# Patient Record
Sex: FEMALE | Race: WHITE | NOT HISPANIC OR LATINO | Employment: FULL TIME | URBAN - METROPOLITAN AREA
[De-identification: names, ages, dates, MRNs, and addresses within clinical notes are randomized per-mention and may not be internally consistent; named-entity substitution may affect disease eponyms.]

---

## 2017-01-04 ENCOUNTER — OFFICE VISIT (OUTPATIENT)
Dept: PHYSICAL MEDICINE AND REHAB | Facility: CLINIC | Age: 67
End: 2017-01-04
Payer: COMMERCIAL

## 2017-01-04 VITALS — BODY MASS INDEX: 43.19 KG/M2 | HEIGHT: 60 IN | WEIGHT: 220 LBS

## 2017-01-04 DIAGNOSIS — M17.12 PRIMARY OSTEOARTHRITIS OF LEFT KNEE: Primary | ICD-10-CM

## 2017-01-04 PROCEDURE — 99499 UNLISTED E&M SERVICE: CPT | Mod: S$GLB,,, | Performed by: PHYSICAL MEDICINE & REHABILITATION

## 2017-01-04 PROCEDURE — 20611 DRAIN/INJ JOINT/BURSA W/US: CPT | Mod: S$GLB,,, | Performed by: PHYSICAL MEDICINE & REHABILITATION

## 2017-01-04 PROCEDURE — 99999 PR PBB SHADOW E&M-EST. PATIENT-LVL II: CPT | Mod: PBBFAC,,, | Performed by: PHYSICAL MEDICINE & REHABILITATION

## 2017-01-04 NOTE — PROGRESS NOTES
[facility]    Consulting Provider: Dr. Nicholas Peter    CHIEF COMPLAINT:   Chief Complaint   Patient presents with    Knee Pain     left knee synvisc one     HISTORY OF PRESENT ILLNESS: Kenna Schmitt is a 66 y.o. female who presents to me in follow-up for her left knee pain.  She has been doing physical therapy and reports improvements and reduction in pain.  She is now doing a home exercise program.  Her pain level today is a 1 on a scale of 1-10.  She does note some mild swelling about the left knee.  There is no excess redness or warmth, or recent illnesses.    Previous history of present illness  The pain started about one month ago insidiously.  There was no known trauma or injury event.  She rates her pain as a 3 on a scale of 1-10, however the pain may increase with increased physical exertion.  She reports the knee is felt swollen at times.  She denies any excess redness or warmth about the knee.  She has been applying heat intermittently which seems to help temporarily.  She has been using Aleve and diclofenac with little relief.  She locates the pain most prominently over the medial aspect of the left knee.  The pain is achy in nature.    Review of Systems   Constitutional: Negative for fever.   HENT: Negative for drooling.    Eyes: Negative for discharge.   Respiratory: Negative for choking.    Cardiovascular: Negative for chest pain.   Genitourinary: Negative for flank pain.   Skin: Negative for wound.   Allergic/Immunologic: Negative for immunocompromised state.   Neurological: Negative for tremors and syncope.   Psychiatric/Behavioral: Negative for behavioral problems.     Past Medical History:   Past Medical History   Diagnosis Date    GERD (gastroesophageal reflux disease)     Hyperlipidemia     Hypertension     Thyroid disease 1975     Thyroiditis       Past Surgical History:   Past Surgical History   Procedure Laterality Date    Breast surgery  2014     breast  implants     Breast surgery  1989     breast implants    Cholecystectomy      Liposuction      Colonoscopy N/A 11/23/2016     Procedure: COLONOSCOPY;  Surgeon: Anoop Buck MD;  Location: [facility];  Service: Endoscopy;  Laterality: N/A;       Family History:   Family History   Problem Relation Age of Onset    Dementia Mother     Heart disease Father      heart valve    Dementia Father     Cancer Maternal Aunt      lung cancer       Medications:   Current Outpatient Prescriptions on File Prior to Visit   Medication Sig Dispense Refill    amlodipine (NORVASC) 5 MG tablet Take 1 tablet (5 mg total) by mouth once daily. 90 tablet 3    atorvastatin (LIPITOR) 20 MG tablet TAKE 1 TABLET BY MOUTH EVERY DAY 90 tablet 3    b complex vitamins capsule Take 1 capsule by mouth once daily.      chlorthalidone (HYGROTEN) 25 MG Tab Take 1 tablet (25 mg total) by mouth once daily. 30 tablet 11    cholecalciferol, vitamin D3, (VITAMIN D3) 2,000 unit Cap Take 1 capsule by mouth once daily.      diclofenac (VOLTAREN) 75 MG EC tablet Take 1 tablet (75 mg total) by mouth 3 (three) times daily. 30 tablet 0    levothyroxine (SYNTHROID) 100 MCG tablet TAKE 1 TABLET BY MOUTH ONCE DAILY 90 tablet 2    omeprazole (PRILOSEC) 20 MG capsule TAKE ONE CAPSULE BY MOUTH DAILY 30 capsule 6    valacyclovir (VALTREX) 500 MG tablet Take 500 mg by mouth 2 (two) times daily. PRN fever blisters       No current facility-administered medications on file prior to visit.        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  Has recently started an exercise program.     PHYSICAL EXAMINATION:   General    Vitals:    01/04/17 0906   Weight: 99.8 kg (220 lb)   Height: 5' (1.524 m)     Constitutional: Oriented to person, place, and time. No apparent distress. Appears well-developed and well-nourished. Pleasant.  HENT:   Head: Normocephalic and atraumatic.   Eyes: Right eye exhibits no discharge. Left eye exhibits no discharge. No scleral icterus.   Pulmonary/Chest: Effort normal. No respiratory distress.   Abdominal: There is no guarding.   Neurological: Alert and oriented to person, place, and time.   Psychiatric: Behavior is normal.   Right Knee Exam   Right knee exam is normal.    Tenderness   The patient is experiencing no tenderness.         Range of Motion   Extension: normal   Flexion: normal     Muscle Strength     The patient has normal right knee strength.    Tests   Juan Carlos:  Medial - negative Lateral - negative  Varus: negative  Valgus: negative    Other   Erythema: absent  Scars: absent  Sensation: normal  Pulse: present  Swelling: none  Other tests: no effusion present      Left Knee Exam     Tenderness   The patient is experiencing tenderness in the medial joint line.    Range of Motion   Extension: 0   Flexion: 130     Tests   Juan Carlos:  Medial - negative Lateral - negative  Lachman:  Anterior - negative    Posterior - negative  Drawer:       Anterior - negative     Posterior - negative  Varus: negative  Valgus: negative  Patellar Apprehension: negative    Other   Erythema: absent  Scars: absent (On)  Sensation: normal  Pulse: present  Swelling: none  Effusion: effusion present        INSPECTION: There is no swelling, ecchymoses, erythema or gross deformity about the left knee.  GAIT/DYNAMIC: Her gait is  slightly antalgic.    Imaging  X-ray of left knee from 10/31/2016: There are no fractures or soft tissue abnormalities.  There are mild degenerative changes present.    Data Reviewed: X-ray    Supportive Actions: Independent visualization of images or test specimens    ASSESSMENT:   1. Primary osteoarthritis of left knee      PLAN:     1.  We performed the left knee injection of Synvisc 1 today without issue.  See separate procedure note.  25 cc of serous drainage was taken from the left knee.    2.  Continue her home exercise program to maintain long-term strength about the left knee.    3. RTC when necessary.    The above note was completed, in part, with the aid of Dragon dictation software/hardware. Translation errors may be present.

## 2017-01-04 NOTE — PROCEDURES
Large Joint Aspiration/Injection  Date/Time: 1/4/2017 9:56 AM  Performed by: PONCHO ZARATE  Authorized by: PONCHO ZARATE     Consent Done?:  Yes (Verbal)  Indications:  Pain  Procedure site marked: Yes    Timeout: Prior to procedure the correct patient, procedure, and site was verified      Location:  Knee  Site:  L knee  Prep: Patient was prepped and draped in usual sterile fashion    Ultrasonic Guidance for needle placement: Yes  Images are saved and documented.  Needle size:  18 G  Approach: Needle in plane, superior-lateral.  Medications:  48 mg hylan g-f 20 48 mg/6 mL  Aspirate amount (ml):  25  Aspirate:  Serous  Patient tolerance:  Patient tolerated the procedure well with no immediate complications    Additional Comments: Ultrasound guidance was used for correct needle placement, the images were saved will be uploaded to EMR.

## 2017-01-05 ENCOUNTER — TELEPHONE (OUTPATIENT)
Dept: PHYSICAL MEDICINE AND REHAB | Facility: CLINIC | Age: 67
End: 2017-01-05

## 2017-01-05 NOTE — TELEPHONE ENCOUNTER
----- Message from Tiffany Nash sent at 1/5/2017 12:47 PM CST -----  Contact: Dr. Mercedes - Peer reviewer for [facility]  Dr. Mercedes is requesting a call back concerning the peer review for the above patient contact him at [phone] cell. He stated turn around time ends this afternoon.    Thank you

## 2017-01-05 NOTE — TELEPHONE ENCOUNTER
Dr jamil spoke with Dr Mercedes at [facility] [place] for approval. The synvisc One injection was denied because the patient has not tried and failed cortisone injections. I did go over the fact that the synvisc had not yet been approved at the time the patient arrived for the injection. I advised her there was a chance this injection would not be approved. Rather than reschedule the appt the patient decided to go forward with the injection understanding that she could in fact be responsible for the entire amount of the injection. I called her today to let her know that the injection has in fact been denied and she will be responsible for the amount of the injection. Patient voiced understanding and asked to be billed for the injection.

## 2017-01-22 ENCOUNTER — PATIENT MESSAGE (OUTPATIENT)
Dept: PHYSICAL MEDICINE AND REHAB | Facility: CLINIC | Age: 67
End: 2017-01-22

## 2017-01-23 ENCOUNTER — PATIENT MESSAGE (OUTPATIENT)
Dept: PHYSICAL MEDICINE AND REHAB | Facility: CLINIC | Age: 67
End: 2017-01-23

## 2017-01-23 ENCOUNTER — TELEPHONE (OUTPATIENT)
Dept: PHYSICAL MEDICINE AND REHAB | Facility: CLINIC | Age: 67
End: 2017-01-23

## 2017-01-23 NOTE — TELEPHONE ENCOUNTER
Spoke with patient- she confirms that there is no redness or swelling. She did get some relief initially but she felt that was because you removed fluid off of the knee. I reassured her that I would not count the synvisc out just yet. I explained that in some patients it can take up to 6 weeks to feel the full effects. I instructed her to continue with the aleve as directed on the bottle- she was just taking a pill here and there. I instructed her to use it as directed for the next couple of weeks and to use Ice as well for comfort. Continue her exercise regimen and return to clinic if not better in 3 weeks. Pt voiced understanding. She will take antiinflammatories, exercise and use ice- she will get back in touch with me in 3 weeks for a report of progress.

## 2017-01-26 ENCOUNTER — PATIENT MESSAGE (OUTPATIENT)
Dept: PHYSICAL MEDICINE AND REHAB | Facility: CLINIC | Age: 67
End: 2017-01-26

## 2017-01-26 ENCOUNTER — TELEPHONE (OUTPATIENT)
Dept: PHYSICAL MEDICINE AND REHAB | Facility: CLINIC | Age: 67
End: 2017-01-26

## 2017-01-26 DIAGNOSIS — G89.29 CHRONIC PAIN OF LEFT KNEE: Primary | ICD-10-CM

## 2017-01-26 DIAGNOSIS — M25.562 CHRONIC PAIN OF LEFT KNEE: Primary | ICD-10-CM

## 2017-01-26 RX ORDER — TRAMADOL HYDROCHLORIDE 50 MG/1
50 TABLET ORAL EVERY 6 HOURS PRN
Qty: 45 TABLET | Refills: 0 | Status: SHIPPED | OUTPATIENT
Start: 2017-01-26 | End: 2017-02-09

## 2017-01-26 NOTE — TELEPHONE ENCOUNTER
Patient states she is in great pain which started 2 days ago. She states it is painful when she bears weight. No redness and swelling. She has been taking aleve. But it doesn't seem to help the pain. She works from home and would be able to take some pain medication if you wanted to give her something. She is willing to get an MRI but it will have to be approved by insurance.I put in order for the MRI and will schedule. Please send rx to pharmacy for pain.

## 2017-01-30 ENCOUNTER — OFFICE VISIT (OUTPATIENT)
Dept: ORTHOPEDICS | Facility: CLINIC | Age: 67
End: 2017-01-30
Payer: COMMERCIAL

## 2017-01-30 ENCOUNTER — TELEPHONE (OUTPATIENT)
Dept: PHYSICAL MEDICINE AND REHAB | Facility: CLINIC | Age: 67
End: 2017-01-30

## 2017-01-30 VITALS — BODY MASS INDEX: 43.19 KG/M2 | HEIGHT: 60 IN | WEIGHT: 220 LBS

## 2017-01-30 DIAGNOSIS — M87.9 OSTEONECROSIS OF LEFT KNEE REGION: Primary | ICD-10-CM

## 2017-01-30 PROCEDURE — 99999 PR PBB SHADOW E&M-EST. PATIENT-LVL II: CPT | Mod: PBBFAC,,, | Performed by: ORTHOPAEDIC SURGERY

## 2017-01-30 PROCEDURE — 99244 OFF/OP CNSLTJ NEW/EST MOD 40: CPT | Mod: S$GLB,,, | Performed by: ORTHOPAEDIC SURGERY

## 2017-01-30 NOTE — TELEPHONE ENCOUNTER
----- Message from Nicholas Peter MD sent at 1/30/2017  8:36 AM CST -----  Please give her a call and let her know the MRI showed arthritis and an there is an area of the bone that is inflammed. I recommend she she one of our ortho surgeons to discuss options. If she decides against surgery, we could consider PRP/stem cells.

## 2017-01-30 NOTE — PROGRESS NOTES
DATE: 1/30/2017  PATIENT: Kenna Schmitt  REFERRING MD: Nicholas Peter M.D.  CHIEF COMPLAINT:   Chief Complaint   Patient presents with    Left Knee - Pain       HISTORY:  Kenna Schmitt is a 66 y.o. female  who is referred by Dr. Peter for evaluation of left knee pain.  States that she notes a 3-4 month history of discomfort when she was up on a ladder and developed pain.  States the pain has progressively been getting worse.  She saw Dr. Peter who recommended a series of Synvisc injections.  They provided no relief.  She did undergo an MRI which showed osteonecrosis in the medial femoral condyle with significant edema in the medial femoral condyle as well.  She is now referred for evaluation.  She takes over-the-counter pain medicine.  She did try tramadol did not provide any improvement.  She is employed selling outdoor gentleman bleacher equipment.  She reports her pain at 10/10 today.  PAST MEDICAL/SURGICAL HISTORY:  Past Medical History   Diagnosis Date    GERD (gastroesophageal reflux disease)     Hyperlipidemia     Hypertension     Thyroid disease 1975     Thyroiditis     Past Surgical History   Procedure Laterality Date    Breast surgery  2014     breast implants     Breast surgery  1989     breast implants    Cholecystectomy      Liposuction      Colonoscopy N/A 11/23/2016     Procedure: COLONOSCOPY;  Surgeon: Anoop Buck MD;  Location: [facility];  Service: Endoscopy;  Laterality: N/A;       Current Medications:   Current Outpatient Prescriptions:     amlodipine (NORVASC) 5 MG tablet, Take 1 tablet (5 mg total) by mouth once daily., Disp: 90 tablet, Rfl: 3    atorvastatin (LIPITOR) 20 MG tablet, TAKE 1 TABLET BY MOUTH EVERY DAY, Disp: 90 tablet, Rfl: 3    b complex vitamins capsule, Take 1 capsule by mouth once daily., Disp: , Rfl:     chlorthalidone (HYGROTEN) 25 MG Tab, Take 1 tablet (25 mg total) by mouth once daily., Disp: 30 tablet, Rfl: 11    cholecalciferol, vitamin  D3, (VITAMIN D3) 2,000 unit Cap, Take 1 capsule by mouth once daily., Disp: , Rfl:     diclofenac (VOLTAREN) 75 MG EC tablet, Take 1 tablet (75 mg total) by mouth 3 (three) times daily., Disp: 30 tablet, Rfl: 0    levothyroxine (SYNTHROID) 100 MCG tablet, TAKE 1 TABLET BY MOUTH ONCE DAILY, Disp: 90 tablet, Rfl: 2    omeprazole (PRILOSEC) 20 MG capsule, TAKE ONE CAPSULE BY MOUTH DAILY, Disp: 30 capsule, Rfl: 6    tramadol (ULTRAM) 50 mg tablet, Take 1 tablet (50 mg total) by mouth every 6 (six) hours as needed for Pain., Disp: 45 tablet, Rfl: 0    valacyclovir (VALTREX) 500 MG tablet, Take 500 mg by mouth 2 (two) times daily. 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  Has recently started an exercise program.       ROS:  Constitution: Negative for chills, fever, and sweats. Negative for unexplained weight loss.  HENT: Negative for headaches and blurry vision.   Cardiovascular: Negative for chest pain, irregular heartbeat, leg swelling and palpitations.   Respiratory: Negative for cough and shortness of breath.   Gastrointestinal: Negative for abdominal pain, heartburn, nausea and vomiting.   Genitourinary: Negative for bladder incontinence and dysuria.   Musculoskeletal: Negative for systemic arthritis, muscle weakness and myalgias.   Neurological: Negative for numbness.   Psychiatric/Behavioral: Negative for depression.  Endocrine: Negative for polyuria.   Hematologic/Lymphatic:  Negative for bleeding disorders.   Skin: Negative for poor wound healing.        PHYSICAL EXAM:  Visit Vitals    Ht 5' (1.524 m)    Wt 99.8 kg (220 lb)    BMI 42.97 kg/m2     Kenna Schmitt is a well developed, well nourished female in no acute distress. Physical examination of the left knee evaluated the following:    Gait and Alignment  Inspection for ecchymosis, swelling, atrophy, or deformity  Inspection for intra-articular and/or bursal effusions  Tenderness to palpation over the bony and soft tissue structures around the knee  Range of Motion and presence of extensor lag/contractures  Sensation and motor strength  Varus/valgus or anterior/posterior/rotatory instability  Flexion pinch and Juan Carlos's Tests  Patellar alignment/tracking/pain to palpation  Vascular exam to include skin temperature/color/capillary refill    Remarkable findings included:  Normal alignment.  Elevated BMI.  No effusion noted.  Moderate medial joint line tenderness and tenderness over the medial femoral condyle.  Range of motion 0-120°.  No gross instability on exam.  Positive flexion pinch.    IMAGING:   X-rays and MRI of the left knee are reviewed.  No acute fractures are seen.  X-rays show minimal degenerative changes without joint space narrowing.  MRI shows significant bone edema in the medial femoral condyle with an area of osteonecrosis which appears to be a detached fragment.    ASSESSMENT:   Osteonecrosis medial femoral condyle left knee    PLAN:  The nature of the diagnosis, using models and diagrams when appropriate, was explained to the patient in detail.Treatment option discussed included observation, cortisone injection, arthroscopy, arthroplasty.  As she has not responded to conservative treatment over the last 2-3 months, I do think arthroscopy and debridement is the first step.  I have explained that this may not fully resolve the issue would be diagnostic and potentially therapeutic.  Surgical procedure was explained  in detail.. All questions answered and the patient wishes to think about her options.  She'll contact the office should she wish to proceed with arthroscopy.  I'll wait to hear from the patient regarding further care..      This note was dictated using voice recognition software and may contain grammatical errors

## 2017-01-30 NOTE — LETTER
January 30, 2017      Nicholas Peter MD  [address] [facility] [address] [place] [address]           [facility]  [address] [facility] [address] [place] [address]  Phone: [phone]          Patient: Kenna Schmitt   MR Number: [id number]   YOB: 1950   Date of Visit: 1/30/2017       Dear Dr. Nicholas Peter:    Thank you for referring Kenna Schmitt to me for evaluation. Attached you will find relevant portions of my assessment and plan of care.    If you have questions, please do not hesitate to call me. I look forward to following Kenna Schmitt along with you.    Sincerely,    Kirby Dale MD    Enclosure  CC:  No Recipients    If you would like to receive this communication electronically, please contact [email or web address] or [phone] to request more information on [facility] access.    For providers and/or their staff who would like to refer a patient to [facility], please contact us through our one-stop-shop provider referral line, [facility], at [phone].    If you feel you have received this communication in error or would no longer like to receive these types of communications, please e-mail [email or web address]

## 2017-02-06 ENCOUNTER — OFFICE VISIT (OUTPATIENT)
Dept: FAMILY MEDICINE | Facility: CLINIC | Age: 67
End: 2017-02-06
Payer: COMMERCIAL

## 2017-02-06 ENCOUNTER — OFFICE VISIT (OUTPATIENT)
Dept: ORTHOPEDICS | Facility: CLINIC | Age: 67
End: 2017-02-06
Payer: COMMERCIAL

## 2017-02-06 VITALS
TEMPERATURE: 98 F | HEIGHT: 60 IN | WEIGHT: 217.13 LBS | OXYGEN SATURATION: 96 % | BODY MASS INDEX: 42.63 KG/M2 | SYSTOLIC BLOOD PRESSURE: 130 MMHG | DIASTOLIC BLOOD PRESSURE: 74 MMHG | HEART RATE: 82 BPM

## 2017-02-06 VITALS — BODY MASS INDEX: 43.19 KG/M2 | HEIGHT: 60 IN | WEIGHT: 220 LBS

## 2017-02-06 DIAGNOSIS — M25.569 KNEE PAIN, UNSPECIFIED CHRONICITY, UNSPECIFIED LATERALITY: ICD-10-CM

## 2017-02-06 DIAGNOSIS — M87.9 OSTEONECROSIS OF LEFT KNEE REGION: Primary | ICD-10-CM

## 2017-02-06 DIAGNOSIS — R05.9 COUGH: Primary | ICD-10-CM

## 2017-02-06 DIAGNOSIS — I10 ESSENTIAL HYPERTENSION: ICD-10-CM

## 2017-02-06 DIAGNOSIS — R09.81 NASAL CONGESTION: ICD-10-CM

## 2017-02-06 PROCEDURE — 1157F ADVNC CARE PLAN IN RCRD: CPT | Mod: S$GLB,,, | Performed by: NURSE PRACTITIONER

## 2017-02-06 PROCEDURE — 1159F MED LIST DOCD IN RCRD: CPT | Mod: S$GLB,,, | Performed by: NURSE PRACTITIONER

## 2017-02-06 PROCEDURE — 3078F DIAST BP <80 MM HG: CPT | Mod: S$GLB,,, | Performed by: ORTHOPAEDIC SURGERY

## 2017-02-06 PROCEDURE — 99214 OFFICE O/P EST MOD 30 MIN: CPT | Mod: S$GLB,,, | Performed by: ORTHOPAEDIC SURGERY

## 2017-02-06 PROCEDURE — 1126F AMNT PAIN NOTED NONE PRSNT: CPT | Mod: S$GLB,,, | Performed by: ORTHOPAEDIC SURGERY

## 2017-02-06 PROCEDURE — 99999 PR PBB SHADOW E&M-EST. PATIENT-LVL II: CPT | Mod: PBBFAC,,, | Performed by: ORTHOPAEDIC SURGERY

## 2017-02-06 PROCEDURE — 3078F DIAST BP <80 MM HG: CPT | Mod: S$GLB,,, | Performed by: NURSE PRACTITIONER

## 2017-02-06 PROCEDURE — 99999 PR PBB SHADOW E&M-EST. PATIENT-LVL III: CPT | Mod: PBBFAC,,, | Performed by: NURSE PRACTITIONER

## 2017-02-06 PROCEDURE — 3074F SYST BP LT 130 MM HG: CPT | Mod: S$GLB,,, | Performed by: ORTHOPAEDIC SURGERY

## 2017-02-06 PROCEDURE — 1126F AMNT PAIN NOTED NONE PRSNT: CPT | Mod: S$GLB,,, | Performed by: NURSE PRACTITIONER

## 2017-02-06 PROCEDURE — 99213 OFFICE O/P EST LOW 20 MIN: CPT | Mod: S$GLB,,, | Performed by: NURSE PRACTITIONER

## 2017-02-06 PROCEDURE — 1157F ADVNC CARE PLAN IN RCRD: CPT | Mod: S$GLB,,, | Performed by: ORTHOPAEDIC SURGERY

## 2017-02-06 PROCEDURE — 1160F RVW MEDS BY RX/DR IN RCRD: CPT | Mod: S$GLB,,, | Performed by: NURSE PRACTITIONER

## 2017-02-06 PROCEDURE — 3075F SYST BP GE 130 - 139MM HG: CPT | Mod: S$GLB,,, | Performed by: NURSE PRACTITIONER

## 2017-02-06 PROCEDURE — 1160F RVW MEDS BY RX/DR IN RCRD: CPT | Mod: S$GLB,,, | Performed by: ORTHOPAEDIC SURGERY

## 2017-02-06 PROCEDURE — 1159F MED LIST DOCD IN RCRD: CPT | Mod: S$GLB,,, | Performed by: ORTHOPAEDIC SURGERY

## 2017-02-06 NOTE — PROGRESS NOTES
Subjective:       Patient ID: Kenna Schmitt is a 66 y.o. female.    Chief Complaint: Cough (cough for a week. Non productive cough.Been taking Nyquil.)    Cough   This is a new problem. The current episode started in the past 7 days. The problem has been gradually improving (feeling better ). The cough is productive of sputum. Associated symptoms include postnasal drip and rhinorrhea. Pertinent negatives include no chest pain, chills, ear congestion, ear pain, fever, headaches, heartburn, hemoptysis, myalgias, nasal congestion, rash, sore throat, shortness of breath, sweats, weight loss or wheezing. Nothing aggravates the symptoms. Treatments tried: nyquil  The treatment provided no relief. There is no history of asthma, bronchiectasis, bronchitis, COPD, emphysema, environmental allergies or pneumonia.     Vitals:    02/06/17 1410   BP: 130/74   Pulse: 82   Temp: 98.2 °F (36.8 °C)     Review of Systems   Constitutional: Negative.  Negative for chills, diaphoresis, fatigue, fever and weight loss.   HENT: Positive for congestion, postnasal drip and rhinorrhea. Negative for ear pain and sore throat.    Eyes: Negative.    Respiratory: Positive for cough. Negative for hemoptysis, shortness of breath and wheezing.    Cardiovascular: Negative.  Negative for chest pain.   Gastrointestinal: Negative.  Negative for abdominal pain, diarrhea, heartburn and nausea.   Endocrine: Negative.    Genitourinary: Negative.  Negative for dysuria and hematuria.   Musculoskeletal: Negative.  Negative for myalgias.   Skin: Negative.  Negative for color change and rash.   Allergic/Immunologic: Negative.  Negative for environmental allergies.   Neurological: Negative.  Negative for speech difficulty, numbness and headaches.   Hematological: Negative.    Psychiatric/Behavioral: Negative.        Past Medical History   Diagnosis Date    GERD (gastroesophageal reflux disease)     Hyperlipidemia     Hypertension     Thyroid disease 1975      Thyroiditis     Objective:      Physical Exam   Constitutional: She is oriented to person, place, and time. She appears well-developed and well-nourished.   HENT:   Head: Normocephalic and atraumatic.   Right Ear: Hearing, tympanic membrane and ear canal normal.   Left Ear: Hearing, tympanic membrane and ear canal normal.   Nose: Mucosal edema and rhinorrhea present. Right sinus exhibits no maxillary sinus tenderness and no frontal sinus tenderness. Left sinus exhibits no maxillary sinus tenderness and no frontal sinus tenderness.   Mouth/Throat: Oropharynx is clear and moist.   Eyes: Conjunctivae and EOM are normal. Pupils are equal, round, and reactive to light.   Neck: Neck supple.   Cardiovascular: Normal rate, regular rhythm, normal heart sounds and intact distal pulses.  Exam reveals no friction rub.    No murmur heard.  Pulmonary/Chest: Effort normal and breath sounds normal. No respiratory distress. She has no wheezes. She has no rales.   Abdominal: Soft. Bowel sounds are normal.   Musculoskeletal: Normal range of motion.   Neurological: She is alert and oriented to person, place, and time.   Skin: Skin is warm and dry.   Psychiatric: She has a normal mood and affect. Her behavior is normal. Judgment and thought content normal.   Nursing note and vitals reviewed.      Assessment:       1. Cough    2. Nasal congestion    3. Essential hypertension    4. Knee pain, unspecified chronicity, unspecified laterality        Plan:       Cough    Nasal congestion  Discussed viral illness   As long as no worsening in cough, congestion or fever - will be stable for surgery     Essential hypertension  Stable       Knee pain, unspecified chronicity, unspecified laterality  Having surgery with Dr Dale         Discussed if any increase in cough, congestion- needs to call back by Wednesday   Can keep taking nyquil for help with antihistamine effect and cough

## 2017-02-07 NOTE — PROGRESS NOTES
DATE: 2/7/2017  PATIENT: Kenna Schmitt    Attending Physician: Kirby Dale M.D.    HISTORY:  Kenna Schmitt is a 66 y.o. female who returns for follow up evaluation of  her left knee.  She is seen last week and diagnosed with osteonecrosis of the medial femoral condyle.  I recommended arthroscopic debridement as a first stage procedure.  She had thought about her options and wishes to discuss this further as she continues to remain in moderate discomfort.    PMH/PSH/FamHx/SocHx:  Reviewed and unchanged from prior visit    ROS:  Constitution: Negative for chills, fever, and sweats. Negative for unexplained weight loss.  HENT: Negative for headaches and blurry vision.   Cardiovascular: Negative for chest pain, irregular heartbeat, leg swelling and palpitations.   Respiratory: Negative for cough and shortness of breath.   Gastrointestinal: Negative for abdominal pain, heartburn, nausea and vomiting.   Genitourinary: Negative for bladder incontinence and dysuria.   Musculoskeletal: Negative for systemic arthritis, joint swelling, muscle weakness and myalgias.   Neurological: Negative for numbness.   Psychiatric/Behavioral: Negative for depression.   Endocrine: Negative for polyuria.   Hematologic/Lymphatic: Negative for bleeding disorders.  Skin: Negative for poor wound healing.       EXAM:  Visit Vitals    Ht 5' (1.524 m)    Wt 99.8 kg (220 lb)    BMI 42.97 kg/m2     Consitiutional: Well developed, well nourished female in no acute distress.  HEENT: Normocephalic, atraumatic.   Neck: Supple.  Chest: Breath sounds equal.  Cor: Regular, rate and rhythm.   Abdomen: Soft, nontender, nondistended. Without rebound or guarding.  Neuro: Alert, oriented x3. Nonfocal.   Rectal/GYN: Deferred.  Physical examination of the left knee evaluated the following:     Gait and Alignment  Inspection for ecchymosis, swelling, atrophy, or deformity  Inspection for intra-articular and/or bursal effusions  Tenderness to palpation over  the bony and soft tissue structures around the knee  Range of Motion and presence of extensor lag/contractures  Sensation and motor strength  Varus/valgus or anterior/posterior/rotatory instability  Flexion pinch and Juan Carlos's Tests  Patellar alignment/tracking/pain to palpation  Vascular exam to include skin temperature/color/capillary refill     Remarkable findings included:  Normal alignment. Elevated BMI. No effusion noted. Moderate medial joint line tenderness and tenderness over the medial femoral condyle. Range of motion 0-120°. No gross instability on exam. Positive flexion pinch.       IMAGING:   No new x-rays are performed today.  However previous MRI does show stiff and osteonecrosis in the medial femoral condyle.    ASSESSMENT:  Osteonecrosis medial femoral condyle left knee.    PLAN:  The implications of the patient's evolution of symptoms and findings were explained to the patient in detail.  I recommended arthroscopy with debridement and possible microfracture of medial femoral condyle as a first stage procedure.The surgical procedure including potential risks and benefits was discussed in detail. Specific risks discussed included but were not limited to: infection, the possibility of a negative arthroscopic exam, arthroscopy failing to reveal any surgically treatable abnormalities (such as arthritic changes), failure to relieve symptoms, recurrence, nerve injury resulting in permanent numbness or weakness, blood vessel damage requiring repair and/or hospitalization, permanent stiffness, failure to achieve full joint mobility, permanent weakness, extensive and time consuming therapy, deep venous thrombosis and pulmonary embolism, and anesthesia related risks to be discussed with the anesthesiologist.  . All questions answered and the patient wishes to proceed with arthroscopy.  Informed consent obtained and signed.  We'll obtain medical clearance as she does have evidence of sinusitis and congestion.   Follow-up at the time of surgery..          This note was dictated using voice recognition software and may contain grammatical errors  Answers for HPI/ROS submitted by the patient on 2/5/2017   Leg pain  neck pain: Yes, No  unexpected weight change: No  hearing loss: No  rhinorrhea: No  trouble swallowing: No  eye discharge: No  visual disturbance: No  chest tightness: No  wheezing: No  chest pain: No  palpatations: No  blood in stool: No  constipation: No  vomiting: No  diarrhea: No  polydipsia: No  polyuria: No  difficulty urinating: No  hematuria: No  menstrual problem: No  dysuria: No  joint swelling: No  headaches: No  weakness: No  confusion: No  dysphoric mood: No  appetite change : No  sleep disturbance: No  IMMUNOCOMPROMISED: No  nervous/ anxious: No  rash: No  eye redness: No  eye pain: No  ear pain: No  tinnitus: No  sinus pressure : Yes  nosebleeds: No  enviro allergies: No  food allergies: Yes  cough: Yes  shortness of breath: No  sweating: No  frequency: No  painful intercourse: No  nausea: No  dizziness: No  numbness: No  seizures: No  myalgia: No  back pain: No  Pain Chronicity: chronic  History of trauma: No  Onset: more than 1 month ago  Frequency: daily  Progression since onset: rapidly worsening  injury location: at home  pain- numeric: 8/10  pain location: left knee  pain quality: sharp  Radiating Pain: Yes  If your pain is radiating, to what part of the body?: left flank  Aggravating factors: bearing weight  fever: No  inability to bear weight: Yes  itching: No  joint locking: No  limited range of motion: Yes  stiffness: Yes  tingling: No  Treatments tried: injection treatment  physical therapy: effective  Improvement on treatment: mild

## 2017-02-09 ENCOUNTER — PATIENT MESSAGE (OUTPATIENT)
Dept: ORTHOPEDICS | Facility: CLINIC | Age: 67
End: 2017-02-09

## 2017-02-09 ENCOUNTER — ANESTHESIA EVENT (OUTPATIENT)
Dept: SURGERY | Facility: HOSPITAL | Age: 67
End: 2017-02-09
Payer: COMMERCIAL

## 2017-02-09 NOTE — PLAN OF CARE
Problem: Patient Care Overview  Goal: Plan of Care Review  HTN  Osteonecrosis of left knee region   Left knee pain

## 2017-02-10 ENCOUNTER — SURGERY (OUTPATIENT)
Age: 67
End: 2017-02-10

## 2017-02-10 ENCOUNTER — HOSPITAL ENCOUNTER (OUTPATIENT)
Facility: HOSPITAL | Age: 67
Discharge: HOME | End: 2017-02-10
Attending: ORTHOPAEDIC SURGERY | Admitting: ORTHOPAEDIC SURGERY
Payer: COMMERCIAL

## 2017-02-10 ENCOUNTER — ANESTHESIA (OUTPATIENT)
Dept: SURGERY | Facility: HOSPITAL | Age: 67
End: 2017-02-10
Payer: COMMERCIAL

## 2017-02-10 DIAGNOSIS — M87.9 OSTEONECROSIS OF LEFT KNEE REGION: ICD-10-CM

## 2017-02-10 DIAGNOSIS — S83.232A COMPLEX TEAR OF MEDIAL MENISCUS OF LEFT KNEE AS CURRENT INJURY, INITIAL ENCOUNTER: ICD-10-CM

## 2017-02-10 PROBLEM — M22.42 CHONDROMALACIA PATELLAE OF LEFT KNEE: Status: ACTIVE | Noted: 2017-02-10

## 2017-02-10 PROCEDURE — 71000033 HC RECOVERY, INTIAL HOUR: Mod: PO | Performed by: ORTHOPAEDIC SURGERY

## 2017-02-10 PROCEDURE — 29879 ARTHRS KNE SRG ABRASJ ARTHRP: CPT | Mod: LT,,, | Performed by: ORTHOPAEDIC SURGERY

## 2017-02-10 PROCEDURE — 25000003 PHARM REV CODE 250: Mod: PO | Performed by: ANESTHESIOLOGY

## 2017-02-10 PROCEDURE — 25000003 PHARM REV CODE 250: Mod: PO | Performed by: ORTHOPAEDIC SURGERY

## 2017-02-10 PROCEDURE — 29881 ARTHRS KNE SRG MNISECTMY M/L: CPT | Mod: 51,LT,, | Performed by: ORTHOPAEDIC SURGERY

## 2017-02-10 PROCEDURE — 36000711: Mod: PO | Performed by: ORTHOPAEDIC SURGERY

## 2017-02-10 PROCEDURE — D9220A PRA ANESTHESIA: Mod: ANES,,, | Performed by: ANESTHESIOLOGY

## 2017-02-10 PROCEDURE — 25000003 PHARM REV CODE 250: Mod: PO | Performed by: NURSE ANESTHETIST, CERTIFIED REGISTERED

## 2017-02-10 PROCEDURE — 71000039 HC RECOVERY, EACH ADD'L HOUR: Mod: PO | Performed by: ORTHOPAEDIC SURGERY

## 2017-02-10 PROCEDURE — 27201423 OPTIME MED/SURG SUP & DEVICES STERILE SUPPLY: Mod: PO | Performed by: ORTHOPAEDIC SURGERY

## 2017-02-10 PROCEDURE — 63600175 PHARM REV CODE 636 W HCPCS: Mod: PO | Performed by: ANESTHESIOLOGY

## 2017-02-10 PROCEDURE — 37000009 HC ANESTHESIA EA ADD 15 MINS: Mod: PO | Performed by: ORTHOPAEDIC SURGERY

## 2017-02-10 PROCEDURE — 63600175 PHARM REV CODE 636 W HCPCS: Mod: PO | Performed by: NURSE ANESTHETIST, CERTIFIED REGISTERED

## 2017-02-10 PROCEDURE — 36000710: Mod: PO | Performed by: ORTHOPAEDIC SURGERY

## 2017-02-10 PROCEDURE — 63600175 PHARM REV CODE 636 W HCPCS: Mod: PO | Performed by: ORTHOPAEDIC SURGERY

## 2017-02-10 PROCEDURE — 37000008 HC ANESTHESIA 1ST 15 MINUTES: Mod: PO | Performed by: ORTHOPAEDIC SURGERY

## 2017-02-10 PROCEDURE — 27200651 HC AIRWAY, LMA: Mod: PO | Performed by: NURSE ANESTHETIST, CERTIFIED REGISTERED

## 2017-02-10 PROCEDURE — D9220A PRA ANESTHESIA: Mod: CRNA,,, | Performed by: NURSE ANESTHETIST, CERTIFIED REGISTERED

## 2017-02-10 RX ORDER — PROPOFOL 10 MG/ML
VIAL (ML) INTRAVENOUS
Status: DISCONTINUED | OUTPATIENT
Start: 2017-02-10 | End: 2017-02-10

## 2017-02-10 RX ORDER — CLINDAMYCIN PHOSPHATE 900 MG/50ML
900 INJECTION, SOLUTION INTRAVENOUS
Status: DISCONTINUED | OUTPATIENT
Start: 2017-02-10 | End: 2017-02-10 | Stop reason: HOSPADM

## 2017-02-10 RX ORDER — LIDOCAINE HCL/PF 100 MG/5ML
SYRINGE (ML) INTRAVENOUS
Status: DISCONTINUED | OUTPATIENT
Start: 2017-02-10 | End: 2017-02-10

## 2017-02-10 RX ORDER — SODIUM CHLORIDE 0.9 % (FLUSH) 0.9 %
3 SYRINGE (ML) INJECTION
Status: DISCONTINUED | OUTPATIENT
Start: 2017-02-10 | End: 2017-02-10 | Stop reason: HOSPADM

## 2017-02-10 RX ORDER — MIDAZOLAM HYDROCHLORIDE 1 MG/ML
INJECTION, SOLUTION INTRAMUSCULAR; INTRAVENOUS
Status: DISCONTINUED | OUTPATIENT
Start: 2017-02-10 | End: 2017-02-10

## 2017-02-10 RX ORDER — FENTANYL CITRATE 50 UG/ML
25 INJECTION, SOLUTION INTRAMUSCULAR; INTRAVENOUS EVERY 5 MIN PRN
Status: DISCONTINUED | OUTPATIENT
Start: 2017-02-10 | End: 2017-02-10 | Stop reason: HOSPADM

## 2017-02-10 RX ORDER — SODIUM CHLORIDE, SODIUM LACTATE, POTASSIUM CHLORIDE, CALCIUM CHLORIDE 600; 310; 30; 20 MG/100ML; MG/100ML; MG/100ML; MG/100ML
INJECTION, SOLUTION INTRAVENOUS CONTINUOUS
Status: DISCONTINUED | OUTPATIENT
Start: 2017-02-10 | End: 2017-02-10 | Stop reason: HOSPADM

## 2017-02-10 RX ORDER — OXYCODONE HYDROCHLORIDE 5 MG/1
10 TABLET ORAL EVERY 4 HOURS PRN
Status: DISCONTINUED | OUTPATIENT
Start: 2017-02-10 | End: 2017-02-10 | Stop reason: HOSPADM

## 2017-02-10 RX ORDER — FENTANYL CITRATE 50 UG/ML
INJECTION, SOLUTION INTRAMUSCULAR; INTRAVENOUS
Status: DISCONTINUED | OUTPATIENT
Start: 2017-02-10 | End: 2017-02-10

## 2017-02-10 RX ORDER — ONDANSETRON 8 MG/1
8 TABLET, ORALLY DISINTEGRATING ORAL EVERY 8 HOURS PRN
Status: DISCONTINUED | OUTPATIENT
Start: 2017-02-10 | End: 2017-02-10 | Stop reason: HOSPADM

## 2017-02-10 RX ORDER — EPINEPHRINE 1 MG/ML
INJECTION INTRAMUSCULAR; INTRAVENOUS; SUBCUTANEOUS
Status: DISCONTINUED | OUTPATIENT
Start: 2017-02-10 | End: 2017-02-10 | Stop reason: HOSPADM

## 2017-02-10 RX ORDER — OXYCODONE HYDROCHLORIDE 5 MG/1
5 TABLET ORAL EVERY 4 HOURS PRN
Status: DISCONTINUED | OUTPATIENT
Start: 2017-02-10 | End: 2017-02-10 | Stop reason: HOSPADM

## 2017-02-10 RX ORDER — LIDOCAINE HYDROCHLORIDE 10 MG/ML
1 INJECTION, SOLUTION EPIDURAL; INFILTRATION; INTRACAUDAL; PERINEURAL ONCE AS NEEDED
Status: COMPLETED | OUTPATIENT
Start: 2017-02-10 | End: 2017-02-10

## 2017-02-10 RX ORDER — KETAMINE HYDROCHLORIDE 100 MG/ML
INJECTION, SOLUTION INTRAMUSCULAR; INTRAVENOUS
Status: DISCONTINUED | OUTPATIENT
Start: 2017-02-10 | End: 2017-02-10

## 2017-02-10 RX ORDER — ACETAMINOPHEN 10 MG/ML
INJECTION, SOLUTION INTRAVENOUS
Status: DISCONTINUED | OUTPATIENT
Start: 2017-02-10 | End: 2017-02-10

## 2017-02-10 RX ORDER — OXYCODONE AND ACETAMINOPHEN 10; 325 MG/1; MG/1
1 TABLET ORAL
Qty: 60 TABLET | Refills: 0 | Status: SHIPPED | OUTPATIENT
Start: 2017-02-10 | End: 2017-04-13 | Stop reason: ALTCHOICE

## 2017-02-10 RX ORDER — DEXAMETHASONE SODIUM PHOSPHATE 4 MG/ML
8 INJECTION, SOLUTION INTRA-ARTICULAR; INTRALESIONAL; INTRAMUSCULAR; INTRAVENOUS; SOFT TISSUE
Status: COMPLETED | OUTPATIENT
Start: 2017-02-10 | End: 2017-02-10

## 2017-02-10 RX ORDER — ROPIVACAINE HYDROCHLORIDE 5 MG/ML
INJECTION, SOLUTION EPIDURAL; INFILTRATION; PERINEURAL
Status: DISCONTINUED | OUTPATIENT
Start: 2017-02-10 | End: 2017-02-10 | Stop reason: HOSPADM

## 2017-02-10 RX ADMIN — LIDOCAINE HYDROCHLORIDE: 10 INJECTION, SOLUTION EPIDURAL; INFILTRATION; INTRACAUDAL; PERINEURAL at 07:02

## 2017-02-10 RX ADMIN — FENTANYL CITRATE 50 MCG: 50 INJECTION, SOLUTION INTRAMUSCULAR; INTRAVENOUS at 09:02

## 2017-02-10 RX ADMIN — ACETAMINOPHEN 1000 MG: 10 INJECTION, SOLUTION INTRAVENOUS at 09:02

## 2017-02-10 RX ADMIN — DEXAMETHASONE SODIUM PHOSPHATE 8 MG: 4 INJECTION, SOLUTION INTRAMUSCULAR; INTRAVENOUS at 07:02

## 2017-02-10 RX ADMIN — EPINEPHRINE 3 MG: 1 INJECTION, SOLUTION INTRAMUSCULAR; INTRAVENOUS; SUBCUTANEOUS at 09:02

## 2017-02-10 RX ADMIN — LIDOCAINE HYDROCHLORIDE 100 MG: 20 INJECTION PARENTERAL at 09:02

## 2017-02-10 RX ADMIN — KETAMINE HYDROCHLORIDE 25 MG: 100 INJECTION, SOLUTION, CONCENTRATE INTRAMUSCULAR; INTRAVENOUS at 09:02

## 2017-02-10 RX ADMIN — MIDAZOLAM HYDROCHLORIDE 2 MG: 1 INJECTION, SOLUTION INTRAMUSCULAR; INTRAVENOUS at 09:02

## 2017-02-10 RX ADMIN — ROPIVACAINE HYDROCHLORIDE 30 ML: 5 INJECTION, SOLUTION EPIDURAL; INFILTRATION; PERINEURAL at 09:02

## 2017-02-10 RX ADMIN — PROPOFOL 150 MG: 10 INJECTION, EMULSION INTRAVENOUS at 09:02

## 2017-02-10 RX ADMIN — SODIUM CHLORIDE, SODIUM LACTATE, POTASSIUM CHLORIDE, AND CALCIUM CHLORIDE: .6; .31; .03; .02 INJECTION, SOLUTION INTRAVENOUS at 07:02

## 2017-02-10 RX ADMIN — OXYCODONE HYDROCHLORIDE 5 MG: 5 TABLET ORAL at 11:02

## 2017-02-10 NOTE — TRANSFER OF CARE
Anesthesia Transfer of Care Note    Patient: Kenna Schmitt    Procedure(s) Performed: Procedure(s) (LRB):  ARTHROSCOPY-KNEE  (Left)  ARTHROSCOPY-MENISCECTOMY PARTIAL (Left)  ARTHROSCOPY-KNEE DEBRIDEMENT (Left)    Patient location: PACU    Anesthesia Type: general    Transport from OR: Transported from OR on room air with adequate spontaneous ventilation    Post pain: adequate analgesia    Post vital signs: stable    Level of consciousness: awake    Nausea/Vomiting: no nausea/vomiting    Complications: none          Last vitals:   Visit Vitals    BP (!) 111/59 (BP Location: Left arm, Patient Position: Lying, BP Method: Automatic)    Pulse 76    Temp 36.3 °C (97.3 °F) (Skin)    Resp 17    Ht 5' (1.524 m)    Wt 98.4 kg (217 lb)    SpO2 (!) 94%    Breastfeeding No    BMI 42.38 kg/m2

## 2017-02-10 NOTE — ANESTHESIA POSTPROCEDURE EVALUATION
Anesthesia Post Evaluation    Patient: Kenna Schmitt    Procedure(s) Performed: Procedure(s) (LRB):  ARTHROSCOPY-KNEE  (Left)  ARTHROSCOPY-MENISCECTOMY PARTIAL (Left)  ARTHROSCOPY-KNEE DEBRIDEMENT (Left)    Final Anesthesia Type: general  Patient location during evaluation: PACU  Patient participation: Yes- Able to Participate  Level of consciousness: awake and alert  Post-procedure vital signs: reviewed and stable  Pain management: adequate  Airway patency: patent  PONV status at discharge: No PONV  Anesthetic complications: no      Cardiovascular status: hemodynamically stable  Respiratory status: unassisted and room air  Hydration status: euvolemic  Follow-up not needed.        Visit Vitals    BP (!) 110/54 (BP Location: Left arm, Patient Position: Lying, BP Method: Automatic)    Pulse 70    Temp 36.3 °C (97.3 °F) (Skin)    Resp 16    Ht 5' (1.524 m)    Wt 98.4 kg (217 lb)    SpO2 96%    Breastfeeding No    BMI 42.38 kg/m2       Pain/Rogerio Score: Pain Assessment Performed: Yes (2/10/2017 11:25 AM)  Presence of Pain: complains of pain/discomfort (2/10/2017 11:25 AM)  Pain Rating Prior to Med Admin: 4 (2/10/2017 11:25 AM)  Rogerio Score: 10 (2/10/2017 11:25 AM)

## 2017-02-10 NOTE — DISCHARGE INSTRUCTIONS
[facility] [address] [place] [address]  [phone]                   Dr. Dale's Postoperative Instructions   for Knee Surgery                 Your Surgery Included:                  Arthroscopic Open         [x] Diagnostic   [] Ligament Repair/Reconstruction      [x] Remove osteonecrotic loose body         [] MCL   [] PLC    [] MPFL      [] Lysis of Adhesion/Manipulation [] Proximal Patellar Realignment      [x] Partial Menisectomy      [x] Medial  [] Lateral   []Distal Patellar Realignment/Osteotomy      [] Meniscal Repair      [] Medial   [] Lateral  [] Fracture Fixation    [] Tibial Plateau  [] Patella  [] Distal Femur      [x] Debridement/Chondroplasty          [x] MFC  [] MTP [] LFC [] LTP [] MFP [] LFP  [] Trochlea  [] Tendon Repair           [] Quadriceps   [] Patellar       [] Articular Cartilage Repair [x] Microfracture (MF)  [] OATS          [x] MFC  [] MTP [] LFC [] LTP [] MFP [] LFP  [] Trochlea [] HTO             [] Opening wedge   [] Closing wedge      [] ACL Reconstruction    [] Autograft   [] Allograft [] Hybrid  [] PRP injection                 [] SBHS     [] DBHS    [] BPTB                     [] PCL Reconstruction    [] Autograft   [] Allograft [] Hybrid                             [] Achilles    [] HS      [] BPTB            [] Lateral Release             Call our office [phone] or [phone] immediately if you experience any of the following:         Excessive bleeding or pus like drainage at the incision site       Uncontrollable pain not relieved by pain medication       Excessive swelling or redness at the incision site       Fever above 101.5 degrees not controlled with Tylenol or Motrin       Shortness of Breath       Any foul odor or blistering from the surgery site        1.   Pain Management: A cold therapy cuff, pain medications, local injections, and in some cases, regional anesthesia injections are used to manage your  post-operative pain. The decision to use each of these options is based on their risks and benefits.     Medications: You were given one or more of the following medication prescriptions before leaving the hospital. Have the prescriptions filled at a pharmacy on your way home and follow the instructions on the bottle. If you need a refill, please call your pharmacy.      Narcotic Medication (usually Vicodin ES, Lortab, Percocet or Nucynta): Begin taking the medication before your knee starts to hurt. Some patients do not like to take any medication, but if you wait until your pain is severe before taking, you will be very uncomfortable for several hours waiting for the medication to work. Always take with food.     Nausea / Vomiting: If you develop post-operative nausea and vomiting, please contact the office and an anti-emetic, such as Zofran can be prescribed. Use this medication as directed.     Cold Therapy: You may have been sent home with a cold therapy unit and wrap for your knee. Fill with ice and water to the indicated fill line and use throughout the day for the first two days and then as needed to help relieve pain and control swelling. However, never place the cold pad directly against your skin. Place over the dressing or after the first dressing change over a light pair of sweat pants.     Regional Anesthesia Injections (Blocks): You may have been given a regional nerve block either before or after surgery. This may make your entire lower leg weak and numb for 24-36 hours. You may have also had a catheter placed which will provide regional anesthesia for 48 hours. If after 36 hours (48 hours if you have had a catheter placed), you still do not have feeling in your leg, please contact the office.                   2.   Diet: Start with clear liquids and then eat a bland diet for the first day after surgery. Progress your diet as tolerated. Constipation may occur with Narcotic usage, contact our office  if you are experiencing constipation.    3.   Activity: After you arrive at home, spend most of the first 24 hours resting in bed, on the couch, or in a reclining chair. Many patients feel more comfortable with your leg elevated. After the first 48 hours, slowly increase your activity level based upon your symptoms.    4.   Dressing Change: Remove the dressing on the 2nd post-operative day unless specifically instructed not to do so. It is normal for some blood to be seen on the dressings. It is also normal for you to see apparent bruising on the skin around your knee when you remove the dressing. Please place a bandaid over each portal site (i.e. over each suture). If present, leave the steri-strip tape across the incisions. If they fall off, it is OK, but do not peel them off. Let them fall off on their own. If you are concerned by the drainage or the appearance of your knee, please call the office.    5.   Showering: You may shower after the first dressing change if the wound is clean and dry. Please place a sheet of Saran wrap over the incisions to keep them dry. It is OK if a small amount of water gets on the incisions. However, do not let the wound soak/submerge in water until the sutures are removed.     6.   DVT Prophylaxis (Blood Clot Prevention):  To minimize the risk of blood clots after lower extremity surgery, please follow the checked recommendations below:            [x] Take 1 full strength Aspirin (325mg) daily with food for [x] 3 weeks [] 6 weeks post-op          [] Use compression stocking to the non-operative leg for 4 weeks post-operatively.      7.  Weight Bearing Status: You may have been sent home with crutches/cane /walker to assist with your weight bearing status as described below:                        []  Weight bear as tolerated (D/C assistive devise when comfortable)         []  Partial weight bearing (ambulate but use assistive device)         [x]  Non weight bearing  ( do not place  "any weight on extremity)  8. Brace: If you were placed in a brace post-operatively, the brace should be locked in         extension when ambulating or sleeping        [] Keep brace on at all times (except for dressing changes)         []  You may remove the brace as tolerated.         Leg Exercises: Begin the marked exercises the first day after surgery in order to help you             regain your motion and strength. You may do the following marked exercises for 5                         minutes at least 3-5 times/day:      [x] Quad Sets - Begin activating your quadriceps muscle by driving your          knee downward into full knee extension while seated on a table or bed   with a towel rolled and propped under your heel     [] Straight Leg Raise (SLR) - While raleigh your quadriceps muscle, lift     your fully extended leg to the level of your non-operative knee (as shown)     [x] Heel Slides - With the knee straight, slide your heel slowly toward your   buttocks, hold at the endpoint for 10-15 seconds, then slowly straighten     [x] Ankle pumps - With your knee straight, move your ankle in a "pumping"    fashion to activate your calf and leg muscles          9. CPM Machine: If you were prescribed a CPM machine, please use 4-6 hours/day. Start at 0-30 degrees and increase 5-10 degrees/day until you are comfortably at 90 degrees. You may then call the office to have the machine returned. Please make sure the brace (if you were given one) is unlocked or removed when in the CPM.        10.  Physical Therapy: Physical therapy is an essential component to your recovery from surgery. Your physical therapy plan will be discussed at your first post-operative visit.    11.Work Status: [] Out of work/gym until follow up appointment     [] May return to work light duty.     [] May return to work/gym without restrictions.     FIRST POSTOPERATIVE VISIT:  As scheduled. However, if you don't have a    scheduled appointment " or can't remember when it is, please contact the office.        Best wishes for a successful recovery!      Kirby Dale MD

## 2017-02-10 NOTE — BRIEF OP NOTE
[facility]  Brief Operative Note     SUMMARY     Surgery Date: 2/10/2017     Surgeon(s) and Role:     * Kirby Dale MD - Primary    First Assistant: ANANYA Escoto    Pre-op Diagnosis:  Osteonecrosis of left knee region [M87.9]    Post-op Diagnosis:  Osteonecrosis of left knee region [M87.9]     Medial meniscus tear left knee.  Procedure(s) (LRB):  ARTHROSCOPY-KNEE  (Left)  ARTHROSCOPY-MENISCECTOMY PARTIAL (Left)  ARTHROSCOPY-KNEE DEBRIDEMENT (Left)    Anesthesia: General    Description of the findings of the procedure: See dictated Op Note.    Findings/Key Components: See dictated Op Note.    Estimated Blood Loss: * No values recorded between 2/10/2017  9:56 AM and 2/10/2017 10:32 AM *         Specimens:   Specimen     None          Discharge Note    SUMMARY     Admit Date: 2/10/2017    Discharge Date and Time: No discharge date for patient encounter.    Attending Physician: Kirby Dale MD     Discharge Provider: Kirby Dale    Final Diagnosis: Osteonecrosis of left knee region [M87.9]    Outcome of Hospitalization, Treatment, Procedure, or Surgery:    Patient admitted for outpatient procedure, procedure tolerated well, patient discharged home.    Disposition: Home or Self Care    Follow Up/Patient Instructions:   Follow-up Information     Follow up with Kirby Dale MD. Schedule an appointment as soon as possible for a visit on 2/16/2017.    Specialties:  Sports Medicine, Orthopedic Surgery    Why:  For wound re-check    Contact information:    [address] [place] [address]  [phone]            Medications:  Reconciled Home Medications:   Current Discharge Medication List      START taking these medications    Details   oxycodone-acetaminophen (PERCOCET)  mg per tablet Take 1 tablet by mouth every 4 to 6 hours as needed for Pain.  Qty: 60 tablet, Refills: 0         CONTINUE these medications which have NOT CHANGED    Details   atorvastatin (LIPITOR) 20  MG tablet TAKE 1 TABLET BY MOUTH EVERY DAY  Qty: 90 tablet, Refills: 3    Associated Diagnoses: Hyperlipidemia      b complex vitamins capsule Take 1 capsule by mouth once daily.      chlorthalidone (HYGROTEN) 25 MG Tab Take 1 tablet (25 mg total) by mouth once daily.  Qty: 30 tablet, Refills: 11    Associated Diagnoses: Essential hypertension      cholecalciferol, vitamin D3, (VITAMIN D3) 2,000 unit Cap Take 1 capsule by mouth once daily.      Lactobacillus rhamnosus GG (CULTURELLE) 10 billion cell capsule Take 1 capsule by mouth once daily.      levothyroxine (SYNTHROID) 100 MCG tablet TAKE 1 TABLET BY MOUTH ONCE DAILY  Qty: 90 tablet, Refills: 2    Associated Diagnoses: Hypothyroidism due to acquired atrophy of thyroid      omeprazole (PRILOSEC) 20 MG capsule TAKE ONE CAPSULE BY MOUTH DAILY  Qty: 30 capsule, Refills: 6      valacyclovir (VALTREX) 500 MG tablet Take 500 mg by mouth 2 (two) times daily. PRN fever blisters             Discharge Procedure Orders  CRUTCHES FOR HOME USE   Order Specific Question Answer Comments   Type: Axillary    Height: 5' (1.524 m)    Weight: 98.4 kg (217 lb)    Length of need (1-99 months): 1      Diet general     Shower on day dressing removed (No bath)     Keep surgical extremity elevated     Ice to affected area     Weight bearing restrictions (specify)     No driving, operating heavy equipment or signing legal documents while taking pain medication     Call MD for:  temperature >100.4     Call MD for:  persistent nausea and vomiting     Call MD for:  severe uncontrolled pain     Call MD for:  difficulty breathing, headache or visual disturbances     Call MD for:  redness, tenderness, or signs of infection (pain, swelling, redness, odor or green/yellow discharge around incision site)     Call MD for:  hives     Call MD for:  persistent dizziness or light-headedness     Call MD for:  extreme fatigue     Remove dressing in 48 hours

## 2017-02-10 NOTE — ANESTHESIA PREPROCEDURE EVALUATION
02/10/2017  Kenna Schmitt is a 66 y.o., female.    [facility] Anesthesia Evaluation    I have reviewed the Patient Summary Reports.    I have reviewed the Nursing Notes.      Review of Systems  Anesthesia Hx:  No problems with previous Anesthesia    Social:  Former Smoker    Hematology/Oncology:  Hematology Normal   Oncology Normal     EENT/Dental:EENT/Dental Normal   Cardiovascular:   Hypertension, well controlled    Pulmonary:  Pulmonary Normal    Hepatic/GI:   GERD    Musculoskeletal:  Musculoskeletal Normal    Neurological:  Neurology Normal    Endocrine:   Hypothyroidism    Dermatological:  Skin Normal        Physical Exam  General:  Morbid Obesity    Airway/Jaw/Neck:  Airway Findings: Mouth Opening: Normal Tongue: Normal  General Airway Assessment: Adult  Mallampati: II  Improves to I with phonation.  TM Distance: Normal, at least 6 cm        Eyes/Ears/Nose:  EYES/EARS/NOSE FINDINGS: Normal   Dental:  DENTAL FINDINGS: Normal   Chest/Lungs:  Chest/Lungs Findings: Clear to auscultation     Heart/Vascular:  Heart Findings: Rate: Normal  Rhythm: Regular Rhythm  Sounds: Normal  Heart Murmur  Systolic  Systolic Heart Murmur Description: L Upper Sternal Border  Systolic Heart Murmur Grade: Grade II  Vascular Findings: Normal    Abdomen:  Abdomen Findings: Normal    Musculoskeletal:  Musculoskeletal Findings: Normal   Skin:  Skin Findings: Normal    Mental Status:  Mental Status Findings: Normal        Anesthesia Plan  Type of Anesthesia, risks & benefits discussed:  Anesthesia Type:  general  Patient's Preference:   Intra-op Monitoring Plan:   Intra-op Monitoring Plan Comments:   Post Op Pain Control Plan:   Post Op Pain Control Plan Comments:   Induction:   IV  Beta Blocker:  Patient is not currently on a Beta-Blocker (No further documentation required).       Informed Consent: Patient understands risks and agrees  with Anesthesia plan.  Questions answered. Anesthesia consent signed with patient.  ASA Score: 3     Day of Surgery Review of History & Physical:    H&P update referred to the surgeon.         Ready For Surgery From Anesthesia Perspective.

## 2017-02-10 NOTE — INTERVAL H&P NOTE
The patient has been examined and the H&P has been reviewed:    I concur with the findings and no changes have occurred since H&P was written.    Anesthesia/Surgery risks, benefits and alternative options discussed and understood by patient/family.          Active Hospital Problems    Diagnosis  POA    Osteonecrosis of left knee region [M87.9]  Yes      Resolved Hospital Problems    Diagnosis Date Resolved POA   No resolved problems to display.

## 2017-02-10 NOTE — IP AVS SNAPSHOT
[facility]  [address]  [place] [address]  Phone: [phone]           Patient Discharge Instructions     Our goal is to set you up for success. This packet includes information on your condition, medications, and your home care. It will help you to care for yourself so you don't get sicker and need to go back to the hospital.     Please ask your nurse if you have any questions.        There are many details to remember when preparing to leave the hospital. Here is what you will need to do:    1. Take your medicine. If you are prescribed medications, review your Medication List in the following pages. You may have new medications to  at the pharmacy and others that you'll need to stop taking. Review the instructions for how and when to take your medications. Talk with your doctor or nurses if you are unsure of what to do.     2. Go to your follow-up appointments. Specific follow-up information is listed in the following pages. Your may be contacted by a transition nurse or clinical provider about future appointments. Be sure we have all of the phone numbers to reach you, if needed. Please contact your provider's office if you are unable to make an appointment.     3. Watch for warning signs. Your doctor or nurse will give you detailed warning signs to watch for and when to call for assistance. These instructions may also include educational information about your condition. If you experience any of warning signs to your health, call your doctor.               [facility]  Unless otherwise directed by your provider, please contact [facility], our nurse care line that is available for 24/7 assistance.     [phone] (toll-free)    Registered nurses in the [facility] provide clinical advisement, health education, appointment booking, and other advisory services.                    ** Verify the list of medication(s) below is accurate and up  to date. Carry this with you in case of emergency. If your medications have changed, please notify your healthcare provider.             Medication List      START taking these medications        Additional Info                      oxycodone-acetaminophen  mg per tablet   Commonly known as:  PERCOCET   Quantity:  60 tablet   Refills:  0   Dose:  1 tablet    Instructions:  Take 1 tablet by mouth every 4 to 6 hours as needed for Pain.     Begin Date    AM    Noon    PM    Bedtime         CONTINUE taking these medications        Additional Info                      atorvastatin 20 MG tablet   Commonly known as:  LIPITOR   Quantity:  90 tablet   Refills:  3    Instructions:  TAKE 1 TABLET BY MOUTH EVERY DAY     Begin Date    AM    Noon    PM    Bedtime       b complex vitamins capsule   Refills:  0   Dose:  1 capsule    Instructions:  Take 1 capsule by mouth once daily.     Begin Date    AM    Noon    PM    Bedtime       chlorthalidone 25 MG Tab   Commonly known as:  HYGROTEN   Quantity:  30 tablet   Refills:  11   Dose:  25 mg    Instructions:  Take 1 tablet (25 mg total) by mouth once daily.     Begin Date    AM    Noon    PM    Bedtime       Lactobacillus rhamnosus GG 10 billion cell capsule   Commonly known as:  CULTURELLE   Refills:  0   Dose:  1 capsule    Instructions:  Take 1 capsule by mouth once daily.     Begin Date    AM    Noon    PM    Bedtime       levothyroxine 100 MCG tablet   Commonly known as:  SYNTHROID   Quantity:  90 tablet   Refills:  2    Instructions:  TAKE 1 TABLET BY MOUTH ONCE DAILY     Begin Date    AM    Noon    PM    Bedtime       omeprazole 20 MG capsule   Commonly known as:  PRILOSEC   Quantity:  30 capsule   Refills:  6    Instructions:  TAKE ONE CAPSULE BY MOUTH DAILY     Begin Date    AM    Noon    PM    Bedtime       valacyclovir 500 MG tablet   Commonly known as:  VALTREX   Refills:  0   Dose:  500 mg    Instructions:  Take 500 mg by mouth 2 (two) times daily. PRN fever  blisters     Begin Date    AM    Noon    PM    Bedtime       VITAMIN D3 2,000 unit Cap   Refills:  0   Dose:  1 capsule   Generic drug:  cholecalciferol (vitamin D3)    Instructions:  Take 1 capsule by mouth once daily.     Begin Date    AM    Noon    PM    Bedtime            Where to Get Your Medications      These medications were sent to [facility] [place], [place] - [address]  [address], [place] [address]     Phone:  [phone]     oxycodone-acetaminophen  mg per tablet                  Please bring to all follow up appointments:    1. A copy of your discharge instructions.  2. All medicines you are currently taking in their original bottles.  3. Identification and insurance card.    Please arrive 15 minutes ahead of scheduled appointment time.    Please call 24 hours in advance if you must reschedule your appointment and/or time.        Follow-up Information     Follow up with Kirby Dale MD. Schedule an appointment as soon as possible for a visit on 2/16/2017.    Specialties:  Sports Medicine, Orthopedic Surgery    Why:  For wound re-check    Contact information:    [address] [place] [id number] [phone]          Discharge Instructions     Future Orders    Call MD for:  difficulty breathing, headache or visual disturbances     Call MD for:  extreme fatigue     Call MD for:  hives     Call MD for:  persistent dizziness or light-headedness     Call MD for:  persistent nausea and vomiting     Call MD for:  redness, tenderness, or signs of infection (pain, swelling, redness, odor or green/yellow discharge around incision site)     Call MD for:  severe uncontrolled pain     Call MD for:  temperature >100.4     CRUTCHES FOR HOME USE     Questions:    Type:  Axillary    Height:  5' (1.524 m)    Weight:  98.4 kg (217 lb)    Does patient have medical equipment at home?:      Other:      Length of need (1-99 months):  1    Vendor:  Other (use comments) Comment  - [facility]    Expected Date of Delivery:  2/10/2017    Expected Time of Delivery:      Diet general     Questions:    Total calories:      Fat restriction, if any:      Protein restriction, if any:      Na restriction, if any:      Fluid restriction:      Additional restrictions:      No driving, operating heavy equipment or signing legal documents while taking pain medication     Remove dressing in 48 hours     Shower on day dressing removed (No bath)     Weight bearing restrictions (specify)         Discharge Instructions            [facility] [address] [place] [address]  [phone]                   Dr. Dale's Postoperative Instructions   for Knee Surgery                 Your Surgery Included:                  Arthroscopic Open         [x] Diagnostic   [] Ligament Repair/Reconstruction      [x] Remove osteonecrotic loose body         [] MCL   [] PLC    [] MPFL      [] Lysis of Adhesion/Manipulation [] Proximal Patellar Realignment      [x] Partial Menisectomy      [x] Medial  [] Lateral   []Distal Patellar Realignment/Osteotomy      [] Meniscal Repair      [] Medial   [] Lateral  [] Fracture Fixation    [] Tibial Plateau  [] Patella  [] Distal Femur      [x] Debridement/Chondroplasty          [x] MFC  [] MTP [] LFC [] LTP [] MFP [] LFP  [] Trochlea  [] Tendon Repair           [] Quadriceps   [] Patellar       [] Articular Cartilage Repair [x] Microfracture (MF)  [] OATS          [x] MFC  [] MTP [] LFC [] LTP [] MFP [] LFP  [] Trochlea [] HTO             [] Opening wedge   [] Closing wedge      [] ACL Reconstruction    [] Autograft   [] Allograft [] Hybrid  [] PRP injection                 [] SBHS     [] DBHS    [] BPTB                     [] PCL Reconstruction    [] Autograft   [] Allograft [] Hybrid                             [] Achilles    [] HS      [] BPTB            [] Lateral Release             Call our office [phone] or [phone] immediately if you experience any  of the following:         Excessive bleeding or pus like drainage at the incision site       Uncontrollable pain not relieved by pain medication       Excessive swelling or redness at the incision site       Fever above 101.5 degrees not controlled with Tylenol or Motrin       Shortness of Breath       Any foul odor or blistering from the surgery site        1.   Pain Management: A cold therapy cuff, pain medications, local injections, and in some cases, regional anesthesia injections are used to manage your post-operative pain. The decision to use each of these options is based on their risks and benefits.     Medications: You were given one or more of the following medication prescriptions before leaving the hospital. Have the prescriptions filled at a pharmacy on your way home and follow the instructions on the bottle. If you need a refill, please call your pharmacy.      Narcotic Medication (usually Vicodin ES, Lortab, Percocet or Nucynta): Begin taking the medication before your knee starts to hurt. Some patients do not like to take any medication, but if you wait until your pain is severe before taking, you will be very uncomfortable for several hours waiting for the medication to work. Always take with food.     Nausea / Vomiting: If you develop post-operative nausea and vomiting, please contact the office and an anti-emetic, such as Zofran can be prescribed. Use this medication as directed.     Cold Therapy: You may have been sent home with a cold therapy unit and wrap for your knee. Fill with ice and water to the indicated fill line and use throughout the day for the first two days and then as needed to help relieve pain and control swelling. However, never place the cold pad directly against your skin. Place over the dressing or after the first dressing change over a light pair of sweat pants.     Regional Anesthesia Injections (Blocks): You may have been given a regional nerve block either before or  after surgery. This may make your entire lower leg weak and numb for 24-36 hours. You may have also had a catheter placed which will provide regional anesthesia for 48 hours. If after 36 hours (48 hours if you have had a catheter placed), you still do not have feeling in your leg, please contact the office.                   2.   Diet: Start with clear liquids and then eat a bland diet for the first day after surgery. Progress your diet as tolerated. Constipation may occur with Narcotic usage, contact our office if you are experiencing constipation.    3.   Activity: After you arrive at home, spend most of the first 24 hours resting in bed, on the couch, or in a reclining chair. Many patients feel more comfortable with your leg elevated. After the first 48 hours, slowly increase your activity level based upon your symptoms.    4.   Dressing Change: Remove the dressing on the 2nd post-operative day unless specifically instructed not to do so. It is normal for some blood to be seen on the dressings. It is also normal for you to see apparent bruising on the skin around your knee when you remove the dressing. Please place a bandaid over each portal site (i.e. over each suture). If present, leave the steri-strip tape across the incisions. If they fall off, it is OK, but do not peel them off. Let them fall off on their own. If you are concerned by the drainage or the appearance of your knee, please call the office.    5.   Showering: You may shower after the first dressing change if the wound is clean and dry. Please place a sheet of Saran wrap over the incisions to keep them dry. It is OK if a small amount of water gets on the incisions. However, do not let the wound soak/submerge in water until the sutures are removed.     6.   DVT Prophylaxis (Blood Clot Prevention):  To minimize the risk of blood clots after lower extremity surgery, please follow the checked recommendations below:            [x] Take 1 full strength  "Aspirin (325mg) daily with food for [x] 3 weeks [] 6 weeks post-op          [] Use compression stocking to the non-operative leg for 4 weeks post-operatively.      7.  Weight Bearing Status: You may have been sent home with crutches/cane /walker to assist with your weight bearing status as described below:                        []  Weight bear as tolerated (D/C assistive devise when comfortable)         []  Partial weight bearing (ambulate but use assistive device)         [x]  Non weight bearing  ( do not place any weight on extremity)  8. Brace: If you were placed in a brace post-operatively, the brace should be locked in         extension when ambulating or sleeping        [] Keep brace on at all times (except for dressing changes)         []  You may remove the brace as tolerated.         Leg Exercises: Begin the marked exercises the first day after surgery in order to help you             regain your motion and strength. You may do the following marked exercises for 5                         minutes at least 3-5 times/day:      [x] Quad Sets - Begin activating your quadriceps muscle by driving your          knee downward into full knee extension while seated on a table or bed   with a towel rolled and propped under your heel     [] Straight Leg Raise (SLR) - While raleigh your quadriceps muscle, lift     your fully extended leg to the level of your non-operative knee (as shown)     [x] Heel Slides - With the knee straight, slide your heel slowly toward your   buttocks, hold at the endpoint for 10-15 seconds, then slowly straighten     [x] Ankle pumps - With your knee straight, move your ankle in a "pumping"    fashion to activate your calf and leg muscles          9. CPM Machine: If you were prescribed a CPM machine, please use 4-6 hours/day. Start at 0-30 degrees and increase 5-10 degrees/day until you are comfortably at 90 degrees. You may then call the office to have the machine returned. Please make " sure the brace (if you were given one) is unlocked or removed when in the CPM.        10.  Physical Therapy: Physical therapy is an essential component to your recovery from surgery. Your physical therapy plan will be discussed at your first post-operative visit.    11.Work Status: [] Out of work/gym until follow up appointment     [] May return to work light duty.     [] May return to work/gym without restrictions.     FIRST POSTOPERATIVE VISIT:  As scheduled. However, if you don't have a    scheduled appointment or can't remember when it is, please contact the office.        Best wishes for a successful recovery!      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      Allergies as of 2/10/2017        Reactions    Penicillins Hives    Shellfish Containing Products Edema    Generalized swelling      Advance Directives     An advance directive is a document which, in the event you are no longer able to make decisions for yourself, tells your healthcare team what kind of treatment you do or do not want to receive, or who you would like to make those decisions for you.  If you do not currently have an advance directive, [facility] encourages you to create one.  For more information call:  [phone] ([phone]),  [phone] ([phone]),  or log on to [email or web address].        Smoking Cessation     If you would like to quit smoking:   You may be eligible for free services if you are a [place] resident and started smoking cigarettes before September 1, 1988.  Call the Smoking Cessation Trust (SCT) toll free at (334) 476-9700 or ([phone]) 460-2448.   Call 9-092-QUIT-NOW if you do not meet the above criteria.            Language Assistance Services     ATTENTION: Language assistance services are available, free of charge. Please call [phone].      ATENCIÓN: Si habla español, tiene a loredo disposición servicios gratuitos de asistencia lingüística. Llame al [phone].     CHÚ Ý: N?u b?n nói Ti?ng Vi?t, có các d?ch v? h? tr? ngôn ng? mi?n phí dành cho b?n. G?i s? [phone].         [facility] complies with applicable Federal civil rights laws and does not discriminate on the basis of race, color, national origin, age, disability, or sex.

## 2017-02-10 NOTE — OP NOTE
DATE OF PROCEDURE:  02/10/2017.    PREOPERATIVE DIAGNOSIS:  Osteonecrosis, medial femoral condyle, left knee.    POSTOPERATIVE DIAGNOSES:  1.  Osteonecrosis, medial femoral condyle, left knee.  2.  Medial meniscus tear, left knee.  3.  Chondromalacia patella, left knee.    PROCEDURE:  Arthroscopy, left knee, with debridement, drilling, removal of   osteonecrotic loose body, medial femoral condyle, chondroplasty, patella and   partial medial meniscectomy.    SURGEON:  Kirby Dale M.D.    FIRST ASSISTANT:  ANANYA Nguyễn.    ANESTHESIA:  General.    ESTIMATED BLOOD LOSS:  Minimal.    FLUIDS:  Per Anesthesia record.    TOURNIQUET TIME:  Approximately 30 minutes.    SPECIMENS:  None.    DRAINS:  None.    COMPLICATIONS:  None.    DESCRIPTION OF THE OPERATION:  The patient was brought to the Operating Room,   placed supine on the operating room table.  After successful induction of   general anesthesia, tourniquet was placed high on the left lower extremity.    Left lower extremity then sterilely prepped and draped in the usual fashion.    After appropriate timeout, the limb was exsanguinated with an Esmarch.    Tourniquet inflated to 250 mmHg.  A 70-degree arthroscope inserted via modified   anterocentral portal.  Systematic diagnostic arthroscopy was performed.    Posteromedial and posterolateral compartments were visualized.  No loose bodies,   mild synovitis in both the posterior compartments and no peripheral capsular   meniscal tears were seen.  PCL insertion identified and found to be intact.  The   arthroscope was brought into the anterior compartment, exchanged with 30-degree   arthroscope.  Anteromedial and anterolateral portals were made under direct   visualization.  Lateral compartment inspected.  Minimal grade II changes on the   lateral tibial plateau.  No significant articular damage to lateral femoral   condyle.  Lateral meniscus was visualized and probed on its superior and   inferior  surfaces and showed mild fraying of the central edge, but otherwise   intact.  ACL and PCL identified in the intercondylar notch and were   unremarkable.  Medial compartment then inspected.  Of immediate notice, there   were significant grade III changes on the weightbearing surface of the medial   femoral condyle.  Medial tibial plateau showed minimal grade II changes.  Medial   meniscus showed a complex tear of the mid third.  Palpation of the medial   aspect of the weightbearing surface of the medial femoral condyle revealed an   area of articular surface that appeared loose and when probed was detached.    This revealed a large osteochondral lesion.  This was not attached.  There was   necrotic bone underneath and this was removed piecemeal.  A fairly large lesion,   which appeared at least a 2.5 x 2.5 cm.  Using a curette, osteonecrotic bone   was removed from the base and then using a microfracture awl, microfracture   holes were drilled.  At this point, the medial meniscus was attended to.  Using   a combination of basket forceps and motorized shaver, partial medial   meniscectomy was performed contouring the meniscus to a stable rim.  Range of   motion of the knee was performed.  The osteochondral defect was at the very   medial aspect, and was a shoulder type lesion, but was no longer rubbing on the   meniscus and the site of the meniscus tear.  At this point, 30-degree   arthroscope was placed in the suprapatellar pouch via superolateral portal.    Medial and lateral gutters were visualized.  Mild synovitis, but no loose bodies   were seen.  Patellofemoral joint inspected.  Grade II to III changes on the   medial and lateral facet of the patella.  This was debrided with the motorized   shaver to a stable configuration.  No articular damage to the trochlear groove.    The remainder of suprapatellar pouch showed mild synovitis, but was otherwise   unremarkable.  At this point, 18-gauge spinal needle was  placed in the   suprapatellar pouch via supramedial stick.  Arthroscopic instruments were   removed.  Arthroscopic portals were then closed using Steri-Strips.  A 10 mL of   0.5% ropivacaine injected into the suprapatellar pouch through the spinal   needle, which was then removed.  Sterile dressing was applied.  Tourniquet was   deflated.  Ace wrap applied around the knee.  Cold temp pad applied around the   Ace wrap.  The patient was then successfully awoken from general anesthesia and   taken to Recovery Room in stable condition.  Sponge and needle counts were   reported as correct.  No complications.      MOHAMUD/CARLOZ  dd: 02/10/2017 10:48:51 (CST)  td: 02/10/2017 11:37:45 (CST)  Doc ID   #[id number]  Job ID #[id number]    CC:

## 2017-02-13 VITALS
OXYGEN SATURATION: 96 % | RESPIRATION RATE: 16 BRPM | TEMPERATURE: 97 F | HEART RATE: 70 BPM | SYSTOLIC BLOOD PRESSURE: 110 MMHG | WEIGHT: 217 LBS | DIASTOLIC BLOOD PRESSURE: 54 MMHG | BODY MASS INDEX: 42.6 KG/M2 | HEIGHT: 60 IN

## 2017-02-14 ENCOUNTER — PATIENT MESSAGE (OUTPATIENT)
Dept: ORTHOPEDICS | Facility: CLINIC | Age: 67
End: 2017-02-14

## 2017-02-14 DIAGNOSIS — Z98.890 S/P ARTHROSCOPY OF LEFT KNEE: Primary | ICD-10-CM

## 2017-02-15 ENCOUNTER — HOSPITAL ENCOUNTER (INPATIENT)
Facility: HOSPITAL | Age: 67
LOS: 2 days | Discharge: HOME | End: 2017-02-18
Attending: EMERGENCY MEDICINE | Admitting: HOSPITALIST

## 2017-02-15 DIAGNOSIS — N10 ACUTE PYELONEPHRITIS: Primary | ICD-10-CM

## 2017-02-15 LAB
BILIRUB UR QL STRIP: NEGATIVE
CLARITY UR: ABNORMAL
COLOR UR: YELLOW
GLUCOSE UR STRIP-MCNC: NEGATIVE MG/DL
HGB UR QL STRIP.AUTO: ABNORMAL
KETONES UR QL STRIP: NEGATIVE
LEUKOCYTE ESTERASE UR QL STRIP.AUTO: ABNORMAL
NITRITE UR QL STRIP: NEGATIVE
PH UR STRIP.AUTO: 6.5 [PH] (ref 5–8)
PROT UR QL STRIP: ABNORMAL
SP GR UR STRIP: 1.01 (ref 1–1.03)
UROBILINOGEN UR QL STRIP: ABNORMAL

## 2017-02-15 PROCEDURE — 99284 EMERGENCY DEPT VISIT MOD MDM: CPT

## 2017-02-15 PROCEDURE — 87186 SC STD MICRODIL/AGAR DIL: CPT | Performed by: EMERGENCY MEDICINE

## 2017-02-15 PROCEDURE — 36415 COLL VENOUS BLD VENIPUNCTURE: CPT

## 2017-02-15 PROCEDURE — 87086 URINE CULTURE/COLONY COUNT: CPT | Performed by: EMERGENCY MEDICINE

## 2017-02-15 PROCEDURE — 81001 URINALYSIS AUTO W/SCOPE: CPT | Performed by: EMERGENCY MEDICINE

## 2017-02-15 RX ORDER — SODIUM CHLORIDE 0.9 % (FLUSH) 0.9 %
10 SYRINGE (ML) INJECTION AS NEEDED
Status: DISCONTINUED | OUTPATIENT
Start: 2017-02-15 | End: 2017-02-18 | Stop reason: HOSPADM

## 2017-02-16 ENCOUNTER — HOSPITAL ENCOUNTER (OUTPATIENT)
Dept: RADIOLOGY | Facility: HOSPITAL | Age: 67
Discharge: HOME | End: 2017-02-16
Attending: ORTHOPAEDIC SURGERY
Payer: COMMERCIAL

## 2017-02-16 ENCOUNTER — APPOINTMENT (OUTPATIENT)
Dept: CT IMAGING | Facility: HOSPITAL | Age: 67
End: 2017-02-16

## 2017-02-16 ENCOUNTER — OFFICE VISIT (OUTPATIENT)
Dept: ORTHOPEDICS | Facility: CLINIC | Age: 67
End: 2017-02-16
Payer: COMMERCIAL

## 2017-02-16 VITALS — BODY MASS INDEX: 42.6 KG/M2 | HEIGHT: 60 IN | WEIGHT: 217 LBS

## 2017-02-16 DIAGNOSIS — Z98.890 S/P ARTHROSCOPY OF LEFT KNEE: ICD-10-CM

## 2017-02-16 DIAGNOSIS — Z98.890 S/P ARTHROSCOPY OF LEFT KNEE: Primary | ICD-10-CM

## 2017-02-16 DIAGNOSIS — M87.9 OSTEONECROSIS OF LEFT KNEE REGION: ICD-10-CM

## 2017-02-16 PROBLEM — N10 ACUTE PYELONEPHRITIS: Status: ACTIVE | Noted: 2017-02-16

## 2017-02-16 PROBLEM — N20.0 NEPHROLITHIASIS: Status: ACTIVE | Noted: 2017-02-16

## 2017-02-16 PROBLEM — N30.00 ACUTE CYSTITIS: Status: ACTIVE | Noted: 2017-02-16

## 2017-02-16 LAB
ALBUMIN SERPL-MCNC: 4.3 G/DL (ref 3.5–5.2)
ALBUMIN/GLOB SERPL: 1.2 G/DL
ALP SERPL-CCNC: 154 U/L (ref 39–117)
ALT SERPL W P-5'-P-CCNC: 16 U/L (ref 1–33)
ANION GAP SERPL CALCULATED.3IONS-SCNC: 14 MMOL/L
ANION GAP SERPL CALCULATED.3IONS-SCNC: 14.2 MMOL/L
AST SERPL-CCNC: 16 U/L (ref 1–32)
BACTERIA UR QL AUTO: ABNORMAL /HPF
BASOPHILS # BLD AUTO: 0.05 10*3/MM3 (ref 0–0.2)
BASOPHILS # BLD AUTO: 0.06 10*3/MM3 (ref 0–0.2)
BASOPHILS NFR BLD AUTO: 0.3 % (ref 0–1.5)
BASOPHILS NFR BLD AUTO: 0.4 % (ref 0–1.5)
BILIRUB SERPL-MCNC: 0.4 MG/DL (ref 0.1–1.2)
BUN BLD-MCNC: 10 MG/DL (ref 8–23)
BUN BLD-MCNC: 12 MG/DL (ref 8–23)
BUN/CREAT SERPL: 13.7 (ref 7–25)
BUN/CREAT SERPL: 16.2 (ref 7–25)
CALCIUM SPEC-SCNC: 10.9 MG/DL (ref 8.6–10.5)
CALCIUM SPEC-SCNC: 9.2 MG/DL (ref 8.6–10.5)
CHLORIDE SERPL-SCNC: 101 MMOL/L (ref 98–107)
CHLORIDE SERPL-SCNC: 106 MMOL/L (ref 98–107)
CO2 SERPL-SCNC: 21 MMOL/L (ref 22–29)
CO2 SERPL-SCNC: 24.8 MMOL/L (ref 22–29)
CREAT BLD-MCNC: 0.73 MG/DL (ref 0.57–1)
CREAT BLD-MCNC: 0.74 MG/DL (ref 0.57–1)
D-LACTATE SERPL-SCNC: 1.1 MMOL/L (ref 0.5–2)
DEPRECATED RDW RBC AUTO: 45.6 FL (ref 37–54)
DEPRECATED RDW RBC AUTO: 45.7 FL (ref 37–54)
EOSINOPHIL # BLD AUTO: 0.13 10*3/MM3 (ref 0–0.7)
EOSINOPHIL # BLD AUTO: 0.28 10*3/MM3 (ref 0–0.7)
EOSINOPHIL NFR BLD AUTO: 0.9 % (ref 0.3–6.2)
EOSINOPHIL NFR BLD AUTO: 1.5 % (ref 0.3–6.2)
ERYTHROCYTE [DISTWIDTH] IN BLOOD BY AUTOMATED COUNT: 13.3 % (ref 11.7–13)
ERYTHROCYTE [DISTWIDTH] IN BLOOD BY AUTOMATED COUNT: 13.4 % (ref 11.7–13)
GFR SERPL CREATININE-BSD FRML MDRD: 78 ML/MIN/1.73
GFR SERPL CREATININE-BSD FRML MDRD: 79 ML/MIN/1.73
GLOBULIN UR ELPH-MCNC: 3.5 GM/DL
GLUCOSE BLD-MCNC: 110 MG/DL (ref 65–99)
GLUCOSE BLD-MCNC: 143 MG/DL (ref 65–99)
HCT VFR BLD AUTO: 41.4 % (ref 35.6–45.5)
HCT VFR BLD AUTO: 51.2 % (ref 35.6–45.5)
HGB BLD-MCNC: 13.6 G/DL (ref 11.9–15.5)
HGB BLD-MCNC: 17.2 G/DL (ref 11.9–15.5)
HOLD SPECIMEN: NORMAL
HOLD SPECIMEN: NORMAL
HYALINE CASTS UR QL AUTO: ABNORMAL /LPF
IMM GRANULOCYTES # BLD: 0.03 10*3/MM3 (ref 0–0.03)
IMM GRANULOCYTES # BLD: 0.05 10*3/MM3 (ref 0–0.03)
IMM GRANULOCYTES NFR BLD: 0.2 % (ref 0–0.5)
IMM GRANULOCYTES NFR BLD: 0.3 % (ref 0–0.5)
LIPASE SERPL-CCNC: 23 U/L (ref 13–60)
LYMPHOCYTES # BLD AUTO: 3.02 10*3/MM3 (ref 0.9–4.8)
LYMPHOCYTES # BLD AUTO: 3.07 10*3/MM3 (ref 0.9–4.8)
LYMPHOCYTES NFR BLD AUTO: 15.7 % (ref 19.6–45.3)
LYMPHOCYTES NFR BLD AUTO: 21.3 % (ref 19.6–45.3)
MAGNESIUM SERPL-MCNC: 2 MG/DL (ref 1.6–2.4)
MCH RBC QN AUTO: 31.1 PG (ref 26.9–32)
MCH RBC QN AUTO: 31.6 PG (ref 26.9–32)
MCHC RBC AUTO-ENTMCNC: 32.9 G/DL (ref 32.4–36.3)
MCHC RBC AUTO-ENTMCNC: 33.6 G/DL (ref 32.4–36.3)
MCV RBC AUTO: 94.1 FL (ref 80.5–98.2)
MCV RBC AUTO: 94.7 FL (ref 80.5–98.2)
MONOCYTES # BLD AUTO: 0.91 10*3/MM3 (ref 0.2–1.2)
MONOCYTES # BLD AUTO: 1.32 10*3/MM3 (ref 0.2–1.2)
MONOCYTES NFR BLD AUTO: 6.3 % (ref 5–12)
MONOCYTES NFR BLD AUTO: 6.8 % (ref 5–12)
NEUTROPHILS # BLD AUTO: 10.2 10*3/MM3 (ref 1.9–8.1)
NEUTROPHILS # BLD AUTO: 14.56 10*3/MM3 (ref 1.9–8.1)
NEUTROPHILS NFR BLD AUTO: 70.9 % (ref 42.7–76)
NEUTROPHILS NFR BLD AUTO: 75.4 % (ref 42.7–76)
PHOSPHATE SERPL-MCNC: 2.7 MG/DL (ref 2.5–4.5)
PLATELET # BLD AUTO: 245 10*3/MM3 (ref 140–500)
PLATELET # BLD AUTO: 290 10*3/MM3 (ref 140–500)
PMV BLD AUTO: 12.8 FL (ref 6–12)
PMV BLD AUTO: 12.9 FL (ref 6–12)
POTASSIUM BLD-SCNC: 3.7 MMOL/L (ref 3.5–5.2)
POTASSIUM BLD-SCNC: 4.3 MMOL/L (ref 3.5–5.2)
PROT SERPL-MCNC: 7.8 G/DL (ref 6–8.5)
RBC # BLD AUTO: 4.37 10*6/MM3 (ref 3.9–5.2)
RBC # BLD AUTO: 5.44 10*6/MM3 (ref 3.9–5.2)
RBC # UR: ABNORMAL /HPF
REF LAB TEST METHOD: ABNORMAL
SODIUM BLD-SCNC: 140 MMOL/L (ref 136–145)
SODIUM BLD-SCNC: 141 MMOL/L (ref 136–145)
SQUAMOUS #/AREA URNS HPF: ABNORMAL /HPF
WBC CLUMPS # UR AUTO: ABNORMAL /HPF
WBC NRBC COR # BLD: 14.4 10*3/MM3 (ref 4.5–10.7)
WBC NRBC COR # BLD: 19.28 10*3/MM3 (ref 4.5–10.7)
WBC UR QL AUTO: ABNORMAL /HPF
WHOLE BLOOD HOLD SPECIMEN: NORMAL
WHOLE BLOOD HOLD SPECIMEN: NORMAL

## 2017-02-16 PROCEDURE — 73564 X-RAY EXAM KNEE 4 OR MORE: CPT | Mod: TC,PO,LT

## 2017-02-16 PROCEDURE — 99999 PR PBB SHADOW E&M-EST. PATIENT-LVL II: CPT | Mod: PBBFAC,,, | Performed by: ORTHOPAEDIC SURGERY

## 2017-02-16 PROCEDURE — 85025 COMPLETE CBC W/AUTO DIFF WBC: CPT | Performed by: HOSPITALIST

## 2017-02-16 PROCEDURE — 73562 X-RAY EXAM OF KNEE 3: CPT | Mod: 26,59,RT, | Performed by: RADIOLOGY

## 2017-02-16 PROCEDURE — 25010000002 HYDROMORPHONE PER 4 MG

## 2017-02-16 PROCEDURE — 25010000002 MEROPENEM: Performed by: HOSPITALIST

## 2017-02-16 PROCEDURE — 84100 ASSAY OF PHOSPHORUS: CPT | Performed by: HOSPITALIST

## 2017-02-16 PROCEDURE — 87040 BLOOD CULTURE FOR BACTERIA: CPT | Performed by: EMERGENCY MEDICINE

## 2017-02-16 PROCEDURE — 83605 ASSAY OF LACTIC ACID: CPT | Performed by: EMERGENCY MEDICINE

## 2017-02-16 PROCEDURE — 83735 ASSAY OF MAGNESIUM: CPT | Performed by: HOSPITALIST

## 2017-02-16 PROCEDURE — 25010000002 ONDANSETRON PER 1 MG: Performed by: INTERNAL MEDICINE

## 2017-02-16 PROCEDURE — 73564 X-RAY EXAM KNEE 4 OR MORE: CPT | Mod: 26,LT,, | Performed by: RADIOLOGY

## 2017-02-16 PROCEDURE — 25010000002 ONDANSETRON PER 1 MG: Performed by: HOSPITALIST

## 2017-02-16 PROCEDURE — 83690 ASSAY OF LIPASE: CPT | Performed by: EMERGENCY MEDICINE

## 2017-02-16 PROCEDURE — 80053 COMPREHEN METABOLIC PANEL: CPT | Performed by: EMERGENCY MEDICINE

## 2017-02-16 PROCEDURE — 25010000002 MORPHINE PER 10 MG: Performed by: HOSPITALIST

## 2017-02-16 PROCEDURE — 99024 POSTOP FOLLOW-UP VISIT: CPT | Mod: S$GLB,,, | Performed by: ORTHOPAEDIC SURGERY

## 2017-02-16 PROCEDURE — 25010000002 ONDANSETRON PER 1 MG

## 2017-02-16 PROCEDURE — 25010000002 MEROPENEM: Performed by: EMERGENCY MEDICINE

## 2017-02-16 PROCEDURE — 85025 COMPLETE CBC W/AUTO DIFF WBC: CPT | Performed by: EMERGENCY MEDICINE

## 2017-02-16 PROCEDURE — 74176 CT ABD & PELVIS W/O CONTRAST: CPT

## 2017-02-16 RX ORDER — NALOXONE HCL 0.4 MG/ML
0.4 VIAL (ML) INJECTION
Status: DISCONTINUED | OUTPATIENT
Start: 2017-02-16 | End: 2017-02-18 | Stop reason: HOSPADM

## 2017-02-16 RX ORDER — SODIUM CHLORIDE 0.9 % (FLUSH) 0.9 %
1-10 SYRINGE (ML) INJECTION AS NEEDED
Status: DISCONTINUED | OUTPATIENT
Start: 2017-02-16 | End: 2017-02-18 | Stop reason: HOSPADM

## 2017-02-16 RX ORDER — NITROGLYCERIN 0.4 MG/1
0.4 TABLET SUBLINGUAL
Status: DISCONTINUED | OUTPATIENT
Start: 2017-02-16 | End: 2017-02-18 | Stop reason: HOSPADM

## 2017-02-16 RX ORDER — ONDANSETRON 2 MG/ML
INJECTION INTRAMUSCULAR; INTRAVENOUS
Status: COMPLETED
Start: 2017-02-16 | End: 2017-02-16

## 2017-02-16 RX ORDER — ONDANSETRON 2 MG/ML
4 INJECTION INTRAMUSCULAR; INTRAVENOUS EVERY 6 HOURS PRN
Status: DISCONTINUED | OUTPATIENT
Start: 2017-02-16 | End: 2017-02-18 | Stop reason: HOSPADM

## 2017-02-16 RX ORDER — MORPHINE SULFATE 2 MG/ML
1 INJECTION, SOLUTION INTRAMUSCULAR; INTRAVENOUS EVERY 4 HOURS PRN
Status: DISCONTINUED | OUTPATIENT
Start: 2017-02-16 | End: 2017-02-18 | Stop reason: HOSPADM

## 2017-02-16 RX ORDER — ONDANSETRON 4 MG/1
4 TABLET, ORALLY DISINTEGRATING ORAL EVERY 6 HOURS PRN
Status: DISCONTINUED | OUTPATIENT
Start: 2017-02-16 | End: 2017-02-18 | Stop reason: HOSPADM

## 2017-02-16 RX ORDER — ONDANSETRON 4 MG/1
4 TABLET, FILM COATED ORAL EVERY 6 HOURS PRN
Status: DISCONTINUED | OUTPATIENT
Start: 2017-02-16 | End: 2017-02-18 | Stop reason: HOSPADM

## 2017-02-16 RX ORDER — SODIUM CHLORIDE 9 MG/ML
125 INJECTION, SOLUTION INTRAVENOUS CONTINUOUS
Status: DISCONTINUED | OUTPATIENT
Start: 2017-02-16 | End: 2017-02-17

## 2017-02-16 RX ORDER — ACETAMINOPHEN 325 MG/1
650 TABLET ORAL EVERY 4 HOURS PRN
Status: DISCONTINUED | OUTPATIENT
Start: 2017-02-16 | End: 2017-02-18 | Stop reason: HOSPADM

## 2017-02-16 RX ORDER — ONDANSETRON 2 MG/ML
4 INJECTION INTRAMUSCULAR; INTRAVENOUS ONCE
Status: COMPLETED | OUTPATIENT
Start: 2017-02-16 | End: 2017-02-16

## 2017-02-16 RX ORDER — SENNA AND DOCUSATE SODIUM 50; 8.6 MG/1; MG/1
2 TABLET, FILM COATED ORAL 2 TIMES DAILY
Status: DISCONTINUED | OUTPATIENT
Start: 2017-02-16 | End: 2017-02-17

## 2017-02-16 RX ORDER — HYDROCODONE BITARTRATE AND ACETAMINOPHEN 7.5; 325 MG/1; MG/1
1 TABLET ORAL EVERY 4 HOURS PRN
Status: DISCONTINUED | OUTPATIENT
Start: 2017-02-16 | End: 2017-02-18 | Stop reason: HOSPADM

## 2017-02-16 RX ADMIN — SODIUM CHLORIDE 1000 ML: 9 INJECTION, SOLUTION INTRAVENOUS at 01:28

## 2017-02-16 RX ADMIN — Medication 1 MG: at 02:29

## 2017-02-16 RX ADMIN — SODIUM CHLORIDE 125 ML/HR: 9 INJECTION, SOLUTION INTRAVENOUS at 20:31

## 2017-02-16 RX ADMIN — SODIUM CHLORIDE 125 ML/HR: 9 INJECTION, SOLUTION INTRAVENOUS at 12:29

## 2017-02-16 RX ADMIN — MORPHINE SULFATE 1 MG: 2 INJECTION, SOLUTION INTRAMUSCULAR; INTRAVENOUS at 20:31

## 2017-02-16 RX ADMIN — MEROPENEM 1 G: 1 INJECTION, POWDER, FOR SOLUTION INTRAVENOUS at 01:28

## 2017-02-16 RX ADMIN — HYDROMORPHONE HYDROCHLORIDE 1 MG: 1 INJECTION, SOLUTION INTRAMUSCULAR; INTRAVENOUS; SUBCUTANEOUS at 02:29

## 2017-02-16 RX ADMIN — HYDROCODONE BITARTRATE AND ACETAMINOPHEN 1 TABLET: 7.5; 325 TABLET ORAL at 08:33

## 2017-02-16 RX ADMIN — ONDANSETRON 4 MG: 2 INJECTION INTRAMUSCULAR; INTRAVENOUS at 02:29

## 2017-02-16 RX ADMIN — ONDANSETRON 4 MG: 2 INJECTION INTRAMUSCULAR; INTRAVENOUS at 19:05

## 2017-02-16 RX ADMIN — ONDANSETRON 4 MG: 2 INJECTION INTRAMUSCULAR; INTRAVENOUS at 14:24

## 2017-02-16 RX ADMIN — DOCUSATE SODIUM -SENNOSIDES 2 TABLET: 50; 8.6 TABLET, COATED ORAL at 17:15

## 2017-02-16 RX ADMIN — DOCUSATE SODIUM -SENNOSIDES 2 TABLET: 50; 8.6 TABLET, COATED ORAL at 09:27

## 2017-02-16 RX ADMIN — MEROPENEM 1 G: 1 INJECTION, POWDER, FOR SOLUTION INTRAVENOUS at 09:27

## 2017-02-16 RX ADMIN — MEROPENEM 1 G: 1 INJECTION, POWDER, FOR SOLUTION INTRAVENOUS at 17:15

## 2017-02-16 RX ADMIN — SODIUM CHLORIDE 125 ML/HR: 9 INJECTION, SOLUTION INTRAVENOUS at 04:30

## 2017-02-16 NOTE — PROGRESS NOTES
DATE: 2/16/2017  PATIENT: Kenna Schmitt    Attending Physician: Kirby Dale M.D.    HISTORY:  Kenna Schmitt is a 66 y.o. female who returns for follow up evaluation of  her left knee.  She is status post arthroscopy with debridement drilling and removal of the osteonecrotic loose body of the medial femoral condyle and partial meniscectomy meniscectomy, 2/10/17.  She reports that her pain is significantly improved from preoperatively.  She does have some soreness but denies to sharp pain.  She's been using a walker and limiting her activities.  She is taking aspirin dose topically as prescribed.    PMH/PSH/FamHx/SocHx:  Reviewed and unchanged from prior visit    ROS:  Constitution: Negative for chills, fever, and sweats. Negative for unexplained weight loss.  HENT: Negative for headaches and blurry vision.   Cardiovascular: Negative for chest pain, irregular heartbeat, leg swelling and palpitations.   Respiratory: Negative for cough and shortness of breath.   Gastrointestinal: Negative for abdominal pain, heartburn, nausea and vomiting.   Genitourinary: Negative for bladder incontinence and dysuria.   Musculoskeletal: Negative for systemic arthritis, joint swelling, muscle weakness and myalgias.   Neurological: Negative for numbness.   Psychiatric/Behavioral: Negative for depression.   Endocrine: Negative for polyuria.   Hematologic/Lymphatic: Negative for bleeding disorders.  Skin: Negative for poor wound healing.       EXAM:  Visit Vitals    Ht 5' (1.524 m)    Wt 98.4 kg (217 lb)    BMI 42.38 kg/m2     Healthy-appearing female no acute distress.  Examination left knee reveals the arthroscopic portals are clean and dry.  Mild swelling and trace effusion.  Range of motion 0-100° of flexion.  Mild medial tenderness noted.  Sensation intact to lower extremity.  Calf compartment is soft    IMAGING:   X-rays performed today of the left knee are reviewed.  There is no evidence of the osteonecrosis of the medial  femoral condyle with a loose body subsequently removed.    ASSESSMENT:  Status post arthroscopy left knee with debridement, microfracture and removal osteonecrotic loose body medial femoral condyle and partial medial meniscectomy, 2/10/17.    PLAN:  The implications of the patient's evolution of symptoms and findings were explained to the patient and her  in detail.  Kenna is feeling much better.  I recommended limitation of activity for 3 more weeks.  She may partial weight-bear with a walker.  She'll work on gentle range of motion.  I will see her back in 3 weeks' time for reexam.    This note was dictated using voice recognition software and may contain grammatical errors

## 2017-02-16 NOTE — ED PROVIDER NOTES
" EMERGENCY DEPARTMENT ENCOUNTER    CHIEF COMPLAINT  Chief Complaint: dysuria   History given by: pt  History limited by: none  Room Number:   PMD: No Known Provider      HPI:  Pt is a 66 y.o. female who presents complaining of dysuria onset this evening. She c/o left sided flank pain. Hx of one prior UTI. She has no further complaints at this time.     Duration:  Several hours   Onset: gradual   Timing: constant   Location: n/a  Radiation: none  Quality: \"pain\"  Intensity/Severity: moderate  Progression: worsening   Associated Symptoms: left sided flank pain   Aggravating Factors: none  Alleviating Factors: none  Previous Episodes: Hx of one prior UTI.   Treatment before arrival: none    PAST MEDICAL HISTORY  Active Ambulatory Problems     Diagnosis Date Noted   • No Active Ambulatory Problems     Resolved Ambulatory Problems     Diagnosis Date Noted   • No Resolved Ambulatory Problems     No Additional Past Medical History       PAST SURGICAL HISTORY  Past Surgical History   Procedure Laterality Date   •  section     • Mandible surgery     • Breast biopsy Right        FAMILY HISTORY  History reviewed. No pertinent family history.    SOCIAL HISTORY  Social History     Social History   • Marital status:      Spouse name: N/A   • Number of children: N/A   • Years of education: N/A     Occupational History   • Not on file.     Social History Main Topics   • Smoking status: Former Smoker     Packs/day: 1.00     Types: Cigarettes   • Smokeless tobacco: Not on file   • Alcohol use Yes      Comment: \"on occasion\"   • Drug use: No   • Sexual activity: Not on file     Other Topics Concern   • Not on file     Social History Narrative   • No narrative on file       ALLERGIES  Cephalosporins; Ciprofloxacin; Penicillins; and Sulfa antibiotics    REVIEW OF SYSTEMS  Review of Systems   Constitutional: Negative for fever.   HENT: Negative for sore throat.    Eyes: Negative.    Respiratory: Negative for cough " and shortness of breath.    Cardiovascular: Negative for chest pain.   Gastrointestinal: Negative for abdominal pain, diarrhea and vomiting.   Genitourinary: Positive for dysuria and flank pain.   Musculoskeletal: Negative for neck pain.   Skin: Negative for rash.   Allergic/Immunologic: Negative.    Neurological: Negative for weakness, numbness and headaches.   Hematological: Negative.    Psychiatric/Behavioral: Negative.    All other systems reviewed and are negative.      PHYSICAL EXAM  ED Triage Vitals   Temp Heart Rate Resp BP SpO2   02/15/17 2305 02/15/17 2305 02/15/17 2305 02/15/17 2310 02/15/17 2305   98.8 °F (37.1 °C) 134 16 162/96 99 %      Temp src Heart Rate Source Patient Position BP Location FiO2 (%)   02/15/17 2305 02/15/17 2305 02/15/17 2310 02/15/17 2310 --   Tympanic Monitor Sitting Left arm        Physical Exam   Constitutional: She is oriented to person, place, and time and well-developed, well-nourished, and in no distress. No distress.   Appears uncomfortable and in pain   HENT:   Head: Normocephalic and atraumatic.   Eyes: EOM are normal. Pupils are equal, round, and reactive to light.   Neck: Normal range of motion. Neck supple.   Cardiovascular: Regular rhythm and normal heart sounds.  Tachycardia present.    Pulmonary/Chest: Effort normal and breath sounds normal. No respiratory distress.   Abdominal: Soft. There is no tenderness. There is CVA tenderness (moderate L). There is no rebound and no guarding.   Musculoskeletal: Normal range of motion. She exhibits no edema.   Neurological: She is alert and oriented to person, place, and time. She has normal sensation and normal strength.   Skin: Skin is warm and dry. No rash noted.   Psychiatric: Mood and affect normal.   Nursing note and vitals reviewed.      LAB RESULTS  Lab Results (last 24 hours)     Procedure Component Value Units Date/Time    Urinalysis With / Culture If Indicated [[id number]]  (Abnormal) Collected:  02/15/17 7253     Specimen:  Urine from Urine, Clean Catch Updated:  02/15/17 2350     Color, UA Yellow      Appearance, UA Cloudy (A)      pH, UA 6.5      Specific Gravity, UA 1.010      Glucose, UA Negative      Ketones, UA Negative      Bilirubin, UA Negative      Blood, UA Large (3+) (A)      Protein, UA 30 mg/dL (1+) (A)      Leuk Esterase, UA Large (3+) (A)      Nitrite, UA Negative      Urobilinogen, UA 1.0 E.U./dL     Urinalysis, Microscopic Only [[id number]]  (Abnormal) Collected:  02/15/17 2329    Specimen:  Urine from Urine, Clean Catch Updated:  02/16/17 0019     RBC, UA 6-12 (A) /HPF      WBC, UA Too Numerous to Count (A) /HPF      Bacteria, UA 2+ (A) /HPF      Squamous Epithelial Cells, UA 0-2 /HPF      Hyaline Casts, UA 0-2 /LPF      WBC Clumps, UA Small/1+ /HPF      Methodology Manual Light Microscopy     Urine Culture [[id number]] Collected:  02/15/17 2329    Specimen:  Urine from Urine, Clean Catch Updated:  02/15/17 2346    CBC & Differential [[id number]] Collected:  02/16/17 0043    Specimen:  Blood Updated:  02/16/17 0101    Narrative:       The following orders were created for panel order CBC & Differential.  Procedure                               Abnormality         Status                     ---------                               -----------         ------                     CBC Auto Differential[[id number]]         Abnormal            Final result                 Please view results for these tests on the individual orders.    Comprehensive Metabolic Panel [[id number]]  (Abnormal) Collected:  02/16/17 0043    Specimen:  Blood Updated:  02/16/17 0119     Glucose 110 (H) mg/dL      BUN 12 mg/dL      Creatinine 0.74 mg/dL      Sodium 140 mmol/L      Potassium 4.3 mmol/L      Chloride 101 mmol/L      CO2 24.8 mmol/L      Calcium 10.9 (H) mg/dL      Total Protein 7.8 g/dL      Albumin 4.30 g/dL      ALT (SGPT) 16 U/L      AST (SGOT) 16 U/L      Alkaline Phosphatase 154 (H) U/L      Total Bilirubin 0.4 mg/dL      eGFR  Non  Amer 78 mL/min/1.73      Globulin 3.5 gm/dL      A/G Ratio 1.2 g/dL      BUN/Creatinine Ratio 16.2      Anion Gap 14.2 mmol/L     Lipase [[id number]]  (Normal) Collected:  02/16/17 0043    Specimen:  Blood Updated:  02/16/17 0119     Lipase 23 U/L     CBC Auto Differential [[id number]]  (Abnormal) Collected:  02/16/17 0043    Specimen:  Blood Updated:  02/16/17 0101     WBC 19.28 (H) 10*3/mm3      RBC 5.44 (H) 10*6/mm3      Hemoglobin 17.2 (H) g/dL      Hematocrit 51.2 (H) %      MCV 94.1 fL      MCH 31.6 pg      MCHC 33.6 g/dL      RDW 13.4 (H) %      RDW-SD 45.7 fl      MPV 12.8 (H) fL      Platelets 290 10*3/mm3      Neutrophil % 75.4 %      Lymphocyte % 15.7 (L) %      Monocyte % 6.8 %      Eosinophil % 1.5 %      Basophil % 0.3 %      Immature Grans % 0.3 %      Neutrophils, Absolute 14.56 (H) 10*3/mm3      Lymphocytes, Absolute 3.02 10*3/mm3      Monocytes, Absolute 1.32 (H) 10*3/mm3      Eosinophils, Absolute 0.28 10*3/mm3      Basophils, Absolute 0.05 10*3/mm3      Immature Grans, Absolute 0.05 (H) 10*3/mm3     Blood Culture [[id number]] Collected:  02/16/17 0125    Specimen:  Blood from Arm, Right Updated:  02/16/17 0134    Lactic Acid, Plasma [[id number]]  (Normal) Collected:  02/16/17 0125    Specimen:  Blood from Arm, Left Updated:  02/16/17 0149     Lactate 1.1 mmol/L     Blood Culture [[id number]] Collected:  02/16/17 0126    Specimen:  Blood from Arm, Left Updated:  02/16/17 0134          I ordered the above labs and reviewed the results    RADIOLOGY  CT Abdomen Pelvis Without Contrast   Preliminary Result   1.  Severe bilateral UTI, left pyelonephritis cannot be excluded on this   noncontrast study. Nonobstructing 0.5 cm stone of the right kidney.    2.  Moderately severe cystitis.   3.  Diverticulosis of the colon, appendix is normal.   4.  Small hiatal hernia. Hepatic cysts measuring up to 2.2 cm.                   I ordered the above noted radiological studies. Interpreted by radiologist.  Reviewed by me in PACS.       PROCEDURES  Procedures      PROGRESS AND CONSULTS  ED Course   23:16 Ordered blood work, Lipase and UA for further evaluation.     01:10 Ordered lactic acid, blood cultures and CT Abd/Pel for further evaluation. Ordered IVF bolus and Merrem for UTI.     01:59 Ordered Zofran for nausea. /89.    02:06 Placed call to [facility] for admission.     02:08 Discussed case with Dr. Prado ([facility]) who agrees to admit pt.     02:10 Rechecked pt. She is resting and appears in no acute distress. Discussed lab results which showed elevated WBC and CT which showed pyelonephritis. Informed of plan to admit. Pt agrees with course of care. Addressed all questions.     MEDICAL DECISION MAKING  Results were reviewed/discussed with the patient and they were also made aware of online access. Pt also made aware that some labs, such as cultures, will not be resulted during ER visit and follow up with PMD is necessary.     MDM  Number of Diagnoses or Management Options  Acute pyelonephritis:      Amount and/or Complexity of Data Reviewed  Clinical lab tests: ordered and reviewed (UA - showed UTI  WBC - 19.2  Lactic - 1.1  Bun - 12  Cr- 0.74  )  Tests in the radiology section of CPT®: ordered and reviewed (CT Abd/Pel showed pyelonephritis )  Decide to obtain previous medical records or to obtain history from someone other than the patient: yes  Review and summarize past medical records: yes  Discuss the patient with other providers: yes (D/w Dr. Prado ([facility]) who agrees to admit pt)           DIAGNOSIS  Final diagnoses:   Acute pyelonephritis       DISPOSITION  ADMISSION    Discussed treatment plan and reason for admission with pt/family and admitting physician.  Pt/family voiced understanding of the plan for admission for further testing/treatment as needed.         Latest Documented Vital Signs:  As of 2:11 AM  BP- 137/89 HR- 114 Temp- 98.8 °F (37.1 °C) (Tympanic) O2 sat- 94%    --  Documentation assistance  provided by alesia oBnilla for Dr. Griffith.  Information recorded by the scribe was done at my direction and has been verified and validated by me.           Pily Bonilla  02/16/17 0211       Leeroy Griffith MD  02/17/17 0406

## 2017-02-16 NOTE — PLAN OF CARE
Problem: Patient Care Overview (Adult)  Goal: Plan of Care Review  Outcome: Ongoing (interventions implemented as appropriate)    02/16/17 0508   Coping/Psychosocial Response Interventions   Plan Of Care Reviewed With patient   Patient Care Overview   Progress no change   Outcome Evaluation   Outcome Summary/Follow up Plan Pt admitted to unit during shift. NS at 125 started. Vitals stable. No falls during shift.          02/16/17 0508   Coping/Psychosocial Response Interventions   Plan Of Care Reviewed With patient   Patient Care Overview   Progress no change   Outcome Evaluation   Outcome Summary/Follow up Plan Pt admitted to unit during shift. NS at 125 started. Vitals stable. No falls during shift.        Goal: Adult Individualization and Mutuality  Outcome: Ongoing (interventions implemented as appropriate)  Goal: Discharge Needs Assessment  Outcome: Ongoing (interventions implemented as appropriate)    Problem: Infection, Risk/Actual (Adult)  Goal: Identify Related Risk Factors and Signs and Symptoms  Outcome: Outcome(s) achieved Date Met:  02/16/17  Goal: Infection Prevention/Resolution  Outcome: Ongoing (interventions implemented as appropriate)    Problem: Pain, Acute (Adult)  Goal: Identify Related Risk Factors and Signs and Symptoms  Outcome: Outcome(s) achieved Date Met:  02/16/17  Goal: Acceptable Pain Control/Comfort Level  Outcome: Ongoing (interventions implemented as appropriate)

## 2017-02-16 NOTE — MR AVS SNAPSHOT
[facility]  [address]  [place] [address]  Phone: [phone]                  Kenna Schmitt   2017 3:00 PM   Office Visit    Description:  Female : 1950   Provider:  Kirby Dale MD   Department:  [facility]           Reason for Visit     Left Knee - Pain, Post-op Evaluation                To Do List           Future Appointments        Provider Department Dept Phone    3/13/2017 3:15 PM Kirby Dale MD [facility] [phone]      Goals (5 Years of Data)              7/27/16    10/23/14    LDL CHOLESTEROL < 130   104.8  82.2      [facility]     [facility] [place] [facility] Line -  Assistance  Registered nurses in the [facility] provide clinical advisement, health education, appointment booking, and other advisory services.  Call for this free service at [phone].             Medications           Message regarding Medications     Verify the changes and/or additions to your medication regime listed below are the same as discussed with your clinician today.  If any of these changes or additions are incorrect, please notify your healthcare provider.             Verify that the below list of medications is an accurate representation of the medications you are currently taking.  If none reported, the list may be blank. If incorrect, please contact your healthcare provider. Carry this list with you in case of emergency.           Current Medications     atorvastatin (LIPITOR) 20 MG tablet TAKE 1 TABLET BY MOUTH EVERY DAY    b complex vitamins capsule Take 1 capsule by mouth once daily.    chlorthalidone (HYGROTEN) 25 MG Tab Take 1 tablet (25 mg total) by mouth once daily.    cholecalciferol, vitamin D3, (VITAMIN D3) 2,000 unit Cap Take 1 capsule by mouth once daily.    Lactobacillus rhamnosus GG (CULTURELLE) 10 billion cell capsule Take 1 capsule by mouth once daily.    levothyroxine (SYNTHROID) 100 MCG tablet  TAKE 1 TABLET BY MOUTH ONCE DAILY    omeprazole (PRILOSEC) 20 MG capsule TAKE ONE CAPSULE BY MOUTH DAILY    oxycodone-acetaminophen (PERCOCET)  mg per tablet Take 1 tablet by mouth every 4 to 6 hours as needed for Pain.    valacyclovir (VALTREX) 500 MG tablet Take 500 mg by mouth 2 (two) times daily. PRN fever blisters           Clinical Reference Information           Your Vitals Were     Height Weight BMI          5' (1.524 m) 98.4 kg (217 lb) 42.38 kg/m2        Allergies as of 2/16/2017     Penicillins    Shellfish Containing Products      Immunizations Administered on Date of Encounter - 2/16/2017     None      Language Assistance Services     ATTENTION: Language assistance services are available, free of charge. Please call [phone].      ATENCIÓN: Si analia santos, tiene a loredo disposición servicios gratuitos de asistencia lingüística. Llame al [phone].     ROSE Ý: N?u b?n nói Ti?ng Vi?t, có các d?ch v? h? tr? ngôn ng? mi?n phí dành cho b?n. G?i s? [phone].         [facility] complies with applicable Federal civil rights laws and does not discriminate on the basis of race, color, national origin, age, disability, or sex.

## 2017-02-16 NOTE — ED NOTES
Pt reports having left flank pain that started this morning. Pt reports difficulty urinating, burning upon urination, and itching.      Norma Castaneda RN  02/15/17 8183

## 2017-02-17 LAB
ANION GAP SERPL CALCULATED.3IONS-SCNC: 8.4 MMOL/L
BUN BLD-MCNC: 8 MG/DL (ref 8–23)
BUN/CREAT SERPL: 12.7 (ref 7–25)
CALCIUM SPEC-SCNC: 9.1 MG/DL (ref 8.6–10.5)
CHLORIDE SERPL-SCNC: 109 MMOL/L (ref 98–107)
CO2 SERPL-SCNC: 22.6 MMOL/L (ref 22–29)
CREAT BLD-MCNC: 0.63 MG/DL (ref 0.57–1)
DEPRECATED RDW RBC AUTO: 47.4 FL (ref 37–54)
ERYTHROCYTE [DISTWIDTH] IN BLOOD BY AUTOMATED COUNT: 13.6 % (ref 11.7–13)
GFR SERPL CREATININE-BSD FRML MDRD: 94 ML/MIN/1.73
GLUCOSE BLD-MCNC: 92 MG/DL (ref 65–99)
HCT VFR BLD AUTO: 39.3 % (ref 35.6–45.5)
HGB BLD-MCNC: 12.8 G/DL (ref 11.9–15.5)
MCH RBC QN AUTO: 31.1 PG (ref 26.9–32)
MCHC RBC AUTO-ENTMCNC: 32.6 G/DL (ref 32.4–36.3)
MCV RBC AUTO: 95.4 FL (ref 80.5–98.2)
PLATELET # BLD AUTO: 221 10*3/MM3 (ref 140–500)
PMV BLD AUTO: 12.7 FL (ref 6–12)
POTASSIUM BLD-SCNC: 4.3 MMOL/L (ref 3.5–5.2)
RBC # BLD AUTO: 4.12 10*6/MM3 (ref 3.9–5.2)
SODIUM BLD-SCNC: 140 MMOL/L (ref 136–145)
WBC NRBC COR # BLD: 8.52 10*3/MM3 (ref 4.5–10.7)

## 2017-02-17 PROCEDURE — 80048 BASIC METABOLIC PNL TOTAL CA: CPT | Performed by: INTERNAL MEDICINE

## 2017-02-17 PROCEDURE — 25010000002 MEROPENEM: Performed by: HOSPITALIST

## 2017-02-17 PROCEDURE — 25010000002 ONDANSETRON PER 1 MG: Performed by: HOSPITALIST

## 2017-02-17 PROCEDURE — 85027 COMPLETE CBC AUTOMATED: CPT | Performed by: INTERNAL MEDICINE

## 2017-02-17 RX ADMIN — MEROPENEM 1 G: 1 INJECTION, POWDER, FOR SOLUTION INTRAVENOUS at 18:40

## 2017-02-17 RX ADMIN — MEROPENEM 1 G: 1 INJECTION, POWDER, FOR SOLUTION INTRAVENOUS at 00:32

## 2017-02-17 RX ADMIN — ONDANSETRON 4 MG: 2 INJECTION INTRAMUSCULAR; INTRAVENOUS at 00:32

## 2017-02-17 RX ADMIN — ONDANSETRON 4 MG: 2 INJECTION INTRAMUSCULAR; INTRAVENOUS at 08:59

## 2017-02-17 RX ADMIN — SODIUM CHLORIDE 125 ML/HR: 9 INJECTION, SOLUTION INTRAVENOUS at 11:54

## 2017-02-17 RX ADMIN — SODIUM CHLORIDE 125 ML/HR: 9 INJECTION, SOLUTION INTRAVENOUS at 04:33

## 2017-02-17 RX ADMIN — MEROPENEM 1 G: 1 INJECTION, POWDER, FOR SOLUTION INTRAVENOUS at 08:59

## 2017-02-17 NOTE — PROGRESS NOTES
Continued Stay Note  [facility]     Patient Name: Jazmyn Castaneda  MRN: [id number]  Today's Date: 2/17/2017    Admit Date: 2/15/2017          Discharge Plan       02/17/17 1246    Case Management/Social Work Plan    Plan home denies additional dc needs    Patient/Family In Agreement With Plan yes    Additional Comments Spoke with patient at bedside, introduced self and explained ccp role. Verified facesheet and confirmed [facility] [address]- pt denies any problems with medications. Pt states she lives independently at home, denies any DME, HH or SNF. Pt states plan is home at dc and daughter Laine will transport. Pt does not have PCP- Gave patient [facility] MD appointment liasion info sheet and number. Elina RNCCP              Discharge Codes     None            Neyda Clinton, RN

## 2017-02-17 NOTE — PLAN OF CARE
Problem: Patient Care Overview (Adult)  Goal: Plan of Care Review    02/16/17 1945   Coping/Psychosocial Response Interventions   Plan Of Care Reviewed With patient   Patient Care Overview   Progress improving   Outcome Evaluation   Outcome Summary/Follow up Plan c/o nausea and 1 episode of vomiting this shift. Antx given. Telemetry NSR with BBB. Will continue to monitor.         Problem: Infection, Risk/Actual (Adult)  Goal: Infection Prevention/Resolution  Outcome: Ongoing (interventions implemented as appropriate)

## 2017-02-17 NOTE — PLAN OF CARE
Problem: Patient Care Overview (Adult)  Goal: Plan of Care Review  Outcome: Ongoing (interventions implemented as appropriate)    02/17/17 0507   Coping/Psychosocial Response Interventions   Plan Of Care Reviewed With patient   Patient Care Overview   Progress improving   Outcome Evaluation   Outcome Summary/Follow up Plan Vitals stable. IV fluids continued. Pt c/o left flank pain x1, medicated per MAR. Pt c/o nausea x1, medicated per MAR.        Goal: Adult Individualization and Mutuality  Outcome: Ongoing (interventions implemented as appropriate)  Goal: Discharge Needs Assessment  Outcome: Ongoing (interventions implemented as appropriate)    Problem: Infection, Risk/Actual (Adult)  Goal: Infection Prevention/Resolution  Outcome: Ongoing (interventions implemented as appropriate)    Problem: Pain, Acute (Adult)  Goal: Acceptable Pain Control/Comfort Level  Outcome: Ongoing (interventions implemented as appropriate)

## 2017-02-17 NOTE — PROGRESS NOTES
" LOS: 1 day   Primary Care Physician: No Known Provider     Subjective  Feels better.  Has been up and about.  Tolerating diet.    I reviewed her allergic reactions: Throat swelling with penicillin or cephalosporin.  Physicians were not sure at the time.  Rash with Cipro.  Sulfa caused irritation to her eyes when it was used in eyedrops.  She has been able to take Macrodantin in the past.    Vital Signs  Body mass index is 27.34 kg/(m^2).  Temp:  [97.5 °F (36.4 °C)-98.1 °F (36.7 °C)] 97.5 °F (36.4 °C)  Heart Rate:  [76-82] 79  Resp:  [16-18] 16  BP: (119-150)/(63-82) 150/76      Objective:  General Appearance:  In no acute distress.    Vital signs: (most recent): Blood pressure 150/76, pulse 79, temperature 97.5 °F (36.4 °C), temperature source Oral, resp. rate 16, height 60\" (152.4 cm), weight 140 lb (63.5 kg), SpO2 97 %.    Lungs:  There are decreased breath sounds.  No wheezes, rales or rhonchi.    Heart: Normal rate.  Regular rhythm.  No murmur.   Abdomen: Abdomen is soft and non-distended.  There is no abdominal tenderness.   There is no splenomegaly. There is no hepatomegaly.   Extremities: There is dependent edema.  (Trace edema)  Neurological: Patient is alert.          Results Review:    I reviewed the patient's new clinical results.      Results from last 7 days  Lab Units 02/17/17  0602 02/16/17  0607   WBC 10*3/mm3 8.52 14.40*   HEMOGLOBIN g/dL 12.8 13.6   PLATELETS 10*3/mm3 221 245       Results from last 7 days  Lab Units 02/17/17  0602 02/16/17  0607   SODIUM mmol/L 140 141   POTASSIUM mmol/L 4.3 3.7   CHLORIDE mmol/L 109* 106   TOTAL CO2 mmol/L 22.6 21.0*   BUN mg/dL 8 10   CREATININE mg/dL 0.63 0.73   CALCIUM mg/dL 9.1 9.2   GLUCOSE mg/dL 92 143*         Hemoglobin A1C:No results found for: HGBA1C    Glucose Range:No results found for: POCGLU    Medication Review: Yes    Assessment/Plan     Active Hospital Problems (** Indicates Principal Problem)    Diagnosis Date Noted   • Acute pyelonephritis " [N10] 02/16/2017   • Acute cystitis [N30.00] 02/16/2017   • Nephrolithiasis [N20.0] 02/16/2017      Resolved Hospital Problems    Diagnosis Date Noted Date Resolved   No resolved problems to display.       Assessment & Plan  1.  Severe cystitis and severe bilateral UTI with possible left pyelonephritis.  Nonobstructing stone right kidney.  Greater than 100,000 colony-forming units-identification of organism and sensitivities are still pending.  Continue Merrem.  Antibiotics are limited secondary to the severity of her allergies as noted above.  DC IV fluids. Leukocytosis has resolved.    Disposition: Home in a.m. if able to change to an oral antibiotic pending sensitivities and allergies.  Otherwise we'll plan on 5 days total of IV Merrem    Elis Adkins MD  02/17/17  4:40 PM

## 2017-02-17 NOTE — PLAN OF CARE
Problem: Patient Care Overview (Adult)  Goal: Plan of Care Review  Outcome: Ongoing (interventions implemented as appropriate)    02/17/17 1528   Coping/Psychosocial Response Interventions   Plan Of Care Reviewed With patient   Patient Care Overview   Progress improving   Outcome Evaluation   Outcome Summary/Follow up Plan vss, no falls, no pain, c/o nausea, up in chair, continue to monitor       Goal: Adult Individualization and Mutuality  Outcome: Ongoing (interventions implemented as appropriate)    Problem: Infection, Risk/Actual (Adult)  Goal: Infection Prevention/Resolution  Outcome: Ongoing (interventions implemented as appropriate)    Problem: Pain, Acute (Adult)  Goal: Acceptable Pain Control/Comfort Level  Outcome: Ongoing (interventions implemented as appropriate)

## 2017-02-18 VITALS
DIASTOLIC BLOOD PRESSURE: 84 MMHG | OXYGEN SATURATION: 97 % | RESPIRATION RATE: 16 BRPM | SYSTOLIC BLOOD PRESSURE: 150 MMHG | WEIGHT: 140 LBS | TEMPERATURE: 97.4 F | HEIGHT: 60 IN | HEART RATE: 81 BPM | BODY MASS INDEX: 27.48 KG/M2

## 2017-02-18 PROBLEM — Z87.892 HISTORY OF DRUG-INDUCED ANAPHYLAXIS: Status: ACTIVE | Noted: 2017-02-18

## 2017-02-18 PROBLEM — K57.30 DIVERTICULOSIS OF LARGE INTESTINE WITHOUT HEMORRHAGE: Status: ACTIVE | Noted: 2017-02-18

## 2017-02-18 PROBLEM — J30.9 ALLERGIC RHINITIS: Status: ACTIVE | Noted: 2017-02-18

## 2017-02-18 PROBLEM — K44.9 HIATAL HERNIA: Status: ACTIVE | Noted: 2017-02-18

## 2017-02-18 LAB — BACTERIA SPEC AEROBE CULT: ABNORMAL

## 2017-02-18 PROCEDURE — 25010000002 MEROPENEM: Performed by: HOSPITALIST

## 2017-02-18 PROCEDURE — G0008 ADMIN INFLUENZA VIRUS VAC: HCPCS | Performed by: HOSPITALIST

## 2017-02-18 PROCEDURE — G0009 ADMIN PNEUMOCOCCAL VACCINE: HCPCS | Performed by: HOSPITALIST

## 2017-02-18 PROCEDURE — 90732 PPSV23 VACC 2 YRS+ SUBQ/IM: CPT | Performed by: HOSPITALIST

## 2017-02-18 PROCEDURE — 90674 CCIIV4 VAC NO PRSV 0.5 ML IM: CPT | Performed by: HOSPITALIST

## 2017-02-18 PROCEDURE — 25010000004 PNEUMOCOCCAL VAC POLYVALENT PER 0.5 ML: Performed by: HOSPITALIST

## 2017-02-18 PROCEDURE — 25010000004 INFLUENZA VAC SPLIT QUAD 0.5 ML SUSPENSION PREFILLED SYRINGE: Performed by: HOSPITALIST

## 2017-02-18 RX ORDER — NITROFURANTOIN 25; 75 MG/1; MG/1
100 CAPSULE ORAL 2 TIMES DAILY
Qty: 14 CAPSULE | Refills: 0 | Status: SHIPPED | OUTPATIENT
Start: 2017-02-18 | End: 2017-03-24

## 2017-02-18 RX ORDER — ACETAMINOPHEN 325 MG/1
650 TABLET ORAL EVERY 6 HOURS PRN
Refills: 0
Start: 2017-02-18 | End: 2017-03-24

## 2017-02-18 RX ADMIN — MEROPENEM 1 G: 1 INJECTION, POWDER, FOR SOLUTION INTRAVENOUS at 01:06

## 2017-02-18 RX ADMIN — PNEUMOCOCCAL VACCINE POLYVALENT 0.5 ML
25; 25; 25; 25; 25; 25; 25; 25; 25; 25; 25; 25; 25; 25; 25; 25; 25; 25; 25; 25; 25; 25; 25 INJECTION, SOLUTION INTRAMUSCULAR; SUBCUTANEOUS at 12:03

## 2017-02-18 RX ADMIN — INFLUENZA A VIRUS A/CALIFORNIA/7/2009 X-179A (H1N1) ANTIGEN (FORMALDEHYDE INACTIVATED), INFLUENZA A VIRUS A/HONG KONG/4801/2014 X-263B (H3N2) ANTIGEN (FORMALDEHYDE INACTIVATED), INFLUENZA B VIRUS B/PHUKET/3073/2013 ANTIGEN (FORMALDEHYDE INACTIVATED), AND INFLUENZA B VIRUS B/BRISBANE/60/2008 ANTIGEN (FORMALDEHYDE INACTIVATED) 0.5 ML: 15; 15; 15; 15 INJECTION, SUSPENSION INTRAMUSCULAR at 12:02

## 2017-02-18 NOTE — PLAN OF CARE
Problem: Patient Care Overview (Adult)  Goal: Plan of Care Review  Outcome: Ongoing (interventions implemented as appropriate)    02/18/17 0932   Coping/Psychosocial Response Interventions   Plan Of Care Reviewed With patient   Patient Care Overview   Progress improving   Outcome Evaluation   Outcome Summary/Follow up Plan vss, no falls, no pain, dc home today       Goal: Adult Individualization and Mutuality  Outcome: Ongoing (interventions implemented as appropriate)    Problem: Infection, Risk/Actual (Adult)  Goal: Infection Prevention/Resolution  Outcome: Ongoing (interventions implemented as appropriate)    Problem: Pain, Acute (Adult)  Goal: Acceptable Pain Control/Comfort Level  Outcome: Ongoing (interventions implemented as appropriate)

## 2017-02-18 NOTE — PLAN OF CARE
Problem: Patient Care Overview (Adult)  Goal: Plan of Care Review  Outcome: Ongoing (interventions implemented as appropriate)    02/18/17 0611   Coping/Psychosocial Response Interventions   Plan Of Care Reviewed With patient   Patient Care Overview   Progress improving   Outcome Evaluation   Outcome Summary/Follow up Plan Vitals stable. No falls during shift. No c/o pain or nausea. ABX continued. Possible d/c this AM.        Goal: Adult Individualization and Mutuality  Outcome: Ongoing (interventions implemented as appropriate)  Goal: Discharge Needs Assessment  Outcome: Ongoing (interventions implemented as appropriate)    Problem: Infection, Risk/Actual (Adult)  Goal: Infection Prevention/Resolution  Outcome: Ongoing (interventions implemented as appropriate)    Problem: Pain, Acute (Adult)  Goal: Acceptable Pain Control/Comfort Level  Outcome: Ongoing (interventions implemented as appropriate)

## 2017-02-18 NOTE — DISCHARGE SUMMARY
DATE OF ADMISSION:  02/15/2017   DATE OF DISCHARGE:  02/18/2017    PRIMARY CARE PROVIDER: None.    DISCHARGE DIAGNOSES:    1. Severe cystitis with possible left pyelonephritis and bilateral stranding of the ureters.   2. Sneezing and sinus congestion.   3. Nonobstructing right kidney stone.   4. Diverticulosis.   5. Small hiatal hernia.   6. History of drug-induced anaphylaxis with multiple drug allergies.     CONSULTANTS: None.     DIAGNOSTIC DATA: CT scan of abdomen and pelvis, 02/16/2017, which showed severe bilateral UTI, left pyelonephritis cannot be excluded. Nonobstructing 0.5 cm stone, right kidney. Moderately severe cystitis. Diverticulosis, hiatal hernia, hepatic cysts.     HOSPITAL COURSE: The patient was admitted to our service for fever, chills and dysuria. CT was done with results as noted above. Given the patient's history of anaphylaxis with penicillin or cephalosporins (the responsible agent could not be determined definitively), she was placed on Merrem. She responded very well. Her white blood cell count is now normal. Today, culture and sensitivities returned. She has E. coli, resistant to ampicillin, but sensitive to the rest of the panel. Yesterday, she told me that she has been able to take Macrodantin. Fortunately, the organism is sensitive to that. We will plan a 10-day course of antibiotics total. Also today she is sneezing and has some sinus congestion. No shortness of breath. Some coughing. She says this usually happens when she is in the hospital. It is likely a flare of allergic rhinitis. She does have an ENT physician, but no primary care provider. I gave her the number for outpatient followup. She is ready for discharge home today.     CONDITION: Stable.     PROGNOSIS: Good.     DIAGNOSTIC DATA: Labs from yesterday: White count 8.5, hemoglobin 12.8, platelets 221,000. Sodium 140, potassium 4.3, chloride 103, CO2 of 23, BUN 8, creatinine 0.6. Blood cultures no growth. Urine culture  greater than 100,000 colony-forming units of E. coli resistant to ampicillin sensitive to the rest of the panel including nitrofurantoin with an ANN of less than 16.     DISCHARGE MEDICATIONS:  1. Tylenol 325 mg 2 p.o. q.6 h. p.r.n. pain.  2. Macrobid 100 mg b.i.d. for 7 more days.     ACTIVITY: As tolerated.     DIET: Regular.     FOLLOWUP:  With physician in 2 weeks. She has the number to schedule.     I have discussed above with the patient. Medical record reviewed. Total time including preparation for discharge was 35 minutes.       MANDO Barbour:deonna  D:   02/18/2017 08:44:28  T:   02/18/2017 09:51:42  Job ID:   [id number]  Document ID:   [id number]  cc:

## 2017-02-18 NOTE — PROGRESS NOTES
" LOS: 2 days   Primary Care Physician: No Known Provider     Subjective  Getting a cold or allergies- sneezing and congested.  Otherwise feels okay.  No nausea or vomiting.  No back pain.  Has been up and about without difficulty.    Vital Signs  Body mass index is 27.34 kg/(m^2).  Temp:  [97.5 °F (36.4 °C)-98.8 °F (37.1 °C)] 97.9 °F (36.6 °C)  Heart Rate:  [79-80] 80  Resp:  [16] 16  BP: (150-155)/(76-84) 150/77      Objective:  General Appearance:  In no acute distress.    Vital signs: (most recent): Blood pressure 150/77, pulse 80, temperature 97.9 °F (36.6 °C), temperature source Oral, resp. rate 16, height 60\" (152.4 cm), weight 140 lb (63.5 kg), SpO2 97 %.    Lungs:  There are decreased breath sounds.  No wheezes, rales or rhonchi.    Heart: Normal rate.  Regular rhythm.  No murmur.   Abdomen: Abdomen is non-distended.    Extremities: There is dependent edema.  (Trace)  Neurological: Patient is alert.          Results Review:    I reviewed the patient's new clinical results.     Urine culture with Escherichia coli, resistant to ampicillin but sensitive to the rest of the panel including nitrofurantoin      Results from last 7 days  Lab Units 02/17/17  0602 02/16/17  0607   WBC 10*3/mm3 8.52 14.40*   HEMOGLOBIN g/dL 12.8 13.6   PLATELETS 10*3/mm3 221 245       Results from last 7 days  Lab Units 02/17/17  0602 02/16/17  0607   SODIUM mmol/L 140 141   POTASSIUM mmol/L 4.3 3.7   CHLORIDE mmol/L 109* 106   TOTAL CO2 mmol/L 22.6 21.0*   BUN mg/dL 8 10   CREATININE mg/dL 0.63 0.73   CALCIUM mg/dL 9.1 9.2   GLUCOSE mg/dL 92 143*         Hemoglobin A1C:No results found for: HGBA1C    Glucose Range:No results found for: POCGLU    Medication Review: Yes    Assessment/Plan     Active Hospital Problems (** Indicates Principal Problem)    Diagnosis Date Noted   • **Acute pyelonephritis [N10] 02/16/2017   • Allergic rhinitis [J30.9] 02/18/2017   • Diverticulosis of large intestine without hemorrhage [K57.30] 02/18/2017   • " Hiatal hernia [K44.9] 02/18/2017   • History of drug-induced anaphylaxis [Z87.892] 02/18/2017   • Acute cystitis [N30.00] 02/16/2017   • Nephrolithiasis [N20.0] 02/16/2017      Resolved Hospital Problems    Diagnosis Date Noted Date Resolved   No resolved problems to display.       Assessment & Plan  1.  Severe cystitis with possible left pyelonephritis by CT.  Nonobstructing stone right kidney.  Culture as above .We'll plan 10 day course of antibiotic.  Change Merrem to Macrodantin which she has been able to take in the past.  Outpatient follow-up.  She has information on hospital follow-up.  2.  Sneezing with sinus congestion, allergies versus cold.  She'll treat symptoms at home.  She has an allergist and an ENT  3. Tics and hiatal hernia noted on CT    Disposition: Home today  [id number]    Elis Adkins MD  02/18/17  8:40 AM

## 2017-02-21 LAB
BACTERIA SPEC AEROBE CULT: NORMAL
BACTERIA SPEC AEROBE CULT: NORMAL

## 2017-03-13 ENCOUNTER — OFFICE VISIT (OUTPATIENT)
Dept: ORTHOPEDICS | Facility: CLINIC | Age: 67
End: 2017-03-13
Payer: COMMERCIAL

## 2017-03-13 VITALS — WEIGHT: 217 LBS | HEIGHT: 60 IN | BODY MASS INDEX: 42.6 KG/M2

## 2017-03-13 DIAGNOSIS — Z98.890 S/P ARTHROSCOPY OF LEFT KNEE: Primary | ICD-10-CM

## 2017-03-13 DIAGNOSIS — M87.9 OSTEONECROSIS OF LEFT KNEE REGION: ICD-10-CM

## 2017-03-13 PROCEDURE — 99999 PR PBB SHADOW E&M-EST. PATIENT-LVL II: CPT | Mod: PBBFAC,,, | Performed by: ORTHOPAEDIC SURGERY

## 2017-03-13 PROCEDURE — 99024 POSTOP FOLLOW-UP VISIT: CPT | Mod: S$GLB,,, | Performed by: ORTHOPAEDIC SURGERY

## 2017-03-13 NOTE — MR AVS SNAPSHOT
[facility]  [address] [facility] [address]  [place] [address]  Phone: [phone]                  Kenna Schmitt   3/13/2017 3:15 PM   Office Visit    Description:  Female : 1950   Provider:  Kirby Dale MD   Department:  [facility]           Reason for Visit     Left Knee - Pain           Diagnoses this Visit        Comments    S/P arthroscopy of left knee    -  Primary     Osteonecrosis of left knee region                To Do List           Future Appointments        Provider Department Dept Phone    2017 1:15 PM Kirby Dale MD [facility] [phone]      Goals (5 Years of Data)              7/27/16    10/23/14    LDL CHOLESTEROL < 130   104.8  82.2      Follow-Up and Disposition     Return in about 3 months (around 2017) for xrays.      [facility]     [facility] Care Line - 24/7 Assistance  Registered nurses in the [facility] provide clinical advisement, health education, appointment booking, and other advisory services.  Call for this free service at [phone].             Medications           Message regarding Medications     Verify the changes and/or additions to your medication regime listed below are the same as discussed with your clinician today.  If any of these changes or additions are incorrect, please notify your healthcare provider.             Verify that the below list of medications is an accurate representation of the medications you are currently taking.  If none reported, the list may be blank. If incorrect, please contact your healthcare provider. Carry this list with you in case of emergency.           Current Medications     atorvastatin (LIPITOR) 20 MG tablet TAKE 1 TABLET BY MOUTH EVERY DAY    b complex vitamins capsule Take 1 capsule by mouth once daily.    chlorthalidone (HYGROTEN) 25 MG Tab Take 1 tablet (25 mg total) by mouth once daily.    cholecalciferol, vitamin D3, (VITAMIN D3)  2,000 unit Cap Take 1 capsule by mouth once daily.    Lactobacillus rhamnosus GG (CULTURELLE) 10 billion cell capsule Take 1 capsule by mouth once daily.    levothyroxine (SYNTHROID) 100 MCG tablet TAKE 1 TABLET BY MOUTH ONCE DAILY    omeprazole (PRILOSEC) 20 MG capsule TAKE ONE CAPSULE BY MOUTH DAILY    oxycodone-acetaminophen (PERCOCET)  mg per tablet Take 1 tablet by mouth every 4 to 6 hours as needed for Pain.    valacyclovir (VALTREX) 500 MG tablet Take 500 mg by mouth 2 (two) times daily. PRN fever blisters           Clinical Reference Information           Your Vitals Were     Height Weight BMI          5' (1.524 m) 98.4 kg (217 lb) 42.38 kg/m2        Allergies as of 3/13/2017     Penicillins    Shellfish Containing Products      Immunizations Administered on Date of Encounter - 3/13/2017     None      Language Assistance Services     ATTENTION: Language assistance services are available, free of charge. Please call [phone].      ATENCIÓN: Si habla tom, tiene a loredo disposición servicios gratuitos de asistencia lingüística. Llame al [phone].     ROSE Ý: N?u b?n nói Ti?ng Vi?t, có các d?ch v? h? tr? ngôn ng? mi?n phí dành cho b?n. G?i s? [phone].         [facility] complies with applicable Federal civil rights laws and does not discriminate on the basis of race, color, national origin, age, disability, or sex.

## 2017-03-14 NOTE — PROGRESS NOTES
DATE: 3/13/2017  PATIENT: Kenna Schmitt    Attending Physician: Kirby Dale M.D.    HISTORY:  Kenna Schmitt is a 66 y.o. female who returns for follow up evaluation of  her left knee.  She is status post arthroscopy with partial meniscectomy meniscectomy and debridement and removal and osteochondral loose body of the medial femoral condyle osteonecrosis, 2/10/17.  She still reports that her pain is significantly improved from preoperatively.  She reports her pain at 0/10 today.  She is weightbearing as tolerated and pleased with her results.    PMH/PSH/FamHx/SocHx:  Reviewed and unchanged from prior visit    ROS:  Constitution: Negative for chills, fever, and sweats. Negative for unexplained weight loss.  HENT: Negative for headaches and blurry vision.   Cardiovascular: Negative for chest pain, irregular heartbeat, leg swelling and palpitations.   Respiratory: Negative for cough and shortness of breath.   Gastrointestinal: Negative for abdominal pain, heartburn, nausea and vomiting.   Genitourinary: Negative for bladder incontinence and dysuria.   Musculoskeletal: Negative for systemic arthritis, joint swelling, muscle weakness and myalgias.   Neurological: Negative for numbness.   Psychiatric/Behavioral: Negative for depression.   Endocrine: Negative for polyuria.   Hematologic/Lymphatic: Negative for bleeding disorders.  Skin: Negative for poor wound healing.       EXAM:  Ht 5' (1.524 m)  Wt 98.4 kg (217 lb)  BMI 42.38 kg/m2  Healthy-appearing female no acute distress.  Examination left knee reveals the arthroscopic portals are healed.  Mild swelling but no effusion.  There is no tenderness over the medial joint line.  Range of motion 0-120° of flexion.  Negative flexion pinch.  Negative Juan Carlos's.    IMAGING:   No new x-rays are performed today.    ASSESSMENT:  Status post arthroscopy left knee with debridement, microfracture and removal osteonecrotic loose body medial femoral condyle and partial medial  meniscectomy, 2/10/17.       PLAN:  The implications of the patient's evolution of symptoms and findings were explained to the patient in detail.  Kenna is doing extremely well, especially given the size of the lesion on the medial femoral condyle.  I have explained that this may progress over time and should be watched.  However at the present time activities may be increased as tolerated.  Flexor back in 3 months time for reexam and follow-up x-rays of the left knee to include standing AP lateral and tunnel views.        This note was dictated using voice recognition software and may contain grammatical errors

## 2017-03-24 ENCOUNTER — OFFICE VISIT (OUTPATIENT)
Dept: INTERNAL MEDICINE | Facility: CLINIC | Age: 67
End: 2017-03-24

## 2017-03-24 VITALS
BODY MASS INDEX: 27.38 KG/M2 | DIASTOLIC BLOOD PRESSURE: 75 MMHG | RESPIRATION RATE: 16 BRPM | SYSTOLIC BLOOD PRESSURE: 161 MMHG | TEMPERATURE: 98.4 F | HEIGHT: 61 IN | OXYGEN SATURATION: 97 % | WEIGHT: 145 LBS | HEART RATE: 97 BPM

## 2017-03-24 DIAGNOSIS — J30.1 NON-SEASONAL ALLERGIC RHINITIS DUE TO POLLEN: ICD-10-CM

## 2017-03-24 DIAGNOSIS — K44.9 HIATAL HERNIA: ICD-10-CM

## 2017-03-24 DIAGNOSIS — Z12.11 ENCOUNTER FOR SCREENING COLONOSCOPY: ICD-10-CM

## 2017-03-24 DIAGNOSIS — N10 ACUTE PYELONEPHRITIS: ICD-10-CM

## 2017-03-24 DIAGNOSIS — Z12.31 VISIT FOR SCREENING MAMMOGRAM: ICD-10-CM

## 2017-03-24 DIAGNOSIS — K57.30 DIVERTICULOSIS OF LARGE INTESTINE WITHOUT HEMORRHAGE: ICD-10-CM

## 2017-03-24 DIAGNOSIS — Z00.00 MEDICARE ANNUAL WELLNESS VISIT, INITIAL: Primary | ICD-10-CM

## 2017-03-24 DIAGNOSIS — N63.20 BREAST MASS, LEFT: ICD-10-CM

## 2017-03-24 DIAGNOSIS — E66.09 EXOGENOUS OBESITY: ICD-10-CM

## 2017-03-24 DIAGNOSIS — I10 ESSENTIAL HYPERTENSION: ICD-10-CM

## 2017-03-24 DIAGNOSIS — N20.0 NEPHROLITHIASIS: ICD-10-CM

## 2017-03-24 PROBLEM — Z87.892 HISTORY OF DRUG-INDUCED ANAPHYLAXIS: Status: RESOLVED | Noted: 2017-02-18 | Resolved: 2017-03-24

## 2017-03-24 PROCEDURE — 99204 OFFICE O/P NEW MOD 45 MIN: CPT | Performed by: INTERNAL MEDICINE

## 2017-03-24 PROCEDURE — G0438 PPPS, INITIAL VISIT: HCPCS | Performed by: INTERNAL MEDICINE

## 2017-03-24 RX ORDER — LISINOPRIL 5 MG/1
5 TABLET ORAL DAILY
Qty: 90 TABLET | Refills: 3 | Status: SHIPPED | OUTPATIENT
Start: 2017-03-24 | End: 2017-09-08

## 2017-03-24 NOTE — PROGRESS NOTES
"QUICK REFERENCE INFORMATION:  The ABCs of the Annual Wellness Visit    Initial Medicare Wellness Visit    HEALTH RISK ASSESSMENT    1950    Recent Hospitalizations:  Recently treated at the following:  [facility].        Current Medical Providers:  Patient Care Team:  Jabier Landon MD as PCP - General (Internal Medicine)        Smoking Status:  History   Smoking Status   • Former Smoker   • Packs/day: 1.00   • Types: Cigarettes   Smokeless Tobacco   • Not on file       Alcohol Consumption:  History   Alcohol Use   • Yes     Comment: \"on occasion\"       Depression Screen:   PHQ-9 Depression Screening 3/24/2017   Little interest or pleasure in doing things 0   Feeling down, depressed, or hopeless 0   Trouble falling or staying asleep, or sleeping too much 0   Feeling tired or having little energy 0   Poor appetite or overeating 0   Feeling bad about yourself - or that you are a failure or have let yourself or your family down 0   Trouble concentrating on things, such as reading the newspaper or watching television 0   Moving or speaking so slowly that other people could have noticed. Or the opposite - being so fidgety or restless that you have been moving around a lot more than usual 0   Thoughts that you would be better off dead, or of hurting yourself in some way 0   PHQ-9 Total Score 0   If you checked off any problems, how difficult have these problems made it for you to do your work, take care of things at home, or get along with other people? Not difficult at all       Health Habits and Functional and Cognitive Screening:  Functional & Cognitive Status 3/24/2017   Do you have difficulty preparing food and eating? No   Do you have difficulty bathing yourself? No   Do you have difficulty getting dressed? No   Do you have difficulty using the toilet? No   Do you have difficulty moving around from place to place? No   In the past year have you fallen or experienced a near fall? No   Do you " need help using the phone?  No   Are you deaf or do you have serious difficulty hearing?  No   Do you need help with transportation? No   Do you need help shopping? No   Do you need help preparing meals?  No   Do you need help with housework?  No   Do you need help with laundry? No   Do you need help taking your medications? No   Do you need help managing money? No   Do you have difficulty concentrating, remembering or making decisions? No       Health Habits  Current Diet: Well Balanced Diet  Dental Exam:  (will schedule)  Eye Exam: Up to date  Exercise (times per week): 3 times per week  Current Exercise Activities Include: Running/Jogging          Does the patient have evidence of cognitive impairment? No    Asprin use counseling:yes      Recent Lab Results:    Visual Acuity:  No exam data present    Age-appropriate Screening Schedule:  Refer to the list below for future screening recommendations based on patient's age, sex and/or medical conditions. Orders for these recommended tests are listed in the plan section. The patient has been provided with a written plan.    Health Maintenance   Topic Date Due   • TDAP/TD VACCINES (1 - Tdap) 07/20/1967   • COLONOSCOPY  03/24/2017   • ZOSTER VACCINE  03/24/2017   • PNEUMOCOCCAL VACCINES (65+ LOW/MEDIUM RISK) (2 of 2 - PCV13) 02/18/2018   • INFLUENZA VACCINE  Completed        Subjective   History of Present Illness    Jazmyn Castaneda is a 66 y.o. female who presents for an Annual Wellness Visit.    The following portions of the patient's history were reviewed and updated as appropriate: allergies, current medications, past family history, past medical history, past social history, past surgical history and problem list.    Outpatient Medications Prior to Visit   Medication Sig Dispense Refill   • acetaminophen (TYLENOL) 325 MG tablet Take 2 tablets by mouth Every 6 (Six) Hours As Needed for mild pain (1-3).  0   • miconazole (MICATIN) 2 % cream Apply  topically 2  "(Two) Times a Day. 28 g 0   • nitrofurantoin, macrocrystal-monohydrate, (MACROBID) 100 MG capsule Take 1 capsule by mouth 2 (Two) Times a Day. 14 capsule 0     No facility-administered medications prior to visit.        Patient Active Problem List   Diagnosis   • Acute pyelonephritis   • Acute cystitis   • Nephrolithiasis   • Allergic rhinitis   • Diverticulosis of large intestine without hemorrhage   • Hiatal hernia   • Medicare annual wellness visit, initial   • Exogenous obesity   • Encounter for screening colonoscopy   • Visit for screening mammogram   • Breast mass, left   • Essential hypertension       Advanced Care Planning:  has NO advanced directive - information provided to the patient today    Identification of Risk Factors:  Risk factors include: weight  and inactivity.    Review of Systems    Compared to one year ago, the patient feels her physical health is the same.  Compared to one year ago, the patient feels her mental health is the same.    Objective     Physical Exam    Vitals:    03/24/17 1332   BP: 161/75   BP Location: Right arm   Patient Position: Sitting   Cuff Size: Small Adult   Pulse: 97   Resp: 16   Temp: 98.4 °F (36.9 °C)   TempSrc: Tympanic   SpO2: 97%   Weight: 145 lb (65.8 kg)   Height: 61\" (154.9 cm)       Body mass index is 27.4 kg/(m^2).  Discussed the patient's BMI with her. The BMI is above average; BMI management plan is completed.    Assessment/Plan   Patient Self-Management and Personalized Health Advice  The patient has been provided with information about: diet, exercise and weight management and preventive services including:   · Exercise counseling provided, Nutrition counseling provided.    Visit Diagnoses:    ICD-10-CM ICD-9-CM   1. Medicare annual wellness visit, initial Z00.00 V70.0   2. Acute pyelonephritis N10 590.10   3. Non-seasonal allergic rhinitis due to pollen J30.1 477.0   4. Diverticulosis of large intestine without hemorrhage K57.30 562.10   5. " "Nephrolithiasis N20.0 592.0   6. Exogenous obesity E66.9 278.00   7. Encounter for screening colonoscopy Z12.11 V76.51   8. Visit for screening mammogram Z12.31 V76.12   9. Breast mass, left N63 611.72   10. Hiatal hernia K44.9 553.3   11. Essential hypertension I10 401.9       Orders Placed This Encounter   Procedures   • Mammo Diagnostic Bilateral With CAD     Order Specific Question:   Is an Ultrasound Breast required?  If 'Yes\", Please enter an order for the US Breast as well.     Answer:   Yes     Order Specific Question:   Reason for Exam:     Answer:   left breast mass at 7 oclock   • Lipid Panel With LDL / HDL Ratio   • Comprehensive Metabolic Panel   • T4, Free   • TSH   • Microscopic Examination   • Ambulatory Referral For Screening Colonoscopy     Referral Priority:   Routine     Referral Type:   Diagnostic Medical     Referral Reason:   Specialty Services Required     Referred to Provider:   Maria Fernanda Lewis MD     Number of Visits Requested:   1       Outpatient Encounter Prescriptions as of 3/24/2017   Medication Sig Dispense Refill   • CALCIUM PO Take  by mouth.     • Multiple Vitamin (MULTI VITAMIN DAILY PO) Take  by mouth.     • lisinopril (PRINIVIL,ZESTRIL) 5 MG tablet Take 1 tablet by mouth Daily. 90 tablet 3   • [DISCONTINUED] acetaminophen (TYLENOL) 325 MG tablet Take 2 tablets by mouth Every 6 (Six) Hours As Needed for mild pain (1-3).  0   • [DISCONTINUED] miconazole (MICATIN) 2 % cream Apply  topically 2 (Two) Times a Day. 28 g 0   • [DISCONTINUED] nitrofurantoin, macrocrystal-monohydrate, (MACROBID) 100 MG capsule Take 1 capsule by mouth 2 (Two) Times a Day. 14 capsule 0     No facility-administered encounter medications on file as of 3/24/2017.        Reviewed use of high risk medication in the elderly: yes  Reviewed for potential of harmful drug interactions in the elderly: yes    Follow Up:  Return in about 1 year (around 3/24/2018).     An After Visit Summary and PPPS with all of these " plans were given to the patient.

## 2017-03-24 NOTE — PATIENT INSTRUCTIONS
Medicare Wellness  Personal Prevention Plan of Service     Date of Office Visit:  2017  Encounter Provider:  Jabier Landon MD  Place of Service:  [facility]  Patient Name: Jazmyn Castaneda  :  1950    As part of the Medicare Wellness portion of your visit today, we are providing you with this personalized preventive plan of services (PPPS). This plan is based upon recommendations of the [place] Preventive Services Task Force (USPSTF) and the Advisory Committee on Immunization Practices (ACIP).    This lists the preventive care services that should be considered, and provides dates of when you are due. Items listed as completed are up-to-date and do not require any further intervention.    Health Maintenance   Topic Date Due   • TDAP/TD VACCINES (1 - Tdap) 1967   • COLONOSCOPY  2017   • ZOSTER VACCINE  2017   • PNEUMOCOCCAL VACCINES (65+ LOW/MEDIUM RISK) (2 of 2 - PCV13) 2018   • MEDICARE ANNUAL WELLNESS  2018   • HEPATITIS C SCREENING  Completed   • INFLUENZA VACCINE  Completed

## 2017-03-25 ENCOUNTER — DOCUMENTATION (OUTPATIENT)
Dept: INTERNAL MEDICINE | Facility: CLINIC | Age: 67
End: 2017-03-25

## 2017-03-25 DIAGNOSIS — E87.5 HYPERKALEMIA: Primary | ICD-10-CM

## 2017-03-25 LAB
ALBUMIN SERPL-MCNC: 4.5 G/DL (ref 3.5–5.2)
ALBUMIN/GLOB SERPL: 1.5 G/DL
ALP SERPL-CCNC: 145 U/L (ref 39–117)
ALT SERPL-CCNC: 18 U/L (ref 1–33)
APPEARANCE UR: CLEAR
AST SERPL-CCNC: 20 U/L (ref 1–32)
BACTERIA #/AREA URNS HPF: ABNORMAL /HPF
BASOPHILS # BLD AUTO: 0.07 10*3/MM3 (ref 0–0.2)
BASOPHILS NFR BLD AUTO: 0.7 % (ref 0–1.5)
BILIRUB SERPL-MCNC: 0.3 MG/DL (ref 0.1–1.2)
BILIRUB UR QL STRIP: NEGATIVE
BUN SERPL-MCNC: 10 MG/DL (ref 8–23)
BUN/CREAT SERPL: 14.3 (ref 7–25)
CALCIUM SERPL-MCNC: 11.4 MG/DL (ref 8.6–10.5)
CHLORIDE SERPL-SCNC: 105 MMOL/L (ref 98–107)
CHOLEST SERPL-MCNC: 223 MG/DL (ref 0–200)
CO2 SERPL-SCNC: 26.8 MMOL/L (ref 22–29)
COLOR UR: YELLOW
CREAT SERPL-MCNC: 0.7 MG/DL (ref 0.57–1)
CRYSTALS URNS MICRO: ABNORMAL
EOSINOPHIL # BLD AUTO: 0.44 10*3/MM3 (ref 0–0.7)
EOSINOPHIL NFR BLD AUTO: 4.3 % (ref 0.3–6.2)
EPI CELLS #/AREA URNS HPF: ABNORMAL /HPF
ERYTHROCYTE [DISTWIDTH] IN BLOOD BY AUTOMATED COUNT: 13.9 % (ref 11.7–13)
GLOBULIN SER CALC-MCNC: 3 GM/DL
GLUCOSE SERPL-MCNC: 105 MG/DL (ref 65–99)
GLUCOSE UR QL: NEGATIVE
HCT VFR BLD AUTO: 51.3 % (ref 35.6–45.5)
HDLC SERPL-MCNC: 41 MG/DL (ref 40–60)
HGB BLD-MCNC: 16.8 G/DL (ref 11.9–15.5)
HGB UR QL STRIP: NEGATIVE
IMM GRANULOCYTES # BLD: 0.02 10*3/MM3 (ref 0–0.03)
IMM GRANULOCYTES NFR BLD: 0.2 % (ref 0–0.5)
KETONES UR QL STRIP: NEGATIVE
LDLC SERPL CALC-MCNC: 161 MG/DL (ref 0–100)
LDLC/HDLC SERPL: 3.92 {RATIO}
LEUKOCYTE ESTERASE UR QL STRIP: (no result)
LYMPHOCYTES # BLD AUTO: 3.54 10*3/MM3 (ref 0.9–4.8)
LYMPHOCYTES NFR BLD AUTO: 34.2 % (ref 19.6–45.3)
MCH RBC QN AUTO: 32 PG (ref 26.9–32)
MCHC RBC AUTO-ENTMCNC: 32.7 G/DL (ref 32.4–36.3)
MCV RBC AUTO: 97.7 FL (ref 80.5–98.2)
MONOCYTES # BLD AUTO: 0.4 10*3/MM3 (ref 0.2–1.2)
MONOCYTES NFR BLD AUTO: 3.9 % (ref 5–12)
NEUTROPHILS # BLD AUTO: 5.87 10*3/MM3 (ref 1.9–8.1)
NEUTROPHILS NFR BLD AUTO: 56.7 % (ref 42.7–76)
NITRITE UR QL STRIP: NEGATIVE
PH UR STRIP: 6 [PH] (ref 5–8)
PLATELET # BLD AUTO: 261 10*3/MM3 (ref 140–500)
POTASSIUM SERPL-SCNC: 6.1 MMOL/L (ref 3.5–5.2)
PROT SERPL-MCNC: 7.5 G/DL (ref 6–8.5)
PROT UR QL STRIP: NEGATIVE
RBC # BLD AUTO: 5.25 10*6/MM3 (ref 3.9–5.2)
RBC #/AREA URNS HPF: ABNORMAL /HPF
SODIUM SERPL-SCNC: 145 MMOL/L (ref 136–145)
SP GR UR: 1.02 (ref 1–1.03)
T4 FREE SERPL-MCNC: 1.25 NG/DL (ref 0.93–1.7)
TRIGL SERPL-MCNC: 107 MG/DL (ref 0–150)
TSH SERPL DL<=0.005 MIU/L-ACNC: 2.13 MIU/ML (ref 0.27–4.2)
UROBILINOGEN UR STRIP-MCNC: (no result) MG/DL
VLDLC SERPL CALC-MCNC: 21.4 MG/DL (ref 5–40)
WBC # BLD AUTO: 10.34 10*3/MM3 (ref 4.5–10.7)
WBC #/AREA URNS HPF: ABNORMAL /HPF

## 2017-03-25 NOTE — PROGRESS NOTES
I called the patient Saturday 3/25/2017 and instructed her to hold her lisinopril this weekend given that the lab called me with an elevated potassium.  She stated that she was too busy to recheck it this weekend and would get the BMP redrawn on Monday in the office.

## 2017-03-28 ENCOUNTER — HOSPITAL ENCOUNTER (OUTPATIENT)
Dept: ULTRASOUND IMAGING | Facility: HOSPITAL | Age: 67
Discharge: HOME | End: 2017-03-28

## 2017-03-28 ENCOUNTER — HOSPITAL ENCOUNTER (OUTPATIENT)
Dept: MAMMOGRAPHY | Facility: HOSPITAL | Age: 67
Discharge: HOME | End: 2017-03-28
Attending: INTERNAL MEDICINE | Admitting: INTERNAL MEDICINE

## 2017-03-28 DIAGNOSIS — Z12.31 VISIT FOR SCREENING MAMMOGRAM: ICD-10-CM

## 2017-03-28 DIAGNOSIS — N63.20 BREAST MASS, LEFT: ICD-10-CM

## 2017-03-28 LAB
BUN SERPL-MCNC: 9 MG/DL (ref 8–23)
BUN/CREAT SERPL: 14.1 (ref 7–25)
CALCIUM SERPL-MCNC: 10.4 MG/DL (ref 8.6–10.5)
CHLORIDE SERPL-SCNC: 106 MMOL/L (ref 98–107)
CO2 SERPL-SCNC: 19.2 MMOL/L (ref 22–29)
CREAT SERPL-MCNC: 0.64 MG/DL (ref 0.57–1)
GLUCOSE SERPL-MCNC: 85 MG/DL (ref 65–99)
POTASSIUM SERPL-SCNC: 5.6 MMOL/L (ref 3.5–5.2)
SODIUM SERPL-SCNC: 142 MMOL/L (ref 136–145)

## 2017-03-28 PROCEDURE — 76642 ULTRASOUND BREAST LIMITED: CPT

## 2017-03-28 PROCEDURE — G0204 DX MAMMO INCL CAD BI: HCPCS

## 2017-03-28 NOTE — PROGRESS NOTES
Subjective   Jazmyn Castaneda is a 66 y.o. female.   She is here today for Medicare annual wellness visit initial along with acute pyelonephritis as well as allergic rhinitis diverticulosis of large intestine nephrolithiasis exogenous obesity encounter for screening colonoscopy and visit for screening mammogram as well as left breast mass and hiatal hernia and hypertension  History of Present Illness   She is here today for Medicare annual wellness visit initial along with pyelonephritis which she has been treated as well as allergic rhinitis diverticulosis of large intestine nephrolithiasis exogenous obesity encounter for screening colonoscopy visit for screening mammogram as well as left breast mass and hiatal hernia and hypertension  The following portions of the patient's history were reviewed and updated as appropriate: allergies, current medications, past family history, past medical history, past social history, past surgical history and problem list.    Review of Systems   Constitutional: Positive for fatigue.   All other systems reviewed and are negative.      Objective   Physical Exam   Constitutional: She is oriented to person, place, and time. Vital signs are normal. She appears well-developed and well-nourished. She is active.   HENT:   Head: Normocephalic and atraumatic.   Right Ear: Hearing, tympanic membrane, external ear and ear canal normal.   Left Ear: Hearing, tympanic membrane, external ear and ear canal normal.   Nose: Nose normal.   Mouth/Throat: Uvula is midline, oropharynx is clear and moist and mucous membranes are normal.   Eyes: Conjunctivae, EOM and lids are normal. Pupils are equal, round, and reactive to light. Right eye exhibits no discharge. Left eye exhibits no discharge.   Neck: Trachea normal, normal range of motion, full passive range of motion without pain and phonation normal. Neck supple. Carotid bruit is not present. No edema present. No thyroid mass and no thyromegaly  present.   Cardiovascular: Normal rate, regular rhythm, normal heart sounds, intact distal pulses and normal pulses.  Exam reveals no gallop and no friction rub.    No murmur heard.  Pulmonary/Chest: Effort normal and breath sounds normal. No respiratory distress. She has no wheezes. She has no rales. Right breast exhibits no inverted nipple, no mass, no nipple discharge, no skin change and no tenderness. Left breast exhibits mass. Left breast exhibits no inverted nipple, no nipple discharge, no skin change and no tenderness. Breasts are symmetrical. There is no breast swelling.       Abdominal: Soft. Normal appearance, normal aorta and bowel sounds are normal. She exhibits no distension, no abdominal bruit and no mass. There is no hepatosplenomegaly. There is no tenderness. There is no rebound, no guarding and no CVA tenderness. No hernia. Hernia confirmed negative in the right inguinal area and confirmed negative in the left inguinal area.   Genitourinary: No breast tenderness, discharge or bleeding.   Musculoskeletal: Normal range of motion. She exhibits no edema or tenderness.       Vascular Status -  Her exam exhibits right foot vasculature normal. Her exam exhibits no right foot edema. Her exam exhibits left foot vasculature normal. Her exam exhibits no left foot edema.   Skin Integrity  -  Her right foot skin is intact.     Jazmyn 's left foot skin is intact. .  Lymphadenopathy:     She has no cervical adenopathy.     She has no axillary adenopathy.        Right: No inguinal and no supraclavicular adenopathy present.        Left: No inguinal and no supraclavicular adenopathy present.   Neurological: She is alert and oriented to person, place, and time. She has normal strength. No cranial nerve deficit or sensory deficit. She exhibits normal muscle tone. She displays a negative Romberg sign. Coordination normal.   Skin: Skin is warm, dry and intact. No cyanosis. Nails show no clubbing.   Psychiatric: She has a  normal mood and affect. Her speech is normal and behavior is normal. Judgment and thought content normal. Cognition and memory are normal.   Nursing note and vitals reviewed.      Assessment/Plan   Diagnoses and all orders for this visit:    Medicare annual wellness visit, initial  -     Lipid Panel With LDL / HDL Ratio  -     CBC & Differential  -     Comprehensive Metabolic Panel  -     T4, Free  -     TSH  -     Urinalysis With Microscopic    Acute pyelonephritis  -     Lipid Panel With LDL / HDL Ratio  -     CBC & Differential  -     Comprehensive Metabolic Panel  -     T4, Free  -     TSH  -     Urinalysis With Microscopic    Non-seasonal allergic rhinitis due to pollen  -     Lipid Panel With LDL / HDL Ratio  -     CBC & Differential  -     Comprehensive Metabolic Panel  -     T4, Free  -     TSH  -     Urinalysis With Microscopic    Diverticulosis of large intestine without hemorrhage  -     Lipid Panel With LDL / HDL Ratio  -     CBC & Differential  -     Comprehensive Metabolic Panel  -     T4, Free  -     TSH  -     Urinalysis With Microscopic    Nephrolithiasis  -     Lipid Panel With LDL / HDL Ratio  -     CBC & Differential  -     Comprehensive Metabolic Panel  -     T4, Free  -     TSH  -     Urinalysis With Microscopic    Exogenous obesity  -     Lipid Panel With LDL / HDL Ratio  -     CBC & Differential  -     Comprehensive Metabolic Panel  -     T4, Free  -     TSH  -     Urinalysis With Microscopic    Encounter for screening colonoscopy  -     Ambulatory Referral For Screening Colonoscopy  -     Lipid Panel With LDL / HDL Ratio  -     CBC & Differential  -     Comprehensive Metabolic Panel  -     T4, Free  -     TSH  -     Urinalysis With Microscopic    Visit for screening mammogram  -     Cancel: Mammo Screening Bilateral With CAD; Future  -     Mammo Diagnostic Bilateral With CAD  -     Lipid Panel With LDL / HDL Ratio  -     CBC & Differential  -     Comprehensive Metabolic Panel  -     T4,  Free  -     TSH  -     Urinalysis With Microscopic    Breast mass, left  -     Mammo Diagnostic Bilateral With CAD  -     Lipid Panel With LDL / HDL Ratio  -     CBC & Differential  -     Comprehensive Metabolic Panel  -     T4, Free  -     TSH  -     Urinalysis With Microscopic    Hiatal hernia  -     Lipid Panel With LDL / HDL Ratio  -     CBC & Differential  -     Comprehensive Metabolic Panel  -     T4, Free  -     TSH  -     Urinalysis With Microscopic    Essential hypertension  -     Lipid Panel With LDL / HDL Ratio  -     CBC & Differential  -     Comprehensive Metabolic Panel  -     T4, Free  -     TSH  -     Urinalysis With Microscopic    Other orders  -     lisinopril (PRINIVIL,ZESTRIL) 5 MG tablet; Take 1 tablet by mouth Daily.  -     Microscopic Examination      Medicare annual wellness visit initial follow recommendations  Left breast mass diagnostic mammogram  Hiatal hernia follow with specialist  Hypertension medication adjustment as noted  This for screening mammogram we will get diagnostic mammogram now with breast mass  Encounter for screening colonoscopy schedule colonoscopy  Exogenous obesity weight loss with proper diet exercise medication  Nephrolithiasis follow with urology  Pyelonephritis getting better  Diverticulosis of large intestine stable with high-fiber diet  Allergic rhinitis supportive meds

## 2017-03-30 ENCOUNTER — RESULTS ENCOUNTER (OUTPATIENT)
Dept: INTERNAL MEDICINE | Facility: CLINIC | Age: 67
End: 2017-03-30

## 2017-03-30 DIAGNOSIS — E87.5 HYPERKALEMIA: ICD-10-CM

## 2017-04-06 DIAGNOSIS — N63.20 BREAST MASS, LEFT: ICD-10-CM

## 2017-04-06 DIAGNOSIS — K44.9 HIATAL HERNIA: ICD-10-CM

## 2017-04-06 DIAGNOSIS — Z12.31 VISIT FOR SCREENING MAMMOGRAM: ICD-10-CM

## 2017-04-06 DIAGNOSIS — I10 ESSENTIAL HYPERTENSION: Primary | ICD-10-CM

## 2017-04-06 DIAGNOSIS — K57.30 DIVERTICULOSIS OF LARGE INTESTINE WITHOUT HEMORRHAGE: ICD-10-CM

## 2017-04-06 DIAGNOSIS — Z12.11 ENCOUNTER FOR SCREENING COLONOSCOPY: ICD-10-CM

## 2017-04-06 DIAGNOSIS — J30.1 NON-SEASONAL ALLERGIC RHINITIS DUE TO POLLEN: ICD-10-CM

## 2017-04-06 LAB
BASOPHILS # BLD AUTO: 0.07 10*3/MM3 (ref 0–0.2)
BASOPHILS NFR BLD AUTO: 0.7 % (ref 0–1.5)
EOSINOPHIL # BLD AUTO: 0.46 10*3/MM3 (ref 0–0.7)
EOSINOPHIL NFR BLD AUTO: 4.5 % (ref 0.3–6.2)
ERYTHROCYTE [DISTWIDTH] IN BLOOD BY AUTOMATED COUNT: 13.7 % (ref 11.7–13)
HCT VFR BLD AUTO: 50.6 % (ref 35.6–45.5)
HGB BLD-MCNC: 16.2 G/DL (ref 11.9–15.5)
IMM GRANULOCYTES # BLD: 0.02 10*3/MM3 (ref 0–0.03)
IMM GRANULOCYTES NFR BLD: 0.2 % (ref 0–0.5)
LYMPHOCYTES # BLD AUTO: 3.46 10*3/MM3 (ref 0.9–4.8)
LYMPHOCYTES NFR BLD AUTO: 33.6 % (ref 19.6–45.3)
MCH RBC QN AUTO: 31 PG (ref 26.9–32)
MCHC RBC AUTO-ENTMCNC: 32 G/DL (ref 32.4–36.3)
MCV RBC AUTO: 96.9 FL (ref 80.5–98.2)
MONOCYTES # BLD AUTO: 0.55 10*3/MM3 (ref 0.2–1.2)
MONOCYTES NFR BLD AUTO: 5.3 % (ref 5–12)
NEUTROPHILS # BLD AUTO: 5.73 10*3/MM3 (ref 1.9–8.1)
NEUTROPHILS NFR BLD AUTO: 55.7 % (ref 42.7–76)
PLATELET # BLD AUTO: 267 10*3/MM3 (ref 140–500)
RBC # BLD AUTO: 5.22 10*6/MM3 (ref 3.9–5.2)
WBC # BLD AUTO: 10.29 10*3/MM3 (ref 4.5–10.7)

## 2017-04-11 ENCOUNTER — RESULTS ENCOUNTER (OUTPATIENT)
Dept: INTERNAL MEDICINE | Facility: CLINIC | Age: 67
End: 2017-04-11

## 2017-04-11 DIAGNOSIS — I10 ESSENTIAL HYPERTENSION: ICD-10-CM

## 2017-04-11 DIAGNOSIS — Z12.31 VISIT FOR SCREENING MAMMOGRAM: ICD-10-CM

## 2017-04-11 DIAGNOSIS — Z12.11 ENCOUNTER FOR SCREENING COLONOSCOPY: ICD-10-CM

## 2017-04-11 DIAGNOSIS — K44.9 HIATAL HERNIA: ICD-10-CM

## 2017-04-11 DIAGNOSIS — N63.20 BREAST MASS, LEFT: ICD-10-CM

## 2017-04-11 DIAGNOSIS — K57.30 DIVERTICULOSIS OF LARGE INTESTINE WITHOUT HEMORRHAGE: ICD-10-CM

## 2017-04-11 DIAGNOSIS — J30.1 NON-SEASONAL ALLERGIC RHINITIS DUE TO POLLEN: ICD-10-CM

## 2017-04-13 ENCOUNTER — PATIENT MESSAGE (OUTPATIENT)
Dept: FAMILY MEDICINE | Facility: CLINIC | Age: 67
End: 2017-04-13

## 2017-04-13 DIAGNOSIS — I10 ESSENTIAL HYPERTENSION: ICD-10-CM

## 2017-04-13 DIAGNOSIS — E03.9 HYPOTHYROIDISM, UNSPECIFIED TYPE: Primary | ICD-10-CM

## 2017-04-13 DIAGNOSIS — E78.2 MIXED HYPERLIPIDEMIA: ICD-10-CM

## 2017-04-13 RX ORDER — CHLORTHALIDONE 25 MG/1
TABLET ORAL
Qty: 30 TABLET | Refills: 11 | Status: SHIPPED | OUTPATIENT
Start: 2017-04-13 | End: 2018-04-07 | Stop reason: SDUPTHER

## 2017-04-18 DIAGNOSIS — D75.1 POLYCYTHEMIA: Primary | ICD-10-CM

## 2017-05-05 ENCOUNTER — PATIENT MESSAGE (OUTPATIENT)
Dept: FAMILY MEDICINE | Facility: CLINIC | Age: 67
End: 2017-05-05

## 2017-05-08 ENCOUNTER — CONSULT (OUTPATIENT)
Dept: ONCOLOGY | Facility: CLINIC | Age: 67
End: 2017-05-08

## 2017-05-08 ENCOUNTER — LAB (OUTPATIENT)
Dept: LAB | Facility: HOSPITAL | Age: 67
End: 2017-05-08

## 2017-05-08 VITALS
HEART RATE: 84 BPM | TEMPERATURE: 98 F | OXYGEN SATURATION: 99 % | DIASTOLIC BLOOD PRESSURE: 76 MMHG | WEIGHT: 146.8 LBS | SYSTOLIC BLOOD PRESSURE: 144 MMHG | BODY MASS INDEX: 28.82 KG/M2 | RESPIRATION RATE: 16 BRPM | HEIGHT: 60 IN

## 2017-05-08 DIAGNOSIS — N63.20 BREAST MASS, LEFT: Primary | ICD-10-CM

## 2017-05-08 DIAGNOSIS — D75.1 ERYTHROCYTOSIS: Primary | ICD-10-CM

## 2017-05-08 DIAGNOSIS — R71.8 OTHER ABNORMALITY OF RED BLOOD CELLS: ICD-10-CM

## 2017-05-08 LAB
BASOPHILS # BLD AUTO: 0.12 10*3/MM3 (ref 0–0.1)
BASOPHILS NFR BLD AUTO: 1.1 % (ref 0–1.1)
DEPRECATED RDW RBC AUTO: 44 FL (ref 37–49)
EOSINOPHIL # BLD AUTO: 0.39 10*3/MM3 (ref 0–0.36)
EOSINOPHIL NFR BLD AUTO: 3.6 % (ref 1–5)
ERYTHROCYTE [DISTWIDTH] IN BLOOD BY AUTOMATED COUNT: 12.9 % (ref 11.7–14.5)
FERRITIN SERPL-MCNC: 131.9 NG/ML
HCT VFR BLD AUTO: 49.4 % (ref 34–45)
HGB BLD-MCNC: 16.1 G/DL (ref 11.5–14.9)
HGB RETIC QN: 36.4 PG (ref 29.8–36.1)
HOLD SPECIMEN: NORMAL
IMM GRANULOCYTES # BLD: 0.03 10*3/MM3 (ref 0–0.03)
IMM GRANULOCYTES NFR BLD: 0.3 % (ref 0–0.5)
IMM RETICS NFR: 6.3 % (ref 3–15.8)
IRON 24H UR-MRATE: 95 MCG/DL (ref 37–145)
IRON SATN MFR SERPL: 26 % (ref 14–48)
LYMPHOCYTES # BLD AUTO: 3.58 10*3/MM3 (ref 1–3.5)
LYMPHOCYTES NFR BLD AUTO: 32.9 % (ref 20–49)
MCH RBC QN AUTO: 30.6 PG (ref 27–33)
MCHC RBC AUTO-ENTMCNC: 32.6 G/DL (ref 32–35)
MCV RBC AUTO: 93.9 FL (ref 83–97)
MONOCYTES # BLD AUTO: 0.61 10*3/MM3 (ref 0.25–0.8)
MONOCYTES NFR BLD AUTO: 5.6 % (ref 4–12)
NEUTROPHILS # BLD AUTO: 6.16 10*3/MM3 (ref 1.5–7)
NEUTROPHILS NFR BLD AUTO: 56.5 % (ref 39–75)
NRBC BLD MANUAL-RTO: 0 /100 WBC (ref 0–0)
PLATELET # BLD AUTO: 314 10*3/MM3 (ref 150–375)
PMV BLD AUTO: 11.5 FL (ref 8.9–12.1)
RBC # BLD AUTO: 5.26 10*6/MM3 (ref 3.9–5)
RETICS/RBC NFR AUTO: 1.56 % (ref 0.6–2)
TIBC SERPL-MCNC: 364 MCG/DL (ref 249–505)
TRANSFERRIN SERPL-MCNC: 260 MG/DL (ref 200–360)
WBC NRBC COR # BLD: 10.89 10*3/MM3 (ref 4–10)
WHOLE BLOOD HOLD SPECIMEN: NORMAL

## 2017-05-08 PROCEDURE — 84466 ASSAY OF TRANSFERRIN: CPT | Performed by: INTERNAL MEDICINE

## 2017-05-08 PROCEDURE — 81270 JAK2 GENE: CPT | Performed by: INTERNAL MEDICINE

## 2017-05-08 PROCEDURE — 36415 COLL VENOUS BLD VENIPUNCTURE: CPT | Performed by: INTERNAL MEDICINE

## 2017-05-08 PROCEDURE — 82728 ASSAY OF FERRITIN: CPT | Performed by: INTERNAL MEDICINE

## 2017-05-08 PROCEDURE — 85025 COMPLETE CBC W/AUTO DIFF WBC: CPT | Performed by: INTERNAL MEDICINE

## 2017-05-08 PROCEDURE — 99205 OFFICE O/P NEW HI 60 MIN: CPT | Performed by: INTERNAL MEDICINE

## 2017-05-08 PROCEDURE — 85046 RETICYTE/HGB CONCENTRATE: CPT | Performed by: INTERNAL MEDICINE

## 2017-05-08 PROCEDURE — 83540 ASSAY OF IRON: CPT | Performed by: INTERNAL MEDICINE

## 2017-05-08 RX ORDER — CETIRIZINE HYDROCHLORIDE 10 MG/1
10 TABLET ORAL DAILY
COMMUNITY
End: 2019-04-17

## 2017-05-09 DIAGNOSIS — M25.562 ACUTE PAIN OF LEFT KNEE: Primary | ICD-10-CM

## 2017-05-09 LAB
COHGB MFR BLD: 2.5 % (ref 0–3.6)
ETHNIC BACKGROUND STATED: 4.8 MIU/ML (ref 2.6–18.5)

## 2017-05-11 ENCOUNTER — HOSPITAL ENCOUNTER (OUTPATIENT)
Dept: RADIOLOGY | Facility: HOSPITAL | Age: 67
Discharge: HOME | End: 2017-05-11
Attending: ORTHOPAEDIC SURGERY
Payer: COMMERCIAL

## 2017-05-11 ENCOUNTER — OFFICE VISIT (OUTPATIENT)
Dept: ORTHOPEDICS | Facility: CLINIC | Age: 67
End: 2017-05-11
Payer: COMMERCIAL

## 2017-05-11 VITALS — BODY MASS INDEX: 42.59 KG/M2 | WEIGHT: 216.94 LBS | HEIGHT: 60 IN

## 2017-05-11 DIAGNOSIS — M87.9 OSTEONECROSIS OF LEFT KNEE REGION: ICD-10-CM

## 2017-05-11 DIAGNOSIS — M25.562 ACUTE PAIN OF LEFT KNEE: ICD-10-CM

## 2017-05-11 DIAGNOSIS — Z98.890 S/P ARTHROSCOPY OF LEFT KNEE: Primary | ICD-10-CM

## 2017-05-11 LAB — REF LAB TEST METHOD: NORMAL

## 2017-05-11 PROCEDURE — 1160F RVW MEDS BY RX/DR IN RCRD: CPT | Mod: S$GLB,,, | Performed by: ORTHOPAEDIC SURGERY

## 2017-05-11 PROCEDURE — 73564 X-RAY EXAM KNEE 4 OR MORE: CPT | Mod: TC,PO,LT

## 2017-05-11 PROCEDURE — 99024 POSTOP FOLLOW-UP VISIT: CPT | Mod: S$GLB,,, | Performed by: ORTHOPAEDIC SURGERY

## 2017-05-11 PROCEDURE — 73564 X-RAY EXAM KNEE 4 OR MORE: CPT | Mod: 26,LT,, | Performed by: RADIOLOGY

## 2017-05-11 PROCEDURE — 1157F ADVNC CARE PLAN IN RCRD: CPT | Mod: 8P,S$GLB,, | Performed by: ORTHOPAEDIC SURGERY

## 2017-05-11 PROCEDURE — 99999 PR PBB SHADOW E&M-EST. PATIENT-LVL II: CPT | Mod: PBBFAC,,, | Performed by: ORTHOPAEDIC SURGERY

## 2017-05-11 PROCEDURE — 1159F MED LIST DOCD IN RCRD: CPT | Mod: S$GLB,,, | Performed by: ORTHOPAEDIC SURGERY

## 2017-05-11 PROCEDURE — 1126F AMNT PAIN NOTED NONE PRSNT: CPT | Mod: S$GLB,,, | Performed by: ORTHOPAEDIC SURGERY

## 2017-05-11 PROCEDURE — 73562 X-RAY EXAM OF KNEE 3: CPT | Mod: 26,RT,, | Performed by: RADIOLOGY

## 2017-05-11 RX ORDER — ACETAMINOPHEN AND PHENYLEPHRINE HCL 325; 5 MG/1; MG/1
TABLET ORAL
COMMUNITY
End: 2023-08-30

## 2017-05-11 NOTE — PROGRESS NOTES
DATE: 5/11/2017  PATIENT: Kenna Schmitt    Attending Physician: Kirby Dale M.D.    HISTORY:  Kenna Schmitt is a 66 y.o. female who returns for follow up evaluation of  her left knee.  She is status post arthroscopy with debridement, microfracture and removal osteonecrotic loose body from the medial femoral condyle and partial medial meniscectomy, 2/10/17.  Overall she is feeling much better.  She does note some disc comfort when she rides a motorcycle.  She denies any swelling.  Pain is reported at 0/10 today.    PMH/PSH/FamHx/SocHx:  Reviewed and unchanged from prior visit    ROS:  Constitution: Negative for chills, fever, and sweats. Negative for unexplained weight loss.  HENT: Negative for headaches and blurry vision.   Cardiovascular: Negative for chest pain, irregular heartbeat, leg swelling and palpitations.   Respiratory: Negative for cough and shortness of breath.   Gastrointestinal: Negative for abdominal pain, heartburn, nausea and vomiting.   Genitourinary: Negative for bladder incontinence and dysuria.   Musculoskeletal: Negative for systemic arthritis, joint swelling, muscle weakness and myalgias.   Neurological: Negative for numbness.   Psychiatric/Behavioral: Negative for depression.   Endocrine: Negative for polyuria.   Hematologic/Lymphatic: Negative for bleeding disorders.  Skin: Negative for poor wound healing.       EXAM:  Ht 5' (1.524 m)  Wt 98.4 kg (216 lb 14.9 oz)  BMI 42.37 kg/m2  Healthy-appearing female no acute distress.  Examination left knee reveals the arthroscopic portals are healed.  No effusion noted.  Minimal medial joint line tenderness.  No lateral joint line tenderness.  Range of motion 0-130° of flexion.  No instability on exam.  Negative flexion pinch.    IMAGING:   X-rays none are performed today.    ASSESSMENT:  Status post arthroscopy left knee with debridement, microfracture and removal osteonecrotic loose body medial femoral condyle and partial medial  meniscectomy, 2/10/17.    PLAN:  The implications of the patient's evolution of symptoms and findings were explained to the patient in detail.. overall, Kenna is doing extremely well especially given the significant findings at arthroscopy.  Activities may be performed as tolerated.  She'll monitor her symptoms over the next 2-3 months.  Should she develop further symptoms, I will see her back.  Otherwise, follow-up as needed.    This note was dictated using voice recognition software and may contain grammatical errors  Answers for HPI/ROS submitted by the patient on 5/10/2017   Leg pain  unexpected weight change: No  appetite change : No  sleep disturbance: No  IMMUNOCOMPROMISED: No  nervous/ anxious: No  dysphoric mood: No  rash: No  visual disturbance: No  eye redness: No  eye pain: No  ear pain: No  tinnitus: No  hearing loss: No  sinus pressure : No  nosebleeds: No  enviro allergies: No  food allergies: Yes  cough: No  shortness of breath: No  sweating: No  dysuria: No  frequency: Yes  difficulty urinating: No  hematuria: No  painful intercourse: No  chest pain: No  palpitations: No  nausea: No  vomiting: No  diarrhea: No  blood in stool: No  constipation: No  headaches: No  dizziness: No  numbness: No  seizures: No  joint swelling: No  myalgia: No  weakness: No  back pain: No  Pain Chronicity: recurrent  History of trauma: Yes  Onset: more than 1 month ago  Frequency: intermittently  Progression since onset: gradually improving  Injury mechanism: falling  injury location: at home  pain- numeric: 0/10  pain location: left knee  pain quality: dull  Radiating Pain: No  Aggravating factors: walking  fever: No  inability to bear weight: Yes  itching: No  joint locking: No  limited range of motion: Yes  stiffness: Yes  tingling: No  Treatments tried: nothing  physical therapy: effective  Improvement on treatment: significant

## 2017-05-26 ENCOUNTER — OFFICE VISIT (OUTPATIENT)
Dept: ONCOLOGY | Facility: CLINIC | Age: 67
End: 2017-05-26

## 2017-05-26 ENCOUNTER — LAB (OUTPATIENT)
Dept: LAB | Facility: HOSPITAL | Age: 67
End: 2017-05-26

## 2017-05-26 VITALS
SYSTOLIC BLOOD PRESSURE: 132 MMHG | TEMPERATURE: 98 F | OXYGEN SATURATION: 96 % | RESPIRATION RATE: 18 BRPM | DIASTOLIC BLOOD PRESSURE: 84 MMHG | HEIGHT: 60 IN | WEIGHT: 147.4 LBS | BODY MASS INDEX: 28.94 KG/M2 | HEART RATE: 76 BPM

## 2017-05-26 DIAGNOSIS — N10 ACUTE PYELONEPHRITIS: Primary | ICD-10-CM

## 2017-05-26 DIAGNOSIS — D75.1 ERYTHROCYTOSIS: ICD-10-CM

## 2017-05-26 DIAGNOSIS — D75.1 ERYTHROCYTOSIS: Primary | ICD-10-CM

## 2017-05-26 LAB
BASOPHILS # BLD AUTO: 0.11 10*3/MM3 (ref 0–0.1)
BASOPHILS NFR BLD AUTO: 1 % (ref 0–1.1)
DEPRECATED RDW RBC AUTO: 45.1 FL (ref 37–49)
EOSINOPHIL # BLD AUTO: 0.44 10*3/MM3 (ref 0–0.36)
EOSINOPHIL NFR BLD AUTO: 4.1 % (ref 1–5)
ERYTHROCYTE [DISTWIDTH] IN BLOOD BY AUTOMATED COUNT: 13.2 % (ref 11.7–14.5)
HCT VFR BLD AUTO: 49.1 % (ref 34–45)
HGB BLD-MCNC: 16.1 G/DL (ref 11.5–14.9)
HOLD SPECIMEN: NORMAL
HOLD SPECIMEN: NORMAL
IMM GRANULOCYTES # BLD: 0.04 10*3/MM3 (ref 0–0.03)
IMM GRANULOCYTES NFR BLD: 0.4 % (ref 0–0.5)
LYMPHOCYTES # BLD AUTO: 3.01 10*3/MM3 (ref 1–3.5)
LYMPHOCYTES NFR BLD AUTO: 27.9 % (ref 20–49)
MCH RBC QN AUTO: 30.7 PG (ref 27–33)
MCHC RBC AUTO-ENTMCNC: 32.8 G/DL (ref 32–35)
MCV RBC AUTO: 93.7 FL (ref 83–97)
MONOCYTES # BLD AUTO: 0.59 10*3/MM3 (ref 0.25–0.8)
MONOCYTES NFR BLD AUTO: 5.5 % (ref 4–12)
NEUTROPHILS # BLD AUTO: 6.59 10*3/MM3 (ref 1.5–7)
NEUTROPHILS NFR BLD AUTO: 61.1 % (ref 39–75)
NRBC BLD MANUAL-RTO: 0 /100 WBC (ref 0–0)
PLATELET # BLD AUTO: 289 10*3/MM3 (ref 150–375)
PMV BLD AUTO: 11.7 FL (ref 8.9–12.1)
RBC # BLD AUTO: 5.24 10*6/MM3 (ref 3.9–5)
WBC NRBC COR # BLD: 10.78 10*3/MM3 (ref 4–10)

## 2017-05-26 PROCEDURE — 36415 COLL VENOUS BLD VENIPUNCTURE: CPT

## 2017-05-26 PROCEDURE — 85025 COMPLETE CBC W/AUTO DIFF WBC: CPT

## 2017-05-26 PROCEDURE — 99213 OFFICE O/P EST LOW 20 MIN: CPT | Performed by: INTERNAL MEDICINE

## 2017-06-20 RX ORDER — OMEPRAZOLE 20 MG/1
CAPSULE, DELAYED RELEASE ORAL
Qty: 30 CAPSULE | Refills: 11 | Status: SHIPPED | OUTPATIENT
Start: 2017-06-20 | End: 2018-06-16 | Stop reason: SDUPTHER

## 2017-08-16 ENCOUNTER — CLINICAL SUPPORT (OUTPATIENT)
Dept: AUDIOLOGY | Facility: CLINIC | Age: 67
End: 2017-08-16
Payer: COMMERCIAL

## 2017-08-16 ENCOUNTER — OFFICE VISIT (OUTPATIENT)
Dept: OTOLARYNGOLOGY | Facility: CLINIC | Age: 67
End: 2017-08-16
Payer: COMMERCIAL

## 2017-08-16 VITALS — BODY MASS INDEX: 42.03 KG/M2 | HEIGHT: 60 IN | WEIGHT: 214.06 LBS

## 2017-08-16 DIAGNOSIS — H90.3 BILATERAL SENSORINEURAL HEARING LOSS: ICD-10-CM

## 2017-08-16 DIAGNOSIS — H61.23 BILATERAL IMPACTED CERUMEN: ICD-10-CM

## 2017-08-16 DIAGNOSIS — H93.19 TINNITUS, UNSPECIFIED LATERALITY: ICD-10-CM

## 2017-08-16 DIAGNOSIS — H90.3 BILATERAL SENSORINEURAL HEARING LOSS: Primary | ICD-10-CM

## 2017-08-16 DIAGNOSIS — Q17.9 CONGENITAL SMALL EAR CANAL: Primary | ICD-10-CM

## 2017-08-16 PROCEDURE — 1126F AMNT PAIN NOTED NONE PRSNT: CPT | Mod: S$GLB,,, | Performed by: NURSE PRACTITIONER

## 2017-08-16 PROCEDURE — 92567 TYMPANOMETRY: CPT | Mod: S$GLB,,, | Performed by: AUDIOLOGIST-HEARING AID FITTER

## 2017-08-16 PROCEDURE — 3008F BODY MASS INDEX DOCD: CPT | Mod: S$GLB,,, | Performed by: NURSE PRACTITIONER

## 2017-08-16 PROCEDURE — 69210 REMOVE IMPACTED EAR WAX UNI: CPT | Mod: S$GLB,,, | Performed by: NURSE PRACTITIONER

## 2017-08-16 PROCEDURE — 99203 OFFICE O/P NEW LOW 30 MIN: CPT | Mod: 25,S$GLB,, | Performed by: NURSE PRACTITIONER

## 2017-08-16 PROCEDURE — 1159F MED LIST DOCD IN RCRD: CPT | Mod: S$GLB,,, | Performed by: NURSE PRACTITIONER

## 2017-08-16 PROCEDURE — 99999 PR PBB SHADOW E&M-EST. PATIENT-LVL III: CPT | Mod: PBBFAC,,, | Performed by: NURSE PRACTITIONER

## 2017-08-16 PROCEDURE — 92557 COMPREHENSIVE HEARING TEST: CPT | Mod: S$GLB,,, | Performed by: AUDIOLOGIST-HEARING AID FITTER

## 2017-08-16 NOTE — PROGRESS NOTES
Kenna Schmitt was seen 08/16/2017 for an audiological evaluation. Patient complains of hearing loss. Pt reports tinnitus sometimes.     Results reveal a bilateral normal-to-moderate sensorineural hearing loss.    Speech Reception Thresholds were  30 dBHL for the right ear and 40 dBHL for the left ear.    Word recognition scores were good for the right ear and good for the left ear.   Tympanograms were Type As for the right ear and Type As for the left ear.    Audiogram results were reviewed in detail with patient and all questions were answered. Results will be reviewed by ENT at the completion of this note. Recommend binaural amplification pending medical clearance, annual hearing tests to monitor hearing and hearing protection in loud noise. Pt scheduled a hearing aid consult for 8/21/17.

## 2017-08-16 NOTE — PROGRESS NOTES
Subjective:       Patient ID: Kenna Schmitt is a 67 y.o. female.    Chief Complaint: Ear Fullness and Hearing Loss    HPI   Patient states she is tired of people telling her she cannot hear. She has some tinnitus. No h/o noise exposure. Her mother had HL after age 60. No other ENT symptoms or concerns.     Review of Systems   Constitutional: Negative.    HENT: Positive for hearing loss and tinnitus.    Eyes: Negative.    Respiratory: Negative.    Cardiovascular: Negative.    Gastrointestinal: Negative.    Musculoskeletal: Negative.    Skin: Negative.    Neurological: Negative.    Hematological: Negative.    Psychiatric/Behavioral: Negative.        Objective:      Physical Exam   Constitutional: She is oriented to person, place, and time. Vital signs are normal. She appears well-developed and well-nourished. She is cooperative. She does not appear ill. No distress.   HENT:   Head: Normocephalic and atraumatic.   Right Ear: Hearing, tympanic membrane, external ear and ear canal normal. Tympanic membrane is not erythematous. No middle ear effusion.   Left Ear: Hearing, tympanic membrane, external ear and ear canal normal. Tympanic membrane is not erythematous.  No middle ear effusion.   Nose: Nose normal. No mucosal edema or rhinorrhea. Right sinus exhibits no maxillary sinus tenderness and no frontal sinus tenderness. Left sinus exhibits no maxillary sinus tenderness and no frontal sinus tenderness.   Mouth/Throat: Uvula is midline, oropharynx is clear and moist and mucous membranes are normal. Mucous membranes are not pale, not dry and not cyanotic. No oral lesions. No oropharyngeal exudate, posterior oropharyngeal edema or posterior oropharyngeal erythema.     Tiny ear canals!    SEPARATE PROCEDURE IN OFFICE:   Procedure: Removal of impacted cerumen, bilateral   Pre Procedure Diagnosis: Cerumen Impaction   Post Procedure Diagnosis: Cerumen Impaction   Verbal informed consent in regards to risk of trauma to ear canal,  ear drum or hearing, discomfort during procedure and/or inability to remove cerumen impaction in one session or unforeseen events or complications.   No anesthesia.     Procedure in detail:   Ear canal visualized bilateral with appropriate size ear speculum utilizing Operating Head Binocular Otomicroscope   Utilizing the following: Ring curet AU. The impacted cerumen of the ear canals was removed atraumatically. The TM and EAC were then inspected and found to be clear of wax. See description of TMs/EACs in PE above.   Complications: No   Condition: Improved/Good     Eyes: Conjunctivae, EOM and lids are normal. Pupils are equal, round, and reactive to light. Right eye exhibits no discharge. Left eye exhibits no discharge. No scleral icterus.   Neck: Trachea normal and normal range of motion. Neck supple. No tracheal deviation present. No thyroid mass and no thyromegaly present.   Cardiovascular: Normal rate.    Pulmonary/Chest: Effort normal. No stridor. No respiratory distress. She has no wheezes.   Musculoskeletal: Normal range of motion.   Lymphadenopathy:        Head (right side): No submental, no submandibular, no tonsillar, no preauricular and no posterior auricular adenopathy present.        Head (left side): No submental, no submandibular, no tonsillar, no preauricular and no posterior auricular adenopathy present.     She has no cervical adenopathy.        Right cervical: No superficial cervical and no posterior cervical adenopathy present.       Left cervical: No superficial cervical and no posterior cervical adenopathy present.   Neurological: She is alert and oriented to person, place, and time. She has normal strength. Coordination and gait normal.   Skin: Skin is warm, dry and intact. No lesion and no rash noted. She is not diaphoretic. No cyanosis. No pallor.   Psychiatric: She has a normal mood and affect. Her speech is normal and behavior is normal. Judgment and thought content normal. Cognition and  memory are normal.   Nursing note and vitals reviewed.      Assessment:     Mild/moderate SNHL AU    Tiny ear canals  Cerumen removed AU  Plan:     PATIENT IS MEDICALLY CLEARED FOR HEARING AIDS.   Today's audiogram reveals the patient is a candidate for amplification. Audiogram is reviewed in detail with the patient. The audiologist's recommendation that the patient have amplification/hearing aids is discussed and questions answered. Patient has been given information by the audiologist on how to schedule a hearing aid consultation. Patient is encouraged to wear ear protection in loud noise and return annually for hearing test. Return to clinic as needed for further ENT concerns.

## 2017-08-18 DIAGNOSIS — E03.4 HYPOTHYROIDISM DUE TO ACQUIRED ATROPHY OF THYROID: ICD-10-CM

## 2017-08-18 RX ORDER — LEVOTHYROXINE SODIUM 100 UG/1
TABLET ORAL
Qty: 90 TABLET | Refills: 0 | Status: SHIPPED | OUTPATIENT
Start: 2017-08-18 | End: 2017-11-15 | Stop reason: SDUPTHER

## 2017-08-21 ENCOUNTER — CLINICAL SUPPORT (OUTPATIENT)
Dept: AUDIOLOGY | Facility: CLINIC | Age: 67
End: 2017-08-21
Payer: COMMERCIAL

## 2017-08-21 DIAGNOSIS — Z71.89 ENCOUNTER FOR HEARING AID CONSULTATION: Primary | ICD-10-CM

## 2017-08-21 NOTE — PROGRESS NOTES
"Kenna Dillon" was seen on 08/21/2017 for a hearing aid consult. Patient's audiogram was discussed in detail. 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 We discussed the different sizes and levels of technology and decided based on the patients lifestyle that the best choice would be Phonak Audeo B90-direct in color P1 (sand beige) with size 1xS receivers AU. The price quoted was $6599.25 with tax. Pt will be called when the aids arrive at the clinic to be scheduled for a hearing aid fitting since this is a new product and shipping time is unavailable at this time. Pt was informed that the hearing test would be valid for 6 months for the purchase of hearing aids and that they would need to pay for the hearing aids in full and be reimbursed by their insurance company for any hearing aid benefits they may have. Pt called her insurance company who told her she has no coverage.     "

## 2017-08-30 DIAGNOSIS — E78.5 HYPERLIPIDEMIA: ICD-10-CM

## 2017-08-30 RX ORDER — ATORVASTATIN CALCIUM 20 MG/1
20 TABLET, FILM COATED ORAL DAILY
Qty: 90 TABLET | Refills: 3 | Status: SHIPPED | OUTPATIENT
Start: 2017-08-30 | End: 2017-09-02 | Stop reason: SDUPTHER

## 2017-09-02 DIAGNOSIS — E78.5 HYPERLIPIDEMIA: ICD-10-CM

## 2017-09-02 RX ORDER — ATORVASTATIN CALCIUM 20 MG/1
TABLET, FILM COATED ORAL
Qty: 90 TABLET | Refills: 0 | Status: SHIPPED | OUTPATIENT
Start: 2017-09-02 | End: 2018-08-26 | Stop reason: SDUPTHER

## 2017-09-08 ENCOUNTER — OFFICE VISIT (OUTPATIENT)
Dept: INTERNAL MEDICINE | Facility: CLINIC | Age: 67
End: 2017-09-08

## 2017-09-08 VITALS
HEART RATE: 95 BPM | RESPIRATION RATE: 16 BRPM | DIASTOLIC BLOOD PRESSURE: 66 MMHG | WEIGHT: 147 LBS | BODY MASS INDEX: 29.64 KG/M2 | OXYGEN SATURATION: 97 % | SYSTOLIC BLOOD PRESSURE: 102 MMHG | HEIGHT: 59 IN | TEMPERATURE: 98.7 F

## 2017-09-08 DIAGNOSIS — L30.9 DERMATITIS: Primary | ICD-10-CM

## 2017-09-08 DIAGNOSIS — L98.9 SKIN LESION OF LEFT LEG: ICD-10-CM

## 2017-09-08 PROCEDURE — 99213 OFFICE O/P EST LOW 20 MIN: CPT | Performed by: NURSE PRACTITIONER

## 2017-09-08 RX ORDER — METHYLPREDNISOLONE 4 MG/1
TABLET ORAL
Qty: 1 EACH | Refills: 0 | Status: SHIPPED | OUTPATIENT
Start: 2017-09-08 | End: 2017-10-24

## 2017-09-08 NOTE — PROGRESS NOTES
"Vitals:    09/08/17 1043   BP: 102/66   Pulse: 95   Resp: 16   Temp: 98.7 °F (37.1 °C)   SpO2: 97%     Last 2 weights    09/08/17  1043   Weight: 147 lb (66.7 kg)     Social History   Substance Use Topics   • Smoking status: Current Every Day Smoker     Packs/day: 1.00     Types: Cigarettes   • Smokeless tobacco: Not on file      Comment: pt states trying to quit   • Alcohol use Yes      Comment: \"on occasion\"       Subjective     HPI  Pt presents to office today with new problem of a skin rash that's located on bilateral extensor forearms. This has been present for two days and states itchiness. She has tried zyrtec with no relief and no benadryl due to making her drowsy. She also spotted a change in a freckle that's located on left upper thigh. She states it use to be red for years and has now turned black.     The following portions of the patient's history were reviewed and updated as appropriate: allergies, current medications, past medical history, past social history and problem list.    Review of Systems   Constitutional: Negative.    Respiratory: Negative.    Cardiovascular: Negative.    Skin: Positive for color change (in freckle) and rash (bilateral extensor forearm).       Objective     Physical Exam   Constitutional: She is oriented to person, place, and time. Vital signs are normal. She appears well-developed and well-nourished.   HENT:   Head: Normocephalic and atraumatic.   Neck: Normal range of motion.   Cardiovascular: Normal rate, regular rhythm and normal heart sounds.    Pulmonary/Chest: Effort normal and breath sounds normal.   Musculoskeletal: Normal range of motion.   Neurological: She is oriented to person, place, and time.   Skin: Lesion (left upper antierior thigh freckle color change) and rash noted. Rash is maculopapular and urticarial.   Nursing note and vitals reviewed.      Assessment/Plan   Jazmyn was seen today for follow-up.    Diagnoses and all orders for this " visit:    Dermatitis  -     Ambulatory Referral to Dermatology    Skin lesion of left leg  -     Ambulatory Referral to Dermatology    Other orders  -     MethylPREDNISolone (MEDROL, WILEY,) 4 MG tablet; Take as directed on package instructions.           -medrol dose pack. No shingles. Some sort of dermatitis  -refer to derm for color change in freckle/mole  -cont home meds  -stop lisinopril per dr meredith  -advised pt to monitor bp at home for next week and let office know her result. May be able to remain off bp med. Goal <150/90  -FU prn or if symptoms persist/worsen

## 2017-09-13 ENCOUNTER — CLINICAL SUPPORT (OUTPATIENT)
Dept: AUDIOLOGY | Facility: CLINIC | Age: 67
End: 2017-09-13

## 2017-09-13 DIAGNOSIS — Z46.1 ENCOUNTER FOR HEARING AID FITTING OF BOTH EARS: Primary | ICD-10-CM

## 2017-09-13 PROCEDURE — COVTAX COVINGTON PRESCRIBED 4.25%: Mod: CSM,,, | Performed by: AUDIOLOGIST-HEARING AID FITTER

## 2017-09-13 PROCEDURE — V5140 BEHIND EAR BINAUR HEARING AI: HCPCS | Mod: CSM,,, | Performed by: AUDIOLOGIST-HEARING AID FITTER

## 2017-09-13 NOTE — PROGRESS NOTES
Kenna Schmitt was seen on 09/13/2017  for a hearing aid fitting.     HA model and style: Phonak Audeo B90-direct   Right S/N: [id number]  Left S/N: [id number]     size: 1xS AU  Dome size: small open AD , small closed AS     Repair warranty expires: 11/22/20    Set target gain to 70% with instructions that pt will not be hearing to total capacity for a couple weeks when the target gain is increased, performed feedback test, set occlusion compensation to weak, deactivated push button, demonstrated low battery signal to pt. Paired hearing aids with her phone and performed a test call with instructions on use. Reviewed insertion and removal, daily care and maintenance, and battery and wax filter change procedure with patient. Patient was able to manipulate hearing aids well and reported satisfaction with sound quality to date.  She was counseled on realistic expectations and acclimation strategies.  She was given a copy of the hearing aid purchase agreement and will pay in full today and was reminded that she should file for insurance reimbursement on her own. Patient's one month trail period will begin today. She to follow up in one week for adjustments. Pt was reminded that she should file for insurance reimbursement on her own. Pt will return for her next follow up in 2 weeks when she returns from her trip.

## 2017-09-18 ENCOUNTER — APPOINTMENT (OUTPATIENT)
Dept: ONCOLOGY | Facility: CLINIC | Age: 67
End: 2017-09-18

## 2017-09-18 ENCOUNTER — APPOINTMENT (OUTPATIENT)
Dept: LAB | Facility: HOSPITAL | Age: 67
End: 2017-09-18

## 2017-09-25 ENCOUNTER — CLINICAL SUPPORT (OUTPATIENT)
Dept: AUDIOLOGY | Facility: CLINIC | Age: 67
End: 2017-09-25
Payer: COMMERCIAL

## 2017-09-25 DIAGNOSIS — Z46.1 ENCOUNTER FOR FITTING AND ADJUSTMENT OF HEARING AID OF BOTH EARS: Primary | ICD-10-CM

## 2017-09-25 DIAGNOSIS — Z97.4 WEARS HEARING AID: ICD-10-CM

## 2017-09-25 NOTE — PROGRESS NOTES
Kenna Schmitt came in on 09/25/2017 for a hearing aid follow up. Pt stated she is hearing her  very loudly and the overall sound is very loud but is otherwise doing well with the aids. Set TG to 80% and decreased 500 Hz by 2 to alleviate her husbands voice. Changed AS to med closed since she was hearing feedback on her left ear. Gave her extra domes for each ear. Gave her small closed domes for the left ear in case she wanted to go back to the smaller size for a better fit. She said it felt very full. She downloaded the [employer] remote elvia and we paired her hearing aids with her phone for use as a remote control. She said her battery was only lasting 2 days but then realized that she may have been mistaking the signals and changing the battery prematurely. Reviewed the signal for low battery and she acknowledged the sound. Discussed opening the battery door completely when the aids are not in use. Gave her Energizer batteries to see if a different brand will increase the battery life as well. Reviewed insertion and removal, daily care and maintenance, and battery and wax filter change procedure with patient.

## 2017-10-02 ENCOUNTER — PATIENT MESSAGE (OUTPATIENT)
Dept: OTOLARYNGOLOGY | Facility: CLINIC | Age: 67
End: 2017-10-02

## 2017-10-02 ENCOUNTER — CLINICAL SUPPORT (OUTPATIENT)
Dept: AUDIOLOGY | Facility: CLINIC | Age: 67
End: 2017-10-02
Payer: COMMERCIAL

## 2017-10-02 DIAGNOSIS — Z97.4 WEARS HEARING AID: Primary | ICD-10-CM

## 2017-10-02 NOTE — PROGRESS NOTES
Kenna Schmitt came in on 10/02/2017 for a hearing aid follow up. Pt stated her left  broke off in her ear but she was able to get it out. She feels like she continually has to push the left  in to hear better. Changed  and will call for stock replacement. Activated alerts for VC and changed intensity for alerts, increased TG to 90% and performed FB test AS to get rid of FB. Changed left dome back to small closed. FB has gone away after performing another FB test. Pt was able to manipulate VC and voiced understanding for use of VC. Reviewed insertion and removal, daily care and maintenance, and battery and wax filter change procedure with patient. Pt will return to the clinic on 1 week for further adjustments.

## 2017-10-09 ENCOUNTER — CLINICAL SUPPORT (OUTPATIENT)
Dept: AUDIOLOGY | Facility: CLINIC | Age: 67
End: 2017-10-09
Payer: COMMERCIAL

## 2017-10-09 DIAGNOSIS — Z97.4 WEARS HEARING AID: Primary | ICD-10-CM

## 2017-10-09 NOTE — PROGRESS NOTES
Kenna Schmitt came in on 10/09/2017 for a hearing aid follow up. Pt stated that sounds are too tinny and she would like more bass. Changed hollow and tinny by 3, increased 500 Hz by 3, and set TG to 100%. Pt says is actually sounds clearer. She is not having the battery drain issue she had in the past and hasn't heard the low battery signal. Played the low battery signal as a reminder. Reviewed insertion and removal, daily care and maintenance, and battery and wax filter change procedure with patient. Pt will return to the clinic in 2 weeks for her next follow up. .

## 2017-10-23 ENCOUNTER — CLINICAL SUPPORT (OUTPATIENT)
Dept: AUDIOLOGY | Facility: CLINIC | Age: 67
End: 2017-10-23
Payer: COMMERCIAL

## 2017-10-23 DIAGNOSIS — Z97.4 WEARS HEARING AID: Primary | ICD-10-CM

## 2017-10-23 NOTE — PROGRESS NOTES
Kenna Schmitt came in on 10/23/2017 for a hearing aid follow up. Pt stated she is having trouble taking phone calls int he car. The car does not take over but she has to manually change the settings in her phone to the car BT. Will call [employer] to see if they have any suggestions and call the pt with answers. Cleaned and tested both aids. Reviewed insertion and removal, daily care and maintenance, and battery and wax filter change procedure with patient. Pt was informed that she rod receive a letter when it's time for her annual hearing test and that she should call the clinic directly to schedule all appts in same day.

## 2017-10-24 ENCOUNTER — LAB (OUTPATIENT)
Dept: LAB | Facility: HOSPITAL | Age: 67
End: 2017-10-24

## 2017-10-24 ENCOUNTER — OFFICE VISIT (OUTPATIENT)
Dept: ONCOLOGY | Facility: CLINIC | Age: 67
End: 2017-10-24

## 2017-10-24 VITALS
TEMPERATURE: 97.4 F | SYSTOLIC BLOOD PRESSURE: 132 MMHG | BODY MASS INDEX: 30 KG/M2 | HEIGHT: 59 IN | RESPIRATION RATE: 18 BRPM | WEIGHT: 148.8 LBS | DIASTOLIC BLOOD PRESSURE: 84 MMHG | OXYGEN SATURATION: 94 % | HEART RATE: 71 BPM

## 2017-10-24 DIAGNOSIS — D75.1 ERYTHROCYTOSIS: Primary | ICD-10-CM

## 2017-10-24 DIAGNOSIS — D75.1 ERYTHROCYTOSIS: ICD-10-CM

## 2017-10-24 LAB
BASOPHILS # BLD AUTO: 0.13 10*3/MM3 (ref 0–0.1)
BASOPHILS NFR BLD AUTO: 1 % (ref 0–1.1)
DEPRECATED RDW RBC AUTO: 45 FL (ref 37–49)
EOSINOPHIL # BLD AUTO: 0.32 10*3/MM3 (ref 0–0.36)
EOSINOPHIL NFR BLD AUTO: 2.4 % (ref 1–5)
ERYTHROCYTE [DISTWIDTH] IN BLOOD BY AUTOMATED COUNT: 13.2 % (ref 11.7–14.5)
HCT VFR BLD AUTO: 46.4 % (ref 34–45)
HGB BLD-MCNC: 15.6 G/DL (ref 11.5–14.9)
IMM GRANULOCYTES # BLD: 0.06 10*3/MM3 (ref 0–0.03)
IMM GRANULOCYTES NFR BLD: 0.4 % (ref 0–0.5)
LYMPHOCYTES # BLD AUTO: 3.5 10*3/MM3 (ref 1–3.5)
LYMPHOCYTES NFR BLD AUTO: 26.2 % (ref 20–49)
MCH RBC QN AUTO: 30.7 PG (ref 27–33)
MCHC RBC AUTO-ENTMCNC: 33.6 G/DL (ref 32–35)
MCV RBC AUTO: 91.3 FL (ref 83–97)
MONOCYTES # BLD AUTO: 0.9 10*3/MM3 (ref 0.25–0.8)
MONOCYTES NFR BLD AUTO: 6.7 % (ref 4–12)
NEUTROPHILS # BLD AUTO: 8.46 10*3/MM3 (ref 1.5–7)
NEUTROPHILS NFR BLD AUTO: 63.3 % (ref 39–75)
NRBC BLD MANUAL-RTO: 0 /100 WBC (ref 0–0)
PLATELET # BLD AUTO: 209 10*3/MM3 (ref 150–375)
PMV BLD AUTO: 12.9 FL (ref 8.9–12.1)
RBC # BLD AUTO: 5.08 10*6/MM3 (ref 3.9–5)
WBC NRBC COR # BLD: 13.37 10*3/MM3 (ref 4–10)

## 2017-10-24 PROCEDURE — 85025 COMPLETE CBC W/AUTO DIFF WBC: CPT

## 2017-10-24 PROCEDURE — 36416 COLLJ CAPILLARY BLOOD SPEC: CPT

## 2017-10-24 PROCEDURE — 99213 OFFICE O/P EST LOW 20 MIN: CPT | Performed by: INTERNAL MEDICINE

## 2017-10-24 RX ORDER — INFLUENZA A VIRUS A/MICHIGAN/45/2015 X-275 (H1N1) ANTIGEN (FORMALDEHYDE INACTIVATED), INFLUENZA A VIRUS A/SINGAPORE/INFIMH-16-0019/2016 IVR-186 (H3N2) ANTIGEN (FORMALDEHYDE INACTIVATED), AND INFLUENZA B VIRUS B/MARYLAND/15/2016 BX-69A (A B/COLORADO/6/2017-LIKE VIRUS) ANTIGEN (FORMALDEHYDE INACTIVATED) 60; 60; 60 UG/.5ML; UG/.5ML; UG/.5ML
INJECTION, SUSPENSION INTRAMUSCULAR
Refills: 0 | COMMUNITY
Start: 2017-10-03 | End: 2018-03-29

## 2017-10-24 NOTE — PROGRESS NOTES
[facility] GROUP OUTPATIENT FOLLOW UP CLINIC VISIT    REASON FOR FOLLOW-UP:    Erythrocytosis    HISTORY OF PRESENT ILLNESS:  Jazmyn Castaneda is a 67 y.o. female who returns today for follow up of the above issue.     She in the past was diagnosed with benign positional vertigo and unfortunately is having some issues with this currently.  This does cause some nausea as well which is nearly constant.  The severe vertigo is positional.  She denies any fevers or chills.  No other infectious symptoms.  She has tried to cut back on smoking.  At times she has been more successful with this than others.    HEMATOLOGIC HISTORY:  She recently switched primary care providers. On 3/24/2017 her hemoglobin was 16.8 with hematocrit 51.3%. As of 4/6/2017 the hemoglobin was 16.2 with hematocrit 50.6%. White blood cells and platelets of been normal. She states that she was not aware of any erythrocytosis prior to that.    Seen initially on 5/8/2017 with hemoglobin of 16.1 and hematocrit 49.4%.  Erythropoietin level was normal at 4.8.,  Monoxide level was normal at 2.5%.  JAK2 V617F and exon 12 mutations were negative.    She returned on 5/26/2017 for follow-up.  Her hemoglobin is stable.  She will follow-up in a few months.  She will try to quit smoking.    PAST MEDICAL, SURGICAL, FAMILY, AND SOCIAL HISTORIES were reviewed with the patient and in the electronic medical record, and were updated if indicated.    ALLERGIES:  Allergies   Allergen Reactions   • Cephalosporins    • Ciprofloxacin    • Penicillins    • Sulfa Antibiotics        MEDICATIONS:  The medication list has been reviewed with the patient by the medical assistant, and the list has been updated in the electronic medical record, which I reviewed.  Medication dosages and frequencies were confirmed to be accurate.    REVIEW OF SYSTEMS:  PAIN:  See Vital Signs below.  GENERAL:  No fevers, chills, night sweats, or unintended weight loss.  SKIN:  No rashes or  "non-healing lesions.  HEME/LYMPH:  No abnormal bleeding.  No palpable lymphadenopathy.  EYES:  No vision changes or diplopia.  ENT:  No sore throat or difficulty swallowing.Vertigo.  RESPIRATORY:  No cough, shortness of breath, hemoptysis, or wheezing.  CARDIOVASCULAR:  No chest pain, palpitations, orthopnea, or dyspnea on exertion.  GASTROINTESTINAL:  Nausea related to vertigo  GENITOURINARY:  No dysuria or hematuria.  MUSCULOSKELETAL:  No joint pain, swelling, or erythema.  NEUROLOGIC:  No dizziness, loss of consciousness, or seizures.  PSYCHIATRIC:  No depression, anxiety, or mood changes.    Vitals:    10/24/17 1055   BP: 132/84   Pulse: 71   Resp: 18   Temp: 97.4 °F (36.3 °C)   TempSrc: Oral   SpO2: 94%   Weight: 148 lb 12.8 oz (67.5 kg)   Height: 59\" (149.9 cm)   PainSc: 0-No pain       PHYSICAL EXAMINATION:  GENERAL:  Well-developed well-nourished female; awake, alert and oriented, in no acute distress.  HEAD:  Normocephalic, atraumatic.  EARS:  Hearing intact.  NOSE:  Septum midline.  No excoriations or nasal discharge.  CHEST:  Lungs are clear to auscultation bilaterally.  No wheezes, rales, or rhonchi.  HEART:  Regular rate; normal rhythm.  No murmurs, gallops or rubs.  EXTREMITIES:  No clubbing, cyanosis, or edema.    DIAGNOSTIC DATA:  Lab Results   Component Value Date    WBC 13.37 (H) 10/24/2017    HGB 15.6 (H) 10/24/2017    HCT 46.4 (H) 10/24/2017    MCV 91.3 10/24/2017     10/24/2017   See the hematologic history otherwise    IMAGING:  None reviewed    ASSESSMENT:  This is a 67 y.o. female with:  1.  Erythrocytosis: This is mild and improving.  I suspect is related to smoking.  COURTNEY 2 mutation analysis was negative.  Laboratory evaluation otherwise was unremarkable.  She will continue to work on smoking cessation.  2.  Mild leukocytosis with neutrophilia: Again, my suspicion for a myeloproliferative disorder is low.  We will continue to monitor this.  3.  Vertigo: She is currently in physical " therapy for this.    PLAN:  1.  She will continue to work on smoking cessation  2.  Return in 4 months for follow-up with a CBC

## 2017-11-04 ENCOUNTER — PATIENT MESSAGE (OUTPATIENT)
Dept: OTOLARYNGOLOGY | Facility: CLINIC | Age: 67
End: 2017-11-04

## 2017-11-06 ENCOUNTER — CLINICAL SUPPORT (OUTPATIENT)
Dept: AUDIOLOGY | Facility: CLINIC | Age: 67
End: 2017-11-06
Payer: COMMERCIAL

## 2017-11-06 ENCOUNTER — PATIENT MESSAGE (OUTPATIENT)
Dept: OTOLARYNGOLOGY | Facility: CLINIC | Age: 67
End: 2017-11-06

## 2017-11-06 DIAGNOSIS — Z97.4 WEARS HEARING AID: Primary | ICD-10-CM

## 2017-11-06 NOTE — PROGRESS NOTES
Kenna Schmitt came in on 11/06/2017 for a hearing aid follow up. Pt stated that her right  broke like the left one. Changed the  and called [employer] for a replacement . Pt says her left aid is not connecting to the phone elvia. Aids were paired in BT but not in the elvia. Deleted the aids in the elvia and repaired. They connected fine. Pt said she is having difficulty with her phone connecting to her car BT. It seems that when she gets a call, her phone automatically comes on in speaker mode rather than connecting to her car or her hearing aids. A test call here in the clinic showed that the phone call initially came on in the speaker mode rather than the hearing aids. She says the same thing happens in her car. Suggested she hard reset her phone to see if the phone behavior changes. Gave 4 extra small closed domes. Reviewed insertion and removal, daily care and maintenance, and battery and wax filter change procedure with patient. Pt will call the clinic PRN.

## 2017-11-15 DIAGNOSIS — E03.4 HYPOTHYROIDISM DUE TO ACQUIRED ATROPHY OF THYROID: ICD-10-CM

## 2017-11-15 RX ORDER — LEVOTHYROXINE SODIUM 100 UG/1
TABLET ORAL
Qty: 90 TABLET | Refills: 1 | Status: SHIPPED | OUTPATIENT
Start: 2017-11-15 | End: 2018-05-12 | Stop reason: SDUPTHER

## 2018-02-15 ENCOUNTER — APPOINTMENT (OUTPATIENT)
Dept: ONCOLOGY | Facility: CLINIC | Age: 68
End: 2018-02-15

## 2018-02-15 ENCOUNTER — APPOINTMENT (OUTPATIENT)
Dept: LAB | Facility: HOSPITAL | Age: 68
End: 2018-02-15

## 2018-02-28 ENCOUNTER — OFFICE VISIT (OUTPATIENT)
Dept: ONCOLOGY | Facility: CLINIC | Age: 68
End: 2018-02-28

## 2018-02-28 ENCOUNTER — LAB (OUTPATIENT)
Dept: LAB | Facility: HOSPITAL | Age: 68
End: 2018-02-28

## 2018-02-28 VITALS
SYSTOLIC BLOOD PRESSURE: 142 MMHG | TEMPERATURE: 98.3 F | DIASTOLIC BLOOD PRESSURE: 94 MMHG | WEIGHT: 144.8 LBS | OXYGEN SATURATION: 96 % | HEART RATE: 81 BPM | RESPIRATION RATE: 16 BRPM | HEIGHT: 61 IN | BODY MASS INDEX: 27.34 KG/M2

## 2018-02-28 DIAGNOSIS — D75.1 ERYTHROCYTOSIS: ICD-10-CM

## 2018-02-28 DIAGNOSIS — D75.1 ERYTHROCYTOSIS: Primary | ICD-10-CM

## 2018-02-28 LAB
BASOPHILS # BLD AUTO: 0.12 10*3/MM3 (ref 0–0.1)
BASOPHILS NFR BLD AUTO: 1 % (ref 0–1.1)
DEPRECATED RDW RBC AUTO: 44.2 FL (ref 37–49)
EOSINOPHIL # BLD AUTO: 0.34 10*3/MM3 (ref 0–0.36)
EOSINOPHIL NFR BLD AUTO: 2.9 % (ref 1–5)
ERYTHROCYTE [DISTWIDTH] IN BLOOD BY AUTOMATED COUNT: 13.4 % (ref 11.7–14.5)
HCT VFR BLD AUTO: 47.7 % (ref 34–45)
HGB BLD-MCNC: 16.1 G/DL (ref 11.5–14.9)
IMM GRANULOCYTES # BLD: 0.04 10*3/MM3 (ref 0–0.03)
IMM GRANULOCYTES NFR BLD: 0.3 % (ref 0–0.5)
LYMPHOCYTES # BLD AUTO: 3.51 10*3/MM3 (ref 1–3.5)
LYMPHOCYTES NFR BLD AUTO: 29.9 % (ref 20–49)
MCH RBC QN AUTO: 30.2 PG (ref 27–33)
MCHC RBC AUTO-ENTMCNC: 33.8 G/DL (ref 32–35)
MCV RBC AUTO: 89.5 FL (ref 83–97)
MONOCYTES # BLD AUTO: 0.77 10*3/MM3 (ref 0.25–0.8)
MONOCYTES NFR BLD AUTO: 6.6 % (ref 4–12)
NEUTROPHILS # BLD AUTO: 6.94 10*3/MM3 (ref 1.5–7)
NEUTROPHILS NFR BLD AUTO: 59.3 % (ref 39–75)
NRBC BLD MANUAL-RTO: 0 /100 WBC (ref 0–0)
PLATELET # BLD AUTO: 272 10*3/MM3 (ref 150–375)
PMV BLD AUTO: 12 FL (ref 8.9–12.1)
RBC # BLD AUTO: 5.33 10*6/MM3 (ref 3.9–5)
WBC NRBC COR # BLD: 11.72 10*3/MM3 (ref 4–10)

## 2018-02-28 PROCEDURE — 36415 COLL VENOUS BLD VENIPUNCTURE: CPT | Performed by: INTERNAL MEDICINE

## 2018-02-28 PROCEDURE — 85025 COMPLETE CBC W/AUTO DIFF WBC: CPT

## 2018-02-28 PROCEDURE — 99213 OFFICE O/P EST LOW 20 MIN: CPT | Performed by: INTERNAL MEDICINE

## 2018-02-28 NOTE — PROGRESS NOTES
[facility] GROUP OUTPATIENT FOLLOW UP CLINIC VISIT    REASON FOR FOLLOW-UP:    1.  Erythrocytosis  2.  Mild leukocytosis    HISTORY OF PRESENT ILLNESS:  Jazmyn Castaneda is a 67 y.o. female who returns today for follow up of the above issue.     She continues to have some issues with vertigo.  She is very stressed at this point.  Brother-in-law was recently diagnosed with head and neck cancer and is being seen at the [facility].  In addition, her niece had a roof leak.  Both call her a lot at night disrupting her sleep.  In spite of this, she states that she is smoking less and continues to try to quit.    HEMATOLOGIC HISTORY:  She recently switched primary care providers. On 3/24/2017 her hemoglobin was 16.8 with hematocrit 51.3%. As of 4/6/2017 the hemoglobin was 16.2 with hematocrit 50.6%. White blood cells and platelets of been normal. She states that she was not aware of any erythrocytosis prior to that.    Seen initially on 5/8/2017 with hemoglobin of 16.1 and hematocrit 49.4%.  Erythropoietin level was normal at 4.8.  Carbon monoxide level was normal at 2.5%.  JAK2 V617F and exon 12 mutations were negative.    She returned on 5/26/2017 for follow-up.  Her hemoglobin is stable.  She will follow-up in a few months.  She will try to quit smoking.    PAST MEDICAL, SURGICAL, FAMILY, AND SOCIAL HISTORIES were reviewed with the patient and in the electronic medical record, and were updated if indicated.    ALLERGIES:  Allergies   Allergen Reactions   • Cephalosporins    • Ciprofloxacin    • Penicillins    • Sulfa Antibiotics        MEDICATIONS:  The medication list has been reviewed with the patient by the medical assistant, and the list has been updated in the electronic medical record, which I reviewed.  Medication dosages and frequencies were confirmed to be accurate.    REVIEW OF SYSTEMS:  PAIN:  See Vital Signs below.  GENERAL:  No fevers, chills, night sweats, or unintended weight  "loss.  SKIN:  No rashes or non-healing lesions.  HEME/LYMPH:  No abnormal bleeding.  No palpable lymphadenopathy.  EYES:  No vision changes or diplopia.  ENT:  No sore throat or difficulty swallowing.  Vertigo.  RESPIRATORY:  No cough, shortness of breath, hemoptysis, or wheezing.  CARDIOVASCULAR:  No chest pain, palpitations, orthopnea, or dyspnea on exertion.  GASTROINTESTINAL:  Nausea related to vertigo  GENITOURINARY:  No dysuria or hematuria.  MUSCULOSKELETAL:  No joint pain, swelling, or erythema.  NEUROLOGIC:  No dizziness, loss of consciousness, or seizures.  PSYCHIATRIC:  See history of present illness    Vitals:    02/28/18 1331   BP: 142/94   Pulse: 81   Resp: 16   Temp: 98.3 °F (36.8 °C)   TempSrc: Oral   SpO2: 96%   Weight: 65.7 kg (144 lb 12.8 oz)   Height: 153.8 cm (60.55\")  Comment: new ht w/shoes   PainSc: 0-No pain       PHYSICAL EXAMINATION:  GENERAL:  Well-developed well-nourished female; awake, alert and oriented, in no acute distress.  HEAD:  Normocephalic, atraumatic.  EARS:  Hearing intact.  NOSE:  Septum midline.  No excoriations or nasal discharge.  LYMPH:  No cervical, supraclavicular, or axillary lymphadenopathy  CHEST:  Lungs are clear to auscultation bilaterally.  No wheezes, rales, or rhonchi.  HEART:  Regular rate; normal rhythm.  No murmurs, gallops or rubs.  Abdomen:  obese  EXTREMITIES:  No edema.    DIAGNOSTIC DATA:  WBC   Date Value Ref Range Status   02/28/2018 11.72 (H) 4.00 - 10.00 10*3/mm3 Final     RBC   Date Value Ref Range Status   02/28/2018 5.33 (H) 3.90 - 5.00 10*6/mm3 Final     Hemoglobin   Date Value Ref Range Status   02/28/2018 16.1 (H) 11.5 - 14.9 g/dL Final     Hematocrit   Date Value Ref Range Status   02/28/2018 47.7 (H) 34.0 - 45.0 % Final     MCV   Date Value Ref Range Status   02/28/2018 89.5 83.0 - 97.0 fL Final     MCH   Date Value Ref Range Status   02/28/2018 30.2 27.0 - 33.0 pg Final     MCHC   Date Value Ref Range Status   02/28/2018 33.8 32.0 - 35.0 " g/dL Final     RDW   Date Value Ref Range Status   02/28/2018 13.4 11.7 - 14.5 % Final     RDW-SD   Date Value Ref Range Status   02/28/2018 44.2 37.0 - 49.0 fl Final     MPV   Date Value Ref Range Status   02/28/2018 12.0 8.9 - 12.1 fL Final     Platelets   Date Value Ref Range Status   02/28/2018 272 150 - 375 10*3/mm3 Final     Neutrophil %   Date Value Ref Range Status   02/28/2018 59.3 39.0 - 75.0 % Final     Lymphocyte %   Date Value Ref Range Status   02/28/2018 29.9 20.0 - 49.0 % Final     Monocyte %   Date Value Ref Range Status   02/28/2018 6.6 4.0 - 12.0 % Final     Eosinophil %   Date Value Ref Range Status   02/28/2018 2.9 1.0 - 5.0 % Final     Basophil %   Date Value Ref Range Status   02/28/2018 1.0 0.0 - 1.1 % Final     Immature Grans %   Date Value Ref Range Status   02/28/2018 0.3 0.0 - 0.5 % Final     Neutrophils, Absolute   Date Value Ref Range Status   02/28/2018 6.94 1.50 - 7.00 10*3/mm3 Final     Lymphocytes, Absolute   Date Value Ref Range Status   02/28/2018 3.51 (H) 1.00 - 3.50 10*3/mm3 Final     Monocytes, Absolute   Date Value Ref Range Status   02/28/2018 0.77 0.25 - 0.80 10*3/mm3 Final     Eosinophils, Absolute   Date Value Ref Range Status   02/28/2018 0.34 0.00 - 0.36 10*3/mm3 Final     Basophils, Absolute   Date Value Ref Range Status   02/28/2018 0.12 (H) 0.00 - 0.10 10*3/mm3 Final     Immature Grans, Absolute   Date Value Ref Range Status   02/28/2018 0.04 (H) 0.00 - 0.03 10*3/mm3 Final     nRBC   Date Value Ref Range Status   02/28/2018 0.0 0.0 - 0.0 /100 WBC Final         IMAGING:  None reviewed    ASSESSMENT:  This is a 67 y.o. female with:  1.  Erythrocytosis:  I suspect this is secondary polycythemia related to smoking.  COURTNEY 2 mutation analysis was negative.  Laboratory evaluation otherwise was unremarkable.  She will continue to work on smoking cessation.  2.  Mild leukocytosis with neutrophilia: Also likely secondary to smoking.    PLAN:  1.  She will continue to work on  smoking cessation  2.  Return in 6 months for follow-up with a CBC

## 2018-03-29 ENCOUNTER — OFFICE VISIT (OUTPATIENT)
Dept: INTERNAL MEDICINE | Facility: CLINIC | Age: 68
End: 2018-03-29

## 2018-03-29 VITALS
SYSTOLIC BLOOD PRESSURE: 134 MMHG | OXYGEN SATURATION: 93 % | WEIGHT: 143 LBS | HEIGHT: 61 IN | BODY MASS INDEX: 27 KG/M2 | TEMPERATURE: 98.2 F | RESPIRATION RATE: 16 BRPM | HEART RATE: 85 BPM | DIASTOLIC BLOOD PRESSURE: 75 MMHG

## 2018-03-29 DIAGNOSIS — Z12.11 ENCOUNTER FOR SCREENING COLONOSCOPY: ICD-10-CM

## 2018-03-29 DIAGNOSIS — Z00.00 MEDICARE ANNUAL WELLNESS VISIT, SUBSEQUENT: Primary | ICD-10-CM

## 2018-03-29 DIAGNOSIS — J30.1 CHRONIC SEASONAL ALLERGIC RHINITIS DUE TO POLLEN: ICD-10-CM

## 2018-03-29 DIAGNOSIS — R42 VERTIGO: ICD-10-CM

## 2018-03-29 DIAGNOSIS — I10 ESSENTIAL HYPERTENSION: ICD-10-CM

## 2018-03-29 PROCEDURE — G0009 ADMIN PNEUMOCOCCAL VACCINE: HCPCS | Performed by: INTERNAL MEDICINE

## 2018-03-29 PROCEDURE — G0439 PPPS, SUBSEQ VISIT: HCPCS | Performed by: INTERNAL MEDICINE

## 2018-03-29 PROCEDURE — 99213 OFFICE O/P EST LOW 20 MIN: CPT | Performed by: INTERNAL MEDICINE

## 2018-03-29 PROCEDURE — 90670 PCV13 VACCINE IM: CPT | Performed by: INTERNAL MEDICINE

## 2018-03-30 LAB
ALBUMIN SERPL-MCNC: 4.2 G/DL (ref 3.5–5.2)
ALBUMIN/GLOB SERPL: 1.5 G/DL
ALP SERPL-CCNC: 152 U/L (ref 39–117)
ALT SERPL-CCNC: 15 U/L (ref 1–33)
APPEARANCE UR: CLEAR
AST SERPL-CCNC: 16 U/L (ref 1–32)
BACTERIA #/AREA URNS HPF: NORMAL /HPF
BASOPHILS # BLD AUTO: 0.07 10*3/MM3 (ref 0–0.2)
BASOPHILS NFR BLD AUTO: 0.6 % (ref 0–1.5)
BILIRUB SERPL-MCNC: 0.6 MG/DL (ref 0.1–1.2)
BILIRUB UR QL STRIP: NEGATIVE
BUN SERPL-MCNC: 14 MG/DL (ref 8–23)
BUN/CREAT SERPL: 22.6 (ref 7–25)
CALCIUM SERPL-MCNC: 10.3 MG/DL (ref 8.6–10.5)
CASTS URNS MICRO: NORMAL
CHLORIDE SERPL-SCNC: 103 MMOL/L (ref 98–107)
CHOLEST SERPL-MCNC: 204 MG/DL (ref 0–200)
CO2 SERPL-SCNC: 26.8 MMOL/L (ref 22–29)
COLOR UR: YELLOW
CREAT SERPL-MCNC: 0.62 MG/DL (ref 0.57–1)
EOSINOPHIL # BLD AUTO: 0.23 10*3/MM3 (ref 0–0.7)
EOSINOPHIL NFR BLD AUTO: 2 % (ref 0.3–6.2)
EPI CELLS #/AREA URNS HPF: NORMAL /HPF
ERYTHROCYTE [DISTWIDTH] IN BLOOD BY AUTOMATED COUNT: 14.5 % (ref 11.7–13)
GFR SERPLBLD CREATININE-BSD FMLA CKD-EPI: 116 ML/MIN/1.73
GFR SERPLBLD CREATININE-BSD FMLA CKD-EPI: 95 ML/MIN/1.73
GLOBULIN SER CALC-MCNC: 2.8 GM/DL
GLUCOSE SERPL-MCNC: 87 MG/DL (ref 65–99)
GLUCOSE UR QL: NEGATIVE
HCT VFR BLD AUTO: 51.1 % (ref 35.6–45.5)
HDLC SERPL-MCNC: 34 MG/DL (ref 40–60)
HGB BLD-MCNC: 16.2 G/DL (ref 11.9–15.5)
HGB UR QL STRIP: NEGATIVE
IMM GRANULOCYTES # BLD: 0.02 10*3/MM3 (ref 0–0.03)
IMM GRANULOCYTES NFR BLD: 0.2 % (ref 0–0.5)
KETONES UR QL STRIP: (no result)
LDLC SERPL CALC-MCNC: 148 MG/DL (ref 0–100)
LDLC/HDLC SERPL: 4.35 {RATIO}
LEUKOCYTE ESTERASE UR QL STRIP: NEGATIVE
LYMPHOCYTES # BLD AUTO: 2.62 10*3/MM3 (ref 0.9–4.8)
LYMPHOCYTES NFR BLD AUTO: 22.7 % (ref 19.6–45.3)
MCH RBC QN AUTO: 30.8 PG (ref 26.9–32)
MCHC RBC AUTO-ENTMCNC: 31.7 G/DL (ref 32.4–36.3)
MCV RBC AUTO: 97.1 FL (ref 80.5–98.2)
MONOCYTES # BLD AUTO: 0.55 10*3/MM3 (ref 0.2–1.2)
MONOCYTES NFR BLD AUTO: 4.8 % (ref 5–12)
NEUTROPHILS # BLD AUTO: 8.03 10*3/MM3 (ref 1.9–8.1)
NEUTROPHILS NFR BLD AUTO: 69.7 % (ref 42.7–76)
NITRITE UR QL STRIP: NEGATIVE
PH UR STRIP: 6 [PH] (ref 5–8)
PLATELET # BLD AUTO: 286 10*3/MM3 (ref 140–500)
POTASSIUM SERPL-SCNC: 4.9 MMOL/L (ref 3.5–5.2)
PROT SERPL-MCNC: 7 G/DL (ref 6–8.5)
PROT UR QL STRIP: NEGATIVE
RBC # BLD AUTO: 5.26 10*6/MM3 (ref 3.9–5.2)
RBC #/AREA URNS HPF: NORMAL /HPF
SODIUM SERPL-SCNC: 140 MMOL/L (ref 136–145)
SP GR UR: 1.02 (ref 1–1.03)
T4 FREE SERPL-MCNC: 1.21 NG/DL (ref 0.93–1.7)
TRIGL SERPL-MCNC: 111 MG/DL (ref 0–150)
TSH SERPL DL<=0.005 MIU/L-ACNC: 1.94 MIU/ML (ref 0.27–4.2)
UROBILINOGEN UR STRIP-MCNC: (no result) MG/DL
VLDLC SERPL CALC-MCNC: 22.2 MG/DL (ref 5–40)
WBC # BLD AUTO: 11.52 10*3/MM3 (ref 4.5–10.7)
WBC #/AREA URNS HPF: NORMAL /HPF

## 2018-04-02 ENCOUNTER — TELEPHONE (OUTPATIENT)
Dept: AUDIOLOGY | Facility: CLINIC | Age: 68
End: 2018-04-02

## 2018-04-02 RX ORDER — ATORVASTATIN CALCIUM 40 MG/1
40 TABLET, FILM COATED ORAL DAILY
Qty: 30 TABLET | Refills: 4 | Status: SHIPPED | OUTPATIENT
Start: 2018-04-02 | End: 2018-07-24 | Stop reason: SDUPTHER

## 2018-04-02 NOTE — TELEPHONE ENCOUNTER
Pt had left a voicemail stating that her hearing aid has been cutting in and out again. Called pt back and she stated that the  wire looks fine but that the left aid is still intermittent with amplification of all sounds, not just when streaming. Pt instructed to drop left aid off to be sent to [facility] for an in-warranty repair. Pt declined a loaner aid for the left ear while her left aid is out for repair. She will be notified when her left aid has arrived back at the clinic so it can be picked up and/or an appt can be scheduled according to her preference.

## 2018-04-07 ENCOUNTER — PATIENT MESSAGE (OUTPATIENT)
Dept: FAMILY MEDICINE | Facility: CLINIC | Age: 68
End: 2018-04-07

## 2018-04-07 DIAGNOSIS — E03.9 HYPOTHYROIDISM, UNSPECIFIED TYPE: Primary | ICD-10-CM

## 2018-04-07 DIAGNOSIS — E78.2 MIXED HYPERLIPIDEMIA: ICD-10-CM

## 2018-04-07 DIAGNOSIS — I10 ESSENTIAL HYPERTENSION: ICD-10-CM

## 2018-04-07 RX ORDER — CHLORTHALIDONE 25 MG/1
TABLET ORAL
Qty: 90 TABLET | Refills: 1 | Status: SHIPPED | OUTPATIENT
Start: 2018-04-07 | End: 2018-09-30 | Stop reason: SDUPTHER

## 2018-04-07 NOTE — TELEPHONE ENCOUNTER
Medications refilled.  Labs due.  Orders in.  Please schedule.    [facility] message sent.    ADOLFO

## 2018-04-09 ENCOUNTER — PATIENT MESSAGE (OUTPATIENT)
Dept: FAMILY MEDICINE | Facility: CLINIC | Age: 68
End: 2018-04-09

## 2018-04-10 ENCOUNTER — CLINICAL SUPPORT (OUTPATIENT)
Dept: AUDIOLOGY | Facility: CLINIC | Age: 68
End: 2018-04-10
Payer: COMMERCIAL

## 2018-04-10 DIAGNOSIS — Z97.4 WEARS HEARING AID: Primary | ICD-10-CM

## 2018-04-10 NOTE — PROGRESS NOTES
Kenna Schmitt came in on 04/10/2018 for a hearing aid follow up. Pt stated when she picked up her repaired left hearing aid, it will not connect to her phone. Tried pairing it to her phone without success. Called [facility] for assistance. Had to change the setting of general>accessibility>call audio routing>bluetooth headset. Test call proved the aids to be working well during calls. Told her she can press either aid to answer the calls. Reviewed insertion and removal, daily care and maintenance, and battery and wax filter change procedure with patient. She will call PRN.

## 2018-04-11 NOTE — PROGRESS NOTES
Subjective   Jazmyn Castaneda is a 67 y.o. female.   She is here today for Medicare annual wellness visit subsequent and she will work on those items mentioned and she is also here to follow-up for hypertension chronic allergic rhinitis encounter for screening colonoscopy and vertigo at times  History of Present Illness   She is here today for Medicare annual wellness visit subsequent and she will work on those items mentioned and she is also here to follow-up for hypertension which is controlled without any medication and chronic seasonal allergic rhinitis which is controlled with Zyrtec and nasal sprays encounter for screening colonoscopy which we will order and vertigo which comes and goes which we will have her see ENT.  The following portions of the patient's history were reviewed and updated as appropriate: allergies, current medications, past family history, past medical history, past social history, past surgical history and problem list.    Review of Systems   Neurological: Positive for dizziness.   All other systems reviewed and are negative.      Objective   Physical Exam   Constitutional: She is oriented to person, place, and time. She appears well-developed and well-nourished. She is cooperative.   HENT:   Head: Normocephalic and atraumatic.   Right Ear: Hearing, tympanic membrane, external ear and ear canal normal.   Left Ear: Hearing, tympanic membrane, external ear and ear canal normal.   Nose: Nose normal.   Mouth/Throat: Uvula is midline, oropharynx is clear and moist and mucous membranes are normal.   Eyes: Conjunctivae, EOM and lids are normal. Pupils are equal, round, and reactive to light.   Neck: Phonation normal. Neck supple. Carotid bruit is not present.   Cardiovascular: Normal rate, regular rhythm and normal heart sounds.  Exam reveals no gallop and no friction rub.    No murmur heard.  Pulmonary/Chest: Effort normal and breath sounds normal. No respiratory distress.   Abdominal: Soft.  Bowel sounds are normal. She exhibits no distension and no mass. There is no hepatosplenomegaly. There is no tenderness. There is no rebound and no guarding. No hernia.   Musculoskeletal: She exhibits no edema.   Neurological: She is alert and oriented to person, place, and time. Coordination and gait normal.   Skin: Skin is warm and dry.   Psychiatric: She has a normal mood and affect. Her speech is normal and behavior is normal. Judgment and thought content normal.   Nursing note and vitals reviewed.      Assessment/Plan   Diagnoses and all orders for this visit:    Medicare annual wellness visit, subsequent    Essential hypertension  -     Lipid Panel With LDL / HDL Ratio  -     CBC & Differential  -     Comprehensive Metabolic Panel  -     T4, Free  -     TSH  -     Urinalysis With Microscopic - Urine, Clean Catch    Chronic seasonal allergic rhinitis due to pollen    Encounter for screening colonoscopy  -     Ambulatory Referral For Screening Colonoscopy    Vertigo  -     Ambulatory Referral to ENT (Otolaryngology)    Other orders  -     Pneumococcal Conjugate Vaccine 13-Valent All  -     Microscopic Examination      Medicare annual wellness visit subsequent see recommendations and she will follow those  Hypertension controlled with diet and exercise and no changes needed we will check labs today  Chronic seasonal allergic rhinitis stable on Zyrtec and nasal sprays  Encounter for screening colonoscopy we will schedule  Vertigo we will have her see ENT

## 2018-04-27 ENCOUNTER — PATIENT MESSAGE (OUTPATIENT)
Dept: FAMILY MEDICINE | Facility: CLINIC | Age: 68
End: 2018-04-27

## 2018-04-28 ENCOUNTER — OFFICE VISIT (OUTPATIENT)
Dept: URGENT CARE | Facility: CLINIC | Age: 68
End: 2018-04-28
Payer: COMMERCIAL

## 2018-04-28 VITALS
TEMPERATURE: 97 F | HEART RATE: 69 BPM | WEIGHT: 214 LBS | HEIGHT: 61 IN | SYSTOLIC BLOOD PRESSURE: 165 MMHG | DIASTOLIC BLOOD PRESSURE: 83 MMHG | OXYGEN SATURATION: 97 % | BODY MASS INDEX: 40.4 KG/M2

## 2018-04-28 DIAGNOSIS — L03.032 CELLULITIS OF TOE OF LEFT FOOT: Primary | ICD-10-CM

## 2018-04-28 PROCEDURE — 3079F DIAST BP 80-89 MM HG: CPT | Mod: CPTII,S$GLB,, | Performed by: PHYSICIAN ASSISTANT

## 2018-04-28 PROCEDURE — 3077F SYST BP >= 140 MM HG: CPT | Mod: CPTII,S$GLB,, | Performed by: PHYSICIAN ASSISTANT

## 2018-04-28 PROCEDURE — 99203 OFFICE O/P NEW LOW 30 MIN: CPT | Mod: S$GLB,,, | Performed by: PHYSICIAN ASSISTANT

## 2018-04-28 RX ORDER — SULFAMETHOXAZOLE AND TRIMETHOPRIM 800; 160 MG/1; MG/1
1 TABLET ORAL 2 TIMES DAILY
Qty: 14 TABLET | Refills: 0 | Status: SHIPPED | OUTPATIENT
Start: 2018-04-28 | End: 2018-05-05

## 2018-04-28 NOTE — PATIENT INSTRUCTIONS
Discharge Instructions for Cellulitis  You have been diagnosed with cellulitis. This is an infection in the deepest layer of the skin. In some cases, the infection also affects the muscle. Cellulitis is caused by bacteria. The bacteria can enter the body through broken skin. This can happen with a cut, scratch, animal bite, or an insect bite that has been scratched. You may have been treated in the hospital with antibiotics and fluids. You will likely be given a prescription for antibiotics to take at home. This sheet will help you take care of yourself at home.  Home care  When you are home:  · Take the prescribed antibiotic medicine you are given as directed until it is gone. Take it even if you feel better. It treats the infection and stops it from returning. Not taking all the medicine can make future infections hard to treat.  · Keep the infected area clean.  · When possible, raise the infected area above the level of your heart. This helps keep swelling down.  · Talk with your healthcare provider if you are in pain. Ask what kind of over-the-counter medicine you can take for pain.  · Apply clean bandages as advised.  · Take your temperature once a day for a week.  · Wash your hands often to prevent spreading the infection.  In the future, wash your hands before and after you touch cuts, scratches, or bandages. This will help prevent infection.   When to call your healthcare provider  Call your healthcare provider immediately if you have any of the following:  · Difficulty or pain when moving the joints above or below the infected area  · Discharge or pus draining from the area  · Fever of 100.4°F (38°C) or higher, or as directed by your healthcare provider  · Pain that gets worse in or around the infected   · Redness that gets worse in or around the infected area, particularly if the area of redness expands to a wider area  · Shaking chills  · Swelling of the infected area  · Vomiting   Date Last Reviewed:  8/1/2016  © 5971-7400 [employer]. [address] [place] [address], [place] [address]. All rights reserved. This information is not intended as a substitute for professional medical care. Always follow your healthcare professional's instructions.

## 2018-04-28 NOTE — PROGRESS NOTES
"Subjective:       Patient ID: Kenna Schmitt is a 67 y.o. female.    Vitals:  height is 5' 1" (1.549 m) and weight is 97.1 kg (214 lb). Her oral temperature is 97.3 °F (36.3 °C). Her blood pressure is 165/83 (abnormal) and her pulse is 69. Her oxygen saturation is 97%.     Chief Complaint: Toe Pain (left great toe)    Had a pedicure 2 weeks ago.       Toe Pain    The incident occurred more than 1 week ago. There was no injury mechanism. The pain is present in the left toes and right toes. The quality of the pain is described as aching. The pain is at a severity of 8/10. The pain has been fluctuating since onset. Associated symptoms include an inability to bear weight (pain when walking). She reports no foreign bodies present. The symptoms are aggravated by weight bearing. She has tried nothing for the symptoms.     Review of Systems   Constitution: Negative for chills and fever.   HENT: Negative for sore throat.    Eyes: Negative for blurred vision.   Cardiovascular: Negative for chest pain.   Respiratory: Negative for shortness of breath.    Skin: Positive for color change, nail changes (Right and Left great toe) and poor wound healing. Negative for rash.   Musculoskeletal: Negative for back pain and joint pain.   Gastrointestinal: Negative for abdominal pain, diarrhea, nausea and vomiting.   Neurological: Negative for headaches.   Psychiatric/Behavioral: The patient is not nervous/anxious.        Objective:      Physical Exam   Constitutional: She is oriented to person, place, and time. She appears well-developed and well-nourished.   HENT:   Head: Normocephalic and atraumatic. Head is without abrasion, without contusion and without laceration.   Right Ear: External ear normal.   Left Ear: External ear normal.   Nose: Nose normal.   Mouth/Throat: Oropharynx is clear and moist.   Eyes: Conjunctivae, EOM and lids are normal. Pupils are equal, round, and reactive to light.   Neck: Trachea normal, full passive range of " motion without pain and phonation normal. Neck supple.   Cardiovascular: Normal rate, regular rhythm and normal heart sounds.    Pulmonary/Chest: Effort normal and breath sounds normal. No stridor. No respiratory distress.   Musculoskeletal: Normal range of motion.   Neurological: She is alert and oriented to person, place, and time.   Skin: Skin is warm, dry and intact. Capillary refill takes less than 2 seconds. No abrasion, no bruising, no burn, no ecchymosis, no laceration and no lesion noted.   Erythema and minimal fluctuance at base of left great toe nail. Left small toe mildly erythematous.   Psychiatric: She has a normal mood and affect. Her speech is normal and behavior is normal. Judgment and thought content normal. Cognition and memory are normal.   Nursing note and vitals reviewed.      Assessment:       1. Cellulitis of toe of left foot        Plan:         Cellulitis of toe of left foot    Other orders  -     sulfamethoxazole-trimethoprim 800-160mg (BACTRIM DS) 800-160 mg Tab; Take 1 tablet by mouth 2 (two) times daily.  Dispense: 14 tablet; Refill: 0      Discussed with patient I&D. She would like to try antibiotics before I&D.  I discussed with the patient the importance of follow up if their symptoms have not resolved in a few day's time. Patient verbalized understanding and will RTC or go to the nearest ER if symptoms persist or worsen.

## 2018-04-30 ENCOUNTER — TELEPHONE (OUTPATIENT)
Dept: URGENT CARE | Facility: CLINIC | Age: 68
End: 2018-04-30

## 2018-04-30 ENCOUNTER — PATIENT MESSAGE (OUTPATIENT)
Dept: FAMILY MEDICINE | Facility: CLINIC | Age: 68
End: 2018-04-30

## 2018-05-03 ENCOUNTER — LAB VISIT (OUTPATIENT)
Dept: LAB | Facility: HOSPITAL | Age: 68
End: 2018-05-03
Attending: EMERGENCY MEDICINE
Payer: COMMERCIAL

## 2018-05-03 DIAGNOSIS — E03.9 HYPOTHYROIDISM, UNSPECIFIED TYPE: ICD-10-CM

## 2018-05-03 DIAGNOSIS — E78.2 MIXED HYPERLIPIDEMIA: ICD-10-CM

## 2018-05-03 DIAGNOSIS — I10 ESSENTIAL HYPERTENSION: ICD-10-CM

## 2018-05-03 LAB
ALBUMIN SERPL BCP-MCNC: 4 G/DL
ALP SERPL-CCNC: 99 U/L
ALT SERPL W/O P-5'-P-CCNC: 20 U/L
ANION GAP SERPL CALC-SCNC: 11 MMOL/L
AST SERPL-CCNC: 23 U/L
BILIRUB SERPL-MCNC: 0.6 MG/DL
BUN SERPL-MCNC: 16 MG/DL
CALCIUM SERPL-MCNC: 10.2 MG/DL
CHLORIDE SERPL-SCNC: 101 MMOL/L
CHOLEST SERPL-MCNC: 203 MG/DL
CHOLEST/HDLC SERPL: 3.4 {RATIO}
CO2 SERPL-SCNC: 29 MMOL/L
CREAT SERPL-MCNC: 1 MG/DL
EST. GFR  (AFRICAN AMERICAN): >60 ML/MIN/1.73 M^2
EST. GFR  (NON AFRICAN AMERICAN): 57.6 ML/MIN/1.73 M^2
GLUCOSE SERPL-MCNC: 101 MG/DL
HDLC SERPL-MCNC: 60 MG/DL
HDLC SERPL: 29.6 %
LDLC SERPL CALC-MCNC: 117.8 MG/DL
NONHDLC SERPL-MCNC: 143 MG/DL
POTASSIUM SERPL-SCNC: 4.4 MMOL/L
PROT SERPL-MCNC: 7.4 G/DL
SODIUM SERPL-SCNC: 141 MMOL/L
TRIGL SERPL-MCNC: 126 MG/DL
TSH SERPL DL<=0.005 MIU/L-ACNC: 3.85 UIU/ML

## 2018-05-03 PROCEDURE — 84443 ASSAY THYROID STIM HORMONE: CPT

## 2018-05-03 PROCEDURE — 80053 COMPREHEN METABOLIC PANEL: CPT

## 2018-05-03 PROCEDURE — 36415 COLL VENOUS BLD VENIPUNCTURE: CPT | Mod: PO

## 2018-05-03 PROCEDURE — 80061 LIPID PANEL: CPT

## 2018-05-12 DIAGNOSIS — E03.4 HYPOTHYROIDISM DUE TO ACQUIRED ATROPHY OF THYROID: ICD-10-CM

## 2018-05-13 RX ORDER — LEVOTHYROXINE SODIUM 100 UG/1
TABLET ORAL
Qty: 90 TABLET | Refills: 0 | Status: SHIPPED | OUTPATIENT
Start: 2018-05-13 | End: 2018-08-08 | Stop reason: SDUPTHER

## 2018-05-28 ENCOUNTER — PATIENT MESSAGE (OUTPATIENT)
Dept: OTOLARYNGOLOGY | Facility: CLINIC | Age: 68
End: 2018-05-28

## 2018-06-16 DIAGNOSIS — Z86.010 PERSONAL HISTORY OF COLONIC POLYPS: Chronic | ICD-10-CM

## 2018-06-16 RX ORDER — OMEPRAZOLE 20 MG/1
CAPSULE, DELAYED RELEASE ORAL
Qty: 30 CAPSULE | Refills: 5 | Status: SHIPPED | OUTPATIENT
Start: 2018-06-16 | End: 2018-11-27 | Stop reason: SDUPTHER

## 2018-07-24 RX ORDER — ATORVASTATIN CALCIUM 40 MG/1
40 TABLET, FILM COATED ORAL DAILY
Qty: 30 TABLET | Refills: 1 | Status: SHIPPED | OUTPATIENT
Start: 2018-07-24 | End: 2018-09-16 | Stop reason: SDUPTHER

## 2018-08-08 DIAGNOSIS — E03.4 HYPOTHYROIDISM DUE TO ACQUIRED ATROPHY OF THYROID: ICD-10-CM

## 2018-08-08 RX ORDER — LEVOTHYROXINE SODIUM 100 UG/1
TABLET ORAL
Qty: 90 TABLET | Refills: 2 | Status: SHIPPED | OUTPATIENT
Start: 2018-08-08 | End: 2019-01-18

## 2018-08-15 ENCOUNTER — LAB (OUTPATIENT)
Dept: LAB | Facility: HOSPITAL | Age: 68
End: 2018-08-15

## 2018-08-15 ENCOUNTER — OFFICE VISIT (OUTPATIENT)
Dept: ONCOLOGY | Facility: CLINIC | Age: 68
End: 2018-08-15

## 2018-08-15 VITALS
OXYGEN SATURATION: 98 % | BODY MASS INDEX: 29.03 KG/M2 | WEIGHT: 144 LBS | HEIGHT: 59 IN | HEART RATE: 97 BPM | SYSTOLIC BLOOD PRESSURE: 126 MMHG | DIASTOLIC BLOOD PRESSURE: 78 MMHG | RESPIRATION RATE: 16 BRPM | TEMPERATURE: 97.9 F

## 2018-08-15 DIAGNOSIS — D75.1 ERYTHROCYTOSIS: ICD-10-CM

## 2018-08-15 DIAGNOSIS — D75.1 ERYTHROCYTOSIS: Primary | ICD-10-CM

## 2018-08-15 LAB
BASOPHILS # BLD AUTO: 0.12 10*3/MM3 (ref 0–0.1)
BASOPHILS NFR BLD AUTO: 1.1 % (ref 0–1.1)
DEPRECATED RDW RBC AUTO: 42.9 FL (ref 37–49)
EOSINOPHIL # BLD AUTO: 0.47 10*3/MM3 (ref 0–0.36)
EOSINOPHIL NFR BLD AUTO: 4.2 % (ref 1–5)
ERYTHROCYTE [DISTWIDTH] IN BLOOD BY AUTOMATED COUNT: 12.7 % (ref 11.7–14.5)
HCT VFR BLD AUTO: 48 % (ref 34–45)
HGB BLD-MCNC: 16.2 G/DL (ref 11.5–14.9)
IMM GRANULOCYTES # BLD: 0.03 10*3/MM3 (ref 0–0.03)
IMM GRANULOCYTES NFR BLD: 0.3 % (ref 0–0.5)
LYMPHOCYTES # BLD AUTO: 2.93 10*3/MM3 (ref 1–3.5)
LYMPHOCYTES NFR BLD AUTO: 26.2 % (ref 20–49)
MCH RBC QN AUTO: 31 PG (ref 27–33)
MCHC RBC AUTO-ENTMCNC: 33.8 G/DL (ref 32–35)
MCV RBC AUTO: 91.8 FL (ref 83–97)
MONOCYTES # BLD AUTO: 0.61 10*3/MM3 (ref 0.25–0.8)
MONOCYTES NFR BLD AUTO: 5.5 % (ref 4–12)
NEUTROPHILS # BLD AUTO: 7.01 10*3/MM3 (ref 1.5–7)
NEUTROPHILS NFR BLD AUTO: 62.7 % (ref 39–75)
NRBC BLD MANUAL-RTO: 0 /100 WBC (ref 0–0)
PLATELET # BLD AUTO: 287 10*3/MM3 (ref 150–375)
PMV BLD AUTO: 11.3 FL (ref 8.9–12.1)
RBC # BLD AUTO: 5.23 10*6/MM3 (ref 3.9–5)
WBC NRBC COR # BLD: 11.17 10*3/MM3 (ref 4–10)

## 2018-08-15 PROCEDURE — 36415 COLL VENOUS BLD VENIPUNCTURE: CPT | Performed by: INTERNAL MEDICINE

## 2018-08-15 PROCEDURE — 85025 COMPLETE CBC W/AUTO DIFF WBC: CPT | Performed by: INTERNAL MEDICINE

## 2018-08-15 PROCEDURE — 99213 OFFICE O/P EST LOW 20 MIN: CPT | Performed by: INTERNAL MEDICINE

## 2018-08-15 NOTE — PROGRESS NOTES
[facility] GROUP OUTPATIENT FOLLOW UP CLINIC VISIT    REASON FOR FOLLOW-UP:    1.  Erythrocytosis  2.  Mild leukocytosis    HISTORY OF PRESENT ILLNESS:  Jazmyn Castaneda is a 68 y.o. female who returns today for follow up of the above issue.     She did not complain about vertigo today.  She quit smoking for a while but then restarted whenever some additional life stressors happened.  She denies any other new problems.  No fevers or chills.    HEMATOLOGIC HISTORY:  She recently switched primary care providers. On 3/24/2017 her hemoglobin was 16.8 with hematocrit 51.3%. As of 4/6/2017 the hemoglobin was 16.2 with hematocrit 50.6%. White blood cells and platelets of been normal. She states that she was not aware of any erythrocytosis prior to that.    Seen initially on 5/8/2017 with hemoglobin of 16.1 and hematocrit 49.4%.  Erythropoietin level was normal at 4.8.  Carbon monoxide level was normal at 2.5%.  JAK2 V617F and exon 12 mutations were negative.    She returned on 5/26/2017 for follow-up.  Her hemoglobin is stable.  She will follow-up in a few months.  She will try to quit smoking.    Subsequent labs stable.    PAST MEDICAL, SURGICAL, FAMILY, AND SOCIAL HISTORIES were reviewed with the patient and in the electronic medical record, and were updated if indicated.    ALLERGIES:  Allergies   Allergen Reactions   • Cephalosporins    • Ciprofloxacin    • Penicillins    • Sulfa Antibiotics        MEDICATIONS:  The medication list has been reviewed with the patient by the medical assistant, and the list has been updated in the electronic medical record, which I reviewed.  Medication dosages and frequencies were confirmed to be accurate.    REVIEW OF SYSTEMS:  PAIN:  See Vital Signs below.  GENERAL:  No fevers, chills, night sweats, or unintended weight loss.  SKIN:  No rashes or non-healing lesions.  HEME/LYMPH:  No abnormal bleeding.  No palpable lymphadenopathy.  EYES:  No vision changes or diplopia.  ENT:  " No sore throat or difficulty swallowing.  Vertigo.  RESPIRATORY:  No cough, shortness of breath, hemoptysis, or wheezing.  CARDIOVASCULAR:  No chest pain, palpitations, orthopnea, or dyspnea on exertion.  GASTROINTESTINAL:  Nausea related to vertigo  GENITOURINARY:  No dysuria or hematuria.  MUSCULOSKELETAL:  No joint pain, swelling, or erythema.  NEUROLOGIC:  No dizziness, loss of consciousness, or seizures.  PSYCHIATRIC:  Stress and anxiety    Vitals:    08/15/18 1345   BP: 126/78   Pulse: 97   Resp: 16   Temp: 97.9 °F (36.6 °C)   TempSrc: Oral   SpO2: 98%   Weight: 65.3 kg (144 lb)   Height: 150.6 cm (59.29\")  Comment: New Ht 6 mth. No shoes   PainSc: 0-No pain       PHYSICAL EXAMINATION:  GENERAL:  Well-developed well-nourished female; awake, alert and oriented, in no acute distress.  HEAD:  Normocephalic, atraumatic.  EARS:  Hearing intact.  NOSE:  Septum midline.  No excoriations or nasal discharge.  LYMPH:  No cervical, supraclavicular lymphadenopathy  CHEST:  Lungs are clear to auscultation bilaterally.  No wheezes, rales, or rhonchi.  HEART:  Regular rate; normal rhythm.  No murmurs, gallops or rubs.  EXTREMITIES:  No loving, cyanosis, or edema.    DIAGNOSTIC DATA:  Results for orders placed or performed in visit on 08/15/18   CBC Auto Differential   Result Value Ref Range    WBC 11.17 (H) 4.00 - 10.00 10*3/mm3    RBC 5.23 (H) 3.90 - 5.00 10*6/mm3    Hemoglobin 16.2 (H) 11.5 - 14.9 g/dL    Hematocrit 48.0 (H) 34.0 - 45.0 %    MCV 91.8 83.0 - 97.0 fL    MCH 31.0 27.0 - 33.0 pg    MCHC 33.8 32.0 - 35.0 g/dL    RDW 12.7 11.7 - 14.5 %    RDW-SD 42.9 37.0 - 49.0 fl    MPV 11.3 8.9 - 12.1 fL    Platelets 287 150 - 375 10*3/mm3    Neutrophil % 62.7 39.0 - 75.0 %    Lymphocyte % 26.2 20.0 - 49.0 %    Monocyte % 5.5 4.0 - 12.0 %    Eosinophil % 4.2 1.0 - 5.0 %    Basophil % 1.1 0.0 - 1.1 %    Immature Grans % 0.3 0.0 - 0.5 %    Neutrophils, Absolute 7.01 (H) 1.50 - 7.00 10*3/mm3    Lymphocytes, Absolute 2.93 1.00 - " 3.50 10*3/mm3    Monocytes, Absolute 0.61 0.25 - 0.80 10*3/mm3    Eosinophils, Absolute 0.47 (H) 0.00 - 0.36 10*3/mm3    Basophils, Absolute 0.12 (H) 0.00 - 0.10 10*3/mm3    Immature Grans, Absolute 0.03 0.00 - 0.03 10*3/mm3    nRBC 0.0 0.0 - 0.0 /100 WBC       IMAGING:  None reviewed    ASSESSMENT:  This is a 68 y.o. female with:  1.  Erythrocytosis:  I suspect this is secondary polycythemia related to smoking.  COURTNEY 2 mutation analysis was negative.  Laboratory evaluation otherwise was unremarkable.  She will continue to work on smoking cessation.  2.  Mild leukocytosis with neutrophilia: Also likely secondary to smoking.  Labs are stable.      PLAN:  1.  She will continue to work on smoking cessation  2.  Return in 6 months for follow-up with a CBC

## 2018-08-24 DIAGNOSIS — H91.90 HEARING DIFFICULTY, UNSPECIFIED LATERALITY: Primary | ICD-10-CM

## 2018-08-25 DIAGNOSIS — E78.5 HYPERLIPIDEMIA: ICD-10-CM

## 2018-08-26 RX ORDER — ATORVASTATIN CALCIUM 20 MG/1
TABLET, FILM COATED ORAL
Qty: 90 TABLET | Refills: 0 | Status: SHIPPED | OUTPATIENT
Start: 2018-08-26 | End: 2018-11-23 | Stop reason: SDUPTHER

## 2018-09-05 ENCOUNTER — OFFICE VISIT (OUTPATIENT)
Dept: OTOLARYNGOLOGY | Facility: CLINIC | Age: 68
End: 2018-09-05
Payer: COMMERCIAL

## 2018-09-05 ENCOUNTER — CLINICAL SUPPORT (OUTPATIENT)
Dept: AUDIOLOGY | Facility: CLINIC | Age: 68
End: 2018-09-05
Payer: COMMERCIAL

## 2018-09-05 VITALS
SYSTOLIC BLOOD PRESSURE: 138 MMHG | BODY MASS INDEX: 43.41 KG/M2 | DIASTOLIC BLOOD PRESSURE: 74 MMHG | HEIGHT: 61 IN | WEIGHT: 229.94 LBS | HEART RATE: 83 BPM

## 2018-09-05 DIAGNOSIS — H61.23 BILATERAL IMPACTED CERUMEN: ICD-10-CM

## 2018-09-05 DIAGNOSIS — Q17.9 CONGENITAL SMALL EAR CANAL: ICD-10-CM

## 2018-09-05 DIAGNOSIS — Z97.4 WEARS HEARING AID IN BOTH EARS: Primary | ICD-10-CM

## 2018-09-05 DIAGNOSIS — H90.3 SENSORINEURAL HEARING LOSS (SNHL) OF BOTH EARS: Primary | ICD-10-CM

## 2018-09-05 DIAGNOSIS — H90.3 BILATERAL SENSORINEURAL HEARING LOSS: ICD-10-CM

## 2018-09-05 PROCEDURE — 99999 PR PBB SHADOW E&M-EST. PATIENT-LVL I: CPT | Mod: PBBFAC,,,

## 2018-09-05 PROCEDURE — 92557 COMPREHENSIVE HEARING TEST: CPT | Mod: S$GLB,,, | Performed by: AUDIOLOGIST

## 2018-09-05 PROCEDURE — 92567 TYMPANOMETRY: CPT | Mod: S$GLB,,, | Performed by: AUDIOLOGIST

## 2018-09-05 PROCEDURE — 99214 OFFICE O/P EST MOD 30 MIN: CPT | Mod: 25,S$GLB,, | Performed by: NURSE PRACTITIONER

## 2018-09-05 PROCEDURE — 99999 PR PBB SHADOW E&M-EST. PATIENT-LVL III: CPT | Mod: PBBFAC,,, | Performed by: NURSE PRACTITIONER

## 2018-09-05 PROCEDURE — 1101F PT FALLS ASSESS-DOCD LE1/YR: CPT | Mod: CPTII,S$GLB,, | Performed by: NURSE PRACTITIONER

## 2018-09-05 PROCEDURE — G0268 REMOVAL OF IMPACTED WAX MD: HCPCS | Mod: S$GLB,,, | Performed by: NURSE PRACTITIONER

## 2018-09-05 PROCEDURE — 3078F DIAST BP <80 MM HG: CPT | Mod: CPTII,S$GLB,, | Performed by: NURSE PRACTITIONER

## 2018-09-05 PROCEDURE — 3075F SYST BP GE 130 - 139MM HG: CPT | Mod: CPTII,S$GLB,, | Performed by: NURSE PRACTITIONER

## 2018-09-05 NOTE — PROGRESS NOTES
Subjective:       Patient ID: Kenna Schmitt is a 68 y.o. female.    Chief Complaint: Cerumen Impaction (clean ears prior to audio today) and Hearing Loss    Hearing Loss:    Associated symptoms: tinnitus.       Patient wears hearing aids. She returns today for annual audiogram, hearing aid adjustment, ear cleaning. No h/o noise exposure. Her mother had HL after age 60. No other ENT symptoms or concerns.     Review of Systems   Constitutional: Negative.    HENT: Positive for hearing loss and tinnitus.    Eyes: Negative.    Respiratory: Negative.    Cardiovascular: Negative.    Gastrointestinal: Negative.    Musculoskeletal: Negative.    Skin: Negative.    Neurological: Negative.    Hematological: Negative.    Psychiatric/Behavioral: Negative.        Objective:      Physical Exam   Constitutional: She is oriented to person, place, and time. Vital signs are normal. She appears well-developed and well-nourished. She is cooperative. She does not appear ill. No distress.   HENT:   Head: Normocephalic and atraumatic.   Right Ear: Hearing, tympanic membrane, external ear and ear canal normal. Tympanic membrane is not erythematous. No middle ear effusion.   Left Ear: Hearing, tympanic membrane, external ear and ear canal normal. Tympanic membrane is not erythematous.  No middle ear effusion.   Nose: Nose normal. No mucosal edema or rhinorrhea. Right sinus exhibits no maxillary sinus tenderness and no frontal sinus tenderness. Left sinus exhibits no maxillary sinus tenderness and no frontal sinus tenderness.   Mouth/Throat: Uvula is midline, oropharynx is clear and moist and mucous membranes are normal. Mucous membranes are not pale, not dry and not cyanotic. No oral lesions. No oropharyngeal exudate, posterior oropharyngeal edema or posterior oropharyngeal erythema.     small ear canals (congenital)    SEPARATE PROCEDURE IN OFFICE:   Procedure: Removal of impacted cerumen, bilateral   Pre Procedure Diagnosis: Cerumen  Impaction   Post Procedure Diagnosis: Cerumen Impaction   Verbal informed consent in regards to risk of trauma to ear canal, ear drum or hearing, discomfort during procedure and/or inability to remove cerumen impaction in one session or unforeseen events or complications.   No anesthesia.     Procedure in detail:   Ear canal visualized bilateral with appropriate size ear speculum utilizing Operating Head Binocular Otomicroscope   Utilizing the following: Suction cannula used AU. The impacted cerumen of the ear canals was removed atraumatically. The TM and EAC were then inspected and found to be clear of wax. See description of TMs/EACs in PE above.   Complications: No   Condition: Improved/Good     Eyes: Conjunctivae, EOM and lids are normal. Pupils are equal, round, and reactive to light. Right eye exhibits no discharge. Left eye exhibits no discharge. No scleral icterus.   Neck: Trachea normal and normal range of motion. Neck supple. No tracheal deviation present. No thyroid mass and no thyromegaly present.   Cardiovascular: Normal rate.   Pulmonary/Chest: Effort normal. No stridor. No respiratory distress. She has no wheezes.   Musculoskeletal: Normal range of motion.   Lymphadenopathy:        Head (right side): No submental, no submandibular, no tonsillar, no preauricular and no posterior auricular adenopathy present.        Head (left side): No submental, no submandibular, no tonsillar, no preauricular and no posterior auricular adenopathy present.     She has no cervical adenopathy.        Right cervical: No superficial cervical and no posterior cervical adenopathy present.       Left cervical: No superficial cervical and no posterior cervical adenopathy present.   Neurological: She is alert and oriented to person, place, and time. She has normal strength. Coordination and gait normal.   Skin: Skin is warm, dry and intact. No lesion and no rash noted. She is not diaphoretic. No cyanosis. No pallor.    Psychiatric: She has a normal mood and affect. Her speech is normal and behavior is normal. Judgment and thought content normal. Cognition and memory are normal.   Nursing note and vitals reviewed.      Assessment:     Mild to moderately-severe SNHL AU (kofi-crossing asymmetry)    Congenitally small ear canals  Cerumen impactions removed AU  Plan:     Recommend repeat audiogram in one year.  Recommend hearing aid adjustment    Patient is encouraged to wear ear protection in loud noise.  Return to clinic as needed for further ENT concerns.

## 2018-09-05 NOTE — PROGRESS NOTES
Kenna Schmitt came in on 09/05/2018 for an annual hearing aid follow up. Pt stated she often has to re-pair her hearing aids to her phone. When the aids were connected, it prompted an update that is suppose to improve the BT connectivity but only for her right aid. Performed the update and changed phone ear to the right with instructions on use. Advised her to call the clinic if the connectivity problem continues. She needed no other changes to her sound settings. Reviewed insertion and removal, daily care and maintenance, and battery and wax filter change procedure with patient. Informed pt that a notification will be mailed when it is time for her annual hearing test and that she should call the clinic to schedule the appts to coordinate them.  She should call the clinic PRN before time for the annual hearing test. Also informed her that if domes or wax filters are needed, call the clinic and we will leave them at the 2nd floor check in desk to  at the earliest convenience.

## 2018-09-17 RX ORDER — ATORVASTATIN CALCIUM 40 MG/1
TABLET, FILM COATED ORAL
Qty: 30 TABLET | Refills: 1 | Status: SHIPPED | OUTPATIENT
Start: 2018-09-17 | End: 2018-11-07 | Stop reason: SDUPTHER

## 2018-09-30 DIAGNOSIS — I10 ESSENTIAL HYPERTENSION: ICD-10-CM

## 2018-09-30 RX ORDER — CHLORTHALIDONE 25 MG/1
TABLET ORAL
Qty: 90 TABLET | Refills: 0 | Status: SHIPPED | OUTPATIENT
Start: 2018-09-30 | End: 2018-12-26 | Stop reason: SDUPTHER

## 2018-10-01 NOTE — TELEPHONE ENCOUNTER
Phoned pot in regards to message for appt. LMOR to call [facility] for appt or schedule via My Chart. Pt states she will schedule via My Chart. CLC

## 2018-10-01 NOTE — TELEPHONE ENCOUNTER
She is overdue for a routine visit for hypertension follow up and general health check.    I have refilled her medication.      It would be ideal if she would make an appointment to see me or Milly in the next 90 days.    Anthony Varner MD.

## 2018-10-15 ENCOUNTER — OFFICE VISIT (OUTPATIENT)
Dept: INTERNAL MEDICINE | Facility: CLINIC | Age: 68
End: 2018-10-15

## 2018-10-15 VITALS
HEART RATE: 80 BPM | DIASTOLIC BLOOD PRESSURE: 90 MMHG | HEIGHT: 60 IN | TEMPERATURE: 98.3 F | SYSTOLIC BLOOD PRESSURE: 167 MMHG | WEIGHT: 147 LBS | OXYGEN SATURATION: 96 % | BODY MASS INDEX: 28.86 KG/M2

## 2018-10-15 DIAGNOSIS — J01.10 ACUTE NON-RECURRENT FRONTAL SINUSITIS: Primary | ICD-10-CM

## 2018-10-15 DIAGNOSIS — J40 BRONCHITIS: ICD-10-CM

## 2018-10-15 PROBLEM — J01.00 ACUTE NON-RECURRENT MAXILLARY SINUSITIS: Status: ACTIVE | Noted: 2018-10-15

## 2018-10-15 PROCEDURE — 96372 THER/PROPH/DIAG INJ SC/IM: CPT | Performed by: INTERNAL MEDICINE

## 2018-10-15 PROCEDURE — 99213 OFFICE O/P EST LOW 20 MIN: CPT | Performed by: INTERNAL MEDICINE

## 2018-10-15 RX ORDER — METHYLPREDNISOLONE ACETATE 80 MG/ML
80 INJECTION, SUSPENSION INTRA-ARTICULAR; INTRALESIONAL; INTRAMUSCULAR; SOFT TISSUE ONCE
Status: COMPLETED | OUTPATIENT
Start: 2018-10-15 | End: 2018-10-15

## 2018-10-15 RX ORDER — DOXYCYCLINE HYCLATE 100 MG
100 TABLET ORAL 2 TIMES DAILY
Qty: 20 TABLET | Refills: 0 | Status: SHIPPED | OUTPATIENT
Start: 2018-10-15 | End: 2019-04-17

## 2018-10-15 RX ORDER — TRIAMCINOLONE ACETONIDE 40 MG/ML
40 INJECTION, SUSPENSION INTRA-ARTICULAR; INTRAMUSCULAR ONCE
Status: COMPLETED | OUTPATIENT
Start: 2018-10-15 | End: 2018-10-15

## 2018-10-15 RX ADMIN — TRIAMCINOLONE ACETONIDE 40 MG: 40 INJECTION, SUSPENSION INTRA-ARTICULAR; INTRAMUSCULAR at 14:42

## 2018-10-15 RX ADMIN — METHYLPREDNISOLONE ACETATE 80 MG: 80 INJECTION, SUSPENSION INTRA-ARTICULAR; INTRALESIONAL; INTRAMUSCULAR; SOFT TISSUE at 14:42

## 2018-11-02 NOTE — PROGRESS NOTES
Subjective   Jazmyn Castaneda is a 68 y.o. female.   She is here today for acute frontal sinusitis  History of Present Illness   She is here today for acute frontal sinusitis which is getting worse and is bilateral  The following portions of the patient's history were reviewed and updated as appropriate: allergies, current medications, past family history, past medical history, past social history, past surgical history and problem list.    Review of Systems   HENT: Positive for congestion and sinus pressure.    Respiratory: Negative for cough, shortness of breath and wheezing.    All other systems reviewed and are negative.      Objective   Physical Exam   Constitutional: She is oriented to person, place, and time. She appears well-developed and well-nourished. She is cooperative.   HENT:   Head: Normocephalic and atraumatic.       Right Ear: Hearing, tympanic membrane, external ear and ear canal normal.   Left Ear: Hearing, tympanic membrane, external ear and ear canal normal.   Nose: Nose normal.   Mouth/Throat: Uvula is midline, oropharynx is clear and moist and mucous membranes are normal.   Eyes: Pupils are equal, round, and reactive to light. Conjunctivae, EOM and lids are normal.   Neck: Phonation normal. Neck supple. Carotid bruit is not present.   Cardiovascular: Normal rate, regular rhythm and normal heart sounds.  Exam reveals no gallop and no friction rub.    No murmur heard.  Pulmonary/Chest: Effort normal and breath sounds normal. No respiratory distress.   Abdominal: Soft. Bowel sounds are normal. She exhibits no distension and no mass. There is no hepatosplenomegaly. There is no tenderness. There is no rebound and no guarding. No hernia.   Musculoskeletal: She exhibits no edema.   Neurological: She is alert and oriented to person, place, and time. Coordination and gait normal.   Skin: Skin is warm and dry.   Psychiatric: She has a normal mood and affect. Her speech is normal and behavior is  normal. Judgment and thought content normal.   Nursing note and vitals reviewed.      Assessment/Plan   Diagnoses and all orders for this visit:    Acute non-recurrent frontal sinusitis    Bronchitis  -     methylPREDNISolone acetate (DEPO-medrol) injection 80 mg; Inject 1 mL into the appropriate muscle as directed by prescriber 1 (One) Time.  -     triamcinolone acetonide (KENALOG-40) injection 40 mg; Inject 1 mL into the appropriate muscle as directed by prescriber 1 (One) Time.    Other orders  -     Cancel: Flucelvax Quad=>4Years (PFS)  -     Cancel: Fluzone High Dose =>65Years  -     doxycycline (VIBRAMYICN) 100 MG tablet; Take 1 tablet by mouth 2 (Two) Times a Day.      Acute nonrecurrent frontal sinusitis not stable and we will provide doxycycline and steroid injections

## 2018-11-07 RX ORDER — ATORVASTATIN CALCIUM 40 MG/1
40 TABLET, FILM COATED ORAL DAILY
Qty: 90 TABLET | Refills: 2 | Status: SHIPPED | OUTPATIENT
Start: 2018-11-07 | End: 2019-08-01 | Stop reason: SDUPTHER

## 2018-11-23 DIAGNOSIS — E78.5 HYPERLIPIDEMIA: ICD-10-CM

## 2018-11-25 RX ORDER — ATORVASTATIN CALCIUM 20 MG/1
TABLET, FILM COATED ORAL
Qty: 90 TABLET | Refills: 1 | Status: SHIPPED | OUTPATIENT
Start: 2018-11-25 | End: 2019-04-02 | Stop reason: SDUPTHER

## 2018-11-28 RX ORDER — OMEPRAZOLE 20 MG/1
CAPSULE, DELAYED RELEASE ORAL
Qty: 30 CAPSULE | Refills: 0 | Status: SHIPPED | OUTPATIENT
Start: 2018-11-28 | End: 2018-12-26 | Stop reason: SDUPTHER

## 2018-12-26 DIAGNOSIS — I10 ESSENTIAL HYPERTENSION: ICD-10-CM

## 2018-12-26 RX ORDER — CHLORTHALIDONE 25 MG/1
TABLET ORAL
Qty: 90 TABLET | Refills: 0 | Status: SHIPPED | OUTPATIENT
Start: 2018-12-26 | End: 2019-03-17 | Stop reason: SDUPTHER

## 2018-12-26 RX ORDER — OMEPRAZOLE 20 MG/1
CAPSULE, DELAYED RELEASE ORAL
Qty: 30 CAPSULE | Refills: 0 | Status: SHIPPED | OUTPATIENT
Start: 2018-12-26 | End: 2019-01-28 | Stop reason: SDUPTHER

## 2019-01-14 ENCOUNTER — TELEPHONE (OUTPATIENT)
Dept: AUDIOLOGY | Facility: CLINIC | Age: 69
End: 2019-01-14

## 2019-01-14 ENCOUNTER — OFFICE VISIT (OUTPATIENT)
Dept: FAMILY MEDICINE | Facility: CLINIC | Age: 69
End: 2019-01-14
Payer: COMMERCIAL

## 2019-01-14 VITALS
HEIGHT: 61 IN | DIASTOLIC BLOOD PRESSURE: 69 MMHG | OXYGEN SATURATION: 97 % | SYSTOLIC BLOOD PRESSURE: 148 MMHG | WEIGHT: 231.5 LBS | BODY MASS INDEX: 43.71 KG/M2 | HEART RATE: 73 BPM

## 2019-01-14 DIAGNOSIS — E78.5 HYPERLIPIDEMIA, UNSPECIFIED HYPERLIPIDEMIA TYPE: Chronic | ICD-10-CM

## 2019-01-14 DIAGNOSIS — I10 ESSENTIAL HYPERTENSION: Primary | Chronic | ICD-10-CM

## 2019-01-14 PROCEDURE — 1101F PR PT FALLS ASSESS DOC 0-1 FALLS W/OUT INJ PAST YR: ICD-10-PCS | Mod: CPTII,S$GLB,, | Performed by: EMERGENCY MEDICINE

## 2019-01-14 PROCEDURE — 3078F DIAST BP <80 MM HG: CPT | Mod: CPTII,S$GLB,, | Performed by: EMERGENCY MEDICINE

## 2019-01-14 PROCEDURE — 99999 PR PBB SHADOW E&M-EST. PATIENT-LVL III: CPT | Mod: PBBFAC,,, | Performed by: EMERGENCY MEDICINE

## 2019-01-14 PROCEDURE — 3078F PR MOST RECENT DIASTOLIC BLOOD PRESSURE < 80 MM HG: ICD-10-PCS | Mod: CPTII,S$GLB,, | Performed by: EMERGENCY MEDICINE

## 2019-01-14 PROCEDURE — 3077F PR MOST RECENT SYSTOLIC BLOOD PRESSURE >= 140 MM HG: ICD-10-PCS | Mod: CPTII,S$GLB,, | Performed by: EMERGENCY MEDICINE

## 2019-01-14 PROCEDURE — 1101F PT FALLS ASSESS-DOCD LE1/YR: CPT | Mod: CPTII,S$GLB,, | Performed by: EMERGENCY MEDICINE

## 2019-01-14 PROCEDURE — 3077F SYST BP >= 140 MM HG: CPT | Mod: CPTII,S$GLB,, | Performed by: EMERGENCY MEDICINE

## 2019-01-14 PROCEDURE — 99999 PR PBB SHADOW E&M-EST. PATIENT-LVL III: ICD-10-PCS | Mod: PBBFAC,,, | Performed by: EMERGENCY MEDICINE

## 2019-01-14 PROCEDURE — 99214 OFFICE O/P EST MOD 30 MIN: CPT | Mod: S$GLB,,, | Performed by: EMERGENCY MEDICINE

## 2019-01-14 PROCEDURE — 99214 PR OFFICE/OUTPT VISIT, EST, LEVL IV, 30-39 MIN: ICD-10-PCS | Mod: S$GLB,,, | Performed by: EMERGENCY MEDICINE

## 2019-01-14 NOTE — PROGRESS NOTES
Subjective:   THIS NOTE IS DONE WITH VOICE RECOGNITION        Patient ID: Kenna Schmitt is a 68 y.o. female.    Chief Complaint: Hypothyroidism      HPI      His in quite some time since I last saw Kenna.  She has done pretty well physically.  She has experienced some significant stress secondary to her daughter's medical illnesses and other family issues.  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  She is anticipating trying to retire in the near future.    She does have hypothyroidism that is currently well controlled, at least as well of May 2018.  She is not having any active symptoms.  She is taking her medication regularly.    Lab Results   Component Value Date    TSH 3.855 05/03/2018     Her blood pressure has fluctuated some.  She is not at target today.  Taking current medications.  She is not experiencing any anginal or other issues.  No palpitations or rhythm related problems.    BP Readings from Last 3 Encounters:   01/14/19 (!) 148/69   09/05/18 138/74   04/28/18 (!) 165/83     Left knee pain continues to be problematic.  She has seen Orthopedics.  She did undergo arthroscopy with debridement and removal of ostomy chronic loose bony particles.  This was from the femoral condyle.  A partial meniscectomy was also completed.  This helped for awhile, but she is now having recurring knee pain. Her primary focus is weight loss rather than additional surgical interventions.    Hyperlipidemia monitoring.  She is currently using atorvastatin 20 milligrams a day.    Lab Results   Component Value Date    CHOL 191 01/18/2019    CHOL 203 (H) 05/03/2018    CHOL 167 05/11/2017     Lab Results   Component Value Date    HDL 62 01/18/2019    HDL 60 05/03/2018    HDL 53 05/11/2017     Lab Results   Component Value Date    LDLCALC 103.4 01/18/2019    LDLCALC 117.8 05/03/2018    LDLCALC 89.6 05/11/2017     Lab Results   Component Value Date    TRIG 128 01/18/2019    TRIG 126 05/03/2018    TRIG 122 05/11/2017     Lab  Results   Component Value Date    CHOLHDL 32.5 01/18/2019    CHOLHDL 29.6 05/03/2018    CHOLHDL 31.7 05/11/2017         Immunization History   Administered Date(s) Administered    Zoster 09/19/2014     All vaccines declined.    Current Outpatient Medications   Medication Sig Dispense Refill    atorvastatin (LIPITOR) 20 MG tablet TAKE ONE TABLET BY MOUTH DAILY 90 tablet 1    b complex vitamins capsule Take 1 capsule by mouth once daily.      biotin 10,000 mcg Cap Take by mouth.      chlorthalidone (HYGROTEN) 25 MG Tab TAKE 1 TABLET BY MOUTH EVERY DAY 90 tablet 0    cholecalciferol, vitamin D3, (VITAMIN D3) 2,000 unit Cap Take 1 capsule by mouth once daily.      Lactobacillus rhamnosus GG (CULTURELLE) 10 billion cell capsule Take 1 capsule by mouth once daily.      levothyroxine (SYNTHROID) 100 MCG tablet TAKE ONE TABLET BY MOUTH EVERY DAY 90 tablet 2    omeprazole (PRILOSEC) 20 MG capsule TAKE ONE CAPSULE BY MOUTH EVERY DAY 30 capsule 0    valacyclovir (VALTREX) 500 MG tablet Take 500 mg by mouth 2 (two) times daily. PRN fever blisters       No current facility-administered medications for this visit.          Review of Systems   Constitutional: Negative for activity change, appetite change, chills, diaphoresis, fatigue, fever and unexpected weight change.   HENT: Negative for congestion, ear pain, hearing loss, rhinorrhea, trouble swallowing and voice change.    Eyes: Negative for pain and visual disturbance.   Respiratory: Negative for cough, chest tightness and shortness of breath.    Cardiovascular: Negative for chest pain, palpitations and leg swelling.   Gastrointestinal: Negative for abdominal distention, abdominal pain and blood in stool.   Genitourinary: Negative for difficulty urinating, flank pain, frequency and urgency.   Musculoskeletal: Negative for arthralgias, back pain, joint swelling, myalgias, neck pain and neck stiffness.   Skin: Negative for pallor and rash.   Neurological: Negative for  dizziness, tremors, syncope, weakness and headaches.   Hematological: Negative for adenopathy.   Psychiatric/Behavioral: Negative for dysphoric mood and sleep disturbance. The patient is not nervous/anxious.        Objective:      Physical Exam   Constitutional: She is oriented to person, place, and time. She appears well-developed and well-nourished. No distress.   She is overweight.   HENT:   Head: Normocephalic and atraumatic.   Right Ear: External ear normal.   Left Ear: External ear normal.   Nose: Nose normal.   Mouth/Throat: Oropharynx is clear and moist.   Eyes: Conjunctivae and EOM are normal. Pupils are equal, round, and reactive to light. No scleral icterus.   Neck: Normal range of motion. Neck supple. No JVD present. No thyromegaly present.   Cardiovascular: Normal rate, regular rhythm, normal heart sounds and intact distal pulses.   No murmur heard.  Pulmonary/Chest: Effort normal and breath sounds normal. She has no wheezes. She has no rales.   Abdominal: Soft. Bowel sounds are normal. She exhibits no distension. There is no tenderness.   Musculoskeletal: Normal range of motion. She exhibits no edema.        Left knee: She exhibits no erythema, no LCL laxity and no MCL laxity. Tenderness found. Medial joint line tenderness noted.   Lymphadenopathy:     She has no cervical adenopathy.   Neurological: She is alert and oriented to person, place, and time. She has normal reflexes. No cranial nerve deficit. Coordination normal.   Skin: Skin is warm and dry. No rash noted. No erythema.   Psychiatric: She has a normal mood and affect. Her behavior is normal.   Vitals reviewed.      Assessment:       1. Essential hypertension    2. Hyperlipidemia, unspecified hyperlipidemia type    3. BMI 40.0-44.9, adult        Plan:       1. Essential hypertension  Additional readings of her blood pressure recommended.  She has a wrist cuff at home, and reports normal blood pressure readings.  I have recommended that we  calibrate her cuff against a cuff here.  A nursing visit is recommended for that process.  Blood pressure target reviewed, current blood pressure target is less than on 139/80.  - Lipid panel; Future  - Comprehensive metabolic panel; Future    2. Hyperlipidemia, unspecified hyperlipidemia type  Prior to changing atorvastatin, her preference is to focus on weight control and repeat lipid panel going forward.  She will be diligent about using her atorvastatin on a regular basis.    - TSH; Future  - Lipid panel; Future    3. BMI 40.0-44.9, adult  She is aware of the negative impact of her weight.  She is planning to reengage in a significant restriction of calories  She is not open to other interventions at this time.  The link between her weight, her cholesterol numbers and her blood pressure was discussed.

## 2019-01-14 NOTE — TELEPHONE ENCOUNTER
Patient called to say she would like to  some hearing aid filters. I am leaving filters for her at the second floor check in desk.

## 2019-01-18 ENCOUNTER — LAB VISIT (OUTPATIENT)
Dept: LAB | Facility: HOSPITAL | Age: 69
End: 2019-01-18
Attending: EMERGENCY MEDICINE
Payer: MEDICARE

## 2019-01-18 DIAGNOSIS — I10 ESSENTIAL HYPERTENSION: Chronic | ICD-10-CM

## 2019-01-18 DIAGNOSIS — E03.4 HYPOTHYROIDISM DUE TO ACQUIRED ATROPHY OF THYROID: ICD-10-CM

## 2019-01-18 DIAGNOSIS — E78.5 HYPERLIPIDEMIA, UNSPECIFIED HYPERLIPIDEMIA TYPE: Chronic | ICD-10-CM

## 2019-01-18 PROBLEM — S83.232A COMPLEX TEAR OF MEDIAL MENISCUS OF LEFT KNEE AS CURRENT INJURY: Status: RESOLVED | Noted: 2017-02-10 | Resolved: 2019-01-18

## 2019-01-18 LAB
ALBUMIN SERPL BCP-MCNC: 3.7 G/DL
ALP SERPL-CCNC: 96 U/L
ALT SERPL W/O P-5'-P-CCNC: 19 U/L
ANION GAP SERPL CALC-SCNC: 8 MMOL/L
AST SERPL-CCNC: 19 U/L
BILIRUB SERPL-MCNC: 0.7 MG/DL
BUN SERPL-MCNC: 13 MG/DL
CALCIUM SERPL-MCNC: 10 MG/DL
CHLORIDE SERPL-SCNC: 101 MMOL/L
CHOLEST SERPL-MCNC: 191 MG/DL
CHOLEST/HDLC SERPL: 3.1 {RATIO}
CO2 SERPL-SCNC: 32 MMOL/L
CREAT SERPL-MCNC: 0.7 MG/DL
EST. GFR  (AFRICAN AMERICAN): >60 ML/MIN/1.73 M^2
EST. GFR  (NON AFRICAN AMERICAN): >60 ML/MIN/1.73 M^2
GLUCOSE SERPL-MCNC: 101 MG/DL
HDLC SERPL-MCNC: 62 MG/DL
HDLC SERPL: 32.5 %
LDLC SERPL CALC-MCNC: 103.4 MG/DL
NONHDLC SERPL-MCNC: 129 MG/DL
POTASSIUM SERPL-SCNC: 3.9 MMOL/L
PROT SERPL-MCNC: 7.1 G/DL
SODIUM SERPL-SCNC: 141 MMOL/L
T4 FREE SERPL-MCNC: 0.78 NG/DL
TRIGL SERPL-MCNC: 128 MG/DL
TSH SERPL DL<=0.005 MIU/L-ACNC: 6.56 UIU/ML

## 2019-01-18 PROCEDURE — 84443 ASSAY THYROID STIM HORMONE: CPT

## 2019-01-18 PROCEDURE — 80053 COMPREHEN METABOLIC PANEL: CPT

## 2019-01-18 PROCEDURE — 84439 ASSAY OF FREE THYROXINE: CPT

## 2019-01-18 PROCEDURE — 36415 COLL VENOUS BLD VENIPUNCTURE: CPT | Mod: PO

## 2019-01-18 PROCEDURE — 80061 LIPID PANEL: CPT

## 2019-01-18 RX ORDER — LEVOTHYROXINE SODIUM 112 UG/1
112 TABLET ORAL
Qty: 90 TABLET | Refills: 0 | Status: SHIPPED | OUTPATIENT
Start: 2019-01-18 | End: 2019-04-04 | Stop reason: SDUPTHER

## 2019-01-28 ENCOUNTER — PATIENT MESSAGE (OUTPATIENT)
Dept: FAMILY MEDICINE | Facility: CLINIC | Age: 69
End: 2019-01-28

## 2019-01-28 DIAGNOSIS — K21.9 GASTROESOPHAGEAL REFLUX DISEASE WITHOUT ESOPHAGITIS: Primary | ICD-10-CM

## 2019-01-28 RX ORDER — OMEPRAZOLE 20 MG/1
CAPSULE, DELAYED RELEASE ORAL
Qty: 90 CAPSULE | Refills: 1 | Status: SHIPPED | OUTPATIENT
Start: 2019-01-28 | End: 2019-07-03 | Stop reason: SDUPTHER

## 2019-01-31 ENCOUNTER — PATIENT MESSAGE (OUTPATIENT)
Dept: FAMILY MEDICINE | Facility: CLINIC | Age: 69
End: 2019-01-31

## 2019-02-06 ENCOUNTER — OFFICE VISIT (OUTPATIENT)
Dept: ONCOLOGY | Facility: CLINIC | Age: 69
End: 2019-02-06

## 2019-02-06 ENCOUNTER — LAB (OUTPATIENT)
Dept: LAB | Facility: HOSPITAL | Age: 69
End: 2019-02-06

## 2019-02-06 VITALS
OXYGEN SATURATION: 96 % | BODY MASS INDEX: 32.61 KG/M2 | TEMPERATURE: 98.1 F | SYSTOLIC BLOOD PRESSURE: 146 MMHG | RESPIRATION RATE: 16 BRPM | WEIGHT: 166.1 LBS | DIASTOLIC BLOOD PRESSURE: 83 MMHG | HEIGHT: 60 IN | HEART RATE: 93 BPM

## 2019-02-06 DIAGNOSIS — D75.1 ERYTHROCYTOSIS: Primary | ICD-10-CM

## 2019-02-06 DIAGNOSIS — D75.1 ERYTHROCYTOSIS: ICD-10-CM

## 2019-02-06 DIAGNOSIS — D72.9 NEUTROPHILIC LEUKOCYTOSIS: ICD-10-CM

## 2019-02-06 PROBLEM — D72.828 NEUTROPHILIC LEUKOCYTOSIS: Status: ACTIVE | Noted: 2019-02-06

## 2019-02-06 LAB
BASOPHILS # BLD AUTO: 0.13 10*3/MM3 (ref 0–0.1)
BASOPHILS NFR BLD AUTO: 1.1 % (ref 0–1.1)
DEPRECATED RDW RBC AUTO: 42.8 FL (ref 37–49)
EOSINOPHIL # BLD AUTO: 0.46 10*3/MM3 (ref 0–0.36)
EOSINOPHIL NFR BLD AUTO: 3.8 % (ref 1–5)
ERYTHROCYTE [DISTWIDTH] IN BLOOD BY AUTOMATED COUNT: 12.5 % (ref 11.7–14.5)
HCT VFR BLD AUTO: 45.6 % (ref 34–45)
HGB BLD-MCNC: 15.4 G/DL (ref 11.5–14.9)
IMM GRANULOCYTES # BLD AUTO: 0.06 10*3/MM3 (ref 0–0.03)
IMM GRANULOCYTES NFR BLD AUTO: 0.5 % (ref 0–0.5)
LYMPHOCYTES # BLD AUTO: 2.88 10*3/MM3 (ref 1–3.5)
LYMPHOCYTES NFR BLD AUTO: 23.7 % (ref 20–49)
MCH RBC QN AUTO: 31.5 PG (ref 27–33)
MCHC RBC AUTO-ENTMCNC: 33.8 G/DL (ref 32–35)
MCV RBC AUTO: 93.3 FL (ref 83–97)
MONOCYTES # BLD AUTO: 0.79 10*3/MM3 (ref 0.25–0.8)
MONOCYTES NFR BLD AUTO: 6.5 % (ref 4–12)
NEUTROPHILS # BLD AUTO: 7.83 10*3/MM3 (ref 1.5–7)
NEUTROPHILS NFR BLD AUTO: 64.4 % (ref 39–75)
NRBC BLD AUTO-RTO: 0 /100 WBC (ref 0–0)
PLATELET # BLD AUTO: 278 10*3/MM3 (ref 150–375)
PMV BLD AUTO: 11.7 FL (ref 8.9–12.1)
RBC # BLD AUTO: 4.89 10*6/MM3 (ref 3.9–5)
WBC NRBC COR # BLD: 12.15 10*3/MM3 (ref 4–10)

## 2019-02-06 PROCEDURE — 99213 OFFICE O/P EST LOW 20 MIN: CPT | Performed by: INTERNAL MEDICINE

## 2019-02-06 PROCEDURE — 85025 COMPLETE CBC W/AUTO DIFF WBC: CPT | Performed by: INTERNAL MEDICINE

## 2019-02-06 PROCEDURE — 36416 COLLJ CAPILLARY BLOOD SPEC: CPT | Performed by: INTERNAL MEDICINE

## 2019-02-06 RX ORDER — INFLUENZA A VIRUS A/MICHIGAN/45/2015 X-275 (H1N1) ANTIGEN (FORMALDEHYDE INACTIVATED), INFLUENZA A VIRUS A/SINGAPORE/INFIMH-16-0019/2016 IVR-186 (H3N2) ANTIGEN (FORMALDEHYDE INACTIVATED), AND INFLUENZA B VIRUS B/MARYLAND/15/2016 BX-69A (A B/COLORADO/6/2017-LIKE VIRUS) ANTIGEN (FORMALDEHYDE INACTIVATED) 60; 60; 60 UG/.5ML; UG/.5ML; UG/.5ML
INJECTION, SUSPENSION INTRAMUSCULAR
Refills: 0 | COMMUNITY
Start: 2018-11-13 | End: 2019-04-17

## 2019-02-06 RX ORDER — IBUPROFEN 400 MG/1
400 TABLET ORAL
Status: ON HOLD | COMMUNITY
End: 2019-07-05

## 2019-02-06 RX ORDER — TRAMADOL HYDROCHLORIDE 50 MG/1
50 TABLET ORAL
Status: ON HOLD | COMMUNITY
Start: 2015-09-18 | End: 2019-07-05

## 2019-02-06 NOTE — PROGRESS NOTES
[facility] GROUP OUTPATIENT FOLLOW UP CLINIC VISIT    REASON FOR FOLLOW-UP:    1.  Erythrocytosis  2.  Mild leukocytosis    HISTORY OF PRESENT ILLNESS:  Jazmyn Castaneda is a 68 y.o. female who returns today for follow up of the above issue.     Her family issues have improved.  She did quit smoking back in October.  She has gained a little weight following this and is having worsening back and hip pain as result of this.  No fevers or chills.  She had an upper respiratory infection back in October that required some antibiotics and steroids but she has not had any steroid use since that time.    HEMATOLOGIC HISTORY:  She recently switched primary care providers. On 3/24/2017 her hemoglobin was 16.8 with hematocrit 51.3%. As of 4/6/2017 the hemoglobin was 16.2 with hematocrit 50.6%. White blood cells and platelets of been normal. She states that she was not aware of any erythrocytosis prior to that.    Seen initially on 5/8/2017 with hemoglobin of 16.1 and hematocrit 49.4%.  Erythropoietin level was normal at 4.8.  Carbon monoxide level was normal at 2.5%.  JAK2 V617F and exon 12 mutations were negative.    She returned on 5/26/2017 for follow-up.  Her hemoglobin is stable.  She will follow-up in a few months.  She will try to quit smoking.    Subsequent labs stable.    PAST MEDICAL, SURGICAL, FAMILY, AND SOCIAL HISTORIES were reviewed with the patient and in the electronic medical record, and were updated if indicated.    ALLERGIES:  Allergies   Allergen Reactions   • Cephalosporins    • Ciprofloxacin    • Penicillins    • Sulfa Antibiotics        MEDICATIONS:  The medication list has been reviewed with the patient by the medical assistant, and the list has been updated in the electronic medical record, which I reviewed.  Medication dosages and frequencies were confirmed to be accurate.    REVIEW OF SYSTEMS:  PAIN:  See Vital Signs below.  GENERAL:  No fevers, chills, night sweats, or unintended weight  "loss.  Weight gain  SKIN:  No rashes or non-healing lesions.  HEME/LYMPH:  No abnormal bleeding.  No palpable lymphadenopathy.  EYES:  No vision changes or diplopia.  ENT:  No sore throat or difficulty swallowing.  Vertigo.  RESPIRATORY:  No cough, shortness of breath, hemoptysis, or wheezing.  CARDIOVASCULAR:  No chest pain, palpitations, orthopnea, or dyspnea on exertion.  GASTROINTESTINAL:  Nausea related to vertigo  GENITOURINARY:  No dysuria or hematuria.  MUSCULOSKELETAL:  No joint pain, swelling, or erythema.  Low back pain  NEUROLOGIC:  No dizziness, loss of consciousness, or seizures.  PSYCHIATRIC:  Stress and anxiety    Vitals:    02/06/19 1320   BP: 146/83   Pulse: 93   Resp: 16   Temp: 98.1 °F (36.7 °C)   TempSrc: Oral   SpO2: 96%   Weight: 75.3 kg (166 lb 1.6 oz)   Height: 152.4 cm (60\")   PainSc: 0-No pain  Comment: erythorcytosis       PHYSICAL EXAMINATION:  GENERAL:  Well-developed well-nourished female; awake, alert and oriented, in no acute distress.  HEAD:  Normocephalic, atraumatic.  EARS:  Hearing intact.  NOSE:  Septum midline.  No excoriations or nasal discharge.  LYMPH:  No cervical, supraclavicular lymphadenopathy  CHEST:  Lungs are clear to auscultation bilaterally.  No wheezes, rales, or rhonchi.  HEART:  Regular rate; normal rhythm.  No murmurs, gallops or rubs.  EXTREMITIES:  No loving, cyanosis, or edema.    DIAGNOSTIC DATA:  Results for orders placed or performed in visit on 02/06/19   CBC Auto Differential   Result Value Ref Range    WBC 12.15 (H) 4.00 - 10.00 10*3/mm3    RBC 4.89 3.90 - 5.00 10*6/mm3    Hemoglobin 15.4 (H) 11.5 - 14.9 g/dL    Hematocrit 45.6 (H) 34.0 - 45.0 %    MCV 93.3 83.0 - 97.0 fL    MCH 31.5 27.0 - 33.0 pg    MCHC 33.8 32.0 - 35.0 g/dL    RDW 12.5 11.7 - 14.5 %    RDW-SD 42.8 37.0 - 49.0 fl    MPV 11.7 8.9 - 12.1 fL    Platelets 278 150 - 375 10*3/mm3    Neutrophil % 64.4 39.0 - 75.0 %    Lymphocyte % 23.7 20.0 - 49.0 %    Monocyte % 6.5 4.0 - 12.0 %    " Eosinophil % 3.8 1.0 - 5.0 %    Basophil % 1.1 0.0 - 1.1 %    Immature Grans % 0.5 0.0 - 0.5 %    Neutrophils, Absolute 7.83 (H) 1.50 - 7.00 10*3/mm3    Lymphocytes, Absolute 2.88 1.00 - 3.50 10*3/mm3    Monocytes, Absolute 0.79 0.25 - 0.80 10*3/mm3    Eosinophils, Absolute 0.46 (H) 0.00 - 0.36 10*3/mm3    Basophils, Absolute 0.13 (H) 0.00 - 0.10 10*3/mm3    Immature Grans, Absolute 0.06 (H) 0.00 - 0.03 10*3/mm3    nRBC 0.0 0.0 - 0.0 /100 WBC       IMAGING:  None reviewed    ASSESSMENT:  This is a 68 y.o. female with:  1.  Erythrocytosis:  I suspect this is secondary polycythemia related to smoking.  COURTNEY 2 mutation analysis was negative.  Laboratory evaluation otherwise was unremarkable.  She has quit smoking.  Her hemoglobin and hematocrit have improved today.  2.  Mild leukocytosis with neutrophilia: Also likely secondary to smoking.  Labs are stable.  No further evaluation at this time.     PLAN:  1.  She will work on diet and exercise to try to maintain a more appropriate weight.  2.  Return in 6 months for follow-up with a CBC

## 2019-03-15 ENCOUNTER — LAB VISIT (OUTPATIENT)
Dept: LAB | Facility: HOSPITAL | Age: 69
End: 2019-03-15
Attending: EMERGENCY MEDICINE
Payer: MEDICARE

## 2019-03-15 DIAGNOSIS — E03.4 HYPOTHYROIDISM DUE TO ACQUIRED ATROPHY OF THYROID: ICD-10-CM

## 2019-03-15 LAB — TSH SERPL DL<=0.005 MIU/L-ACNC: 2.46 UIU/ML

## 2019-03-15 PROCEDURE — 84443 ASSAY THYROID STIM HORMONE: CPT

## 2019-03-15 PROCEDURE — 36415 COLL VENOUS BLD VENIPUNCTURE: CPT | Mod: PO

## 2019-03-16 DIAGNOSIS — E03.4 HYPOTHYROIDISM DUE TO ACQUIRED ATROPHY OF THYROID: Primary | Chronic | ICD-10-CM

## 2019-03-17 DIAGNOSIS — I10 ESSENTIAL HYPERTENSION: ICD-10-CM

## 2019-03-17 RX ORDER — CHLORTHALIDONE 25 MG/1
TABLET ORAL
Qty: 90 TABLET | Refills: 0 | Status: SHIPPED | OUTPATIENT
Start: 2019-03-17 | End: 2019-04-04 | Stop reason: SDUPTHER

## 2019-04-02 DIAGNOSIS — E78.5 HYPERLIPIDEMIA: ICD-10-CM

## 2019-04-02 RX ORDER — ATORVASTATIN CALCIUM 20 MG/1
TABLET, FILM COATED ORAL
Qty: 90 TABLET | Refills: 0 | OUTPATIENT
Start: 2019-04-02

## 2019-04-02 RX ORDER — ATORVASTATIN CALCIUM 20 MG/1
TABLET, FILM COATED ORAL
Qty: 90 TABLET | Refills: 3 | Status: SHIPPED | OUTPATIENT
Start: 2019-04-02 | End: 2020-03-30

## 2019-04-04 DIAGNOSIS — E03.4 HYPOTHYROIDISM DUE TO ACQUIRED ATROPHY OF THYROID: ICD-10-CM

## 2019-04-04 DIAGNOSIS — I10 ESSENTIAL HYPERTENSION: ICD-10-CM

## 2019-04-04 RX ORDER — CHLORTHALIDONE 25 MG/1
TABLET ORAL
Qty: 90 TABLET | Refills: 3 | Status: SHIPPED | OUTPATIENT
Start: 2019-04-04 | End: 2020-07-01

## 2019-04-04 RX ORDER — LEVOTHYROXINE SODIUM 112 UG/1
TABLET ORAL
Qty: 90 TABLET | Refills: 3 | Status: SHIPPED | OUTPATIENT
Start: 2019-04-04 | End: 2020-03-30

## 2019-04-04 NOTE — TELEPHONE ENCOUNTER
Medications filled.    She needs a nursing visit to follow up on blood pressure.    Anthony Varner MD

## 2019-04-09 ENCOUNTER — CLINICAL SUPPORT (OUTPATIENT)
Dept: FAMILY MEDICINE | Facility: CLINIC | Age: 69
End: 2019-04-09
Payer: MEDICARE

## 2019-04-09 VITALS — DIASTOLIC BLOOD PRESSURE: 80 MMHG | SYSTOLIC BLOOD PRESSURE: 136 MMHG

## 2019-04-09 DIAGNOSIS — Z01.30 BP CHECK: Primary | ICD-10-CM

## 2019-04-09 PROCEDURE — 99499 UNLISTED E&M SERVICE: CPT | Mod: S$PBB,,, | Performed by: EMERGENCY MEDICINE

## 2019-04-09 PROCEDURE — 99999 PR PBB SHADOW E&M-EST. PATIENT-LVL I: CPT | Mod: PBBFAC,,,

## 2019-04-09 PROCEDURE — 99499 NO LOS: ICD-10-PCS | Mod: S$PBB,,, | Performed by: EMERGENCY MEDICINE

## 2019-04-09 PROCEDURE — 99999 PR PBB SHADOW E&M-EST. PATIENT-LVL I: ICD-10-PCS | Mod: PBBFAC,,,

## 2019-04-09 PROCEDURE — 99211 OFF/OP EST MAY X REQ PHY/QHP: CPT | Mod: PBBFAC,PO

## 2019-04-09 NOTE — PROGRESS NOTES
Kenna Schmitt 68 y.o. female is here today for Blood Pressure check.   History of HTN yes.    Review of patient's allergies indicates:   Allergen Reactions    Penicillins Hives    Shellfish containing products Edema     Generalized swelling     Creatinine   Date Value Ref Range Status   01/18/2019 0.7 0.5 - 1.4 mg/dL Final     Sodium   Date Value Ref Range Status   01/18/2019 141 136 - 145 mmol/L Final     Potassium   Date Value Ref Range Status   01/18/2019 3.9 3.5 - 5.1 mmol/L Final   ]  Patient verifies taking blood pressure medications on a regular basis at the same time of the day.     Current Outpatient Medications:     atorvastatin (LIPITOR) 20 MG tablet, TAKE 1 TABLET BY MOUTH EVERY DAY, Disp: 90 tablet, Rfl: 3    b complex vitamins capsule, Take 1 capsule by mouth once daily., Disp: , Rfl:     biotin 10,000 mcg Cap, Take by mouth., Disp: , Rfl:     chlorthalidone (HYGROTEN) 25 MG Tab, TAKE 1 TABLET BY MOUTH EVERY DAY, Disp: 90 tablet, Rfl: 3    cholecalciferol, vitamin D3, (VITAMIN D3) 2,000 unit Cap, Take 1 capsule by mouth once daily., Disp: , Rfl:     Lactobacillus rhamnosus GG (CULTURELLE) 10 billion cell capsule, Take 1 capsule by mouth once daily., Disp: , Rfl:     levothyroxine (SYNTHROID) 112 MCG tablet, TAKE 1 TABLET BY MOUTH BEFORE BREAKFAST, Disp: 90 tablet, Rfl: 3    omeprazole (PRILOSEC) 20 MG capsule, TAKE ONE CAPSULE BY MOUTH EVERY DAY, Disp: 90 capsule, Rfl: 1    valacyclovir (VALTREX) 500 MG tablet, Take 500 mg by mouth 2 (two) times daily. PRN fever blisters, Disp: , Rfl:   Does patient have record of home blood pressure readings yes. Readings have been averaging 127/133-64/78.   Last dose of blood pressure medication was taken at am.  Patient is asymptomatic.   Complains of na.    BP: 136/80 ,   .    Blood pressure reading after 15 minutes was 136/80, Pulse .  Dr. Varner notified.

## 2019-04-09 NOTE — PROGRESS NOTES
Pt came in for bp check 140/76 left arm rechecked after 15 minutes 136/80 right arm. Please advise

## 2019-04-10 ENCOUNTER — PATIENT MESSAGE (OUTPATIENT)
Dept: FAMILY MEDICINE | Facility: CLINIC | Age: 69
End: 2019-04-10

## 2019-04-17 ENCOUNTER — OFFICE VISIT (OUTPATIENT)
Dept: INTERNAL MEDICINE | Facility: CLINIC | Age: 69
End: 2019-04-17

## 2019-04-17 VITALS
OXYGEN SATURATION: 98 % | TEMPERATURE: 97.6 F | RESPIRATION RATE: 16 BRPM | HEART RATE: 105 BPM | WEIGHT: 154 LBS | SYSTOLIC BLOOD PRESSURE: 123 MMHG | BODY MASS INDEX: 30.23 KG/M2 | DIASTOLIC BLOOD PRESSURE: 86 MMHG | HEIGHT: 60 IN

## 2019-04-17 DIAGNOSIS — E66.09 EXOGENOUS OBESITY: ICD-10-CM

## 2019-04-17 DIAGNOSIS — J30.1 NON-SEASONAL ALLERGIC RHINITIS DUE TO POLLEN: Primary | ICD-10-CM

## 2019-04-17 DIAGNOSIS — Z12.31 VISIT FOR SCREENING MAMMOGRAM: ICD-10-CM

## 2019-04-17 DIAGNOSIS — E78.2 MIXED HYPERLIPIDEMIA: ICD-10-CM

## 2019-04-17 DIAGNOSIS — Z12.11 ENCOUNTER FOR SCREENING COLONOSCOPY: ICD-10-CM

## 2019-04-17 DIAGNOSIS — I10 ESSENTIAL HYPERTENSION: ICD-10-CM

## 2019-04-17 PROCEDURE — 99214 OFFICE O/P EST MOD 30 MIN: CPT | Performed by: INTERNAL MEDICINE

## 2019-04-17 RX ORDER — MONTELUKAST SODIUM 10 MG/1
10 TABLET ORAL NIGHTLY
Qty: 90 TABLET | Refills: 1 | Status: SHIPPED | OUTPATIENT
Start: 2019-04-17 | End: 2019-12-11 | Stop reason: SDDI

## 2019-04-18 LAB
ALBUMIN SERPL-MCNC: 4.5 G/DL (ref 3.5–5.2)
ALBUMIN/GLOB SERPL: 1.6 G/DL
ALP SERPL-CCNC: 178 U/L (ref 39–117)
ALT SERPL-CCNC: 18 U/L (ref 1–33)
APPEARANCE UR: (no result)
AST SERPL-CCNC: 18 U/L (ref 1–32)
BACTERIA #/AREA URNS HPF: ABNORMAL /HPF
BASOPHILS # BLD AUTO: 0.1 10*3/MM3 (ref 0–0.2)
BASOPHILS # BLD MANUAL: 0 10*3/MM3 (ref 0–0.2)
BASOPHILS NFR BLD AUTO: 0.6 % (ref 0–1.5)
BASOPHILS NFR BLD MANUAL: 0 % (ref 0–1.5)
BILIRUB SERPL-MCNC: 0.2 MG/DL (ref 0.2–1.2)
BILIRUB UR QL STRIP: NEGATIVE
BUN SERPL-MCNC: 12 MG/DL (ref 8–23)
BUN/CREAT SERPL: 17.6 (ref 7–25)
CALCIUM SERPL-MCNC: 11.1 MG/DL (ref 8.6–10.5)
CHLORIDE SERPL-SCNC: 104 MMOL/L (ref 98–107)
CHOLEST SERPL-MCNC: 176 MG/DL (ref 0–200)
CO2 SERPL-SCNC: 22.7 MMOL/L (ref 22–29)
COLOR UR: (no result)
CREAT SERPL-MCNC: 0.68 MG/DL (ref 0.57–1)
CRYSTALS URNS MICRO: ABNORMAL
DIFFERENTIAL COMMENT: ABNORMAL
EOSINOPHIL # BLD AUTO: 0.45 10*3/MM3 (ref 0–0.4)
EOSINOPHIL # BLD MANUAL: 0 10*3/MM3 (ref 0–0.4)
EOSINOPHIL NFR BLD AUTO: 2.8 % (ref 0.3–6.2)
EOSINOPHIL NFR BLD MANUAL: 0 % (ref 0.3–6.2)
EPI CELLS #/AREA URNS HPF: ABNORMAL /HPF
ERYTHROCYTE [DISTWIDTH] IN BLOOD BY AUTOMATED COUNT: 13.4 % (ref 12.3–15.4)
GLOBULIN SER CALC-MCNC: 2.8 GM/DL
GLUCOSE SERPL-MCNC: 116 MG/DL (ref 65–99)
GLUCOSE UR QL: NEGATIVE
HCT VFR BLD AUTO: 52.8 % (ref 34–46.6)
HDLC SERPL-MCNC: 35 MG/DL (ref 40–60)
HGB BLD-MCNC: 16.7 G/DL (ref 12–15.9)
HGB UR QL STRIP: NEGATIVE
IMM GRANULOCYTES # BLD AUTO: 0.07 10*3/MM3 (ref 0–0.05)
IMM GRANULOCYTES NFR BLD AUTO: 0.4 % (ref 0–0.5)
KETONES UR QL STRIP: (no result)
LDLC SERPL CALC-MCNC: 114 MG/DL (ref 0–100)
LDLC/HDLC SERPL: 3.27 {RATIO}
LEUKOCYTE ESTERASE UR QL STRIP: (no result)
LYMPHOCYTES # BLD AUTO: 4.34 10*3/MM3 (ref 0.7–3.1)
LYMPHOCYTES # BLD MANUAL: 4.73 10*3/MM3 (ref 0.7–3.1)
LYMPHOCYTES NFR BLD AUTO: 26.6 % (ref 19.6–45.3)
LYMPHOCYTES NFR BLD MANUAL: 29 % (ref 19.6–45.3)
MCH RBC QN AUTO: 31 PG (ref 26.6–33)
MCHC RBC AUTO-ENTMCNC: 31.6 G/DL (ref 31.5–35.7)
MCV RBC AUTO: 98.1 FL (ref 79–97)
MONOCYTES # BLD AUTO: 0.87 10*3/MM3 (ref 0.1–0.9)
MONOCYTES # BLD MANUAL: 1.31 10*3/MM3 (ref 0.1–0.9)
MONOCYTES NFR BLD AUTO: 5.3 % (ref 5–12)
MONOCYTES NFR BLD MANUAL: 8 % (ref 5–12)
NEUTROPHILS # BLD AUTO: 10.49 10*3/MM3 (ref 1.4–7)
NEUTROPHILS # BLD MANUAL: 8.65 10*3/MM3 (ref 1.4–7)
NEUTROPHILS NFR BLD AUTO: 64.3 % (ref 42.7–76)
NEUTROPHILS NFR BLD MANUAL: 53 % (ref 42.7–76)
NITRITE UR QL STRIP: NEGATIVE
PH UR STRIP: 5.5 [PH] (ref 5–8)
PLATELET # BLD AUTO: 315 10*3/MM3 (ref 140–450)
PLATELET BLD QL SMEAR: ABNORMAL
POTASSIUM SERPL-SCNC: 4.8 MMOL/L (ref 3.5–5.2)
PROT SERPL-MCNC: 7.3 G/DL (ref 6–8.5)
PROT UR QL STRIP: NEGATIVE
RBC # BLD AUTO: 5.38 10*6/MM3 (ref 3.77–5.28)
RBC #/AREA URNS HPF: ABNORMAL /HPF
RBC MORPH BLD: ABNORMAL
SODIUM SERPL-SCNC: 139 MMOL/L (ref 136–145)
SP GR UR: 1.02 (ref 1–1.03)
T4 FREE SERPL-MCNC: 1.35 NG/DL (ref 0.93–1.7)
TRIGL SERPL-MCNC: 133 MG/DL (ref 0–150)
TSH SERPL DL<=0.005 MIU/L-ACNC: 4.44 MIU/ML (ref 0.27–4.2)
UROBILINOGEN UR STRIP-MCNC: (no result) MG/DL
VLDLC SERPL CALC-MCNC: 26.6 MG/DL
WBC # BLD AUTO: 16.32 10*3/MM3 (ref 3.4–10.8)
WBC #/AREA URNS HPF: ABNORMAL /HPF

## 2019-04-28 ENCOUNTER — PATIENT MESSAGE (OUTPATIENT)
Dept: FAMILY MEDICINE | Facility: CLINIC | Age: 69
End: 2019-04-28

## 2019-05-09 ENCOUNTER — HOSPITAL ENCOUNTER (OUTPATIENT)
Dept: MAMMOGRAPHY | Facility: HOSPITAL | Age: 69
Discharge: HOME | End: 2019-05-09
Admitting: INTERNAL MEDICINE

## 2019-05-09 ENCOUNTER — APPOINTMENT (OUTPATIENT)
Dept: MAMMOGRAPHY | Facility: HOSPITAL | Age: 69
End: 2019-05-09

## 2019-05-09 DIAGNOSIS — Z12.31 VISIT FOR SCREENING MAMMOGRAM: ICD-10-CM

## 2019-05-09 PROCEDURE — 77063 BREAST TOMOSYNTHESIS BI: CPT

## 2019-05-09 PROCEDURE — 77067 SCR MAMMO BI INCL CAD: CPT

## 2019-05-19 NOTE — PROGRESS NOTES
Subjective   Jazmyn Castaneda is a 68 y.o. female.   She is here for allergic rhinitis hypertension obesity hyper mixed hyperlipidemia and this was screening mammogram and encounter for screening colonoscopy  History of Present Illness   She is here today for allergic rhinitis which stable hypertension which is stable on current medication mixed hyperlipidemia stable on current medication visit for screening mammogram which is stable and encounter for screening colonoscopy  The following portions of the patient's history were reviewed and updated as appropriate: allergies, current medications, past family history, past medical history, past social history, past surgical history and problem list.    Review of Systems   All other systems reviewed and are negative.      Objective   Physical Exam   Constitutional: She is oriented to person, place, and time. She appears well-developed and well-nourished. She is cooperative.   HENT:   Head: Normocephalic and atraumatic.   Right Ear: Hearing, tympanic membrane, external ear and ear canal normal.   Left Ear: Hearing, tympanic membrane, external ear and ear canal normal.   Nose: Nose normal.   Mouth/Throat: Uvula is midline, oropharynx is clear and moist and mucous membranes are normal.   Eyes: Conjunctivae, EOM and lids are normal. Pupils are equal, round, and reactive to light.   Neck: Phonation normal. Neck supple. Carotid bruit is not present.   Cardiovascular: Normal rate, regular rhythm and normal heart sounds. Exam reveals no gallop and no friction rub.   No murmur heard.  Pulmonary/Chest: Effort normal and breath sounds normal. No respiratory distress.   Abdominal: Soft. Bowel sounds are normal. She exhibits no distension and no mass. There is no hepatosplenomegaly. There is no tenderness. There is no rebound and no guarding. No hernia.   Musculoskeletal: She exhibits no edema.   Neurological: She is alert and oriented to person, place, and time. Coordination and  gait normal.   Skin: Skin is warm and dry.   Psychiatric: She has a normal mood and affect. Her speech is normal and behavior is normal. Judgment and thought content normal.   Nursing note and vitals reviewed.      Assessment/Plan   Diagnoses and all orders for this visit:    Non-seasonal allergic rhinitis due to pollen    Essential hypertension    Exogenous obesity    Mixed hyperlipidemia  -     Lipid Panel With LDL / HDL Ratio  -     CBC & Differential  -     Comprehensive Metabolic Panel  -     T4, Free  -     TSH  -     Urinalysis With Microscopic - Urine, Clean Catch    Visit for screening mammogram  -     Mammo Screening Digital Tomosynthesis Bilateral With CAD; Future    Encounter for screening colonoscopy  -     Ambulatory Referral For Screening Colonoscopy    Other orders  -     montelukast (SINGULAIR) 10 MG tablet; Take 1 tablet by mouth Every Night.  -     Manual Differential  -     Microscopic Examination -    Allergic rhinitis not stable and we will provide singular  Hypertension well-controlled current medication  Mixed hyperlipidemia stable we will check lipid panel  Visit for screening mammogram we will schedule  Encounter for screening colonoscopy we will schedule

## 2019-06-03 ENCOUNTER — PATIENT MESSAGE (OUTPATIENT)
Dept: FAMILY MEDICINE | Facility: CLINIC | Age: 69
End: 2019-06-03

## 2019-06-03 DIAGNOSIS — G89.29 CHRONIC PAIN OF BOTH KNEES: Primary | ICD-10-CM

## 2019-06-03 DIAGNOSIS — M25.561 CHRONIC PAIN OF BOTH KNEES: Primary | ICD-10-CM

## 2019-06-03 DIAGNOSIS — M25.562 CHRONIC PAIN OF BOTH KNEES: Primary | ICD-10-CM

## 2019-06-07 ENCOUNTER — PATIENT MESSAGE (OUTPATIENT)
Dept: FAMILY MEDICINE | Facility: CLINIC | Age: 69
End: 2019-06-07

## 2019-06-26 ENCOUNTER — LAB (OUTPATIENT)
Dept: LAB | Facility: HOSPITAL | Age: 69
End: 2019-06-26

## 2019-06-26 ENCOUNTER — OFFICE VISIT (OUTPATIENT)
Dept: ONCOLOGY | Facility: CLINIC | Age: 69
End: 2019-06-26

## 2019-06-26 VITALS
BODY MASS INDEX: 29.94 KG/M2 | DIASTOLIC BLOOD PRESSURE: 72 MMHG | RESPIRATION RATE: 16 BRPM | OXYGEN SATURATION: 91 % | SYSTOLIC BLOOD PRESSURE: 107 MMHG | HEART RATE: 102 BPM | WEIGHT: 152.5 LBS | HEIGHT: 60 IN | TEMPERATURE: 97.8 F

## 2019-06-26 DIAGNOSIS — D75.1 ERYTHROCYTOSIS: Primary | ICD-10-CM

## 2019-06-26 DIAGNOSIS — D72.9 NEUTROPHILIC LEUKOCYTOSIS: ICD-10-CM

## 2019-06-26 LAB
BASOPHILS # BLD AUTO: 0.1 10*3/MM3 (ref 0–0.2)
BASOPHILS NFR BLD AUTO: 0.8 % (ref 0–1.5)
DEPRECATED RDW RBC AUTO: 47 FL (ref 37–54)
EOSINOPHIL # BLD AUTO: 0.59 10*3/MM3 (ref 0–0.4)
EOSINOPHIL NFR BLD AUTO: 4.9 % (ref 0.3–6.2)
ERYTHROCYTE [DISTWIDTH] IN BLOOD BY AUTOMATED COUNT: 13.7 % (ref 12.3–15.4)
HCT VFR BLD AUTO: 50.1 % (ref 34–46.6)
HGB BLD-MCNC: 16.7 G/DL (ref 12–15.9)
IMM GRANULOCYTES # BLD AUTO: 0.05 10*3/MM3 (ref 0–0.05)
IMM GRANULOCYTES NFR BLD AUTO: 0.4 % (ref 0–0.5)
LYMPHOCYTES # BLD AUTO: 3.93 10*3/MM3 (ref 0.7–3.1)
LYMPHOCYTES NFR BLD AUTO: 32.4 % (ref 19.6–45.3)
MCH RBC QN AUTO: 30.8 PG (ref 26.6–33)
MCHC RBC AUTO-ENTMCNC: 33.3 G/DL (ref 31.5–35.7)
MCV RBC AUTO: 92.4 FL (ref 79–97)
MONOCYTES # BLD AUTO: 0.74 10*3/MM3 (ref 0.1–0.9)
MONOCYTES NFR BLD AUTO: 6.1 % (ref 5–12)
NEUTROPHILS # BLD AUTO: 6.73 10*3/MM3 (ref 1.7–7)
NEUTROPHILS NFR BLD AUTO: 55.4 % (ref 42.7–76)
NRBC BLD AUTO-RTO: 0 /100 WBC (ref 0–0.2)
PLATELET # BLD AUTO: 261 10*3/MM3 (ref 140–450)
PMV BLD AUTO: 12.5 FL (ref 6–12)
RBC # BLD AUTO: 5.42 10*6/MM3 (ref 3.77–5.28)
WBC NRBC COR # BLD: 12.14 10*3/MM3 (ref 3.4–10.8)

## 2019-06-26 PROCEDURE — 85025 COMPLETE CBC W/AUTO DIFF WBC: CPT | Performed by: INTERNAL MEDICINE

## 2019-06-26 PROCEDURE — 99213 OFFICE O/P EST LOW 20 MIN: CPT | Performed by: INTERNAL MEDICINE

## 2019-06-26 PROCEDURE — 36415 COLL VENOUS BLD VENIPUNCTURE: CPT | Performed by: INTERNAL MEDICINE

## 2019-06-26 NOTE — PROGRESS NOTES
[facility] GROUP OUTPATIENT FOLLOW UP CLINIC VISIT    REASON FOR FOLLOW-UP:    1.  Erythrocytosis  2.  Mild leukocytosis    HISTORY OF PRESENT ILLNESS:  Jazmyn Castaneda is a 68 y.o. female who returns today for follow up of the above issue.     She did unfortunately start smoking again.  She does again plan to quit though.  She denies any other new problems today.    HEMATOLOGIC HISTORY:  She recently switched primary care providers. On 3/24/2017 her hemoglobin was 16.8 with hematocrit 51.3%. As of 4/6/2017 the hemoglobin was 16.2 with hematocrit 50.6%. White blood cells and platelets of been normal. She states that she was not aware of any erythrocytosis prior to that.    Seen initially on 5/8/2017 with hemoglobin of 16.1 and hematocrit 49.4%.  Erythropoietin level was normal at 4.8.  Carbon monoxide level was normal at 2.5%.  JAK2 V617F and exon 12 mutations were negative.    She returned on 5/26/2017 for follow-up.  Her hemoglobin is stable.  She will follow-up in a few months.  She will try to quit smoking.    Subsequent labs stable.    PAST MEDICAL, SURGICAL, FAMILY, AND SOCIAL HISTORIES were reviewed with the patient and in the electronic medical record, and were updated if indicated.    ALLERGIES:  Allergies   Allergen Reactions   • Cephalosporins    • Ciprofloxacin    • Penicillins    • Sulfa Antibiotics        MEDICATIONS:  The medication list has been reviewed with the patient by the medical assistant, and the list has been updated in the electronic medical record, which I reviewed.  Medication dosages and frequencies were confirmed to be accurate.    REVIEW OF SYSTEMS:  PAIN:  See Vital Signs below.  GENERAL:  No fevers, chills, night sweats, or unintended weight loss.    SKIN:  No rashes or non-healing lesions.  HEME/LYMPH:  No abnormal bleeding.  No palpable lymphadenopathy.  EYES:  No vision changes or diplopia.  ENT:  No sore throat or difficulty swallowing.  Vertigo.  RESPIRATORY:  No  "cough, shortness of breath, hemoptysis, or wheezing.  CARDIOVASCULAR:  No chest pain, palpitations, orthopnea, or dyspnea on exertion.  GASTROINTESTINAL:  Nausea related to vertigo  GENITOURINARY:  No dysuria or hematuria.  MUSCULOSKELETAL:  No joint pain, swelling, or erythema.  Low back pain  NEUROLOGIC:  No dizziness, loss of consciousness, or seizures.  PSYCHIATRIC:  Stress and anxiety    Vitals:    06/26/19 1159   BP: 107/72   Pulse: 102   Resp: 16   Temp: 97.8 °F (36.6 °C)   SpO2: 91%   Weight: 69.2 kg (152 lb 8 oz)   Height: 152.4 cm (60\")   PainSc: 0-No pain       PHYSICAL EXAMINATION:  GENERAL:  Well-developed well-nourished female; awake, alert and oriented, in no acute distress.  HEAD:  Normocephalic, atraumatic.  EARS:  Hearing intact.  NOSE:  Septum midline.  No excoriations or nasal discharge.  LYMPH:  No cervical, supraclavicular lymphadenopathy  CHEST:  Lungs are clear to auscultation bilaterally.  No wheezes, rales, or rhonchi.  HEART:  Regular rate; normal rhythm.  No murmurs, gallops or rubs.  EXTREMITIES:  No loving, cyanosis, or edema.    DIAGNOSTIC DATA:  Results for orders placed or performed in visit on 06/26/19   CBC Auto Differential   Result Value Ref Range    WBC 12.14 (H) 3.40 - 10.80 10*3/mm3    RBC 5.42 (H) 3.77 - 5.28 10*6/mm3    Hemoglobin 16.7 (H) 12.0 - 15.9 g/dL    Hematocrit 50.1 (H) 34.0 - 46.6 %    MCV 92.4 79.0 - 97.0 fL    MCH 30.8 26.6 - 33.0 pg    MCHC 33.3 31.5 - 35.7 g/dL    RDW 13.7 12.3 - 15.4 %    RDW-SD 47.0 37.0 - 54.0 fl    MPV 12.5 (H) 6.0 - 12.0 fL    Platelets 261 140 - 450 10*3/mm3    Neutrophil % 55.4 42.7 - 76.0 %    Lymphocyte % 32.4 19.6 - 45.3 %    Monocyte % 6.1 5.0 - 12.0 %    Eosinophil % 4.9 0.3 - 6.2 %    Basophil % 0.8 0.0 - 1.5 %    Immature Grans % 0.4 0.0 - 0.5 %    Neutrophils, Absolute 6.73 1.70 - 7.00 10*3/mm3    Lymphocytes, Absolute 3.93 (H) 0.70 - 3.10 10*3/mm3    Monocytes, Absolute 0.74 0.10 - 0.90 10*3/mm3    Eosinophils, Absolute 0.59 (H) " 0.00 - 0.40 10*3/mm3    Basophils, Absolute 0.10 0.00 - 0.20 10*3/mm3    Immature Grans, Absolute 0.05 0.00 - 0.05 10*3/mm3    nRBC 0.0 0.0 - 0.2 /100 WBC       IMAGING:  None reviewed    ASSESSMENT:  This is a 68 y.o. female with:  1.  Erythrocytosis:  I suspect this is secondary polycythemia related to smoking.  COURTNEY 2 mutation analysis was negative.  Laboratory evaluation otherwise was unremarkable.  She is now smoking again after quitting for some time.  Her hemoglobin is stable from April but her hematocrit has improved slightly.  Overall, reasonably stable.  2.  Mild leukocytosis with neutrophilia: Also likely secondary to smoking.  Her white blood cell count has improved from 16,000-12,000 today.    PLAN:  1.  She will again try to quit smoking  2.  Return in 6 months for follow-up with a CBC

## 2019-07-02 DIAGNOSIS — M25.562 CHRONIC PAIN OF BOTH KNEES: Primary | ICD-10-CM

## 2019-07-02 DIAGNOSIS — M25.561 CHRONIC PAIN OF BOTH KNEES: Primary | ICD-10-CM

## 2019-07-02 DIAGNOSIS — G89.29 CHRONIC PAIN OF BOTH KNEES: Primary | ICD-10-CM

## 2019-07-03 DIAGNOSIS — K21.9 GASTROESOPHAGEAL REFLUX DISEASE WITHOUT ESOPHAGITIS: ICD-10-CM

## 2019-07-03 RX ORDER — OMEPRAZOLE 20 MG/1
CAPSULE, DELAYED RELEASE ORAL
Qty: 90 CAPSULE | Refills: 0 | Status: SHIPPED | OUTPATIENT
Start: 2019-07-03 | End: 2019-10-01 | Stop reason: SDUPTHER

## 2019-07-04 ENCOUNTER — HOSPITAL ENCOUNTER (INPATIENT)
Facility: HOSPITAL | Age: 69
LOS: 3 days | Discharge: HOME | End: 2019-07-07
Attending: EMERGENCY MEDICINE | Admitting: UROLOGY

## 2019-07-04 ENCOUNTER — APPOINTMENT (OUTPATIENT)
Dept: CT IMAGING | Facility: HOSPITAL | Age: 69
End: 2019-07-04

## 2019-07-04 ENCOUNTER — APPOINTMENT (OUTPATIENT)
Dept: GENERAL RADIOLOGY | Facility: HOSPITAL | Age: 69
End: 2019-07-04

## 2019-07-04 ENCOUNTER — ANESTHESIA (OUTPATIENT)
Dept: PERIOP | Facility: HOSPITAL | Age: 69
End: 2019-07-04

## 2019-07-04 ENCOUNTER — ANESTHESIA EVENT (OUTPATIENT)
Dept: PERIOP | Facility: HOSPITAL | Age: 69
End: 2019-07-04

## 2019-07-04 DIAGNOSIS — N13.2 HYDRONEPHROSIS WITH URINARY OBSTRUCTION DUE TO URETERAL CALCULUS: Primary | ICD-10-CM

## 2019-07-04 DIAGNOSIS — D72.829 LEUKOCYTOSIS, UNSPECIFIED TYPE: ICD-10-CM

## 2019-07-04 PROBLEM — A41.9 SEPSIS DUE TO URINARY TRACT INFECTION: Status: ACTIVE | Noted: 2019-07-04

## 2019-07-04 PROBLEM — N39.0 SEPSIS DUE TO URINARY TRACT INFECTION (HCC): Status: ACTIVE | Noted: 2019-07-04

## 2019-07-04 LAB
ALBUMIN SERPL-MCNC: 4.3 G/DL (ref 3.5–5.2)
ALBUMIN/GLOB SERPL: 1.2 G/DL
ALP SERPL-CCNC: 203 U/L (ref 39–117)
ALT SERPL W P-5'-P-CCNC: 17 U/L (ref 1–33)
ANION GAP SERPL CALCULATED.3IONS-SCNC: 14.3 MMOL/L (ref 5–15)
AST SERPL-CCNC: 18 U/L (ref 1–32)
BACTERIA UR QL AUTO: NORMAL /HPF
BASOPHILS # BLD AUTO: 0.08 10*3/MM3 (ref 0–0.2)
BASOPHILS NFR BLD AUTO: 0.4 % (ref 0–1.5)
BILIRUB SERPL-MCNC: 0.6 MG/DL (ref 0.2–1.2)
BILIRUB UR QL STRIP: NEGATIVE
BUN BLD-MCNC: 9 MG/DL (ref 8–23)
BUN/CREAT SERPL: 8.3 (ref 7–25)
CALCIUM SPEC-SCNC: 10.8 MG/DL (ref 8.6–10.5)
CHLORIDE SERPL-SCNC: 96 MMOL/L (ref 98–107)
CLARITY UR: CLEAR
CO2 SERPL-SCNC: 23.7 MMOL/L (ref 22–29)
COLOR UR: YELLOW
CREAT BLD-MCNC: 1.08 MG/DL (ref 0.57–1)
DEPRECATED RDW RBC AUTO: 45.3 FL (ref 37–54)
EOSINOPHIL # BLD AUTO: 0 10*3/MM3 (ref 0–0.4)
EOSINOPHIL NFR BLD AUTO: 0 % (ref 0.3–6.2)
ERYTHROCYTE [DISTWIDTH] IN BLOOD BY AUTOMATED COUNT: 13.4 % (ref 12.3–15.4)
GFR SERPL CREATININE-BSD FRML MDRD: 50 ML/MIN/1.73
GLOBULIN UR ELPH-MCNC: 3.7 GM/DL
GLUCOSE BLD-MCNC: 157 MG/DL (ref 65–99)
GLUCOSE UR STRIP-MCNC: NEGATIVE MG/DL
HCT VFR BLD AUTO: 51.8 % (ref 34–46.6)
HGB BLD-MCNC: 17.2 G/DL (ref 12–15.9)
HGB UR QL STRIP.AUTO: ABNORMAL
HYALINE CASTS UR QL AUTO: NORMAL /LPF
IMM GRANULOCYTES # BLD AUTO: 0.09 10*3/MM3 (ref 0–0.05)
IMM GRANULOCYTES NFR BLD AUTO: 0.4 % (ref 0–0.5)
KETONES UR QL STRIP: ABNORMAL
LEUKOCYTE ESTERASE UR QL STRIP.AUTO: ABNORMAL
LIPASE SERPL-CCNC: 19 U/L (ref 13–60)
LYMPHOCYTES # BLD AUTO: 1.5 10*3/MM3 (ref 0.7–3.1)
LYMPHOCYTES NFR BLD AUTO: 6.8 % (ref 19.6–45.3)
MCH RBC QN AUTO: 30.6 PG (ref 26.6–33)
MCHC RBC AUTO-ENTMCNC: 33.2 G/DL (ref 31.5–35.7)
MCV RBC AUTO: 92.2 FL (ref 79–97)
MONOCYTES # BLD AUTO: 0.67 10*3/MM3 (ref 0.1–0.9)
MONOCYTES NFR BLD AUTO: 3 % (ref 5–12)
NEUTROPHILS # BLD AUTO: 19.66 10*3/MM3 (ref 1.7–7)
NEUTROPHILS NFR BLD AUTO: 89.4 % (ref 42.7–76)
NITRITE UR QL STRIP: NEGATIVE
NRBC BLD AUTO-RTO: 0 /100 WBC (ref 0–0.2)
PH UR STRIP.AUTO: 8 [PH] (ref 5–8)
PLATELET # BLD AUTO: 338 10*3/MM3 (ref 140–450)
PMV BLD AUTO: 12.7 FL (ref 6–12)
POTASSIUM BLD-SCNC: 3.9 MMOL/L (ref 3.5–5.2)
PROT SERPL-MCNC: 8 G/DL (ref 6–8.5)
PROT UR QL STRIP: NEGATIVE
RBC # BLD AUTO: 5.62 10*6/MM3 (ref 3.77–5.28)
RBC # UR: NORMAL /HPF
REF LAB TEST METHOD: NORMAL
SODIUM BLD-SCNC: 134 MMOL/L (ref 136–145)
SP GR UR STRIP: 1.01 (ref 1–1.03)
SQUAMOUS #/AREA URNS HPF: NORMAL /HPF
UROBILINOGEN UR QL STRIP: ABNORMAL
WBC NRBC COR # BLD: 22 10*3/MM3 (ref 3.4–10.8)
WBC UR QL AUTO: NORMAL /HPF

## 2019-07-04 PROCEDURE — 25010000002 VANCOMYCIN 10 G RECONSTITUTED SOLUTION: Performed by: EMERGENCY MEDICINE

## 2019-07-04 PROCEDURE — 25010000002 MIDAZOLAM PER 1 MG

## 2019-07-04 PROCEDURE — 25010000002 ONDANSETRON PER 1 MG: Performed by: ANESTHESIOLOGY

## 2019-07-04 PROCEDURE — 74018 RADEX ABDOMEN 1 VIEW: CPT

## 2019-07-04 PROCEDURE — 25010000002 IOPAMIDOL 61 % SOLUTION: Performed by: EMERGENCY MEDICINE

## 2019-07-04 PROCEDURE — 81001 URINALYSIS AUTO W/SCOPE: CPT | Performed by: EMERGENCY MEDICINE

## 2019-07-04 PROCEDURE — 0 IOTHALAMATE 60 % SOLUTION: Performed by: UROLOGY

## 2019-07-04 PROCEDURE — C1758 CATHETER, URETERAL: HCPCS | Performed by: UROLOGY

## 2019-07-04 PROCEDURE — 0T7B8DZ DILATION OF BLADDER WITH INTRALUMINAL DEVICE, VIA NATURAL OR ARTIFICIAL OPENING ENDOSCOPIC: ICD-10-PCS | Performed by: UROLOGY

## 2019-07-04 PROCEDURE — 25010000002 KETOROLAC TROMETHAMINE PER 15 MG: Performed by: EMERGENCY MEDICINE

## 2019-07-04 PROCEDURE — 25010000002 PROPOFOL 10 MG/ML EMULSION: Performed by: ANESTHESIOLOGY

## 2019-07-04 PROCEDURE — 25010000002 ONDANSETRON PER 1 MG: Performed by: EMERGENCY MEDICINE

## 2019-07-04 PROCEDURE — 99284 EMERGENCY DEPT VISIT MOD MDM: CPT

## 2019-07-04 PROCEDURE — 74177 CT ABD & PELVIS W/CONTRAST: CPT

## 2019-07-04 PROCEDURE — 85025 COMPLETE CBC W/AUTO DIFF WBC: CPT | Performed by: EMERGENCY MEDICINE

## 2019-07-04 PROCEDURE — 25010000002 SUCCINYLCHOLINE PER 20 MG: Performed by: ANESTHESIOLOGY

## 2019-07-04 PROCEDURE — 83690 ASSAY OF LIPASE: CPT | Performed by: EMERGENCY MEDICINE

## 2019-07-04 PROCEDURE — 80053 COMPREHEN METABOLIC PANEL: CPT | Performed by: EMERGENCY MEDICINE

## 2019-07-04 PROCEDURE — C2617 STENT, NON-COR, TEM W/O DEL: HCPCS | Performed by: UROLOGY

## 2019-07-04 DEVICE — URETERAL STENT
Type: IMPLANTABLE DEVICE | Site: URETER | Status: NON-FUNCTIONAL
Brand: CONTOUR™

## 2019-07-04 RX ORDER — MIDAZOLAM HYDROCHLORIDE 1 MG/ML
INJECTION INTRAMUSCULAR; INTRAVENOUS
Status: COMPLETED
Start: 2019-07-04 | End: 2019-07-04

## 2019-07-04 RX ORDER — MIDAZOLAM HYDROCHLORIDE 1 MG/ML
2 INJECTION INTRAMUSCULAR; INTRAVENOUS
Status: DISCONTINUED | OUTPATIENT
Start: 2019-07-04 | End: 2019-07-05 | Stop reason: HOSPADM

## 2019-07-04 RX ORDER — KETOROLAC TROMETHAMINE 15 MG/ML
7.5 INJECTION, SOLUTION INTRAMUSCULAR; INTRAVENOUS ONCE
Status: COMPLETED | OUTPATIENT
Start: 2019-07-04 | End: 2019-07-04

## 2019-07-04 RX ORDER — SODIUM CHLORIDE 0.9 % (FLUSH) 0.9 %
3 SYRINGE (ML) INJECTION EVERY 12 HOURS SCHEDULED
Status: DISCONTINUED | OUTPATIENT
Start: 2019-07-04 | End: 2019-07-07 | Stop reason: HOSPADM

## 2019-07-04 RX ORDER — SODIUM CHLORIDE AND POTASSIUM CHLORIDE 150; 900 MG/100ML; MG/100ML
125 INJECTION, SOLUTION INTRAVENOUS CONTINUOUS
Status: DISCONTINUED | OUTPATIENT
Start: 2019-07-04 | End: 2019-07-07 | Stop reason: HOSPADM

## 2019-07-04 RX ORDER — NALOXONE HCL 0.4 MG/ML
0.4 VIAL (ML) INJECTION
Status: DISCONTINUED | OUTPATIENT
Start: 2019-07-04 | End: 2019-07-07 | Stop reason: HOSPADM

## 2019-07-04 RX ORDER — ACETAMINOPHEN 325 MG/1
650 TABLET ORAL EVERY 4 HOURS PRN
Status: DISCONTINUED | OUTPATIENT
Start: 2019-07-04 | End: 2019-07-07 | Stop reason: HOSPADM

## 2019-07-04 RX ORDER — ONDANSETRON 2 MG/ML
4 INJECTION INTRAMUSCULAR; INTRAVENOUS ONCE
Status: COMPLETED | OUTPATIENT
Start: 2019-07-04 | End: 2019-07-04

## 2019-07-04 RX ORDER — CHOLECALCIFEROL (VITAMIN D3) 125 MCG
5 CAPSULE ORAL NIGHTLY PRN
Status: DISCONTINUED | OUTPATIENT
Start: 2019-07-04 | End: 2019-07-07 | Stop reason: HOSPADM

## 2019-07-04 RX ORDER — FAMOTIDINE 10 MG/ML
INJECTION, SOLUTION INTRAVENOUS
Status: COMPLETED
Start: 2019-07-04 | End: 2019-07-04

## 2019-07-04 RX ORDER — HYDROMORPHONE HYDROCHLORIDE 1 MG/ML
0.5 INJECTION, SOLUTION INTRAMUSCULAR; INTRAVENOUS; SUBCUTANEOUS
Status: DISCONTINUED | OUTPATIENT
Start: 2019-07-04 | End: 2019-07-07 | Stop reason: HOSPADM

## 2019-07-04 RX ORDER — FENTANYL CITRATE 50 UG/ML
50 INJECTION, SOLUTION INTRAMUSCULAR; INTRAVENOUS
Status: DISCONTINUED | OUTPATIENT
Start: 2019-07-04 | End: 2019-07-05 | Stop reason: HOSPADM

## 2019-07-04 RX ORDER — ONDANSETRON 4 MG/1
4 TABLET, FILM COATED ORAL EVERY 6 HOURS PRN
Status: DISCONTINUED | OUTPATIENT
Start: 2019-07-04 | End: 2019-07-07 | Stop reason: HOSPADM

## 2019-07-04 RX ORDER — SODIUM CHLORIDE 0.9 % (FLUSH) 0.9 %
10 SYRINGE (ML) INJECTION AS NEEDED
Status: DISCONTINUED | OUTPATIENT
Start: 2019-07-04 | End: 2019-07-07 | Stop reason: HOSPADM

## 2019-07-04 RX ORDER — ONDANSETRON 2 MG/ML
4 INJECTION INTRAMUSCULAR; INTRAVENOUS EVERY 6 HOURS PRN
Status: DISCONTINUED | OUTPATIENT
Start: 2019-07-04 | End: 2019-07-07 | Stop reason: HOSPADM

## 2019-07-04 RX ORDER — MIDAZOLAM HYDROCHLORIDE 1 MG/ML
1 INJECTION INTRAMUSCULAR; INTRAVENOUS
Status: DISCONTINUED | OUTPATIENT
Start: 2019-07-04 | End: 2019-07-05 | Stop reason: HOSPADM

## 2019-07-04 RX ORDER — NITROGLYCERIN 0.4 MG/1
0.4 TABLET SUBLINGUAL
Status: DISCONTINUED | OUTPATIENT
Start: 2019-07-04 | End: 2019-07-07 | Stop reason: HOSPADM

## 2019-07-04 RX ORDER — SODIUM CHLORIDE, SODIUM LACTATE, POTASSIUM CHLORIDE, CALCIUM CHLORIDE 600; 310; 30; 20 MG/100ML; MG/100ML; MG/100ML; MG/100ML
9 INJECTION, SOLUTION INTRAVENOUS CONTINUOUS
Status: DISCONTINUED | OUTPATIENT
Start: 2019-07-04 | End: 2019-07-07 | Stop reason: HOSPADM

## 2019-07-04 RX ORDER — DOCUSATE SODIUM 100 MG/1
100 CAPSULE, LIQUID FILLED ORAL 2 TIMES DAILY
Status: DISCONTINUED | OUTPATIENT
Start: 2019-07-04 | End: 2019-07-07 | Stop reason: HOSPADM

## 2019-07-04 RX ORDER — LIDOCAINE HYDROCHLORIDE 10 MG/ML
0.5 INJECTION, SOLUTION EPIDURAL; INFILTRATION; INTRACAUDAL; PERINEURAL ONCE AS NEEDED
Status: DISCONTINUED | OUTPATIENT
Start: 2019-07-04 | End: 2019-07-05 | Stop reason: HOSPADM

## 2019-07-04 RX ORDER — HYDROCODONE BITARTRATE AND ACETAMINOPHEN 5; 325 MG/1; MG/1
1 TABLET ORAL EVERY 4 HOURS PRN
Status: DISCONTINUED | OUTPATIENT
Start: 2019-07-04 | End: 2019-07-07 | Stop reason: HOSPADM

## 2019-07-04 RX ORDER — CALCIUM CARBONATE 200(500)MG
2 TABLET,CHEWABLE ORAL 2 TIMES DAILY PRN
Status: DISCONTINUED | OUTPATIENT
Start: 2019-07-04 | End: 2019-07-07 | Stop reason: HOSPADM

## 2019-07-04 RX ORDER — SODIUM CHLORIDE 0.9 % (FLUSH) 0.9 %
3-10 SYRINGE (ML) INJECTION AS NEEDED
Status: DISCONTINUED | OUTPATIENT
Start: 2019-07-04 | End: 2019-07-07 | Stop reason: HOSPADM

## 2019-07-04 RX ORDER — SODIUM CHLORIDE 0.9 % (FLUSH) 0.9 %
1-10 SYRINGE (ML) INJECTION AS NEEDED
Status: DISCONTINUED | OUTPATIENT
Start: 2019-07-04 | End: 2019-07-05 | Stop reason: HOSPADM

## 2019-07-04 RX ORDER — FAMOTIDINE 10 MG/ML
20 INJECTION, SOLUTION INTRAVENOUS ONCE
Status: COMPLETED | OUTPATIENT
Start: 2019-07-04 | End: 2019-07-04

## 2019-07-04 RX ADMIN — KETOROLAC TROMETHAMINE 7.5 MG: 15 INJECTION, SOLUTION INTRAMUSCULAR; INTRAVENOUS at 20:14

## 2019-07-04 RX ADMIN — MIDAZOLAM HYDROCHLORIDE 2 MG: 1 INJECTION INTRAMUSCULAR; INTRAVENOUS at 23:25

## 2019-07-04 RX ADMIN — FAMOTIDINE 20 MG: 10 INJECTION INTRAVENOUS at 23:20

## 2019-07-04 RX ADMIN — SODIUM CHLORIDE, POTASSIUM CHLORIDE, SODIUM LACTATE AND CALCIUM CHLORIDE 1000 ML: 600; 310; 30; 20 INJECTION, SOLUTION INTRAVENOUS at 20:18

## 2019-07-04 RX ADMIN — SUCCINYLCHOLINE CHLORIDE 120 MG: 20 INJECTION, SOLUTION INTRAMUSCULAR; INTRAVENOUS; PARENTERAL at 23:42

## 2019-07-04 RX ADMIN — SODIUM CHLORIDE, POTASSIUM CHLORIDE, SODIUM LACTATE AND CALCIUM CHLORIDE 9 ML/HR: 600; 310; 30; 20 INJECTION, SOLUTION INTRAVENOUS at 23:00

## 2019-07-04 RX ADMIN — LIDOCAINE HYDROCHLORIDE 80 MG: 20 INJECTION, SOLUTION INFILTRATION; PERINEURAL at 23:42

## 2019-07-04 RX ADMIN — GLYCOPYRROLATE 0.2 MG: 0.2 INJECTION INTRAMUSCULAR; INTRAVENOUS at 23:39

## 2019-07-04 RX ADMIN — AZTREONAM 1000 MG: 1 INJECTION, POWDER, LYOPHILIZED, FOR SOLUTION INTRAMUSCULAR; INTRAVENOUS at 22:22

## 2019-07-04 RX ADMIN — VANCOMYCIN HYDROCHLORIDE 1500 MG: 10 INJECTION, POWDER, LYOPHILIZED, FOR SOLUTION INTRAVENOUS at 23:05

## 2019-07-04 RX ADMIN — ONDANSETRON 8 MG: 2 INJECTION INTRAMUSCULAR; INTRAVENOUS at 23:39

## 2019-07-04 RX ADMIN — PROPOFOL 150 MG: 10 INJECTION, EMULSION INTRAVENOUS at 23:42

## 2019-07-04 RX ADMIN — ONDANSETRON HYDROCHLORIDE 4 MG: 2 SOLUTION INTRAMUSCULAR; INTRAVENOUS at 20:13

## 2019-07-04 RX ADMIN — IOPAMIDOL 85 ML: 612 INJECTION, SOLUTION INTRAVENOUS at 21:06

## 2019-07-04 RX ADMIN — FAMOTIDINE 20 MG: 10 INJECTION, SOLUTION INTRAVENOUS at 23:20

## 2019-07-04 RX ADMIN — KETOROLAC TROMETHAMINE 7.5 MG: 15 INJECTION, SOLUTION INTRAMUSCULAR; INTRAVENOUS at 22:22

## 2019-07-04 RX ADMIN — MIDAZOLAM 2 MG: 1 INJECTION INTRAMUSCULAR; INTRAVENOUS at 23:25

## 2019-07-04 NOTE — ED PROVIDER NOTES
" EMERGENCY DEPARTMENT ENCOUNTER    CHIEF COMPLAINT  Chief Complaint: Back pain  History given by: Pt  History limited by: none  Room Number: [facility] OR/MAIN OR  PMD: Provider, No Known      HPI:  Pt is a 68 y.o. female who presents complaining of right lower back pain that began this morning. Pt describes the pain as \"giving birth\". Pt also reports lower abdominal pain, urinary urgency, decreased output, and nausea with two episodes of emesis. Pt states her last BM was yesterday. Pt took milk of magnesia today with no results. Pt reports Hx of UTI. Pt denies Hx of kidney stones.      Duration:  Since this am  Onset: gradual  Timing: constant  Location: right lower back  Radiation: none  Quality: giving birth  Intensity/Severity: moderate to severe  Progression: unchanged  Associated Symptoms: lower abd pain, urinary urgency, nausea, vomiting  Aggravating Factors: none  Alleviating Factors: none    PAST MEDICAL HISTORY  Active Ambulatory Problems     Diagnosis Date Noted   • Acute pyelonephritis 02/16/2017   • Acute cystitis 02/16/2017   • Nephrolithiasis 02/16/2017   • Allergic rhinitis 02/18/2017   • Diverticulosis of large intestine without hemorrhage 02/18/2017   • Hiatal hernia 02/18/2017   • Medicare annual wellness visit, subsequent 03/24/2017   • Exogenous obesity 03/24/2017   • Encounter for screening colonoscopy 03/24/2017   • Visit for screening mammogram 03/24/2017   • Breast mass, left 03/24/2017   • Essential hypertension 03/24/2017   • Erythrocytosis 05/08/2017   • Vertigo 03/29/2018   • Acute non-recurrent frontal sinusitis 10/15/2018   • Bronchitis 10/15/2018   • Neutrophilic leukocytosis 02/06/2019   • Mixed hyperlipidemia 04/17/2019     Resolved Ambulatory Problems     Diagnosis Date Noted   • History of drug-induced anaphylaxis 02/18/2017     Past Medical History:   Diagnosis Date   • Collapse of lung 1980s   • Diverticulosis    • Environmental allergies    • Essential hypertension 3/24/2017   • " "Exogenous obesity 3/24/2017   • H/O Pneumonia 1980s   • Infectious mononucleosis    • Kidney infection    • Kidney stone    • Mixed hyperlipidemia 2019   • Neuromuscular disorder (CMS/HCC)    • Urinary tract infection    • Vertigo        PAST SURGICAL HISTORY  Past Surgical History:   Procedure Laterality Date   • BREAST EXCISIONAL BIOPSY Right     30 something when it was done; says it was precancerous   • CATARACT EXTRACTION, BILATERAL     •  SECTION     • MASTOID SURGERY     • NASAL SEPTAL RECONSTRUCTION     • SINUS SURGERY      scraped and prior deviated septum       FAMILY HISTORY  Family History   Problem Relation Age of Onset   • Hypertension Mother    • Aneurysm Mother    • Heart failure Mother    • Heart disease Mother    • Heart disease Father    • Hypertension Father    • Kidney disease Father    • Cancer Father 82        Bladder   • Hypertension Sister    • Cancer Maternal Aunt    • Stroke Maternal Uncle    • Glaucoma Maternal Grandmother    • Cancer Maternal Grandmother    • Stroke Maternal Grandfather    • Aneurysm Maternal Grandfather    • Cancer Maternal Aunt    • Cancer Maternal Aunt    • Liver cancer Other    • Pancreatic cancer Other        SOCIAL HISTORY  Social History     Socioeconomic History   • Marital status:      Spouse name: Not on file   • Number of children: 2   • Years of education: College   • Highest education level: Not on file   Occupational History   • Occupation: Medical technologist     Employer: RETIRED   Tobacco Use   • Smoking status: Current Some Day Smoker     Packs/day: 1.00     Types: Cigarettes   • Smokeless tobacco: Never Used   • Tobacco comment: pt states trying to quit   Substance and Sexual Activity   • Alcohol use: Yes     Comment: \"on occasion\"   • Drug use: No       ALLERGIES  Cephalosporins; Ciprofloxacin; Penicillins; and Sulfa antibiotics    REVIEW OF SYSTEMS  Review of Systems   Constitutional: Negative for fever.   HENT: Negative for " sore throat.    Eyes: Negative.    Respiratory: Negative for cough and shortness of breath.    Cardiovascular: Negative for chest pain.   Gastrointestinal: Positive for abdominal pain (lower), nausea and vomiting (two episodes). Negative for diarrhea.   Genitourinary: Positive for decreased urine volume and urgency. Negative for dysuria.   Musculoskeletal: Positive for back pain (right lower). Negative for neck pain.   Skin: Negative for rash.   Allergic/Immunologic: Negative.    Neurological: Negative for weakness, numbness and headaches.   Hematological: Negative.    Psychiatric/Behavioral: Negative.    All other systems reviewed and are negative.      PHYSICAL EXAM  ED Triage Vitals   Temp Heart Rate Resp BP SpO2   07/04/19 1916 07/04/19 1916 07/04/19 1916 07/04/19 1925 07/04/19 1916   98.1 °F (36.7 °C) (!) 124 24 (!) 181/96 97 %      Temp src Heart Rate Source Patient Position BP Location FiO2 (%)   07/04/19 1916 07/04/19 1916 07/04/19 1925 07/04/19 1925 --   Tympanic Monitor Lying Right arm        Physical Exam   Constitutional: She is oriented to person, place, and time. She appears distressed.   HENT:   Head: Normocephalic and atraumatic.   Eyes: EOM are normal. Pupils are equal, round, and reactive to light.   Neck: Normal range of motion. Neck supple.   Cardiovascular: Normal rate, regular rhythm and normal heart sounds.   No murmur heard.  Pulmonary/Chest: Effort normal and breath sounds normal. No respiratory distress.   Abdominal: Soft. There is tenderness (suprapubic). There is no rebound, no guarding and no CVA tenderness.   Musculoskeletal: Normal range of motion. She exhibits no edema.   Tenderness right lumbar paraspinal muscles. No lumbar spinous tenderness.   Neurological: She is alert and oriented to person, place, and time. She has normal sensation and normal strength.   No saddle anesthesia   Skin: Skin is warm and dry. No rash noted.   Psychiatric: Mood and affect normal.   Nursing note and  vitals reviewed.      LAB RESULTS  Lab Results (last 24 hours)     Procedure Component Value Units Date/Time    Urinalysis With Microscopic If Indicated (No Culture) - Urine, Clean Catch [[id number]]  (Abnormal) Collected:  07/04/19 2006    Specimen:  Urine, Clean Catch Updated:  07/04/19 2024     Color, UA Yellow     Appearance, UA Clear     pH, UA 8.0     Specific Gravity, UA 1.010     Glucose, UA Negative     Ketones, UA Trace     Bilirubin, UA Negative     Blood, UA Small (1+)     Protein, UA Negative     Leuk Esterase, UA Trace     Nitrite, UA Negative     Urobilinogen, UA 0.2 E.U./dL    Urinalysis, Microscopic Only - Urine, Clean Catch [[id number]] Collected:  07/04/19 2006    Specimen:  Urine, Clean Catch Updated:  07/04/19 2043     RBC, UA 0-2 /HPF      WBC, UA 0-2 /HPF      Bacteria, UA None Seen /HPF      Squamous Epithelial Cells, UA 0-2 /HPF      Hyaline Casts, UA None Seen /LPF      Methodology Manual Light Microscopy    CBC & Differential [[id number]] Collected:  07/04/19 2015    Specimen:  Blood Updated:  07/04/19 2023    Narrative:       The following orders were created for panel order CBC & Differential.  Procedure                               Abnormality         Status                     ---------                               -----------         ------                     CBC Auto Differential[[id number]]        Abnormal            Final result                 Please view results for these tests on the individual orders.    Comprehensive Metabolic Panel [[id number]]  (Abnormal) Collected:  07/04/19 2015    Specimen:  Blood Updated:  07/04/19 2043     Glucose 157 mg/dL      BUN 9 mg/dL      Creatinine 1.08 mg/dL      Sodium 134 mmol/L      Potassium 3.9 mmol/L      Chloride 96 mmol/L      CO2 23.7 mmol/L      Calcium 10.8 mg/dL      Total Protein 8.0 g/dL      Albumin 4.30 g/dL      ALT (SGPT) 17 U/L      AST (SGOT) 18 U/L      Alkaline Phosphatase 203 U/L      Total Bilirubin 0.6 mg/dL      eGFR Non   Amer 50 mL/min/1.73      Globulin 3.7 gm/dL      A/G Ratio 1.2 g/dL      BUN/Creatinine Ratio 8.3     Anion Gap 14.3 mmol/L     Narrative:       GFR Normal >60  Chronic Kidney Disease <60  Kidney Failure <15    Lipase [[id number]]  (Normal) Collected:  07/04/19 2015    Specimen:  Blood Updated:  07/04/19 2043     Lipase 19 U/L     CBC Auto Differential [[id number]]  (Abnormal) Collected:  07/04/19 2015    Specimen:  Blood Updated:  07/04/19 2023     WBC 22.00 10*3/mm3      RBC 5.62 10*6/mm3      Hemoglobin 17.2 g/dL      Hematocrit 51.8 %      MCV 92.2 fL      MCH 30.6 pg      MCHC 33.2 g/dL      RDW 13.4 %      RDW-SD 45.3 fl      MPV 12.7 fL      Platelets 338 10*3/mm3      Neutrophil % 89.4 %      Lymphocyte % 6.8 %      Monocyte % 3.0 %      Eosinophil % 0.0 %      Basophil % 0.4 %      Immature Grans % 0.4 %      Neutrophils, Absolute 19.66 10*3/mm3      Lymphocytes, Absolute 1.50 10*3/mm3      Monocytes, Absolute 0.67 10*3/mm3      Eosinophils, Absolute 0.00 10*3/mm3      Basophils, Absolute 0.08 10*3/mm3      Immature Grans, Absolute 0.09 10*3/mm3      nRBC 0.0 /100 WBC           I ordered the above labs and reviewed the results    RADIOLOGY  CT Abdomen Pelvis With Contrast   Final Result   Mild right-sided hydroureteronephrosis. This is secondary to a 6 mm   stone located within the distal right ureter. Patient is noted to have   extensive periureteral and perinephric soft tissue stranding, as well as   urothelial enhancement. Superimposed infection is not excluded.       Radiation dose reduction techniques were utilized, including automated   exposure control and exposure modulation based on body size.       This report was finalized on 7/4/2019 9:47 PM by Dr. Hillary Menendez M.D.               I ordered the above noted radiological studies. Interpreted by radiologist. Discussed with radiologist (). Reviewed by me in PACS.       PROCEDURES  Procedures      PROGRESS AND CONSULTS  ED Course as of  Jul 04 2248   u Jul 04, 2019 2158 12.1 eight days ago WBC: (!) 22.00 []      ED Course User Index  [] Gigi Macias MD       2150  Rechecked pt. Pt is resting comfortably. Notified pt of test results, including a CT abd/pelvis that shows a 6 mm distal R uretal kidney stone with mild right hydroureteronephrosis and CBC that shows elevated WBC. Discussed the plan to consult Urology and plan to admit. Pt understands and agrees with the plan, all questions answered.    2158  Ordered IV Vanc and Aztreonam for hydronephrosis with leukocytosis. Ordered IV Toradol for pain.     2214  Discussed the pt's case with Dr. Lal, Urology who wants to admit to medicine. Plans of placing a stent later tonight.    2232  Discussed the pt's case with Dr. Stone ([facility]) who agrees to admit the pt.       MEDICAL DECISION MAKING  Results were reviewed/discussed with the patient and they were also made aware of online access. Pt also made aware that some labs, such as cultures, will not be resulted during ER visit and follow up with PMD is necessary.     MDM  Number of Diagnoses or Management Options  Hydronephrosis with urinary obstruction due to ureteral calculus:   Leukocytosis, unspecified type:   Diagnosis management comments: CT showed an obstructing 6 mm stone in the distal right ureter.  There was also extensive inflammatory stranding around the right kidney and right ureter.  Patient's white blood cell count was elevated.  She was afebrile.  Urinalysis was unremarkable but I still suspect the patient has underlying infection.  Due to her multiple drug allergies, the patient was given IV Azactam and vancomycin.  She was also given IV fluids and IV Toradol.  Case was discussed with Dr. Lal and he is planning on placing a stent later tonight.  Case was also discussed with Dr. Stone and he agreed to admit the patient.  She was tachycardic while in the ER but was not hypotensive.       Amount and/or Complexity of  Data Reviewed  Clinical lab tests: reviewed and ordered (CBC: WBC = 22k; CMP: BUN = 9, creatinine = 1.08)  Tests in the radiology section of CPT®: reviewed and ordered (CT abd/pelvis shows mild right-sided hydroureteronephrosis with a 6 mm stone located within the distal right ureter.)  Decide to obtain previous medical records or to obtain history from someone other than the patient: yes  Review and summarize past medical records: yes (Last admitted here February 2017 for severe cystitis and possible pyelonephritis. )  Discuss the patient with other providers: yes ( Dr. Lal, Urology; Dr. Stone, [facility])           DIAGNOSIS  Final diagnoses:   Hydronephrosis with urinary obstruction due to ureteral calculus   Leukocytosis, unspecified type       DISPOSITION  ADMISSION    Discussed treatment plan and reason for admission with pt/family and admitting physician.  Pt/family voiced understanding of the plan for admission for further testing/treatment as needed.         Latest Documented Vital Signs:  As of 10:48 PM  BP- 154/92 HR- 107 Temp- 98.1 °F (36.7 °C) (Tympanic) O2 sat- 98%    --  Documentation assistance provided by alesia Hernandez for Dr. Macias.  Information recorded by the alesia was done at my direction and has been verified and validated by me.          Austin Hernandez  07/04/19 7400       Gigi Macias MD  07/04/19 8546

## 2019-07-05 ENCOUNTER — APPOINTMENT (OUTPATIENT)
Dept: GENERAL RADIOLOGY | Facility: HOSPITAL | Age: 69
End: 2019-07-05

## 2019-07-05 PROBLEM — Z88.1 ALLERGY TO MULTIPLE ANTIBIOTICS: Status: ACTIVE | Noted: 2019-07-05

## 2019-07-05 LAB
ANION GAP SERPL CALCULATED.3IONS-SCNC: 6.7 MMOL/L (ref 5–15)
BACTERIA UR QL AUTO: ABNORMAL /HPF
BASOPHILS # BLD AUTO: 0.07 10*3/MM3 (ref 0–0.2)
BASOPHILS NFR BLD AUTO: 0.5 % (ref 0–1.5)
BILIRUB UR QL STRIP: NEGATIVE
BUN BLD-MCNC: 8 MG/DL (ref 8–23)
BUN/CREAT SERPL: 11.3 (ref 7–25)
CALCIUM SPEC-SCNC: 9.4 MG/DL (ref 8.6–10.5)
CHLORIDE SERPL-SCNC: 110 MMOL/L (ref 98–107)
CLARITY UR: CLEAR
CO2 SERPL-SCNC: 22.3 MMOL/L (ref 22–29)
COLOR UR: YELLOW
CREAT BLD-MCNC: 0.71 MG/DL (ref 0.57–1)
DEPRECATED RDW RBC AUTO: 45.9 FL (ref 37–54)
EOSINOPHIL # BLD AUTO: 0.15 10*3/MM3 (ref 0–0.4)
EOSINOPHIL NFR BLD AUTO: 1.1 % (ref 0.3–6.2)
ERYTHROCYTE [DISTWIDTH] IN BLOOD BY AUTOMATED COUNT: 13.5 % (ref 12.3–15.4)
GFR SERPL CREATININE-BSD FRML MDRD: 81 ML/MIN/1.73
GLUCOSE BLD-MCNC: 110 MG/DL (ref 65–99)
GLUCOSE UR STRIP-MCNC: NEGATIVE MG/DL
HCT VFR BLD AUTO: 44.2 % (ref 34–46.6)
HGB BLD-MCNC: 14.5 G/DL (ref 12–15.9)
HGB UR QL STRIP.AUTO: ABNORMAL
HYALINE CASTS UR QL AUTO: ABNORMAL /LPF
IMM GRANULOCYTES # BLD AUTO: 0.04 10*3/MM3 (ref 0–0.05)
IMM GRANULOCYTES NFR BLD AUTO: 0.3 % (ref 0–0.5)
KETONES UR QL STRIP: NEGATIVE
LEUKOCYTE ESTERASE UR QL STRIP.AUTO: ABNORMAL
LYMPHOCYTES # BLD AUTO: 3.24 10*3/MM3 (ref 0.7–3.1)
LYMPHOCYTES NFR BLD AUTO: 24.7 % (ref 19.6–45.3)
MCH RBC QN AUTO: 30.5 PG (ref 26.6–33)
MCHC RBC AUTO-ENTMCNC: 32.8 G/DL (ref 31.5–35.7)
MCV RBC AUTO: 92.9 FL (ref 79–97)
MONOCYTES # BLD AUTO: 1.08 10*3/MM3 (ref 0.1–0.9)
MONOCYTES NFR BLD AUTO: 8.2 % (ref 5–12)
NEUTROPHILS # BLD AUTO: 8.56 10*3/MM3 (ref 1.7–7)
NEUTROPHILS NFR BLD AUTO: 65.2 % (ref 42.7–76)
NITRITE UR QL STRIP: NEGATIVE
NRBC BLD AUTO-RTO: 0 /100 WBC (ref 0–0.2)
PH UR STRIP.AUTO: 5.5 [PH] (ref 5–8)
PLATELET # BLD AUTO: 278 10*3/MM3 (ref 140–450)
PMV BLD AUTO: 12.9 FL (ref 6–12)
POTASSIUM BLD-SCNC: 4.1 MMOL/L (ref 3.5–5.2)
PROT UR QL STRIP: ABNORMAL
RBC # BLD AUTO: 4.76 10*6/MM3 (ref 3.77–5.28)
RBC # UR: ABNORMAL /HPF
REF LAB TEST METHOD: ABNORMAL
SODIUM BLD-SCNC: 139 MMOL/L (ref 136–145)
SP GR UR STRIP: 1.02 (ref 1–1.03)
SQUAMOUS #/AREA URNS HPF: ABNORMAL /HPF
UROBILINOGEN UR QL STRIP: ABNORMAL
WBC NRBC COR # BLD: 13.14 10*3/MM3 (ref 3.4–10.8)
WBC UR QL AUTO: ABNORMAL /HPF

## 2019-07-05 PROCEDURE — 81001 URINALYSIS AUTO W/SCOPE: CPT | Performed by: INTERNAL MEDICINE

## 2019-07-05 PROCEDURE — 25810000003 SODIUM CHLORIDE 0.9 % WITH KCL 20 MEQ 20-0.9 MEQ/L-% SOLUTION: Performed by: INTERNAL MEDICINE

## 2019-07-05 PROCEDURE — 36415 COLL VENOUS BLD VENIPUNCTURE: CPT | Performed by: INTERNAL MEDICINE

## 2019-07-05 PROCEDURE — 87040 BLOOD CULTURE FOR BACTERIA: CPT | Performed by: INTERNAL MEDICINE

## 2019-07-05 PROCEDURE — 85025 COMPLETE CBC W/AUTO DIFF WBC: CPT | Performed by: INTERNAL MEDICINE

## 2019-07-05 PROCEDURE — 76000 FLUOROSCOPY <1 HR PHYS/QHP: CPT

## 2019-07-05 PROCEDURE — 25010000002 VANCOMYCIN 750 MG RECONSTITUTED SOLUTION: Performed by: INTERNAL MEDICINE

## 2019-07-05 PROCEDURE — 80048 BASIC METABOLIC PNL TOTAL CA: CPT | Performed by: INTERNAL MEDICINE

## 2019-07-05 PROCEDURE — 87086 URINE CULTURE/COLONY COUNT: CPT | Performed by: INTERNAL MEDICINE

## 2019-07-05 RX ORDER — ONDANSETRON 2 MG/ML
INJECTION INTRAMUSCULAR; INTRAVENOUS AS NEEDED
Status: DISCONTINUED | OUTPATIENT
Start: 2019-07-04 | End: 2019-07-05 | Stop reason: SURG

## 2019-07-05 RX ORDER — HYDROCODONE BITARTRATE AND ACETAMINOPHEN 7.5; 325 MG/1; MG/1
1 TABLET ORAL ONCE AS NEEDED
Status: DISCONTINUED | OUTPATIENT
Start: 2019-07-05 | End: 2019-07-05 | Stop reason: HOSPADM

## 2019-07-05 RX ORDER — ENALAPRILAT 2.5 MG/2ML
0.62 INJECTION INTRAVENOUS ONCE AS NEEDED
Status: DISCONTINUED | OUTPATIENT
Start: 2019-07-05 | End: 2019-07-05 | Stop reason: HOSPADM

## 2019-07-05 RX ORDER — ONDANSETRON 2 MG/ML
4 INJECTION INTRAMUSCULAR; INTRAVENOUS ONCE AS NEEDED
Status: DISCONTINUED | OUTPATIENT
Start: 2019-07-05 | End: 2019-07-05 | Stop reason: HOSPADM

## 2019-07-05 RX ORDER — PROMETHAZINE HYDROCHLORIDE 25 MG/ML
6.25 INJECTION, SOLUTION INTRAMUSCULAR; INTRAVENOUS ONCE AS NEEDED
Status: DISCONTINUED | OUTPATIENT
Start: 2019-07-05 | End: 2019-07-05 | Stop reason: HOSPADM

## 2019-07-05 RX ORDER — MAGNESIUM HYDROXIDE 1200 MG/15ML
LIQUID ORAL AS NEEDED
Status: DISCONTINUED | OUTPATIENT
Start: 2019-07-04 | End: 2019-07-05 | Stop reason: HOSPADM

## 2019-07-05 RX ORDER — HYDROMORPHONE HYDROCHLORIDE 1 MG/ML
0.25 INJECTION, SOLUTION INTRAMUSCULAR; INTRAVENOUS; SUBCUTANEOUS
Status: DISCONTINUED | OUTPATIENT
Start: 2019-07-05 | End: 2019-07-05 | Stop reason: HOSPADM

## 2019-07-05 RX ORDER — ATORVASTATIN CALCIUM 20 MG/1
40 TABLET, FILM COATED ORAL DAILY
Status: DISCONTINUED | OUTPATIENT
Start: 2019-07-05 | End: 2019-07-07 | Stop reason: HOSPADM

## 2019-07-05 RX ORDER — FENTANYL CITRATE 50 UG/ML
25 INJECTION, SOLUTION INTRAMUSCULAR; INTRAVENOUS
Status: DISCONTINUED | OUTPATIENT
Start: 2019-07-05 | End: 2019-07-05 | Stop reason: HOSPADM

## 2019-07-05 RX ORDER — MONTELUKAST SODIUM 10 MG/1
10 TABLET ORAL NIGHTLY
Status: DISCONTINUED | OUTPATIENT
Start: 2019-07-05 | End: 2019-07-07 | Stop reason: HOSPADM

## 2019-07-05 RX ORDER — PROMETHAZINE HYDROCHLORIDE 25 MG/1
25 TABLET ORAL ONCE AS NEEDED
Status: DISCONTINUED | OUTPATIENT
Start: 2019-07-05 | End: 2019-07-05 | Stop reason: HOSPADM

## 2019-07-05 RX ORDER — MEPERIDINE HYDROCHLORIDE 25 MG/ML
12.5 INJECTION INTRAMUSCULAR; INTRAVENOUS; SUBCUTANEOUS
Status: DISCONTINUED | OUTPATIENT
Start: 2019-07-05 | End: 2019-07-05 | Stop reason: HOSPADM

## 2019-07-05 RX ORDER — PROMETHAZINE HYDROCHLORIDE 25 MG/1
25 SUPPOSITORY RECTAL ONCE AS NEEDED
Status: DISCONTINUED | OUTPATIENT
Start: 2019-07-05 | End: 2019-07-05 | Stop reason: HOSPADM

## 2019-07-05 RX ORDER — PROPOFOL 10 MG/ML
VIAL (ML) INTRAVENOUS AS NEEDED
Status: DISCONTINUED | OUTPATIENT
Start: 2019-07-04 | End: 2019-07-05 | Stop reason: SURG

## 2019-07-05 RX ORDER — SUCCINYLCHOLINE CHLORIDE 20 MG/ML
INJECTION INTRAMUSCULAR; INTRAVENOUS AS NEEDED
Status: DISCONTINUED | OUTPATIENT
Start: 2019-07-04 | End: 2019-07-05 | Stop reason: SURG

## 2019-07-05 RX ORDER — LIDOCAINE HYDROCHLORIDE 20 MG/ML
INJECTION, SOLUTION INFILTRATION; PERINEURAL AS NEEDED
Status: DISCONTINUED | OUTPATIENT
Start: 2019-07-04 | End: 2019-07-05 | Stop reason: SURG

## 2019-07-05 RX ORDER — GLYCOPYRROLATE 0.2 MG/ML
INJECTION INTRAMUSCULAR; INTRAVENOUS AS NEEDED
Status: DISCONTINUED | OUTPATIENT
Start: 2019-07-04 | End: 2019-07-05 | Stop reason: SURG

## 2019-07-05 RX ADMIN — VANCOMYCIN HYDROCHLORIDE 750 MG: 750 INJECTION, POWDER, LYOPHILIZED, FOR SOLUTION INTRAVENOUS at 17:50

## 2019-07-05 RX ADMIN — MONTELUKAST SODIUM 10 MG: 10 TABLET, FILM COATED ORAL at 20:02

## 2019-07-05 RX ADMIN — HYDROCODONE BITARTRATE AND ACETAMINOPHEN 1 TABLET: 5; 325 TABLET ORAL at 21:35

## 2019-07-05 RX ADMIN — HYDROCODONE BITARTRATE AND ACETAMINOPHEN 1 TABLET: 5; 325 TABLET ORAL at 06:05

## 2019-07-05 RX ADMIN — HYDROCODONE BITARTRATE AND ACETAMINOPHEN 1 TABLET: 5; 325 TABLET ORAL at 12:30

## 2019-07-05 RX ADMIN — POTASSIUM CHLORIDE AND SODIUM CHLORIDE 125 ML/HR: 900; 150 INJECTION, SOLUTION INTRAVENOUS at 02:55

## 2019-07-05 RX ADMIN — ATORVASTATIN CALCIUM 40 MG: 20 TABLET, FILM COATED ORAL at 09:52

## 2019-07-05 RX ADMIN — POTASSIUM CHLORIDE AND SODIUM CHLORIDE 125 ML/HR: 900; 150 INJECTION, SOLUTION INTRAVENOUS at 14:42

## 2019-07-05 RX ADMIN — AZTREONAM 1000 MG: 1 INJECTION, POWDER, LYOPHILIZED, FOR SOLUTION INTRAMUSCULAR; INTRAVENOUS at 14:42

## 2019-07-05 RX ADMIN — SODIUM CHLORIDE, PRESERVATIVE FREE 3 ML: 5 INJECTION INTRAVENOUS at 09:53

## 2019-07-05 RX ADMIN — AZTREONAM 1000 MG: 1 INJECTION, POWDER, LYOPHILIZED, FOR SOLUTION INTRAMUSCULAR; INTRAVENOUS at 21:35

## 2019-07-05 RX ADMIN — AZTREONAM 1000 MG: 1 INJECTION, POWDER, LYOPHILIZED, FOR SOLUTION INTRAMUSCULAR; INTRAVENOUS at 07:25

## 2019-07-05 NOTE — PROGRESS NOTES
Discharge Planning Assessment  [facility]     Patient Name: Jazmyn Castaneda  MRN: [id number]  Today's Date: 7/5/2019    Admit Date: 7/4/2019    Discharge Needs Assessment     Row Name 07/05/19 1430       Living Environment    Lives With  alone    Current Living Arrangements  home/apartment/condo    Primary Care Provided by  self    Provides Primary Care For  no one    Family Caregiver if Needed  child(emiliano), adult    Family Caregiver Names  daughter, Laine Garza or son, Nando Castaneda    Quality of Family Relationships  helpful;involved;supportive    Able to Return to Prior Arrangements  yes       Resource/Environmental Concerns    Resource/Environmental Concerns  none    Transportation Concerns  car, none       Transition Planning    Patient/Family Anticipates Transition to  home with family    Patient/Family Anticipated Services at Transition  none    Transportation Anticipated  family or friend will provide       Discharge Needs Assessment    Readmission Within the Last 30 Days  no previous admission in last 30 days    Concerns to be Addressed  no discharge needs identified    Equipment Currently Used at Home  none    Anticipated Changes Related to Illness  none        Discharge Plan     Row Name 07/05/19 1431       Plan    Patient/Family in Agreement with Plan  yes    Plan Comments  Spoke with patient at bedside.  Introduced self and explained role.  Facesheet and pharmacy verified.  Patient lives alone and is IADLS.  She does not use any DME and does not have a HH or SNF history.  She reports that her PCP recently retired.  Provided patient with appointment liaison number. Elza Flannery RN    Row Name 07/05/19 1424       Plan    Plan  return home- CCP will follow        Destination      No service coordination in this encounter.      Durable Medical Equipment      No service coordination in this encounter.      Dialysis/Infusion      No service coordination in this encounter.      Home Medical Care      No  service coordination in this encounter.      Therapy      No service coordination in this encounter.      Community Resources      No service coordination in this encounter.          Demographic Summary     Row Name 07/05/19 1427       General Information    Admission Type  inpatient    Arrived From  emergency department    Required Notices Provided  Important Message from Medicare    Referral Source  admission list    Reason for Consult  discharge planning    Preferred Language  English        Functional Status     Row Name 07/05/19 1428       Functional Status    Usual Activity Tolerance  good    Current Activity Tolerance  moderate       Functional Status, IADL    Medications  independent    Meal Preparation  independent    Housekeeping  independent    Laundry  independent    Shopping  independent       Mental Status    General Appearance WDL  WDL       Mental Status Summary    Recent Changes in Mental Status/Cognitive Functioning  no changes       Employment/    Employment Status  retired        Psychosocial    No documentation.       Abuse/Neglect    No documentation.       Legal    No documentation.       Substance Abuse    No documentation.       Patient Forms    No documentation.           Elza Flannery RN

## 2019-07-05 NOTE — ANESTHESIA PROCEDURE NOTES
Airway  Urgency: elective    Airway not difficult    General Information and Staff    Patient location during procedure: OR  Anesthesiologist: Live Vigil MD    Indications and Patient Condition  Indications for airway management: airway protection    Preoxygenated: yes  Mask difficulty assessment: 1 - vent by mask    Final Airway Details  Final airway type: endotracheal airway      Successful airway: ETT  Cuffed: yes   Successful intubation technique: direct laryngoscopy  Facilitating devices/methods: intubating stylet  Endotracheal tube insertion site: oral  Blade: Elizondo  Blade size: 2  ETT size (mm): 7.0  Cormack-Lehane Classification: grade I - full view of glottis  Placement verified by: chest auscultation and capnometry   Cuff volume (mL): 5  Measured from: teeth  ETT to teeth (cm): 22  Number of attempts at approach: 1

## 2019-07-05 NOTE — CONSULTS
Urology Consult  Patient Identification:  Name: Jazmyn Castaneda  Age: 68 y.o.  Sex: female  : 1950  MRN: [id number]   Chief Complaint: flank pain  History of Present Illness:     1. Stone - 6mm right uvj , pain was 10/10, improved with pain meds, worse with movement, radiating to back, no sob  D/w er   No cp    2. Multiple medical problems - stones, obesity, neuromuscular d/o     3. uti - ua +, wbc 22    Ct independently reviewed by myself - right hydro with distal 6mm stone      Problem List:  [unfilled]  Past Medical History:  Past Medical History:   Diagnosis Date   • Collapse of lung     history of in past post trauma   • Diverticulosis    • Environmental allergies     Some pollens, grass, trees, flowers   • Essential hypertension 3/24/2017   • Exogenous obesity 3/24/2017   • H/O Pneumonia    • Infectious mononucleosis    • Kidney infection    • Kidney stone    • Mixed hyperlipidemia 2019   • Neuromuscular disorder (CMS/HCC)    • Urinary tract infection    • Vertigo     past history of several times     Past Surgical History:  Past Surgical History:   Procedure Laterality Date   • BREAST EXCISIONAL BIOPSY Right     30 something when it was done; says it was precancerous   • CATARACT EXTRACTION, BILATERAL     •  SECTION     • MASTOID SURGERY     • NASAL SEPTAL RECONSTRUCTION     • SINUS SURGERY      scraped and prior deviated septum      Home Meds:  Medications Prior to Admission   Medication Sig Dispense Refill Last Dose   • atorvastatin (LIPITOR) 40 MG tablet Take 1 tablet by mouth Daily. 90 tablet 2 Taking   • CALCIUM PO Take  by mouth.   Taking   • ibuprofen (ADVIL,MOTRIN) 400 MG tablet Take 400 mg by mouth.   Taking   • montelukast (SINGULAIR) 10 MG tablet Take 1 tablet by mouth Every Night. 90 tablet 1 Taking   • Multiple Vitamin (MULTI VITAMIN DAILY PO) Take  by mouth.   Taking   • traMADol (ULTRAM) 50 MG tablet Take 50 mg by mouth.   Unknown     Current Meds:  "  [unfilled]  Allergies:  Allergies   Allergen Reactions   • Cephalosporins    • Ciprofloxacin    • Penicillins    • Sulfa Antibiotics      Immunizations:  Immunization History   Administered Date(s) Administered   • Flu Vaccine High Dose PF 65YR+ 10/14/2017   • Flu Vaccine Quad PF >36MO 2017   • Pneumococcal Conjugate 13-Valent (PCV13) 2018   • Pneumococcal Polysaccharide (PPSV23) 2017     Social History:   Social History     Tobacco Use   • Smoking status: Current Some Day Smoker     Packs/day: 1.00     Types: Cigarettes   • Smokeless tobacco: Never Used   • Tobacco comment: pt states trying to quit   Substance Use Topics   • Alcohol use: Yes     Comment: \"on occasion\"      Family History:  Family History   Problem Relation Age of Onset   • Hypertension Mother    • Aneurysm Mother    • Heart failure Mother    • Heart disease Mother    • Heart disease Father    • Hypertension Father    • Kidney disease Father    • Cancer Father 82        Bladder   • Hypertension Sister    • Cancer Maternal Aunt    • Stroke Maternal Uncle    • Glaucoma Maternal Grandmother    • Cancer Maternal Grandmother    • Stroke Maternal Grandfather    • Aneurysm Maternal Grandfather    • Cancer Maternal Aunt    • Cancer Maternal Aunt    • Liver cancer Other    • Pancreatic cancer Other       Review of Systems  negative 12 point system review except:as stated in the hpi  Objective:  tMax 24 hrs: Temp (24hrs), Av.3 °F (36.8 °C), Min:98.1 °F (36.7 °C), Max:98.4 °F (36.9 °C)    Vitals Ranges:   Temp:  [98.1 °F (36.7 °C)-98.4 °F (36.9 °C)] 98.4 °F (36.9 °C)  Heart Rate:  [] 92  Resp:  [16-24] 16  BP: (154-181)/(72-96) 173/72  Intake and Output Last 3 Shifts:   No intake/output data recorded.  Exam:  /72 (BP Location: Right arm, Patient Position: Lying)   Pulse 92   Temp 98.4 °F (36.9 °C) (Oral)   Resp 16   Ht 152.4 cm (60\")   Wt 70.9 kg (156 lb 3.2 oz)   SpO2 97%   BMI 30.51 kg/m²       General Appearance: " Alert, cooperative, no distress, appears stated age   Head: Normocephalic, without obvious abnormality, atraumatic   ENT: Normal hearing, external inspection, ears and nose   Eyes: Normal pupils/iris, external inspection, conjunctiva, eye lids   Back: No CVA tenderness   Respiratory: Normal effort, palpation, auscultation   CV: Normal auscultation, carotid pulses, no edema   Abdomen: No hernia, soft, non tender, no masses   :    Musculoskeletal: Normal extremities, nails, digitis   Skin: Normal skin color, texture, no rashes or lesion   Neurologic/psych: Normal orientation, mood, affect           Data Review:  CBC:   Results from last 7 days   Lab Units 07/04/19 2015   WBC 10*3/mm3 22.00*   RBC 10*6/mm3 5.62*      Assessment:    Sepsis due to urinary tract infection (CMS/HCC)    Acute pyelonephritis    Essential hypertension    Neutrophilic leukocytosis    Hydronephrosis with urinary obstruction due to ureteral calculus    Right uvj stone   uti      Plan:    Cystoscopy with right stent placement   ivf iv abx  Complicated by uti      Alen Lal MD  7/4/2019

## 2019-07-05 NOTE — PROGRESS NOTES
"DAILY PROGRESS NOTE  [facility]    Patient Identification:  Name: Jazmyn Castaneda  Age: 68 y.o.  Sex: female  :  1950  MRN: [id number]         Primary Care Physician: Provider, No Known    Subjective:  Interval History:She feels better. Pain better.    Objective:    Scheduled Meds:  atorvastatin 40 mg Oral Daily   aztreonam 1,000 mg Intravenous Q8H   docusate sodium 100 mg Oral BID   montelukast 10 mg Oral Nightly   sodium chloride 3 mL Intravenous Q12H   vancomycin 20 mg/kg Intravenous Q24H     Continuous Infusions:  lactated ringers 9 mL/hr Last Rate: Stopped (19 0332)   Pharmacy to dose vancomycin     sodium chloride 0.9 % with KCl 20 mEq 125 mL/hr Last Rate: 125 mL/hr (19 0255)       Vital signs in last 24 hours:  Temp:  [97.6 °F (36.4 °C)-98.5 °F (36.9 °C)] 97.6 °F (36.4 °C)  Heart Rate:  [] 77  Resp:  [16-24] 18  BP: (111-181)/(57-96) 111/57    Intake/Output:    Intake/Output Summary (Last 24 hours) at 2019 1136  Last data filed at 2019 0900  Gross per 24 hour   Intake 1981.67 ml   Output 0 ml   Net 1981.67 ml       Exam:  /57 (BP Location: Right arm, Patient Position: Lying)   Pulse 77   Temp 97.6 °F (36.4 °C) (Oral)   Resp 18   Ht 152.4 cm (60\")   Wt 68.3 kg (150 lb 9.6 oz)   SpO2 97%   BMI 29.41 kg/m²     General Appearance:    Alert, cooperative, no distress   Head:    Normocephalic, without obvious abnormality, atraumatic   Eyes:       Throat:   Lips, tongue, gums normal   Neck:   Supple, symmetrical, trachea midline, no JVD   Lungs:     Clear to auscultation bilaterally, respirations unlabored   Chest Wall:    No tenderness or deformity    Heart:    Regular rate and rhythm, S1 and S2 normal, no murmur,no  Rub or gallop   Abdomen:     Soft, non-tender, bowel sounds active, no masses, no organomegaly    Extremities:   Extremities normal, atraumatic, no cyanosis or edema   Pulses:      Skin:   Skin is warm and dry,  no rashes or palpable " lesions   Neurologic:   no focal deficits noted      Lab Results (last 72 hours)     Procedure Component Value Units Date/Time    Urinalysis, Microscopic Only - Urine, Clean Catch [[id number]]  (Abnormal) Collected:  07/05/19 0604    Specimen:  Urine, Clean Catch Updated:  07/05/19 0847     RBC, UA 13-20 /HPF      WBC, UA 13-20 /HPF      Bacteria, UA 1+ /HPF      Squamous Epithelial Cells, UA 0-2 /HPF      Hyaline Casts, UA 3-6 /LPF      Methodology Manual Light Microscopy    Urine Culture - Urine, Urine, Clean Catch [[id number]] Collected:  07/05/19 0604    Specimen:  Urine, Clean Catch Updated:  07/05/19 0847    CBC & Differential [[id number]] Collected:  07/05/19 0555    Specimen:  Blood Updated:  07/05/19 0715    Narrative:       The following orders were created for panel order CBC & Differential.  Procedure                               Abnormality         Status                     ---------                               -----------         ------                     CBC Auto Differential[[id number]]        Abnormal            Final result                 Please view results for these tests on the individual orders.    CBC Auto Differential [[id number]]  (Abnormal) Collected:  07/05/19 0555    Specimen:  Blood Updated:  07/05/19 0715     WBC 13.14 10*3/mm3      RBC 4.76 10*6/mm3      Hemoglobin 14.5 g/dL      Hematocrit 44.2 %      MCV 92.9 fL      MCH 30.5 pg      MCHC 32.8 g/dL      RDW 13.5 %      RDW-SD 45.9 fl      MPV 12.9 fL      Platelets 278 10*3/mm3      Neutrophil % 65.2 %      Lymphocyte % 24.7 %      Monocyte % 8.2 %      Eosinophil % 1.1 %      Basophil % 0.5 %      Immature Grans % 0.3 %      Neutrophils, Absolute 8.56 10*3/mm3      Lymphocytes, Absolute 3.24 10*3/mm3      Monocytes, Absolute 1.08 10*3/mm3      Eosinophils, Absolute 0.15 10*3/mm3      Basophils, Absolute 0.07 10*3/mm3      Immature Grans, Absolute 0.04 10*3/mm3      nRBC 0.0 /100 WBC     Blood Culture - Blood, Arm, Right [[id number]]  Collected:  07/05/19 0621    Specimen:  Blood from Arm, Right Updated:  07/05/19 0714    Urinalysis With Culture If Indicated - Urine, Clean Catch [[id number]]  (Abnormal) Collected:  07/05/19 0604    Specimen:  Urine, Clean Catch Updated:  07/05/19 0706     Color, UA Yellow     Appearance, UA Clear     pH, UA 5.5     Specific Gravity, UA 1.020     Glucose, UA Negative     Ketones, UA Negative     Bilirubin, UA Negative     Blood, UA Large (3+)     Protein, UA 30 mg/dL (1+)     Leuk Esterase, UA Trace     Nitrite, UA Negative     Urobilinogen, UA 0.2 E.U./dL    Basic Metabolic Panel [[id number]]  (Abnormal) Collected:  07/05/19 0555    Specimen:  Blood Updated:  07/05/19 0655     Glucose 110 mg/dL      BUN 8 mg/dL      Creatinine 0.71 mg/dL      Sodium 139 mmol/L      Potassium 4.1 mmol/L      Chloride 110 mmol/L      CO2 22.3 mmol/L      Calcium 9.4 mg/dL      eGFR Non African Amer 81 mL/min/1.73      BUN/Creatinine Ratio 11.3     Anion Gap 6.7 mmol/L     Narrative:       GFR Normal >60  Chronic Kidney Disease <60  Kidney Failure <15    Blood Culture - Blood, Arm, Right [[id number]] Collected:  07/05/19 0555    Specimen:  Blood from Arm, Right Updated:  07/05/19 0616    Comprehensive Metabolic Panel [[id number]]  (Abnormal) Collected:  07/04/19 2015    Specimen:  Blood Updated:  07/04/19 2043     Glucose 157 mg/dL      BUN 9 mg/dL      Creatinine 1.08 mg/dL      Sodium 134 mmol/L      Potassium 3.9 mmol/L      Chloride 96 mmol/L      CO2 23.7 mmol/L      Calcium 10.8 mg/dL      Total Protein 8.0 g/dL      Albumin 4.30 g/dL      ALT (SGPT) 17 U/L      AST (SGOT) 18 U/L      Alkaline Phosphatase 203 U/L      Total Bilirubin 0.6 mg/dL      eGFR Non African Amer 50 mL/min/1.73      Globulin 3.7 gm/dL      A/G Ratio 1.2 g/dL      BUN/Creatinine Ratio 8.3     Anion Gap 14.3 mmol/L     Narrative:       GFR Normal >60  Chronic Kidney Disease <60  Kidney Failure <15    Lipase [[id number]]  (Normal) Collected:  07/04/19 2015     Specimen:  Blood Updated:  07/04/19 2043     Lipase 19 U/L     Urinalysis, Microscopic Only - Urine, Clean Catch [[id number]] Collected:  07/04/19 2006    Specimen:  Urine, Clean Catch Updated:  07/04/19 2043     RBC, UA 0-2 /HPF      WBC, UA 0-2 /HPF      Bacteria, UA None Seen /HPF      Squamous Epithelial Cells, UA 0-2 /HPF      Hyaline Casts, UA None Seen /LPF      Methodology Manual Light Microscopy    Urinalysis With Microscopic If Indicated (No Culture) - Urine, Clean Catch [[id number]]  (Abnormal) Collected:  07/04/19 2006    Specimen:  Urine, Clean Catch Updated:  07/04/19 2024     Color, UA Yellow     Appearance, UA Clear     pH, UA 8.0     Specific Gravity, UA 1.010     Glucose, UA Negative     Ketones, UA Trace     Bilirubin, UA Negative     Blood, UA Small (1+)     Protein, UA Negative     Leuk Esterase, UA Trace     Nitrite, UA Negative     Urobilinogen, UA 0.2 E.U./dL    CBC & Differential [[id number]] Collected:  07/04/19 2015    Specimen:  Blood Updated:  07/04/19 2023    Narrative:       The following orders were created for panel order CBC & Differential.  Procedure                               Abnormality         Status                     ---------                               -----------         ------                     CBC Auto Differential[[id number]]        Abnormal            Final result                 Please view results for these tests on the individual orders.    CBC Auto Differential [[id number]]  (Abnormal) Collected:  07/04/19 2015    Specimen:  Blood Updated:  07/04/19 2023     WBC 22.00 10*3/mm3      RBC 5.62 10*6/mm3      Hemoglobin 17.2 g/dL      Hematocrit 51.8 %      MCV 92.2 fL      MCH 30.6 pg      MCHC 33.2 g/dL      RDW 13.4 %      RDW-SD 45.3 fl      MPV 12.7 fL      Platelets 338 10*3/mm3      Neutrophil % 89.4 %      Lymphocyte % 6.8 %      Monocyte % 3.0 %      Eosinophil % 0.0 %      Basophil % 0.4 %      Immature Grans % 0.4 %      Neutrophils, Absolute 19.66 10*3/mm3       Lymphocytes, Absolute 1.50 10*3/mm3      Monocytes, Absolute 0.67 10*3/mm3      Eosinophils, Absolute 0.00 10*3/mm3      Basophils, Absolute 0.08 10*3/mm3      Immature Grans, Absolute 0.09 10*3/mm3      nRBC 0.0 /100 WBC         Data Review:  Results from last 7 days   Lab Units 07/05/19 0555 07/04/19 2015   SODIUM mmol/L 139 134*   POTASSIUM mmol/L 4.1 3.9   CHLORIDE mmol/L 110* 96*   CO2 mmol/L 22.3 23.7   BUN mg/dL 8 9   CREATININE mg/dL 0.71 1.08*   GLUCOSE mg/dL 110* 157*   CALCIUM mg/dL 9.4 10.8*     Results from last 7 days   Lab Units 07/05/19 0555 07/04/19 2015   WBC 10*3/mm3 13.14* 22.00*   HEMOGLOBIN g/dL 14.5 17.2*   HEMATOCRIT % 44.2 51.8*   PLATELETS 10*3/mm3 278 338             No results found for: TROPONINT      Results from last 7 days   Lab Units 07/04/19 2015   ALK PHOS U/L 203*   BILIRUBIN mg/dL 0.6   ALT (SGPT) U/L 17   AST (SGOT) U/L 18             No results found for: POCGLU        Past Medical History:   Diagnosis Date   • Collapse of lung 1980s    history of in past post trauma   • Diverticulosis    • Environmental allergies     Some pollens, grass, trees, flowers   • Essential hypertension 3/24/2017   • Exogenous obesity 3/24/2017   • H/O Pneumonia 1980s   • Infectious mononucleosis    • Kidney infection    • Kidney stone    • Mixed hyperlipidemia 4/17/2019   • Neuromuscular disorder (CMS/HCC)    • Urinary tract infection    • Vertigo     past history of several times       Assessment:  Active Hospital Problems    Diagnosis  POA   • **Sepsis due to urinary tract infection (CMS/HCC) [A41.9, N39.0]  Yes   • Allergy to multiple antibiotics [Z88.1]  Not Applicable   • Hydronephrosis with urinary obstruction due to ureteral calculus [N13.2]  Yes   • Neutrophilic leukocytosis [D72.9]  Yes   • Essential hypertension [I10]  Yes   • Acute pyelonephritis [N10]  Yes      Resolved Hospital Problems   No resolved problems to display.       Plan:  S/P ureteral stent. Continue antibiotics and  await cultures.  Follow up lab.    Neno Sue MD  7/5/2019  11:36 AM

## 2019-07-05 NOTE — H&P
Patient Name:  Jazmyn Castaneda  YOB: 1950  MRN:  [id number]  Admit Date:  7/4/2019  Patient Care Team:  Provider, No Known as PCP - Ha Aparicio MD as PCP - Claims Attributed      Subjective   History Present Illness     Chief Complaint   Patient presents with   • Back Pain   • Flank Pain       Ms. Castaneda is a 68 y.o. smoker with a history of nephrolithiasis that presents to [facility] complaining of right flank pain, suprapubic pain, and increased urinary frequency for the past day.  She states that she at first thought this was due to constipation but the symptoms kept progressing.  She had a couple episodes of nausea and vomiting associated with this. She has not had antibiotics recently and has no known history of resistant organisms.    She was found to have a right obstructing UVJ stone with evidence of right-sided pyelonephritis.  Urology was consulted from the ER and she was taken to the OR right away for cystoscopy and stent placement.      History of Present Illness  Review of Systems   Constitutional: Negative for chills and fever.   HENT: Negative for congestion, nosebleeds, sore throat and trouble swallowing.    Eyes: Negative for redness and visual disturbance.   Respiratory: Negative for cough, choking and shortness of breath.    Cardiovascular: Negative for chest pain, palpitations and leg swelling.   Gastrointestinal: Positive for abdominal pain and constipation. Negative for blood in stool, diarrhea, nausea and vomiting.   Endocrine: Negative for cold intolerance and heat intolerance.   Genitourinary: Positive for decreased urine volume, dysuria and flank pain. Negative for difficulty urinating.   Musculoskeletal: Negative for arthralgias and myalgias.   Skin: Negative for pallor and rash.   Neurological: Negative for dizziness, weakness, light-headedness and headaches.   Hematological: Negative for adenopathy. Does not bruise/bleed easily.  "  Psychiatric/Behavioral: Negative for confusion and decreased concentration.        Personal History     Past Medical History:   Diagnosis Date   • Collapse of lung     history of in past post trauma   • Diverticulosis    • Environmental allergies     Some pollens, grass, trees, flowers   • Essential hypertension 3/24/2017   • Exogenous obesity 3/24/2017   • H/O Pneumonia    • Infectious mononucleosis    • Kidney infection    • Kidney stone    • Mixed hyperlipidemia 2019   • Neuromuscular disorder (CMS/HCC)    • Urinary tract infection    • Vertigo     past history of several times     Past Surgical History:   Procedure Laterality Date   • BREAST EXCISIONAL BIOPSY Right     30 something when it was done; says it was precancerous   • CATARACT EXTRACTION, BILATERAL     •  SECTION     • MASTOID SURGERY     • NASAL SEPTAL RECONSTRUCTION     • SINUS SURGERY      scraped and prior deviated septum     Family History   Problem Relation Age of Onset   • Hypertension Mother    • Aneurysm Mother    • Heart failure Mother    • Heart disease Mother    • Heart disease Father    • Hypertension Father    • Kidney disease Father    • Cancer Father 82        Bladder   • Hypertension Sister    • Cancer Maternal Aunt    • Stroke Maternal Uncle    • Glaucoma Maternal Grandmother    • Cancer Maternal Grandmother    • Stroke Maternal Grandfather    • Aneurysm Maternal Grandfather    • Cancer Maternal Aunt    • Cancer Maternal Aunt    • Liver cancer Other    • Pancreatic cancer Other      Social History     Tobacco Use   • Smoking status: Current Some Day Smoker     Packs/day: 1.00     Types: Cigarettes   • Smokeless tobacco: Never Used   • Tobacco comment: pt states trying to quit   Substance Use Topics   • Alcohol use: Yes     Comment: \"on occasion\"   • Drug use: No     Medications Prior to Admission   Medication Sig Dispense Refill Last Dose   • atorvastatin (LIPITOR) 40 MG tablet Take 1 tablet by mouth Daily. 90 " tablet 2 7/4/2019 at Unknown time   • CALCIUM PO Take  by mouth.   7/4/2019 at Unknown time   • montelukast (SINGULAIR) 10 MG tablet Take 1 tablet by mouth Every Night. 90 tablet 1 7/4/2019 at Unknown time     Allergies:    Allergies   Allergen Reactions   • Cephalosporins Swelling   • Penicillins Swelling   • Ciprofloxacin Rash   • Sulfa Antibiotics Other (See Comments)     Patient states she lost her vision temporarily when using Sulfa based eye ointment, also, broke out in rash w/oral medication.       Objective    Objective     Vital Signs  Temp:  [98.1 °F (36.7 °C)-98.5 °F (36.9 °C)] 98.5 °F (36.9 °C)  Heart Rate:  [] 99  Resp:  [16-24] 16  BP: (112-181)/(68-96) 154/82  SpO2:  [97 %-100 %] 99 %  on  Flow (L/min):  [2-4] 2;   Device (Oxygen Therapy): nasal cannula  Body mass index is 30.51 kg/m².    Physical Exam   Constitutional: She is oriented to person, place, and time. No distress.   HENT:   Head: Normocephalic and atraumatic.   Mouth/Throat: Oropharynx is clear and moist.   Eyes: Conjunctivae and EOM are normal. Pupils are equal, round, and reactive to light.   Neck: Normal range of motion. Neck supple.   Cardiovascular: Normal rate, regular rhythm and intact distal pulses.   Pulmonary/Chest: Effort normal and breath sounds normal.   Abdominal: Soft. Bowel sounds are normal. There is tenderness (suprapubic, right flank).   Musculoskeletal: She exhibits no edema or tenderness.   Neurological: She is alert and oriented to person, place, and time.   Skin: Skin is warm and dry. She is not diaphoretic.   Psychiatric: She has a normal mood and affect. Her behavior is normal.   Nursing note and vitals reviewed.      Results Review:  I reviewed the patient's new clinical results.  I reviewed the patient's new imaging results and agree with the interpretation.  I reviewed the patient's other test results and agree with the interpretation  I personally viewed and interpreted the patient's EKG/Telemetry  data  Discussed with ED provider.    Lab Results (last 24 hours)     Procedure Component Value Units Date/Time    Urinalysis With Microscopic If Indicated (No Culture) - Urine, Clean Catch [[id number]]  (Abnormal) Collected:  07/04/19 2006    Specimen:  Urine, Clean Catch Updated:  07/04/19 2024     Color, UA Yellow     Appearance, UA Clear     pH, UA 8.0     Specific Gravity, UA 1.010     Glucose, UA Negative     Ketones, UA Trace     Bilirubin, UA Negative     Blood, UA Small (1+)     Protein, UA Negative     Leuk Esterase, UA Trace     Nitrite, UA Negative     Urobilinogen, UA 0.2 E.U./dL    Urinalysis, Microscopic Only - Urine, Clean Catch [[id number]] Collected:  07/04/19 2006    Specimen:  Urine, Clean Catch Updated:  07/04/19 2043     RBC, UA 0-2 /HPF      WBC, UA 0-2 /HPF      Bacteria, UA None Seen /HPF      Squamous Epithelial Cells, UA 0-2 /HPF      Hyaline Casts, UA None Seen /LPF      Methodology Manual Light Microscopy    CBC & Differential [[id number]] Collected:  07/04/19 2015    Specimen:  Blood Updated:  07/04/19 2023    Narrative:       The following orders were created for panel order CBC & Differential.  Procedure                               Abnormality         Status                     ---------                               -----------         ------                     CBC Auto Differential[[id number]]        Abnormal            Final result                 Please view results for these tests on the individual orders.    Comprehensive Metabolic Panel [[id number]]  (Abnormal) Collected:  07/04/19 2015    Specimen:  Blood Updated:  07/04/19 2043     Glucose 157 mg/dL      BUN 9 mg/dL      Creatinine 1.08 mg/dL      Sodium 134 mmol/L      Potassium 3.9 mmol/L      Chloride 96 mmol/L      CO2 23.7 mmol/L      Calcium 10.8 mg/dL      Total Protein 8.0 g/dL      Albumin 4.30 g/dL      ALT (SGPT) 17 U/L      AST (SGOT) 18 U/L      Alkaline Phosphatase 203 U/L      Total Bilirubin 0.6 mg/dL      eGFR  Non African Amer 50 mL/min/1.73      Globulin 3.7 gm/dL      A/G Ratio 1.2 g/dL      BUN/Creatinine Ratio 8.3     Anion Gap 14.3 mmol/L     Narrative:       GFR Normal >60  Chronic Kidney Disease <60  Kidney Failure <15    Lipase [[id number]]  (Normal) Collected:  07/04/19 2015    Specimen:  Blood Updated:  07/04/19 2043     Lipase 19 U/L     CBC Auto Differential [[id number]]  (Abnormal) Collected:  07/04/19 2015    Specimen:  Blood Updated:  07/04/19 2023     WBC 22.00 10*3/mm3      RBC 5.62 10*6/mm3      Hemoglobin 17.2 g/dL      Hematocrit 51.8 %      MCV 92.2 fL      MCH 30.6 pg      MCHC 33.2 g/dL      RDW 13.4 %      RDW-SD 45.3 fl      MPV 12.7 fL      Platelets 338 10*3/mm3      Neutrophil % 89.4 %      Lymphocyte % 6.8 %      Monocyte % 3.0 %      Eosinophil % 0.0 %      Basophil % 0.4 %      Immature Grans % 0.4 %      Neutrophils, Absolute 19.66 10*3/mm3      Lymphocytes, Absolute 1.50 10*3/mm3      Monocytes, Absolute 0.67 10*3/mm3      Eosinophils, Absolute 0.00 10*3/mm3      Basophils, Absolute 0.08 10*3/mm3      Immature Grans, Absolute 0.09 10*3/mm3      nRBC 0.0 /100 WBC           Imaging Results (last 24 hours)     Procedure Component Value Units Date/Time    FL C Arm During Surgery [[id number]] Resulted:  07/05/19 0019     Updated:  07/05/19 0019    Narrative:       This procedure was auto-finalized with no dictation required.    XR Abdomen KUB [[id number]] Updated:  07/05/19 0014    CT Abdomen Pelvis With Contrast [[id number]] Collected:  07/04/19 2142     Updated:  07/04/19 2150    Narrative:       CT OF THE ABDOMEN AND PELVIS WITH CONTRAST     HISTORY: Right flank pain and dysuria     COMPARISON: 02/16/2017     TECHNIQUE: Axial CT imaging was obtained from the dome the diaphragm to  the symphysis pubis. IV contrast was administered.     FINDINGS:  Images through the lung bases demonstrate some mild dependent  atelectasis. There is a small hiatal hernia. Proximal small bowel  appears unremarkable,  with exception of a duodenal diverticulum arising  from the third portion of the duodenum. Small cysts are identified  within the liver. The gallbladder is normal, as is the spleen. The  pancreas appears unremarkable. The patient has mild right-sided  hydroureteronephrosis. This is secondary to a 6 mm stone which is  located within the distal right ureter. Distal to this, the right ureter  is decompressed. There is extensive inflammatory stranding seen around  the right kidney and right ureter, as well as urothelial enhancement.  Superimposed infection is not excluded. Correlation with urinalysis and  urine cultures is recommended. The left kidney appears unremarkable. No  hydronephrosis is seen on the left. Urinary bladder appears  unremarkable. The uterus is within normal limits. There is colonic  diverticulosis, although there is no evidence of diverticulitis. The  appendix is visualized, and is within normal limits. No free fluid or  adenopathy is seen within the pelvis. No aggressive osseous  abnormalities are seen.       Impression:       Mild right-sided hydroureteronephrosis. This is secondary to a 6 mm  stone located within the distal right ureter. Patient is noted to have  extensive periureteral and perinephric soft tissue stranding, as well as  urothelial enhancement. Superimposed infection is not excluded.     Radiation dose reduction techniques were utilized, including automated  exposure control and exposure modulation based on body size.     This report was finalized on 7/4/2019 9:47 PM by Dr. Hillary Menendez M.D.                  No orders to display        Assessment/Plan     Active Hospital Problems    Diagnosis POA   • **Sepsis due to urinary tract infection (CMS/HCC) [A41.9, N39.0] Yes   • Allergy to multiple antibiotics [Z88.1] Not Applicable   • Hydronephrosis with urinary obstruction due to ureteral calculus [N13.2] Yes   • Neutrophilic leukocytosis [D72.9] Yes   • Essential hypertension  [I10] Yes   • Acute pyelonephritis [N10] Yes   Right Ureteral Stone with Obstruction  - s/p right ureteral stent 7/4/19  - appreciate urology recs    UTI with Sepsis  - symptomatic though urine unimpressive, will repeat following ureteral stent; e/o pyelonephritis on CT  - WBC 22k, , RR 24 on admission-has leukocytosis at baseline but this is much higher than normal for her  - send blood cultures, repeat UA as above  - given multiple drug allergies and nitrite negative urine will cover with aztreonam and vancomycin for now       I discussed the patients findings and my recommendations with patient and nursing staff.    VTE Prophylaxis - SCDs .  Code Status - Full code.       Neil Stone MD  [facility]  07/05/19  4:02 AM

## 2019-07-05 NOTE — OP NOTE
Operative Note      Jazmyn Castaneda  68 y.o.  1950  female  [id number]      7/4/2019  Surgeon(s) and Role:     * Alen Lal MD - Primary   Pre-operative Diagnosis: right uvj stone / uti   Post-operative Diagnosis: Same  Complications:None    History: This is a 68-year-old female who presented to the emergency room with right-sided flank pain and abdominal pain had a white cell count of 22,000 and was worked up and found to have an obstructing right 6 mm distal ureteral stone.  She had nausea and emesis.  After the risks benefits alternatives were all explained to her which included but were not limited to damage to the surrounding structures, infection, bleeding, anesthetic complications, life-threatening events including stroke heart attack and death the patient understood all the risk benefits alternatives except of these and was taken the operating room    Description of procedure: After consent was obtained the patient was taken to the operating room.  After general anesthesia was administered she was placed in the dorsal lithotomy position.  She was prepped and draped in the standard fashion.  A cystoscope was placed per her urethra into her bladder and the right ureteral orifice was cannulated with a wire and a Pollick catheter.  There was contrast from the CT scan was still in the right collecting system.  I was able to position a 624 double-J stent with good coil in the renal pelvis and the bladder.  She was then extubated in the operating room and taken to recovery room stable.    Procedure(s) and Anesthesia Type:     * CYSTOSCOPY AND STENT PLACEMENT - General  right  Specimens:none      Estimated Blood Loss: minimal  Alen Lal MD  7/4/2019

## 2019-07-05 NOTE — PLAN OF CARE
Problem: Patient Care Overview  Goal: Plan of Care Review  Outcome: Ongoing (interventions implemented as appropriate)   07/05/19 0135   Coping/Psychosocial   Plan of Care Reviewed With patient;daughter   Plan of Care Review   Progress no change   OTHER   Outcome Summary admitted from OR post cysto w/ stent placement. denies pain. will continue to monitor.       Problem: Pain, Acute (Adult)  Goal: Acceptable Pain Control/Comfort Level  Outcome: Ongoing (interventions implemented as appropriate)

## 2019-07-05 NOTE — H&P (VIEW-ONLY)
Urology Consult  Patient Identification:  Name: Jazmyn Castaneda  Age: 68 y.o.  Sex: female  : 1950  MRN: [id number]   Chief Complaint: flank pain  History of Present Illness:     1. Stone - 6mm right uvj , pain was 10/10, improved with pain meds, worse with movement, radiating to back, no sob  D/w er   No cp    2. Multiple medical problems - stones, obesity, neuromuscular d/o     3. uti - ua +, wbc 22    Ct independently reviewed by myself - right hydro with distal 6mm stone      Problem List:  [unfilled]  Past Medical History:  Past Medical History:   Diagnosis Date   • Collapse of lung     history of in past post trauma   • Diverticulosis    • Environmental allergies     Some pollens, grass, trees, flowers   • Essential hypertension 3/24/2017   • Exogenous obesity 3/24/2017   • H/O Pneumonia    • Infectious mononucleosis    • Kidney infection    • Kidney stone    • Mixed hyperlipidemia 2019   • Neuromuscular disorder (CMS/HCC)    • Urinary tract infection    • Vertigo     past history of several times     Past Surgical History:  Past Surgical History:   Procedure Laterality Date   • BREAST EXCISIONAL BIOPSY Right     30 something when it was done; says it was precancerous   • CATARACT EXTRACTION, BILATERAL     •  SECTION     • MASTOID SURGERY     • NASAL SEPTAL RECONSTRUCTION     • SINUS SURGERY      scraped and prior deviated septum      Home Meds:  Medications Prior to Admission   Medication Sig Dispense Refill Last Dose   • atorvastatin (LIPITOR) 40 MG tablet Take 1 tablet by mouth Daily. 90 tablet 2 Taking   • CALCIUM PO Take  by mouth.   Taking   • ibuprofen (ADVIL,MOTRIN) 400 MG tablet Take 400 mg by mouth.   Taking   • montelukast (SINGULAIR) 10 MG tablet Take 1 tablet by mouth Every Night. 90 tablet 1 Taking   • Multiple Vitamin (MULTI VITAMIN DAILY PO) Take  by mouth.   Taking   • traMADol (ULTRAM) 50 MG tablet Take 50 mg by mouth.   Unknown     Current Meds:  "  [unfilled]  Allergies:  Allergies   Allergen Reactions   • Cephalosporins    • Ciprofloxacin    • Penicillins    • Sulfa Antibiotics      Immunizations:  Immunization History   Administered Date(s) Administered   • Flu Vaccine High Dose PF 65YR+ 10/14/2017   • Flu Vaccine Quad PF >36MO 2017   • Pneumococcal Conjugate 13-Valent (PCV13) 2018   • Pneumococcal Polysaccharide (PPSV23) 2017     Social History:   Social History     Tobacco Use   • Smoking status: Current Some Day Smoker     Packs/day: 1.00     Types: Cigarettes   • Smokeless tobacco: Never Used   • Tobacco comment: pt states trying to quit   Substance Use Topics   • Alcohol use: Yes     Comment: \"on occasion\"      Family History:  Family History   Problem Relation Age of Onset   • Hypertension Mother    • Aneurysm Mother    • Heart failure Mother    • Heart disease Mother    • Heart disease Father    • Hypertension Father    • Kidney disease Father    • Cancer Father 82        Bladder   • Hypertension Sister    • Cancer Maternal Aunt    • Stroke Maternal Uncle    • Glaucoma Maternal Grandmother    • Cancer Maternal Grandmother    • Stroke Maternal Grandfather    • Aneurysm Maternal Grandfather    • Cancer Maternal Aunt    • Cancer Maternal Aunt    • Liver cancer Other    • Pancreatic cancer Other       Review of Systems  negative 12 point system review except:as stated in the hpi  Objective:  tMax 24 hrs: Temp (24hrs), Av.3 °F (36.8 °C), Min:98.1 °F (36.7 °C), Max:98.4 °F (36.9 °C)    Vitals Ranges:   Temp:  [98.1 °F (36.7 °C)-98.4 °F (36.9 °C)] 98.4 °F (36.9 °C)  Heart Rate:  [] 92  Resp:  [16-24] 16  BP: (154-181)/(72-96) 173/72  Intake and Output Last 3 Shifts:   No intake/output data recorded.  Exam:  /72 (BP Location: Right arm, Patient Position: Lying)   Pulse 92   Temp 98.4 °F (36.9 °C) (Oral)   Resp 16   Ht 152.4 cm (60\")   Wt 70.9 kg (156 lb 3.2 oz)   SpO2 97%   BMI 30.51 kg/m²       General Appearance: " Alert, cooperative, no distress, appears stated age   Head: Normocephalic, without obvious abnormality, atraumatic   ENT: Normal hearing, external inspection, ears and nose   Eyes: Normal pupils/iris, external inspection, conjunctiva, eye lids   Back: No CVA tenderness   Respiratory: Normal effort, palpation, auscultation   CV: Normal auscultation, carotid pulses, no edema   Abdomen: No hernia, soft, non tender, no masses   :    Musculoskeletal: Normal extremities, nails, digitis   Skin: Normal skin color, texture, no rashes or lesion   Neurologic/psych: Normal orientation, mood, affect           Data Review:  CBC:   Results from last 7 days   Lab Units 07/04/19 2015   WBC 10*3/mm3 22.00*   RBC 10*6/mm3 5.62*      Assessment:    Sepsis due to urinary tract infection (CMS/HCC)    Acute pyelonephritis    Essential hypertension    Neutrophilic leukocytosis    Hydronephrosis with urinary obstruction due to ureteral calculus    Right uvj stone   uti      Plan:    Cystoscopy with right stent placement   ivf iv abx  Complicated by uti      Alen Lal MD  7/4/2019

## 2019-07-05 NOTE — ANESTHESIA POSTPROCEDURE EVALUATION
"Patient: Jazmyn Castaneda    Procedure Summary     Date:  07/04/19 Room / Location:  [facility] OR 01 / [facility] MAIN OR    Anesthesia Start:  2332 Anesthesia Stop:  07/05/19 0009    Procedure:  CYSTOSCOPY AND STENT PLACEMENT (N/A Urethra) Diagnosis:      Surgeon:  Alen Lal MD Provider:  Live Vigil MD    Anesthesia Type:  general ASA Status:  3 - Emergent          Anesthesia Type: general  Last vitals  BP   173/72 (07/04/19 2245)   Temp   36.9 °C (98.4 °F) (07/04/19 2245)   Pulse   92 (07/04/19 2245)   Resp   16 (07/04/19 2245)     SpO2   97 % (07/04/19 2245)     Post Anesthesia Care and Evaluation    Patient location during evaluation: PACU  Patient participation: complete - patient participated  Level of consciousness: awake  Pain management: adequate  Airway patency: patent  Anesthetic complications: No anesthetic complications    Cardiovascular status: acceptable  Respiratory status: acceptable  Hydration status: acceptable    Comments: /72 (BP Location: Right arm, Patient Position: Lying)   Pulse 92   Temp 36.9 °C (98.4 °F) (Oral)   Resp 16   Ht 152.4 cm (60\")   Wt 70.9 kg (156 lb 3.2 oz)   SpO2 97%   BMI 30.51 kg/m²         "

## 2019-07-05 NOTE — PROGRESS NOTES
"   LOS: 1 day   Patient Care Team:  Provider, No Known as PCP - General  Ha García MD as PCP - Claims Attributed      Subjective   Interval History: s/p ureteral stent. Remained afebrile overnight. Tolerating stent well with no significant discomfort.    Objective     ROS   12 POINT NEG ROS PERTINENT IN HPI      Vital Signs  Temp:  [97.6 °F (36.4 °C)-98.5 °F (36.9 °C)] 97.6 °F (36.4 °C)  Heart Rate:  [] 77  Resp:  [16-24] 18  BP: (111-181)/(57-96) 111/57      Intake/Output Summary (Last 24 hours) at 7/5/2019 0853  Last data filed at 7/5/2019 0605  Gross per 24 hour   Intake 1741.67 ml   Output 0 ml   Net 1741.67 ml       Flowsheet Rows      First Filed Value   Admission Height  152.4 cm (60\") Documented at 07/04/2019 1916   Admission Weight  70.9 kg (156 lb 3.2 oz) Documented at 07/04/2019 1925          Physical Exam:     General yasir: alert, cooperative, oriented    Skin:  Skin color, texture, turgor, normal no rashes        Results Review:     I reviewed the patient's new clinical results.  Lab Results (all)     Procedure Component Value Units Date/Time    Urinalysis, Microscopic Only - Urine, Clean Catch [[id number]]  (Abnormal) Collected:  07/05/19 0604    Specimen:  Urine, Clean Catch Updated:  07/05/19 0847     RBC, UA 13-20 /HPF      WBC, UA 13-20 /HPF      Bacteria, UA 1+ /HPF      Squamous Epithelial Cells, UA 0-2 /HPF      Hyaline Casts, UA 3-6 /LPF      Methodology Manual Light Microscopy    Urine Culture - Urine, Urine, Clean Catch [[id number]] Collected:  07/05/19 0604    Specimen:  Urine, Clean Catch Updated:  07/05/19 0847    CBC & Differential [[id number]] Collected:  07/05/19 0555    Specimen:  Blood Updated:  07/05/19 0715    Narrative:       The following orders were created for panel order CBC & Differential.  Procedure                               Abnormality         Status                     ---------                               -----------         ------                     CBC Auto " Differential[[id number]]        Abnormal            Final result                 Please view results for these tests on the individual orders.    CBC Auto Differential [[id number]]  (Abnormal) Collected:  07/05/19 0555    Specimen:  Blood Updated:  07/05/19 0715     WBC 13.14 10*3/mm3      RBC 4.76 10*6/mm3      Hemoglobin 14.5 g/dL      Hematocrit 44.2 %      MCV 92.9 fL      MCH 30.5 pg      MCHC 32.8 g/dL      RDW 13.5 %      RDW-SD 45.9 fl      MPV 12.9 fL      Platelets 278 10*3/mm3      Neutrophil % 65.2 %      Lymphocyte % 24.7 %      Monocyte % 8.2 %      Eosinophil % 1.1 %      Basophil % 0.5 %      Immature Grans % 0.3 %      Neutrophils, Absolute 8.56 10*3/mm3      Lymphocytes, Absolute 3.24 10*3/mm3      Monocytes, Absolute 1.08 10*3/mm3      Eosinophils, Absolute 0.15 10*3/mm3      Basophils, Absolute 0.07 10*3/mm3      Immature Grans, Absolute 0.04 10*3/mm3      nRBC 0.0 /100 WBC     Blood Culture - Blood, Arm, Right [[id number]] Collected:  07/05/19 0621    Specimen:  Blood from Arm, Right Updated:  07/05/19 0714    Urinalysis With Culture If Indicated - Urine, Clean Catch [[id number]]  (Abnormal) Collected:  07/05/19 0604    Specimen:  Urine, Clean Catch Updated:  07/05/19 0706     Color, UA Yellow     Appearance, UA Clear     pH, UA 5.5     Specific Gravity, UA 1.020     Glucose, UA Negative     Ketones, UA Negative     Bilirubin, UA Negative     Blood, UA Large (3+)     Protein, UA 30 mg/dL (1+)     Leuk Esterase, UA Trace     Nitrite, UA Negative     Urobilinogen, UA 0.2 E.U./dL    Basic Metabolic Panel [[id number]]  (Abnormal) Collected:  07/05/19 0555    Specimen:  Blood Updated:  07/05/19 0655     Glucose 110 mg/dL      BUN 8 mg/dL      Creatinine 0.71 mg/dL      Sodium 139 mmol/L      Potassium 4.1 mmol/L      Chloride 110 mmol/L      CO2 22.3 mmol/L      Calcium 9.4 mg/dL      eGFR Non African Amer 81 mL/min/1.73      BUN/Creatinine Ratio 11.3     Anion Gap 6.7 mmol/L     Narrative:       GFR  Normal >60  Chronic Kidney Disease <60  Kidney Failure <15    Blood Culture - Blood, Arm, Right [[id number]] Collected:  07/05/19 0555    Specimen:  Blood from Arm, Right Updated:  07/05/19 0616    Comprehensive Metabolic Panel [[id number]]  (Abnormal) Collected:  07/04/19 2015    Specimen:  Blood Updated:  07/04/19 2043     Glucose 157 mg/dL      BUN 9 mg/dL      Creatinine 1.08 mg/dL      Sodium 134 mmol/L      Potassium 3.9 mmol/L      Chloride 96 mmol/L      CO2 23.7 mmol/L      Calcium 10.8 mg/dL      Total Protein 8.0 g/dL      Albumin 4.30 g/dL      ALT (SGPT) 17 U/L      AST (SGOT) 18 U/L      Alkaline Phosphatase 203 U/L      Total Bilirubin 0.6 mg/dL      eGFR Non African Amer 50 mL/min/1.73      Globulin 3.7 gm/dL      A/G Ratio 1.2 g/dL      BUN/Creatinine Ratio 8.3     Anion Gap 14.3 mmol/L     Narrative:       GFR Normal >60  Chronic Kidney Disease <60  Kidney Failure <15    Lipase [[id number]]  (Normal) Collected:  07/04/19 2015    Specimen:  Blood Updated:  07/04/19 2043     Lipase 19 U/L     Urinalysis, Microscopic Only - Urine, Clean Catch [[id number]] Collected:  07/04/19 2006    Specimen:  Urine, Clean Catch Updated:  07/04/19 2043     RBC, UA 0-2 /HPF      WBC, UA 0-2 /HPF      Bacteria, UA None Seen /HPF      Squamous Epithelial Cells, UA 0-2 /HPF      Hyaline Casts, UA None Seen /LPF      Methodology Manual Light Microscopy    Urinalysis With Microscopic If Indicated (No Culture) - Urine, Clean Catch [[id number]]  (Abnormal) Collected:  07/04/19 2006    Specimen:  Urine, Clean Catch Updated:  07/04/19 2024     Color, UA Yellow     Appearance, UA Clear     pH, UA 8.0     Specific Gravity, UA 1.010     Glucose, UA Negative     Ketones, UA Trace     Bilirubin, UA Negative     Blood, UA Small (1+)     Protein, UA Negative     Leuk Esterase, UA Trace     Nitrite, UA Negative     Urobilinogen, UA 0.2 E.U./dL    CBC & Differential [[id number]] Collected:  07/04/19 2015    Specimen:  Blood Updated:   07/04/19 2023    Narrative:       The following orders were created for panel order CBC & Differential.  Procedure                               Abnormality         Status                     ---------                               -----------         ------                     CBC Auto Differential[[id number]]        Abnormal            Final result                 Please view results for these tests on the individual orders.    CBC Auto Differential [[id number]]  (Abnormal) Collected:  07/04/19 2015    Specimen:  Blood Updated:  07/04/19 2023     WBC 22.00 10*3/mm3      RBC 5.62 10*6/mm3      Hemoglobin 17.2 g/dL      Hematocrit 51.8 %      MCV 92.2 fL      MCH 30.6 pg      MCHC 33.2 g/dL      RDW 13.4 %      RDW-SD 45.3 fl      MPV 12.7 fL      Platelets 338 10*3/mm3      Neutrophil % 89.4 %      Lymphocyte % 6.8 %      Monocyte % 3.0 %      Eosinophil % 0.0 %      Basophil % 0.4 %      Immature Grans % 0.4 %      Neutrophils, Absolute 19.66 10*3/mm3      Lymphocytes, Absolute 1.50 10*3/mm3      Monocytes, Absolute 0.67 10*3/mm3      Eosinophils, Absolute 0.00 10*3/mm3      Basophils, Absolute 0.08 10*3/mm3      Immature Grans, Absolute 0.09 10*3/mm3      nRBC 0.0 /100 WBC           Imaging Results (all)     Procedure Component Value Units Date/Time    FL C Arm During Surgery [[id number]] Resulted:  07/05/19 0019     Updated:  07/05/19 0019    Narrative:       This procedure was auto-finalized with no dictation required.    XR Abdomen KUB [[id number]] Updated:  07/05/19 0014    CT Abdomen Pelvis With Contrast [[id number]] Collected:  07/04/19 2142     Updated:  07/04/19 2150    Narrative:       CT OF THE ABDOMEN AND PELVIS WITH CONTRAST     HISTORY: Right flank pain and dysuria     COMPARISON: 02/16/2017     TECHNIQUE: Axial CT imaging was obtained from the dome the diaphragm to  the symphysis pubis. IV contrast was administered.     FINDINGS:  Images through the lung bases demonstrate some mild  dependent  atelectasis. There is a small hiatal hernia. Proximal small bowel  appears unremarkable, with exception of a duodenal diverticulum arising  from the third portion of the duodenum. Small cysts are identified  within the liver. The gallbladder is normal, as is the spleen. The  pancreas appears unremarkable. The patient has mild right-sided  hydroureteronephrosis. This is secondary to a 6 mm stone which is  located within the distal right ureter. Distal to this, the right ureter  is decompressed. There is extensive inflammatory stranding seen around  the right kidney and right ureter, as well as urothelial enhancement.  Superimposed infection is not excluded. Correlation with urinalysis and  urine cultures is recommended. The left kidney appears unremarkable. No  hydronephrosis is seen on the left. Urinary bladder appears  unremarkable. The uterus is within normal limits. There is colonic  diverticulosis, although there is no evidence of diverticulitis. The  appendix is visualized, and is within normal limits. No free fluid or  adenopathy is seen within the pelvis. No aggressive osseous  abnormalities are seen.       Impression:       Mild right-sided hydroureteronephrosis. This is secondary to a 6 mm  stone located within the distal right ureter. Patient is noted to have  extensive periureteral and perinephric soft tissue stranding, as well as  urothelial enhancement. Superimposed infection is not excluded.     Radiation dose reduction techniques were utilized, including automated  exposure control and exposure modulation based on body size.     This report was finalized on 7/4/2019 9:47 PM by Dr. Hillary Menendez M.D.             Medication Review:   Current Facility-Administered Medications   Medication Dose Route Frequency Provider Last Rate Last Dose   • acetaminophen (TYLENOL) tablet 650 mg  650 mg Oral Q4H PRN Neil Stone MD       • atorvastatin (LIPITOR) tablet 40 mg  40 mg Oral Daily  Neil Stone MD       • aztreonam (AZACTAM) 1 g/100 mL 0.9% NS IVPB (mbp)  1,000 mg Intravenous Q8H Neil Stone MD   1,000 mg at 07/05/19 0725   • calcium carbonate (TUMS) chewable tablet 500 mg (200 mg elemental)  2 tablet Oral BID PRN Neil Stone MD       • docusate sodium (COLACE) capsule 100 mg  100 mg Oral BID Neil Stone MD       • HYDROcodone-acetaminophen (NORCO) 5-325 MG per tablet 1 tablet  1 tablet Oral Q4H PRN Neil Stone MD   1 tablet at 07/05/19 0605   • HYDROmorphone (DILAUDID) injection 0.5 mg  0.5 mg Intravenous Q2H PRN Neil Stone MD        And   • naloxone (NARCAN) injection 0.4 mg  0.4 mg Intravenous Q5 Min PRN Neil Stone MD       • lactated ringers infusion  9 mL/hr Intravenous Continuous Live Vigil MD   Stopped at 07/05/19 0332   • melatonin tablet 5 mg  5 mg Oral Nightly PRN Neil Stone MD       • montelukast (SINGULAIR) tablet 10 mg  10 mg Oral Nightly Neil Stone MD       • nitroglycerin (NITROSTAT) SL tablet 0.4 mg  0.4 mg Sublingual Q5 Min PRN Neil Stone MD       • ondansetron (ZOFRAN) tablet 4 mg  4 mg Oral Q6H PRN Neil Stone MD        Or   • ondansetron (ZOFRAN) injection 4 mg  4 mg Intravenous Q6H PRN Neil Stone MD       • Pharmacy to dose vancomycin   Does not apply Continuous PRN Neil Stone MD       • [MAR Hold] sodium chloride 0.9 % flush 10 mL  10 mL Intravenous PRN Gigi Macias MD       • sodium chloride 0.9 % flush 3 mL  3 mL Intravenous Q12H Neil Stone MD       • sodium chloride 0.9 % flush 3-10 mL  3-10 mL Intravenous PRN Neil Stone MD       • sodium chloride 0.9 % with KCl 20 mEq/L infusion  125 mL/hr Intravenous Continuous Bertha, Neil D,  mL/hr at 07/05/19 0255 125 mL/hr at 07/05/19 0255   • vancomycin 1500 mg/500 mL 0.9% NS IVPB ([facility])  20 mg/kg Intravenous Q24H Neil Stone MD           Current Facility-Administered Medications:   •   acetaminophen (TYLENOL) tablet 650 mg, 650 mg, Oral, Q4H PRN, Neil Stone MD  •  atorvastatin (LIPITOR) tablet 40 mg, 40 mg, Oral, Daily, Neil Stone MD  •  aztreonam (AZACTAM) 1 g/100 mL 0.9% NS IVPB (mbp), 1,000 mg, Intravenous, Q8H, Neil Stone MD, 1,000 mg at 07/05/19 0725  •  calcium carbonate (TUMS) chewable tablet 500 mg (200 mg elemental), 2 tablet, Oral, BID PRN, Neil Stone MD  •  docusate sodium (COLACE) capsule 100 mg, 100 mg, Oral, BID, Neil Stone MD  •  HYDROcodone-acetaminophen (NORCO) 5-325 MG per tablet 1 tablet, 1 tablet, Oral, Q4H PRN, Neil Stoen MD, 1 tablet at 07/05/19 0605  •  HYDROmorphone (DILAUDID) injection 0.5 mg, 0.5 mg, Intravenous, Q2H PRN **AND** naloxone (NARCAN) injection 0.4 mg, 0.4 mg, Intravenous, Q5 Min PRN, Neil Stone MD  •  lactated ringers infusion, 9 mL/hr, Intravenous, Continuous, Live Vigil MD, Stopped at 07/05/19 0332  •  melatonin tablet 5 mg, 5 mg, Oral, Nightly PRN, Neil Stone MD  •  montelukast (SINGULAIR) tablet 10 mg, 10 mg, Oral, Nightly, Neil Stone MD  •  nitroglycerin (NITROSTAT) SL tablet 0.4 mg, 0.4 mg, Sublingual, Q5 Min PRN, Neil Stone MD  •  ondansetron (ZOFRAN) tablet 4 mg, 4 mg, Oral, Q6H PRN **OR** ondansetron (ZOFRAN) injection 4 mg, 4 mg, Intravenous, Q6H PRN, Neil Stone MD  •  Pharmacy to dose vancomycin, , Does not apply, Continuous PRN, Neil Stone MD  •  [COMPLETED] Insert peripheral IV, , , Once **AND** [MAR Hold] sodium chloride 0.9 % flush 10 mL, 10 mL, Intravenous, PRN, Gigi Macias MD  •  sodium chloride 0.9 % flush 3 mL, 3 mL, Intravenous, Q12H, Neil Stone MD  •  sodium chloride 0.9 % flush 3-10 mL, 3-10 mL, Intravenous, PRN, Neil Stone MD  •  sodium chloride 0.9 % with KCl 20 mEq/L infusion, 125 mL/hr, Intravenous, Continuous, Neil Stone MD, Last Rate: 125 mL/hr at 07/05/19 0255, 125 mL/hr at 07/05/19 0255  •   vancomycin 1500 mg/500 mL 0.9% NS IVPB ([facility]), 20 mg/kg, Intravenous, Q24H, Neil Stone MD  Medications Discontinued During This Encounter   Medication Reason   • sterile water irrigation solution Patient Discharge   • iothalamate (CONRAY) injection Patient Discharge   • sodium chloride 0.9 % flush 1-10 mL Patient Transfer   • lidocaine PF 1% (XYLOCAINE) injection 0.5 mL Patient Transfer   • fentaNYL citrate (PF) (SUBLIMAZE) injection 50 mcg Patient Transfer   • midazolam (VERSED) injection 1 mg Patient Transfer   • midazolam (VERSED) injection 2 mg Patient Transfer   • HYDROcodone-acetaminophen (NORCO) 7.5-325 MG per tablet 1 tablet Patient Transfer   • HYDROmorphone (DILAUDID) injection 0.25 mg Patient Transfer   • fentaNYL citrate (PF) (SUBLIMAZE) injection 25 mcg Patient Transfer   • enalaprilat (VASOTEC) injection 0.625 mg Patient Transfer   • metoprolol tartrate (LOPRESSOR) injection 2.5 mg Patient Transfer   • ondansetron (ZOFRAN) injection 4 mg Patient Transfer   • promethazine (PHENERGAN) injection 6.25 mg Patient Transfer   • promethazine (PHENERGAN) injection 6.25 mg Patient Transfer   • promethazine (PHENERGAN) suppository 25 mg Patient Transfer   • promethazine (PHENERGAN) tablet 25 mg Patient Transfer   • meperidine (DEMEROL) injection 12.5 mg Patient Transfer   • ibuprofen (ADVIL,MOTRIN) 400 MG tablet *Therapy completed   • traMADol (ULTRAM) 50 MG tablet *Therapy completed   • Multiple Vitamin (MULTI VITAMIN DAILY PO) *Therapy completed       Assessment/Plan         Sepsis due to urinary tract infection (CMS/HCC)    Acute pyelonephritis    Essential hypertension    Neutrophilic leukocytosis    Hydronephrosis with urinary obstruction due to ureteral calculus    Allergy to multiple antibiotics        Plan   - continue antibiotics  - awaiting culture results  - will plan follow up in 1 week to discuss further stone management    Alfredo Gongora Jr., MD  07/05/19  8:53 AM

## 2019-07-05 NOTE — PROGRESS NOTES
"Pharmacy to Dose Vancomycin    Patient: Jazmyn Castaneda (S609/1, [id number]  LOS: 1 day )  Relevant clinical data and objective history reviewed:  68 y.o. female 152.4 cm (60\") 68.3 kg (150 lb 9.6 oz)  Body mass index is 29.41 kg/m².  she has a past medical history of Collapse of lung (1980s), Diverticulosis, Environmental allergies, Essential hypertension (3/24/2017), Exogenous obesity (3/24/2017), H/O Pneumonia (1980s), Infectious mononucleosis, Kidney infection, Kidney stone, Mixed hyperlipidemia (4/17/2019), Neuromuscular disorder (CMS/HCC), Urinary tract infection, and Vertigo.    Day #1 update  Duration: 7 days  Consult for Dr Stone  Indication: sepsis/UTI  Current dose: 1500 mg IV q24h    Cultures:  7/5    Blood x2 - sent   UCx - sent    Other Abx: azactam    Vancomycin Dosing history/levels:  7/4 2305 vanc 1500 mg IV x1    Results from last 7 days   Lab Units 07/05/19  0555 07/04/19 2015   WBC 10*3/mm3 13.14* 22.00*   HEMOGLOBIN g/dL 14.5 17.2*   HEMATOCRIT % 44.2 51.8*   PLATELETS 10*3/mm3 278 338     Urinalysis With Microscopic If Indicated (No Culture) - Urine, Clean Catch [[id number]]  (Abnormal) Collected:  07/04/19 2006   Specimen:  Urine, Clean Catch Updated:  07/04/19 2024    Color, UA Yellow    Appearance, UA Clear    pH, UA 8.0    Specific Gravity, UA 1.010    Glucose, UA Negative    Ketones, UA Trace    Bilirubin, UA Negative    Blood, UA Small (1+)    Protein, UA Negative    Leuk Esterase, UA Trace    Nitrite, UA Negative    Urobilinogen, UA 0.2 E.U./dL     Urinalysis, Microscopic Only - Urine, Clean Catch [[id number]] Collected:  07/04/19 2006   Specimen:  Urine, Clean Catch Updated:  07/04/19 2043    RBC, UA 0-2 /HPF     WBC, UA 0-2 /HPF     Bacteria, UA None Seen /HPF     Squamous Epithelial Cells, UA 0-2 /HPF     Hyaline Casts, UA None Seen /LPF     Methodology Manual Light Microscopy     Lipase [[id number]]  (Normal) Collected:  07/04/19 2015   Specimen:  Blood Updated:  07/04/19 2043    " Lipase 19 U/L       Procedure Component Value Units Date/Time    Urinalysis, Microscopic Only - Urine, Clean Catch [[id number]]  (Abnormal) Collected:  19 0604    Specimen:  Urine, Clean Catch Updated:  19 0847     RBC, UA 13-20 /HPF      WBC, UA 13-20 /HPF      Bacteria, UA 1+ /HPF      Squamous Epithelial Cells, UA 0-2 /HPF      Hyaline Casts, UA 3-6 /LPF      Methodology Manual Light Microscopy     Temp (24hrs), Av.2 °F (36.8 °C), Min:97.6 °F (36.4 °C), Max:98.5 °F (36.9 °C)    Serum creatinine: 0.71 mg/dL 19 0555  Estimated creatinine clearance: 56.4 mL/min     Assessment/Plan:   - SCr has returned to baseline, will increase to 750 mg IV q12h per [facility] dosing guidelines (50-69 mL/min = 10-15 mg/kg IV Q12hr)    - Trough prior to the 5 dose ( 1430). Predicted 15-20.    - CBC/BMP in AM    Hao Bravo, PharmD  19 1:25 PM

## 2019-07-05 NOTE — ANESTHESIA PREPROCEDURE EVALUATION
Anesthesia Evaluation     Patient summary reviewed and Nursing notes reviewed   NPO Solid Status: > 8 hours  NPO Liquid Status: > 2 hours           Airway   Mallampati: II  TM distance: >3 FB  Neck ROM: full  Dental - normal exam     Pulmonary - normal exam    breath sounds clear to auscultation  (+) pneumonia resolved , a smoker Current,   Cardiovascular - normal exam    Rhythm: regular  Rate: normal    (+) hypertension, hyperlipidemia,   (-) angina, orthopnea, PND, VIEIRA      Neuro/Psych  (+) dizziness/light headedness, numbness,     GI/Hepatic/Renal/Endo    (+) obesity,  hiatal hernia,  renal disease stones,     Musculoskeletal (-) negative ROS    Abdominal    Substance History - negative use     OB/GYN negative ob/gyn ROS         Other - negative ROS                     Anesthesia Plan    ASA 3 - emergent     general     intravenous induction   Anesthetic plan, all risks, benefits, and alternatives have been provided, discussed and informed consent has been obtained with: patient.

## 2019-07-05 NOTE — PROGRESS NOTES
"Pharmacokinetic Consult - Vancomycin Dosing (Initial Note)    Jazmyn Castaneda has a consult for pharmacy to dose vancomycin x 7 days for sepsis, UTI.  Pharmacy dosing vancomycin per Dr. Mohan's request.   Goal trough: 15-20 mg/L     Other ABX: aztreonam    Relevant clinical data and objective history reviewed:  68 y.o. female 152.4 cm (60\") 70.9 kg (156 lb 3.2 oz)    Past Medical History:   Diagnosis Date   • Collapse of lung 1980s    history of in past post trauma   • Diverticulosis    • Environmental allergies     Some pollens, grass, trees, flowers   • Essential hypertension 3/24/2017   • Exogenous obesity 3/24/2017   • H/O Pneumonia 1980s   • Infectious mononucleosis    • Kidney infection    • Kidney stone    • Mixed hyperlipidemia 4/17/2019   • Neuromuscular disorder (CMS/HCC)    • Urinary tract infection    • Vertigo     past history of several times     Creatinine   Date Value Ref Range Status   07/04/2019 1.08 (H) 0.57 - 1.00 mg/dL Final   04/17/2019 0.68 0.57 - 1.00 mg/dL Final   03/30/2018 0.62 0.57 - 1.00 mg/dL Final   03/27/2017 0.64 0.57 - 1.00 mg/dL Final   02/17/2017 0.63 0.57 - 1.00 mg/dL Final   02/16/2017 0.73 0.57 - 1.00 mg/dL Final     BUN   Date Value Ref Range Status   07/04/2019 9 8 - 23 mg/dL Final     Estimated Creatinine Clearance: 42.6 mL/min (A) (by C-G formula based on SCr of 1.08 mg/dL (H)).    Lab Results   Component Value Date    WBC 22.00 (H) 07/04/2019     Temp Readings from Last 3 Encounters:   07/05/19 98.5 °F (36.9 °C) (Oral)   06/26/19 97.8 °F (36.6 °C)   04/17/19 97.6 °F (36.4 °C) (Oral)          Assessment/Plan  Will start vancomycin 1500mg IV q24h. Vancomycin level to be drawn with 4th dose. Pharmacy will continue to follow daily while on vancomycin and adjust as needed.     Winnie Augustin, [facility]    "

## 2019-07-06 PROBLEM — Z72.0 TOBACCO ABUSE: Chronic | Status: ACTIVE | Noted: 2019-07-06

## 2019-07-06 LAB
ANION GAP SERPL CALCULATED.3IONS-SCNC: 7.2 MMOL/L (ref 5–15)
BACTERIA SPEC AEROBE CULT: NO GROWTH
BASOPHILS # BLD AUTO: 0.08 10*3/MM3 (ref 0–0.2)
BASOPHILS NFR BLD AUTO: 0.8 % (ref 0–1.5)
BUN BLD-MCNC: 10 MG/DL (ref 8–23)
BUN/CREAT SERPL: 18.5 (ref 7–25)
CALCIUM SPEC-SCNC: 9.4 MG/DL (ref 8.6–10.5)
CHLORIDE SERPL-SCNC: 111 MMOL/L (ref 98–107)
CO2 SERPL-SCNC: 22.8 MMOL/L (ref 22–29)
CREAT BLD-MCNC: 0.54 MG/DL (ref 0.57–1)
DEPRECATED RDW RBC AUTO: 47.6 FL (ref 37–54)
EOSINOPHIL # BLD AUTO: 0.56 10*3/MM3 (ref 0–0.4)
EOSINOPHIL NFR BLD AUTO: 5.8 % (ref 0.3–6.2)
ERYTHROCYTE [DISTWIDTH] IN BLOOD BY AUTOMATED COUNT: 13.7 % (ref 12.3–15.4)
GFR SERPL CREATININE-BSD FRML MDRD: 112 ML/MIN/1.73
GLUCOSE BLD-MCNC: 93 MG/DL (ref 65–99)
HCT VFR BLD AUTO: 41.2 % (ref 34–46.6)
HGB BLD-MCNC: 13.3 G/DL (ref 12–15.9)
IMM GRANULOCYTES # BLD AUTO: 0.03 10*3/MM3 (ref 0–0.05)
IMM GRANULOCYTES NFR BLD AUTO: 0.3 % (ref 0–0.5)
LYMPHOCYTES # BLD AUTO: 3.86 10*3/MM3 (ref 0.7–3.1)
LYMPHOCYTES NFR BLD AUTO: 40 % (ref 19.6–45.3)
MCH RBC QN AUTO: 30.5 PG (ref 26.6–33)
MCHC RBC AUTO-ENTMCNC: 32.3 G/DL (ref 31.5–35.7)
MCV RBC AUTO: 94.5 FL (ref 79–97)
MONOCYTES # BLD AUTO: 0.71 10*3/MM3 (ref 0.1–0.9)
MONOCYTES NFR BLD AUTO: 7.3 % (ref 5–12)
NEUTROPHILS # BLD AUTO: 4.42 10*3/MM3 (ref 1.7–7)
NEUTROPHILS NFR BLD AUTO: 45.8 % (ref 42.7–76)
NRBC BLD AUTO-RTO: 0 /100 WBC (ref 0–0.2)
PLATELET # BLD AUTO: 224 10*3/MM3 (ref 140–450)
PMV BLD AUTO: 12.9 FL (ref 6–12)
POTASSIUM BLD-SCNC: 4.5 MMOL/L (ref 3.5–5.2)
RBC # BLD AUTO: 4.36 10*6/MM3 (ref 3.77–5.28)
SODIUM BLD-SCNC: 141 MMOL/L (ref 136–145)
WBC NRBC COR # BLD: 9.66 10*3/MM3 (ref 3.4–10.8)

## 2019-07-06 PROCEDURE — 25010000002 VANCOMYCIN 750 MG RECONSTITUTED SOLUTION: Performed by: INTERNAL MEDICINE

## 2019-07-06 PROCEDURE — 36415 COLL VENOUS BLD VENIPUNCTURE: CPT | Performed by: INTERNAL MEDICINE

## 2019-07-06 PROCEDURE — 80048 BASIC METABOLIC PNL TOTAL CA: CPT | Performed by: INTERNAL MEDICINE

## 2019-07-06 PROCEDURE — 85025 COMPLETE CBC W/AUTO DIFF WBC: CPT | Performed by: INTERNAL MEDICINE

## 2019-07-06 PROCEDURE — 25810000003 SODIUM CHLORIDE 0.9 % WITH KCL 20 MEQ 20-0.9 MEQ/L-% SOLUTION: Performed by: INTERNAL MEDICINE

## 2019-07-06 PROCEDURE — 25010000002 ONDANSETRON PER 1 MG: Performed by: INTERNAL MEDICINE

## 2019-07-06 RX ADMIN — ATORVASTATIN CALCIUM 40 MG: 20 TABLET, FILM COATED ORAL at 09:16

## 2019-07-06 RX ADMIN — ONDANSETRON 4 MG: 2 INJECTION INTRAMUSCULAR; INTRAVENOUS at 04:55

## 2019-07-06 RX ADMIN — SODIUM CHLORIDE, PRESERVATIVE FREE 3 ML: 5 INJECTION INTRAVENOUS at 09:17

## 2019-07-06 RX ADMIN — DOCUSATE SODIUM 100 MG: 100 CAPSULE, LIQUID FILLED ORAL at 19:13

## 2019-07-06 RX ADMIN — DOCUSATE SODIUM 100 MG: 100 CAPSULE, LIQUID FILLED ORAL at 09:16

## 2019-07-06 RX ADMIN — VANCOMYCIN HYDROCHLORIDE 750 MG: 750 INJECTION, POWDER, LYOPHILIZED, FOR SOLUTION INTRAVENOUS at 02:13

## 2019-07-06 RX ADMIN — AZTREONAM 1000 MG: 1 INJECTION, POWDER, LYOPHILIZED, FOR SOLUTION INTRAMUSCULAR; INTRAVENOUS at 04:55

## 2019-07-06 RX ADMIN — AZTREONAM 1000 MG: 1 INJECTION, POWDER, LYOPHILIZED, FOR SOLUTION INTRAMUSCULAR; INTRAVENOUS at 13:59

## 2019-07-06 RX ADMIN — VANCOMYCIN HYDROCHLORIDE 750 MG: 750 INJECTION, POWDER, LYOPHILIZED, FOR SOLUTION INTRAVENOUS at 17:45

## 2019-07-06 RX ADMIN — AZTREONAM 1000 MG: 1 INJECTION, POWDER, LYOPHILIZED, FOR SOLUTION INTRAMUSCULAR; INTRAVENOUS at 22:10

## 2019-07-06 RX ADMIN — ACETAMINOPHEN 650 MG: 325 TABLET, FILM COATED ORAL at 19:13

## 2019-07-06 RX ADMIN — POTASSIUM CHLORIDE AND SODIUM CHLORIDE 125 ML/HR: 900; 150 INJECTION, SOLUTION INTRAVENOUS at 11:30

## 2019-07-06 RX ADMIN — POTASSIUM CHLORIDE AND SODIUM CHLORIDE 125 ML/HR: 900; 150 INJECTION, SOLUTION INTRAVENOUS at 22:10

## 2019-07-06 RX ADMIN — MONTELUKAST SODIUM 10 MG: 10 TABLET, FILM COATED ORAL at 19:12

## 2019-07-06 NOTE — PROGRESS NOTES
"   LOS: 2 days   Patient Care Team:  Provider, No Known as PCP - Ha Aparicio MD as PCP - Claims Attributed    Chief Complaint: urinary frequency    Subjective     Feeling some pressure in lower abdomen today. Having episodes of nausea (no vomiting). Feels better overall than yesterday. Tolerating diet. Voiding a lot        Subjective:  Symptoms:  Improved.  No shortness of breath, malaise, cough, chest pain, weakness, headache, chest pressure, anorexia, diarrhea or anxiety.    Diet:  Adequate intake.  No nausea or vomiting.    Activity level: Normal.    Pain:  She complains of pain that is mild.  She reports pain is unchanged.  Pain is well controlled.        History taken from: patient chart    Objective     Vital Signs  Temp:  [97.8 °F (36.6 °C)-98.6 °F (37 °C)] 97.8 °F (36.6 °C)  Heart Rate:  [82-99] 99  Resp:  [18-20] 20  BP: (119-173)/(58-87) 166/58    Objective:  General Appearance:  Comfortable and in no acute distress.    Vital signs: (most recent): Blood pressure 166/58, pulse 99, temperature 97.8 °F (36.6 °C), temperature source Oral, resp. rate 20, height 152.4 cm (60\"), weight 68.3 kg (150 lb 9.6 oz), SpO2 94 %, not currently breastfeeding.  Vital signs are normal.  No fever.    Output: Producing urine.    HEENT: Normal HEENT exam.    Lungs:  Normal effort and normal respiratory rate.  Breath sounds clear to auscultation.    Heart: Normal rate.  Regular rhythm.    Abdomen: Abdomen is soft.  Bowel sounds are normal.   There is no abdominal tenderness.     Extremities: There is no dependent edema.    Pulses: Distal pulses are intact.    Neurological: Patient is alert and oriented to person, place and time.    Skin:  Warm and dry.  No rash.             Results Review:     I reviewed the patient's new clinical results.  I reviewed the patient's other test results and agree with the interpretation  Discussed with pt    Medication Review: reviewed    Assessment/Plan       Sepsis due to urinary tract " infection (CMS/HCC)    Acute pyelonephritis    Essential hypertension    Neutrophilic leukocytosis    Right hydronephrosis with urinary obstruction due to ureteral calculus    Allergy to multiple antibiotics    Tobacco abuse          Plan:   (S/p right ureteral stent 7/5  Feeling better today  Continue Vanc/Azactam given multiple abx allergies  Await cultures  WBC normalized  Afebrile  Possible dc home tomorrow, defer to   ).       Alfredo Mcgraw MD  07/06/19  3:27 PM    Time: 20min

## 2019-07-06 NOTE — PROGRESS NOTES
CC: I am going better    NAD  A&O x 3  Sitting up on side of bed  VSS - Afebrile  Denies dysuria  Had some nausea last night, has resolved  Denies pain    WBC improving, 9  [facility] pending, awaiting final results  Will see in am   Continue abx

## 2019-07-07 VITALS
HEART RATE: 77 BPM | TEMPERATURE: 97.3 F | RESPIRATION RATE: 18 BRPM | SYSTOLIC BLOOD PRESSURE: 151 MMHG | BODY MASS INDEX: 29.57 KG/M2 | OXYGEN SATURATION: 98 % | DIASTOLIC BLOOD PRESSURE: 77 MMHG | WEIGHT: 150.6 LBS | HEIGHT: 60 IN

## 2019-07-07 LAB
ANION GAP SERPL CALCULATED.3IONS-SCNC: 10.5 MMOL/L (ref 5–15)
BASOPHILS # BLD AUTO: 0.09 10*3/MM3 (ref 0–0.2)
BASOPHILS NFR BLD AUTO: 0.9 % (ref 0–1.5)
BUN BLD-MCNC: 8 MG/DL (ref 8–23)
BUN/CREAT SERPL: 13.6 (ref 7–25)
CALCIUM SPEC-SCNC: 9.7 MG/DL (ref 8.6–10.5)
CHLORIDE SERPL-SCNC: 113 MMOL/L (ref 98–107)
CO2 SERPL-SCNC: 22.5 MMOL/L (ref 22–29)
CREAT BLD-MCNC: 0.59 MG/DL (ref 0.57–1)
DEPRECATED RDW RBC AUTO: 46.3 FL (ref 37–54)
EOSINOPHIL # BLD AUTO: 0.58 10*3/MM3 (ref 0–0.4)
EOSINOPHIL NFR BLD AUTO: 5.8 % (ref 0.3–6.2)
ERYTHROCYTE [DISTWIDTH] IN BLOOD BY AUTOMATED COUNT: 13.6 % (ref 12.3–15.4)
GFR SERPL CREATININE-BSD FRML MDRD: 101 ML/MIN/1.73
GLUCOSE BLD-MCNC: 88 MG/DL (ref 65–99)
HCT VFR BLD AUTO: 42.7 % (ref 34–46.6)
HGB BLD-MCNC: 14.1 G/DL (ref 12–15.9)
IMM GRANULOCYTES # BLD AUTO: 0.03 10*3/MM3 (ref 0–0.05)
IMM GRANULOCYTES NFR BLD AUTO: 0.3 % (ref 0–0.5)
LYMPHOCYTES # BLD AUTO: 3.29 10*3/MM3 (ref 0.7–3.1)
LYMPHOCYTES NFR BLD AUTO: 32.7 % (ref 19.6–45.3)
MCH RBC QN AUTO: 30.7 PG (ref 26.6–33)
MCHC RBC AUTO-ENTMCNC: 33 G/DL (ref 31.5–35.7)
MCV RBC AUTO: 93 FL (ref 79–97)
MONOCYTES # BLD AUTO: 0.62 10*3/MM3 (ref 0.1–0.9)
MONOCYTES NFR BLD AUTO: 6.2 % (ref 5–12)
NEUTROPHILS # BLD AUTO: 5.45 10*3/MM3 (ref 1.7–7)
NEUTROPHILS NFR BLD AUTO: 54.1 % (ref 42.7–76)
NRBC BLD AUTO-RTO: 0 /100 WBC (ref 0–0.2)
PLATELET # BLD AUTO: 232 10*3/MM3 (ref 140–450)
PMV BLD AUTO: 13.5 FL (ref 6–12)
POTASSIUM BLD-SCNC: 4.9 MMOL/L (ref 3.5–5.2)
RBC # BLD AUTO: 4.59 10*6/MM3 (ref 3.77–5.28)
SODIUM BLD-SCNC: 146 MMOL/L (ref 136–145)
WBC NRBC COR # BLD: 10.06 10*3/MM3 (ref 3.4–10.8)

## 2019-07-07 PROCEDURE — 80048 BASIC METABOLIC PNL TOTAL CA: CPT | Performed by: INTERNAL MEDICINE

## 2019-07-07 PROCEDURE — 85025 COMPLETE CBC W/AUTO DIFF WBC: CPT | Performed by: INTERNAL MEDICINE

## 2019-07-07 PROCEDURE — 25010000002 VANCOMYCIN 750 MG RECONSTITUTED SOLUTION: Performed by: INTERNAL MEDICINE

## 2019-07-07 RX ORDER — DOXYCYCLINE 100 MG/1
100 CAPSULE ORAL EVERY 12 HOURS SCHEDULED
Qty: 20 CAPSULE | Refills: 0 | Status: SHIPPED | OUTPATIENT
Start: 2019-07-07 | End: 2019-07-17

## 2019-07-07 RX ORDER — DOXYCYCLINE 100 MG/1
100 CAPSULE ORAL EVERY 12 HOURS SCHEDULED
Status: DISCONTINUED | OUTPATIENT
Start: 2019-07-07 | End: 2019-07-07 | Stop reason: HOSPADM

## 2019-07-07 RX ADMIN — SODIUM CHLORIDE, PRESERVATIVE FREE 3 ML: 5 INJECTION INTRAVENOUS at 09:30

## 2019-07-07 RX ADMIN — VANCOMYCIN HYDROCHLORIDE 750 MG: 750 INJECTION, POWDER, LYOPHILIZED, FOR SOLUTION INTRAVENOUS at 04:23

## 2019-07-07 RX ADMIN — ATORVASTATIN CALCIUM 40 MG: 20 TABLET, FILM COATED ORAL at 09:30

## 2019-07-07 RX ADMIN — AZTREONAM 1000 MG: 1 INJECTION, POWDER, LYOPHILIZED, FOR SOLUTION INTRAMUSCULAR; INTRAVENOUS at 14:07

## 2019-07-07 RX ADMIN — AZTREONAM 1000 MG: 1 INJECTION, POWDER, LYOPHILIZED, FOR SOLUTION INTRAMUSCULAR; INTRAVENOUS at 05:23

## 2019-07-07 NOTE — PROGRESS NOTES
CC: I feel ok     NAD   VSSA   WBC 10.6  Cr 0.59  UCS - no growth  Denies dysuria or gross hematuria      Impression:  Sepsis due to UTI  Hydronephrosis with urinary obstruction due to ureteral calculus s/p stent placement    Plan:  Please schedule outpatient follow up with Dr. Gongora with [facility], [phone] in 1 week to discuss further stone management

## 2019-07-07 NOTE — DISCHARGE SUMMARY
Patient Name: Jazmyn Castaneda  : 1950  MRN: [id number]    Date of Admission: 2019  Date of Discharge:  2019  Primary Care Physician: Provider, No Known      Chief Complaint:   Back Pain and Flank Pain      Discharge Diagnoses     Active Hospital Problems    Diagnosis  POA   • **Sepsis due to urinary tract infection (CMS/HCC) [A41.9, N39.0]  Yes   • Tobacco abuse [Z72.0]  Yes   • Allergy to multiple antibiotics [Z88.1]  Not Applicable   • Right hydronephrosis with urinary obstruction due to ureteral calculus [N13.2]  Yes   • Neutrophilic leukocytosis [D72.9]  Yes   • Essential hypertension [I10]  Yes   • Acute pyelonephritis [N10]  Yes      Resolved Hospital Problems   No resolved problems to display.        Hospital Course     Ms. Castaneda is a 68 y.o. female smoker with a history of HTN and multiple antibiotic allergies who presented to [facility] initially complaining of right flank pain, suprapubic pain, N/V, and urinary frequency.  Please see the admitting history and physical for further details.  She was found to have obstructing right ureteral stone and evidence of right pyelonephritis on CT, and was admitted to the hospital for further evaluation and treatment.  She was taken immediately to OR by Urology for cystoscopy and stent placement. She was covered with IV Azactam and IV Vanc. Blood and urine cultures have remained negative. With stent and current abx therapy she has remained afebrile and her WBC dropped from 22 to 10. She is doing very well today and is eager for discharge.  is okay with her discharge today and have instructed her to follow up with Dr. Gongora as outpt in 1 week to discuss definitive mgmt of stone. I discussed with Dr. Trejo today on the phone and he recommends discharging her on oral Doxycycline.     Day of Discharge     Feeling good today. Very eager to go home.    Physical Exam:  Temp:  [97.3 °F (36.3 °C)-97.8 °F (36.6 °C)] 97.3 °F (36.3  °C)  Heart Rate:  [77-82] 77  Resp:  [18-20] 18  BP: (146-164)/(67-96) 151/77  Body mass index is 29.41 kg/m².  Physical Exam  General Appearance:  Comfortable and in no acute distress.    Vital signs are normal.  No fever.    Output: Producing urine.    HEENT: Normal HEENT exam.    Lungs:  Normal effort and normal respiratory rate.  Breath sounds clear to auscultation.    Heart: Normal rate.  Regular rhythm.    Abdomen: Abdomen is soft.  Bowel sounds are normal.   There is no abdominal tenderness.     Extremities: There is no dependent edema.    Pulses: Distal pulses are intact.    Neurological: Patient is alert and oriented to person, place and time.    Skin:  Warm and dry.  No rash.        Consultants     Consult Orders (all) (From admission, onward)    Start     Ordered    07/04/19 2216  [facility] (on-call MD unless specified) Details  Once     Specialty:  Hospitalist  Provider:  (Not yet assigned)    07/04/19 2215 07/04/19 2156  Urology (on-call MD unless specified)  Once     Specialty:  Urology  Provider:  (Not yet assigned)    07/04/19 2155        Procedures     CYSTOSCOPY AND STENT PLACEMENT    Imaging Results (all)     Procedure Component Value Units Date/Time    XR Abdomen KUB [[id number]] Collected:  07/05/19 1921     Updated:  07/05/19 1921    Narrative:       XR ABDOMEN KUB-  07/04/2019     HISTORY: Cystogram.     FINDINGS: 3 views of the abdomen show contrast in the right collecting  system and right ureter. Urinary bladder is not included in the  field-of-view. A double-J right ureteral stent appears to be present  with its superior coil overlying the right collecting system. Its  inferior coil was not seen in the field-of-view.                FL C Arm During Surgery [[id number]] Resulted:  07/05/19 0019     Updated:  07/05/19 0019    Narrative:       This procedure was auto-finalized with no dictation required.    CT Abdomen Pelvis With Contrast [[id number]] Collected:  07/04/19 2142     Updated:  07/04/19  2150    Narrative:       CT OF THE ABDOMEN AND PELVIS WITH CONTRAST     HISTORY: Right flank pain and dysuria     COMPARISON: 02/16/2017     TECHNIQUE: Axial CT imaging was obtained from the dome the diaphragm to  the symphysis pubis. IV contrast was administered.     FINDINGS:  Images through the lung bases demonstrate some mild dependent  atelectasis. There is a small hiatal hernia. Proximal small bowel  appears unremarkable, with exception of a duodenal diverticulum arising  from the third portion of the duodenum. Small cysts are identified  within the liver. The gallbladder is normal, as is the spleen. The  pancreas appears unremarkable. The patient has mild right-sided  hydroureteronephrosis. This is secondary to a 6 mm stone which is  located within the distal right ureter. Distal to this, the right ureter  is decompressed. There is extensive inflammatory stranding seen around  the right kidney and right ureter, as well as urothelial enhancement.  Superimposed infection is not excluded. Correlation with urinalysis and  urine cultures is recommended. The left kidney appears unremarkable. No  hydronephrosis is seen on the left. Urinary bladder appears  unremarkable. The uterus is within normal limits. There is colonic  diverticulosis, although there is no evidence of diverticulitis. The  appendix is visualized, and is within normal limits. No free fluid or  adenopathy is seen within the pelvis. No aggressive osseous  abnormalities are seen.       Impression:       Mild right-sided hydroureteronephrosis. This is secondary to a 6 mm  stone located within the distal right ureter. Patient is noted to have  extensive periureteral and perinephric soft tissue stranding, as well as  urothelial enhancement. Superimposed infection is not excluded.     Radiation dose reduction techniques were utilized, including automated  exposure control and exposure modulation based on body size.     This report was finalized on  7/4/2019 9:47 PM by Dr. Hillary Menendez M.D.                     Pertinent Labs     Results from last 7 days   Lab Units 07/07/19 0540 07/06/19 0548 07/05/19 0555 07/04/19 2015   WBC 10*3/mm3 10.06 9.66 13.14* 22.00*   HEMOGLOBIN g/dL 14.1 13.3 14.5 17.2*   PLATELETS 10*3/mm3 232 224 278 338     Results from last 7 days   Lab Units 07/07/19 0540 07/06/19 0548 07/05/19 0555 07/04/19 2015   SODIUM mmol/L 146* 141 139 134*   POTASSIUM mmol/L 4.9 4.5 4.1 3.9   CHLORIDE mmol/L 113* 111* 110* 96*   CO2 mmol/L 22.5 22.8 22.3 23.7   BUN mg/dL 8 10 8 9   CREATININE mg/dL 0.59 0.54* 0.71 1.08*   GLUCOSE mg/dL 88 93 110* 157*   Estimated Creatinine Clearance: 56.4 mL/min (by C-G formula based on SCr of 0.59 mg/dL).  Results from last 7 days   Lab Units 07/04/19 2015   ALBUMIN g/dL 4.30   BILIRUBIN mg/dL 0.6   ALK PHOS U/L 203*   AST (SGOT) U/L 18   ALT (SGPT) U/L 17     Results from last 7 days   Lab Units 07/07/19 0540 07/06/19 0548 07/05/19 0555 07/04/19 2015   CALCIUM mg/dL 9.7 9.4 9.4 10.8*   ALBUMIN g/dL  --   --   --  4.30     Results from last 7 days   Lab Units 07/04/19 2015   LIPASE U/L 19             Invalid input(s): LDLCALC  Results from last 7 days   Lab Units 07/05/19 0621 07/05/19 0604 07/05/19 0555   BLOODCX  No growth at 2 days  --  No growth at 2 days   URINECX   --  No growth  --        Test Results Pending at Discharge   None   Order Current Status    Blood Culture - Blood, Arm, Right Preliminary result    Blood Culture - Blood, Arm, Right Preliminary result          Discharge Details        Discharge Medications      New Medications      Instructions Start Date   doxycycline 100 MG capsule  Commonly known as:  MONODOX   100 mg, Oral, Every 12 Hours Scheduled         Continue These Medications      Instructions Start Date   atorvastatin 40 MG tablet  Commonly known as:  LIPITOR   40 mg, Oral, Daily      montelukast 10 MG tablet  Commonly known as:  SINGULAIR   10 mg, Oral, Nightly          Stop These Medications    CALCIUM PO            Allergies   Allergen Reactions   • Cephalosporins Swelling   • Penicillins Swelling   • Ciprofloxacin Rash   • Sulfa Antibiotics Other (See Comments)     Patient states she lost her vision temporarily when using Sulfa based eye ointment, also, broke out in rash w/oral medication.         Discharge Disposition:  Home or Self Care    Discharge Diet:  Diet Order   Procedures   • Diet Regular       Discharge Activity:   as tolerated    CODE STATUS:    Code Status and Medical Interventions:   Ordered at: 07/04/19 2241     Level Of Support Discussed With:    Patient     Code Status:    CPR     Medical Interventions (Level of Support Prior to Arrest):    Full       Future Appointments   Date Time Provider Department Center   12/11/2019  1:30 PM LAB CHAIR 4 [facility]  LAB [facility]   12/11/2019  2:00 PM Ha García MD [facility] [place]     Additional Instructions for the Follow-ups that You Need to Schedule     Discharge Follow-up with Specified Provider: Dr. Gongora (TRESSA); 1 Week   As directed      To:  Dr. Gongora (TRESSA)    Follow Up:  1 Week           Follow-up Information     Alfredo Gongora Jr., MD. Schedule an appointment as soon as possible for a visit in 1 week(s).    Specialty:  Urology  Why:  please call to schedule your appt for 1 week from now for further stone management.  Contact information:  [address] [place], [place] [id number] [phone]             Provider, No Known .    Contact information:  [facility] [address] [phone]                   Additional Instructions for the Follow-ups that You Need to Schedule     Discharge Follow-up with Specified Provider: Dr. Fransisca SINGH); 1 Week   As directed      To:  Dr. Gongora (TRESSA)    Follow Up:  1 Week           Time Spent on Discharge:  Greater than 30 minutes      Electronically signed by Alfredo Mcgraw MD, 7/7/2019, 3:01 PM

## 2019-07-08 ENCOUNTER — READMISSION MANAGEMENT (OUTPATIENT)
Dept: CALL CENTER | Facility: HOSPITAL | Age: 69
End: 2019-07-08

## 2019-07-08 ENCOUNTER — HOSPITAL ENCOUNTER (OUTPATIENT)
Dept: RADIOLOGY | Facility: HOSPITAL | Age: 69
Discharge: HOME | End: 2019-07-08
Attending: ORTHOPAEDIC SURGERY
Payer: MEDICARE

## 2019-07-08 ENCOUNTER — OFFICE VISIT (OUTPATIENT)
Dept: ORTHOPEDICS | Facility: CLINIC | Age: 69
End: 2019-07-08
Payer: MEDICARE

## 2019-07-08 ENCOUNTER — PATIENT MESSAGE (OUTPATIENT)
Dept: FAMILY MEDICINE | Facility: CLINIC | Age: 69
End: 2019-07-08

## 2019-07-08 VITALS — WEIGHT: 231 LBS | BODY MASS INDEX: 43.61 KG/M2 | HEIGHT: 61 IN

## 2019-07-08 DIAGNOSIS — G89.29 CHRONIC PAIN OF BOTH KNEES: ICD-10-CM

## 2019-07-08 DIAGNOSIS — M17.0 PRIMARY OSTEOARTHRITIS OF BOTH KNEES: ICD-10-CM

## 2019-07-08 DIAGNOSIS — M25.562 CHRONIC PAIN OF BOTH KNEES: ICD-10-CM

## 2019-07-08 DIAGNOSIS — G89.29 CHRONIC PAIN OF BOTH KNEES: Primary | ICD-10-CM

## 2019-07-08 DIAGNOSIS — M17.12 PRIMARY OSTEOARTHRITIS OF LEFT KNEE: ICD-10-CM

## 2019-07-08 DIAGNOSIS — M25.561 CHRONIC PAIN OF BOTH KNEES: Primary | ICD-10-CM

## 2019-07-08 DIAGNOSIS — M25.561 CHRONIC PAIN OF BOTH KNEES: ICD-10-CM

## 2019-07-08 DIAGNOSIS — M25.562 CHRONIC PAIN OF BOTH KNEES: Primary | ICD-10-CM

## 2019-07-08 DIAGNOSIS — Z11.9 SCREENING EXAMINATION FOR INFECTIOUS DISEASE: ICD-10-CM

## 2019-07-08 PROCEDURE — 73562 X-RAY EXAM OF KNEE 3: CPT | Mod: TC,50,PO

## 2019-07-08 PROCEDURE — 99999 PR PBB SHADOW E&M-EST. PATIENT-LVL II: CPT | Mod: PBBFAC,,, | Performed by: ORTHOPAEDIC SURGERY

## 2019-07-08 PROCEDURE — 99214 PR OFFICE/OUTPT VISIT, EST, LEVL IV, 30-39 MIN: ICD-10-PCS | Mod: S$PBB,,, | Performed by: ORTHOPAEDIC SURGERY

## 2019-07-08 PROCEDURE — 99212 OFFICE O/P EST SF 10 MIN: CPT | Mod: PBBFAC,25,PN | Performed by: ORTHOPAEDIC SURGERY

## 2019-07-08 PROCEDURE — 99214 OFFICE O/P EST MOD 30 MIN: CPT | Mod: S$PBB,,, | Performed by: ORTHOPAEDIC SURGERY

## 2019-07-08 PROCEDURE — 99999 PR PBB SHADOW E&M-EST. PATIENT-LVL II: ICD-10-PCS | Mod: PBBFAC,,, | Performed by: ORTHOPAEDIC SURGERY

## 2019-07-08 PROCEDURE — 73562 X-RAY EXAM OF KNEE 3: CPT | Mod: 26,50,, | Performed by: RADIOLOGY

## 2019-07-08 PROCEDURE — 73562 XR KNEE ORTHO BILAT: ICD-10-PCS | Mod: 26,50,, | Performed by: RADIOLOGY

## 2019-07-08 RX ORDER — SODIUM CHLORIDE 9 MG/ML
INJECTION, SOLUTION INTRAVENOUS CONTINUOUS
Status: CANCELLED | OUTPATIENT
Start: 2019-07-08

## 2019-07-08 NOTE — PROGRESS NOTES
HISTORY OF PRESENT ILLNESS:  A 68 years old, left knee pain for at least a   couple of years' time.  She had knee arthroscopy in 2017.  Feels it did provide   some relief.  Pain is on the medial side of the knee, 2/10 on good days, 5/10 on   bad days, she describes as debilitating.  She has had injections and therapy   and again knee arthroscopy without relief.  She is interested in more definitive   treatment.    Exam today shows she has tenderness to the medial joint line.  No signs of   infection.  Hip range of motion is painless.  Straight leg raise testing is   negative.    X-rays show medial joint space narrowing.  Arthroscopic pictures show that her   lateral compartment was healthy with an intact ACL, mild degenerative change of   the patellofemoral compartment, grade 4 chondrosis in the medial compartment.    ASSESSMENT:  Medial compartment arthritis.    PLAN:  We will schedule for partial knee replacement.  The patient is aware of   the risks and limitation and still wants to proceed.      PBB/HN  dd: 07/08/2019 09:32:59 (CDT)  td: 07/09/2019 00:33:12 (CDT)  Doc ID   #[id number]  Job ID #[id number]    CC:     Further History  Aching pain  Worse with activity  Relieved with rest  No other associated symptoms  No other radiation    Further Exam  Alert and oriented  Pleasant  Contralateral limb has appropriate range of motion for age and condition  Contralateral limb has appropriate strength for age and condition  Contralateral limb has appropriate stability  for age and condition  No adenopathy  Pulses are appropriate for current condition  Skin is intact        Chief Complaint    Chief Complaint   Patient presents with    Right Knee - Pain    Left Knee - Pain       HPI  Kenna Schmitt is a 68 y.o.  female who presents with       Past Medical History  Past Medical History:   Diagnosis Date    GERD (gastroesophageal reflux disease)     Hyperlipidemia     Hypertension     Personal history of colonic polyps  10/6/2016    Colonoscopy 2013 with Dr. Rhiannon Harrington.  Tubular adenoma Colonoscopy 2016 Clear.       Thyroid disease 1975    Thyroiditis       Past Surgical History  Past Surgical History:   Procedure Laterality Date    ARTHROSCOPY-KNEE Left 2/10/2017    [facility]    ARTHROSCOPY-KNEE DEBRIDEMENT Left 2/10/2017    [facility]    ARTHROSCOPY-MENISCECTOMY PARTIAL Left 2/10/2017    [facility] OR    BREAST SURGERY  2014    breast implants     BREAST SURGERY  1989    breast implants    CHOLECYSTECTOMY      COLONOSCOPY N/A 11/23/2016    [facility] Anoop Buck [facility] at [facility] ENDO    LIPOSUCTION      VAGINAL DELIVERY         Medications  Current Outpatient Medications   Medication Sig    atorvastatin (LIPITOR) 20 MG tablet TAKE 1 TABLET BY MOUTH EVERY DAY    b complex vitamins capsule Take 1 capsule by mouth once daily.    biotin 10,000 mcg Cap Take by mouth.    chlorthalidone (HYGROTEN) 25 MG Tab TAKE 1 TABLET BY MOUTH EVERY DAY    cholecalciferol, vitamin D3, (VITAMIN D3) 2,000 unit Cap Take 1 capsule by mouth once daily.    Lactobacillus rhamnosus GG (CULTURELLE) 10 billion cell capsule Take 1 capsule by mouth once daily.    levothyroxine (SYNTHROID) 112 MCG tablet TAKE 1 TABLET BY MOUTH BEFORE BREAKFAST    omeprazole (PRILOSEC) 20 MG capsule TAKE 1 CAPSULE BY MOUTH EVERY DAY    valacyclovir (VALTREX) 500 MG tablet Take 500 mg by mouth 2 (two) times daily. PRN fever blisters     No current facility-administered medications for this visit.        Allergies  Review of patient's allergies indicates:   Allergen Reactions    Penicillins Hives    Shellfish containing products Edema     Generalized swelling       Family History  Family History   Problem Relation Age of Onset    Dementia Mother     Heart disease Father         heart valve    Dementia Father     Cancer Maternal Aunt          lung cancer       Social History  Social History     Socioeconomic History    Marital status:      Spouse name: Not on file    Number of children: 1    Years of education: Not on file    Highest education level: Not on file   Occupational History    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     Employer: [employer]   Social Needs    Financial resource strain: Not on file    Food insecurity:     Worry: Not on file     Inability: Not on file    Transportation needs:     Medical: Not on file     Non-medical: Not on file   Tobacco Use    Smoking status: Former Smoker     Last attempt to quit: 2000     Years since quittin.9    Smokeless tobacco: Never Used   Substance and Sexual Activity    Alcohol use: Yes     Alcohol/week: 1.2 oz     Types: 2 Glasses of wine per week    Drug use: No    Sexual activity: Yes     Partners: Male   Lifestyle    Physical activity:     Days per week: Not on file     Minutes per session: Not on file    Stress: Not on file   Relationships    Social connections:     Talks on phone: Not on file     Gets together: Not on file     Attends Confucianism service: Not on file     Active member of club or organization: Not on file     Attends meetings of clubs or organizations: Not on file     Relationship status: Not on file   Other Topics Concern    Not on file   Social History Narrative    Rides motorcycles.  Has recently started an exercise program.               Review of Systems     Constitutional: Negative    HENT: Negative  Eyes: Negative  Respiratory: Negative  Cardiovascular: Negative  Musculoskeletal: HPI  Skin: Negative  Neurological: Negative  Hematological: Negative  Endocrine: Negative                 Physical Exam    There were no vitals filed for this visit.  Body mass index is 43.65 kg/m².  Physical Examination:     General appearance -  well appearing, and in no distress  Mental status - awake  Neck - supple  Chest -  symmetric air entry  Heart - normal rate    Abdomen - soft      Assessment     1. Chronic pain of both knees    2. Primary osteoarthritis of both knees          Plan

## 2019-07-08 NOTE — LETTER
July 8, 2019      Anthony Varner Jr., MD  [address] [facility] [address] [place] [address]           [facility]  [address] [facility] [address] [place] [address]  Phone: [phone]          Patient: Kenna Schmitt   MR Number: [id number]   YOB: 1950   Date of Visit: 7/8/2019       Dear Dr. Anthony Varner Jr.:    Thank you for referring Kenna Schmitt to me for evaluation. Attached you will find relevant portions of my assessment and plan of care.    If you have questions, please do not hesitate to call me. I look forward to following Kenna Schmitt along with you.    Sincerely,    Tino Loja MD    Enclosure  CC:  No Recipients    If you would like to receive this communication electronically, please contact [email or web address] or [phone] to request more information on [employer] Link access.    For providers and/or their staff who would like to refer a patient to [facility], please contact us through our one-stop-shop provider referral line, [facility], at [phone].    If you feel you have received this communication in error or would no longer like to receive these types of communications, please e-mail [email or web address]

## 2019-07-08 NOTE — OUTREACH NOTE
Prep Survey      Responses   Facility patient discharged from?  [place]   Is patient eligible?  Yes   Discharge diagnosis  Sepsis due to urinary tract infection    Does the patient have one of the following disease processes/diagnoses(primary or secondary)?  Sepsis   Does the patient have Home health ordered?  No   Is there a DME ordered?  No   Prep survey completed?  Yes          Judith Jackson RN

## 2019-07-08 NOTE — PROGRESS NOTES
Case Management Discharge Note    Final Note: Patient was DC'd home 7/7         Transportation Services  Private: Car    Final Discharge Disposition Code: 01 - home or self-care

## 2019-07-10 LAB
BACTERIA SPEC AEROBE CULT: NORMAL
BACTERIA SPEC AEROBE CULT: NORMAL

## 2019-07-11 ENCOUNTER — READMISSION MANAGEMENT (OUTPATIENT)
Dept: CALL CENTER | Facility: HOSPITAL | Age: 69
End: 2019-07-11

## 2019-07-11 NOTE — OUTREACH NOTE
Sepsis Week 1 Survey      Responses   Facility patient discharged from?  [place]   Does the patient have one of the following disease processes/diagnoses(primary or secondary)?  Sepsis   Is there a successful TCM telephone encounter documented?  No   Week 1 attempt successful?  Yes   Call start time  0944   Call end time  0952   Discharge diagnosis  Sepsis due to urinary tract infection    Meds reviewed with patient/caregiver?  Yes   Is the patient having any side effects they believe may be caused by any medication additions or changes?  No   Does the patient have all medications related to this admission filled (includes all antibiotics, inhalers, nebulizers,steroids,etc.)  Yes   Is the patient taking all medications as directed (includes completed medication regime)?  Yes   Does the patient have a primary care provider?   Yes   Does the patient have an appointment with their PCP within 7 days of discharge?  No [Pt is looking for a new PCP, she is calling some and waiting for return calls]   Has the patient kept scheduled appointments due by today?  N/A   Comments  Has appt with her urologist Dr. Gongora   Has home health visited the patient within 72 hours of discharge?  N/A   Psychosocial issues?  No   Did the patient receive a copy of their discharge instructions?  Yes   Nursing interventions  Reviewed instructions with patient   What is the patient's perception of their health status since discharge?  Improving   Is patient/caregiver able to teach back steps to recovery at home?  Set small, achievable goals for return to baseline health, Rest and regain strength, Eat a balanced diet, Exercise as tolerated   Is the patient/caregiver able to teach back signs and symptoms of worsening condition:  Fever, Rapid heart rate (>90), Shortness of breath/rapid respiratory rate, Altered mental status(confusion/coma)   Additional teach back comments  Pt says she is doing well, she still has some blood in her urine but  she is going to call Dr. Gongora's office and let them know, no questions or concerns at this time.   Week 1 call completed?  Yes          Sarita Guzman RN

## 2019-07-12 ENCOUNTER — TELEPHONE (OUTPATIENT)
Dept: ORTHOPEDICS | Facility: CLINIC | Age: 69
End: 2019-07-12

## 2019-07-12 NOTE — TELEPHONE ENCOUNTER
Talked with patient and notified patient that her surgery was not approved here. Informed patient that it will be approved at [facility]. Patient asked about [facility] notified patient that certain surgeries are unable to be performed there at this time. She stated that she will come in today to go over [facility] process. Patient is wanting to schedule surgery on 8/20/19.

## 2019-07-17 RX ORDER — TETRACYCLINE HYDROCHLORIDE 250 MG/1
250 CAPSULE ORAL 4 TIMES DAILY
COMMUNITY
End: 2019-07-17

## 2019-07-17 RX ORDER — DOXYCYCLINE HYCLATE 100 MG
100 TABLET ORAL 2 TIMES DAILY
COMMUNITY
End: 2019-07-18 | Stop reason: HOSPADM

## 2019-07-18 ENCOUNTER — APPOINTMENT (OUTPATIENT)
Dept: GENERAL RADIOLOGY | Facility: HOSPITAL | Age: 69
End: 2019-07-18

## 2019-07-18 ENCOUNTER — ANESTHESIA (OUTPATIENT)
Dept: PERIOP | Facility: HOSPITAL | Age: 69
End: 2019-07-18

## 2019-07-18 ENCOUNTER — HOSPITAL ENCOUNTER (OUTPATIENT)
Facility: HOSPITAL | Age: 69
Setting detail: HOSPITAL OUTPATIENT SURGERY
Discharge: HOME | End: 2019-07-18
Attending: UROLOGY | Admitting: UROLOGY

## 2019-07-18 ENCOUNTER — ANESTHESIA EVENT (OUTPATIENT)
Dept: PERIOP | Facility: HOSPITAL | Age: 69
End: 2019-07-18

## 2019-07-18 ENCOUNTER — READMISSION MANAGEMENT (OUTPATIENT)
Dept: CALL CENTER | Facility: HOSPITAL | Age: 69
End: 2019-07-18

## 2019-07-18 VITALS
TEMPERATURE: 97.6 F | DIASTOLIC BLOOD PRESSURE: 54 MMHG | HEART RATE: 82 BPM | WEIGHT: 151.9 LBS | OXYGEN SATURATION: 97 % | BODY MASS INDEX: 29.82 KG/M2 | HEIGHT: 60 IN | RESPIRATION RATE: 16 BRPM | SYSTOLIC BLOOD PRESSURE: 104 MMHG

## 2019-07-18 DIAGNOSIS — N20.1 RIGHT URETERAL STONE: ICD-10-CM

## 2019-07-18 DIAGNOSIS — N20.1 URETERAL STONE: Primary | ICD-10-CM

## 2019-07-18 PROCEDURE — 25010000002 PROPOFOL 10 MG/ML EMULSION: Performed by: ANESTHESIOLOGY

## 2019-07-18 PROCEDURE — 25010000002 FENTANYL CITRATE (PF) 100 MCG/2ML SOLUTION: Performed by: ANESTHESIOLOGY

## 2019-07-18 PROCEDURE — C2617 STENT, NON-COR, TEM W/O DEL: HCPCS | Performed by: UROLOGY

## 2019-07-18 PROCEDURE — 0 IOTHALAMATE 60 % SOLUTION: Performed by: UROLOGY

## 2019-07-18 PROCEDURE — 82360 CALCULUS ASSAY QUANT: CPT | Performed by: UROLOGY

## 2019-07-18 PROCEDURE — 74018 RADEX ABDOMEN 1 VIEW: CPT

## 2019-07-18 PROCEDURE — 25010000002 ONDANSETRON PER 1 MG: Performed by: ANESTHESIOLOGY

## 2019-07-18 PROCEDURE — 25010000002 GENTAMICIN PER 80 MG: Performed by: UROLOGY

## 2019-07-18 PROCEDURE — 76000 FLUOROSCOPY <1 HR PHYS/QHP: CPT

## 2019-07-18 PROCEDURE — 25010000002 MIDAZOLAM PER 1 MG: Performed by: ANESTHESIOLOGY

## 2019-07-18 DEVICE — URETERAL STENT
Type: IMPLANTABLE DEVICE | Site: URETER | Status: NON-FUNCTIONAL
Brand: CONTOUR™

## 2019-07-18 RX ORDER — FAMOTIDINE 10 MG/ML
20 INJECTION, SOLUTION INTRAVENOUS ONCE
Status: COMPLETED | OUTPATIENT
Start: 2019-07-18 | End: 2019-07-18

## 2019-07-18 RX ORDER — DOXYCYCLINE HYCLATE 100 MG/1
100 TABLET, DELAYED RELEASE ORAL 2 TIMES DAILY
Qty: 12 TABLET | Refills: 0 | Status: SHIPPED | OUTPATIENT
Start: 2019-07-18 | End: 2019-12-11

## 2019-07-18 RX ORDER — MAGNESIUM HYDROXIDE 1200 MG/15ML
LIQUID ORAL AS NEEDED
Status: DISCONTINUED | OUTPATIENT
Start: 2019-07-18 | End: 2019-07-18 | Stop reason: HOSPADM

## 2019-07-18 RX ORDER — HYDROMORPHONE HYDROCHLORIDE 1 MG/ML
0.5 INJECTION, SOLUTION INTRAMUSCULAR; INTRAVENOUS; SUBCUTANEOUS
Status: DISCONTINUED | OUTPATIENT
Start: 2019-07-18 | End: 2019-07-18 | Stop reason: HOSPADM

## 2019-07-18 RX ORDER — PROMETHAZINE HYDROCHLORIDE 25 MG/1
25 TABLET ORAL ONCE AS NEEDED
Status: DISCONTINUED | OUTPATIENT
Start: 2019-07-18 | End: 2019-07-18 | Stop reason: HOSPADM

## 2019-07-18 RX ORDER — FENTANYL CITRATE 50 UG/ML
50 INJECTION, SOLUTION INTRAMUSCULAR; INTRAVENOUS
Status: DISCONTINUED | OUTPATIENT
Start: 2019-07-18 | End: 2019-07-18 | Stop reason: HOSPADM

## 2019-07-18 RX ORDER — OXYCODONE AND ACETAMINOPHEN 7.5; 325 MG/1; MG/1
1 TABLET ORAL ONCE AS NEEDED
Status: DISCONTINUED | OUTPATIENT
Start: 2019-07-18 | End: 2019-07-18 | Stop reason: HOSPADM

## 2019-07-18 RX ORDER — LIDOCAINE HYDROCHLORIDE 20 MG/ML
INJECTION, SOLUTION INFILTRATION; PERINEURAL AS NEEDED
Status: DISCONTINUED | OUTPATIENT
Start: 2019-07-18 | End: 2019-07-18 | Stop reason: SURG

## 2019-07-18 RX ORDER — PROPOFOL 10 MG/ML
VIAL (ML) INTRAVENOUS AS NEEDED
Status: DISCONTINUED | OUTPATIENT
Start: 2019-07-18 | End: 2019-07-18 | Stop reason: SURG

## 2019-07-18 RX ORDER — SODIUM CHLORIDE 0.9 % (FLUSH) 0.9 %
3-10 SYRINGE (ML) INJECTION AS NEEDED
Status: DISCONTINUED | OUTPATIENT
Start: 2019-07-18 | End: 2019-07-18 | Stop reason: HOSPADM

## 2019-07-18 RX ORDER — HYDROCODONE BITARTRATE AND ACETAMINOPHEN 7.5; 325 MG/1; MG/1
1 TABLET ORAL ONCE AS NEEDED
Status: COMPLETED | OUTPATIENT
Start: 2019-07-18 | End: 2019-07-18

## 2019-07-18 RX ORDER — EPHEDRINE SULFATE 50 MG/ML
INJECTION, SOLUTION INTRAVENOUS AS NEEDED
Status: DISCONTINUED | OUTPATIENT
Start: 2019-07-18 | End: 2019-07-18 | Stop reason: SURG

## 2019-07-18 RX ORDER — HYDROCODONE BITARTRATE AND ACETAMINOPHEN 5; 325 MG/1; MG/1
1-2 TABLET ORAL EVERY 4 HOURS PRN
Qty: 12 TABLET | Refills: 0 | Status: SHIPPED | OUTPATIENT
Start: 2019-07-18 | End: 2019-12-11

## 2019-07-18 RX ORDER — HYDRALAZINE HYDROCHLORIDE 20 MG/ML
5 INJECTION INTRAMUSCULAR; INTRAVENOUS
Status: DISCONTINUED | OUTPATIENT
Start: 2019-07-18 | End: 2019-07-18 | Stop reason: HOSPADM

## 2019-07-18 RX ORDER — NALOXONE HCL 0.4 MG/ML
0.2 VIAL (ML) INJECTION AS NEEDED
Status: DISCONTINUED | OUTPATIENT
Start: 2019-07-18 | End: 2019-07-18 | Stop reason: HOSPADM

## 2019-07-18 RX ORDER — EPHEDRINE SULFATE 50 MG/ML
5 INJECTION, SOLUTION INTRAVENOUS ONCE AS NEEDED
Status: DISCONTINUED | OUTPATIENT
Start: 2019-07-18 | End: 2019-07-18 | Stop reason: HOSPADM

## 2019-07-18 RX ORDER — FLUMAZENIL 0.1 MG/ML
0.2 INJECTION INTRAVENOUS AS NEEDED
Status: DISCONTINUED | OUTPATIENT
Start: 2019-07-18 | End: 2019-07-18 | Stop reason: HOSPADM

## 2019-07-18 RX ORDER — ONDANSETRON 2 MG/ML
INJECTION INTRAMUSCULAR; INTRAVENOUS AS NEEDED
Status: DISCONTINUED | OUTPATIENT
Start: 2019-07-18 | End: 2019-07-18 | Stop reason: SURG

## 2019-07-18 RX ORDER — PROMETHAZINE HYDROCHLORIDE 25 MG/1
25 SUPPOSITORY RECTAL ONCE AS NEEDED
Status: DISCONTINUED | OUTPATIENT
Start: 2019-07-18 | End: 2019-07-18 | Stop reason: HOSPADM

## 2019-07-18 RX ORDER — ACETAMINOPHEN 325 MG/1
650 TABLET ORAL ONCE AS NEEDED
Status: DISCONTINUED | OUTPATIENT
Start: 2019-07-18 | End: 2019-07-18 | Stop reason: HOSPADM

## 2019-07-18 RX ORDER — ONDANSETRON 2 MG/ML
4 INJECTION INTRAMUSCULAR; INTRAVENOUS ONCE AS NEEDED
Status: DISCONTINUED | OUTPATIENT
Start: 2019-07-18 | End: 2019-07-18 | Stop reason: HOSPADM

## 2019-07-18 RX ORDER — SODIUM CHLORIDE, SODIUM LACTATE, POTASSIUM CHLORIDE, CALCIUM CHLORIDE 600; 310; 30; 20 MG/100ML; MG/100ML; MG/100ML; MG/100ML
9 INJECTION, SOLUTION INTRAVENOUS CONTINUOUS
Status: DISCONTINUED | OUTPATIENT
Start: 2019-07-18 | End: 2019-07-18 | Stop reason: HOSPADM

## 2019-07-18 RX ORDER — SODIUM CHLORIDE 0.9 % (FLUSH) 0.9 %
3 SYRINGE (ML) INJECTION EVERY 12 HOURS SCHEDULED
Status: DISCONTINUED | OUTPATIENT
Start: 2019-07-18 | End: 2019-07-18 | Stop reason: HOSPADM

## 2019-07-18 RX ORDER — PROMETHAZINE HYDROCHLORIDE 25 MG/ML
12.5 INJECTION, SOLUTION INTRAMUSCULAR; INTRAVENOUS ONCE AS NEEDED
Status: DISCONTINUED | OUTPATIENT
Start: 2019-07-18 | End: 2019-07-18 | Stop reason: HOSPADM

## 2019-07-18 RX ORDER — DIPHENHYDRAMINE HCL 25 MG
25 CAPSULE ORAL
Status: DISCONTINUED | OUTPATIENT
Start: 2019-07-18 | End: 2019-07-18 | Stop reason: HOSPADM

## 2019-07-18 RX ORDER — MIDAZOLAM HYDROCHLORIDE 1 MG/ML
2 INJECTION INTRAMUSCULAR; INTRAVENOUS
Status: DISCONTINUED | OUTPATIENT
Start: 2019-07-18 | End: 2019-07-18 | Stop reason: HOSPADM

## 2019-07-18 RX ORDER — MIDAZOLAM HYDROCHLORIDE 1 MG/ML
1 INJECTION INTRAMUSCULAR; INTRAVENOUS
Status: DISCONTINUED | OUTPATIENT
Start: 2019-07-18 | End: 2019-07-18 | Stop reason: HOSPADM

## 2019-07-18 RX ORDER — PROMETHAZINE HYDROCHLORIDE 25 MG/ML
6.25 INJECTION, SOLUTION INTRAMUSCULAR; INTRAVENOUS
Status: DISCONTINUED | OUTPATIENT
Start: 2019-07-18 | End: 2019-07-18 | Stop reason: HOSPADM

## 2019-07-18 RX ADMIN — EPHEDRINE SULFATE 10 MG: 50 INJECTION INTRAMUSCULAR; INTRAVENOUS; SUBCUTANEOUS at 09:15

## 2019-07-18 RX ADMIN — FENTANYL CITRATE 50 MCG: 50 INJECTION, SOLUTION INTRAMUSCULAR; INTRAVENOUS at 09:50

## 2019-07-18 RX ADMIN — PROPOFOL 120 MG: 10 INJECTION, EMULSION INTRAVENOUS at 08:54

## 2019-07-18 RX ADMIN — MIDAZOLAM 2 MG: 1 INJECTION INTRAMUSCULAR; INTRAVENOUS at 08:29

## 2019-07-18 RX ADMIN — GENTAMICIN SULFATE: 40 INJECTION, SOLUTION INTRAMUSCULAR; INTRAVENOUS at 09:00

## 2019-07-18 RX ADMIN — HYDROCODONE BITARTRATE AND ACETAMINOPHEN 1 TABLET: 7.5; 325 TABLET ORAL at 10:20

## 2019-07-18 RX ADMIN — SODIUM CHLORIDE, POTASSIUM CHLORIDE, SODIUM LACTATE AND CALCIUM CHLORIDE 9 ML/HR: 600; 310; 30; 20 INJECTION, SOLUTION INTRAVENOUS at 08:29

## 2019-07-18 RX ADMIN — FAMOTIDINE 20 MG: 10 INJECTION INTRAVENOUS at 08:29

## 2019-07-18 RX ADMIN — ONDANSETRON 4 MG: 2 INJECTION INTRAMUSCULAR; INTRAVENOUS at 09:20

## 2019-07-18 RX ADMIN — FENTANYL CITRATE 50 MCG: 50 INJECTION, SOLUTION INTRAMUSCULAR; INTRAVENOUS at 09:40

## 2019-07-18 RX ADMIN — LIDOCAINE HYDROCHLORIDE 50 MG: 20 INJECTION, SOLUTION INFILTRATION; PERINEURAL at 08:54

## 2019-07-18 RX ADMIN — FENTANYL CITRATE 50 MCG: 50 INJECTION INTRAMUSCULAR; INTRAVENOUS at 08:47

## 2019-07-18 NOTE — OUTREACH NOTE
Sepsis Week 2 Survey      Responses   Facility patient discharged from?  [place]   Does the patient have one of the following disease processes/diagnoses(primary or secondary)?  Sepsis   Week 2 attempt successful?  No   Unsuccessful attempts  Attempt 1 [She was also in the ER today.]          Sung Tafoya RN

## 2019-07-18 NOTE — OP NOTE
Operative Report     [facility]    Patient: Jazmyn Castaneda  Age:      68 y.o.  :     1950  Sex:      female    Medical Record:  [id number]    Date of Operation/Procedure:  2019    Pre-op Diagnosis:   Right ureteral stone    Post-Op Diagnosis Codes:   Same    Pre-operative Diagnosis Free Text:  * No pre-op diagnosis entered *     Name of Operation/Procedure:  Procedure(s) and Anesthesia Type:     * CYSTOSCOPY, RIGHT URETEROSCOPY, LASER LITHOTRIPSY, STONE BASKET EXTRACTION, STENT PLACEMENT WITHOUT STRINGS - General    Findings/Complications:  Right ureteral stone    Description of procedure: The patient was taken to the OR and placed under GA in lithotomy position.  Prepped and draped in sterile fashion.  The 21 Fr cystoscope was introduced and pan-cystoscopy was performed.  No tumors or stones were seen.  A Sensor guidewire was passed through the right ureteral orifice into the kidney under fluoro guidance without difficulty.Next, a rigid ureteroscope was passed alongside the safety wire. The stone was visible in the mid ureter. It was fragmented with a holmium laser into two fragments. Each fragment was grasped with a zero-tip nitinol basket and removed.  A 6 Fr x 26 cm JJ stent was passed into the kidney into the proper anatomic position.    Estimated Blood Loss: minimal    Specimens:   Order Name Source Comment Collection Info Order Time   STONE ANALYSIS Ureter, Right  Collected By: Alfredo Gongora Jr., MD 2019  9:24 AM       Fluids/Drains: none    Alfredo Gongora Jr., MD  2019  9:50 AM

## 2019-07-18 NOTE — ANESTHESIA PREPROCEDURE EVALUATION
Anesthesia Evaluation     Patient summary reviewed and Nursing notes reviewed   no history of anesthetic complications:  NPO Solid Status: > 6 hours  NPO Liquid Status: > 6 hours           Airway   Mallampati: I  TM distance: >3 FB  Neck ROM: full  No difficulty expected  Dental - normal exam     Pulmonary - normal exam   (+) a smoker Current Abstained day of surgery,   Cardiovascular - normal exam    NYHA Classification: I    (+) hypertension, hyperlipidemia,       Neuro/Psych  GI/Hepatic/Renal/Endo    (+) obesity,  hiatal hernia,  renal disease stones,     Musculoskeletal (-) negative ROS    Abdominal  - normal exam    Bowel sounds: normal.   Substance History - negative use     OB/GYN negative ob/gyn ROS         Other - negative ROS                       Anesthesia Plan    ASA 3     general     intravenous induction   Anesthetic plan, all risks, benefits, and alternatives have been provided, discussed and informed consent has been obtained with: patient.

## 2019-07-18 NOTE — ANESTHESIA PROCEDURE NOTES
Airway  Urgency: elective    Date/Time: 7/18/2019 8:40 AM  Airway not difficult    General Information and Staff    Patient location during procedure: OR  Anesthesiologist: Patricia Pruitt MD    Indications and Patient Condition  Indications for airway management: airway protection    Preoxygenated: yes  Mask difficulty assessment: 1 - vent by mask    Final Airway Details  Final airway type: supraglottic airway      Successful airway: unique  Size 4    Number of attempts at approach: 1

## 2019-07-18 NOTE — ANESTHESIA POSTPROCEDURE EVALUATION
"Patient: Jazmyn Castaneda    Procedure Summary     Date:  07/18/19 Room / Location:  [facility] OR  / [facility] MAIN OR    Anesthesia Start:  0840 Anesthesia Stop:  0937    Procedure:  CYSTOSCOPY, RIGHT URETEROSCOPY, LASER LITHOTRIPSY, STONE BASKET EXTRACTION, STENT PLACEMENT WITHOUT STRINGS (Right ) Diagnosis:      Surgeon:  Alfredo Gongora Jr., MD Provider:  Patricia Pruitt MD    Anesthesia Type:  general ASA Status:  3          Anesthesia Type: general  Last vitals  BP   113/63 (07/18/19 1059)   Temp   36.4 °C (97.6 °F) (07/18/19 0935)   Pulse   80 (07/18/19 1059)   Resp   16 (07/18/19 1059)     SpO2   94 % (07/18/19 1059)     Post Anesthesia Care and Evaluation    Patient location during evaluation: bedside  Patient participation: complete - patient participated  Level of consciousness: awake  Pain management: adequate  Airway patency: patent  Anesthetic complications: No anesthetic complications    Cardiovascular status: acceptable  Respiratory status: acceptable  Hydration status: acceptable    Comments: */63   Pulse 80   Temp 36.4 °C (97.6 °F) (Oral)   Resp 16   Ht 152.4 cm (60\")   Wt 68.9 kg (151 lb 14.4 oz)   SpO2 94%   BMI 29.67 kg/m²         "

## 2019-07-19 ENCOUNTER — READMISSION MANAGEMENT (OUTPATIENT)
Dept: CALL CENTER | Facility: HOSPITAL | Age: 69
End: 2019-07-19

## 2019-07-19 NOTE — OUTREACH NOTE
Sepsis Week 2 Survey      Responses   Facility patient discharged from?  [place]   Does the patient have one of the following disease processes/diagnoses(primary or secondary)?  Sepsis   Week 2 attempt successful?  Yes   Call start time  1351   Call end time  1353   Discharge diagnosis  Sepsis due to urinary tract infection    Meds reviewed with patient/caregiver?  Yes   Is the patient having any side effects they believe may be caused by any medication additions or changes?  No   Does the patient have all medications related to this admission filled (includes all antibiotics, inhalers, nebulizers,steroids,etc.)  Yes   Is the patient taking all medications as directed (includes completed medication regime)?  Yes   Does the patient have a primary care provider?   No   PCP Nursing Intervention  Provided number to obtain PCP   Does the patient have an appointment with their PCP within 7 days of discharge?  No   Nursing Interventions  Advised patient to make appointment   Urgent appointment interventions  Facilitated patient appointment, Instructed to go to Urgent Care   Has the patient kept scheduled appointments due by today?  Yes   Comments  Seen Urologist. Needs PCP.   Has home health visited the patient within 72 hours of discharge?  N/A   Psychosocial issues?  No   Did the patient receive a copy of their discharge instructions?  Yes   Nursing interventions  Reviewed instructions with patient   What is the patient's perception of their health status since discharge?  Improving   Nursing interventions  Nurse provided patient education   Is the patient/caregiver able to teach back Sepsis?  S - Shivering,fever or very cold, E - Extreme pain or generalized discomfort (worst ever,especially abdomen), P - Pale or discolored skin   Nursing interventions  Nurse provided reassurance to patient, Nurse provided patient education   Is patient/caregiver able to teach back steps to recovery at home?  Set small, achievable  goals for return to baseline health, Rest and regain strength, Eat a balanced diet, Exercise as tolerated   Is the patient/caregiver able to teach back signs and symptoms of worsening condition:  Fever, Rapid heart rate (>90), Shortness of breath/rapid respiratory rate, Altered mental status(confusion/coma), Hyperthermia   Is the patient/caregiver able to teach back the hierarchy of who to call/visit for symptoms/problems? PCP, Specialist, Home health nurse, Urgent Care, ED, 911  Yes   Week 2 call completed?  Yes          Sung Tafoya RN

## 2019-07-25 ENCOUNTER — TELEPHONE (OUTPATIENT)
Dept: AUDIOLOGY | Facility: CLINIC | Age: 69
End: 2019-07-25

## 2019-07-25 LAB
CA PHOS CRY STONE QL IR: 10 %
COD CRY STONE QL IR: 75 %
COLOR STONE: NORMAL
COM CRY STONE QL IR: 15 %
COMPN STONE: NORMAL
CONV COMMENT: NORMAL
Lab: NORMAL
Lab: NORMAL
NIDUS STONE QL: NORMAL
PATH REPORT.COMMENTS IMP SPEC: NORMAL
SIZE STONE: NORMAL MM
WT STONE: 14.7 MG

## 2019-07-25 NOTE — TELEPHONE ENCOUNTER
Spoke with pt who told me she needs supplies like domes and wax filters. She laso mentioned that the wire seems to be disconnecting from the  box. Suggested she come here in the morning at 9:30 and I can change the  and give her the suplies she needs.

## 2019-07-26 ENCOUNTER — READMISSION MANAGEMENT (OUTPATIENT)
Dept: CALL CENTER | Facility: HOSPITAL | Age: 69
End: 2019-07-26

## 2019-07-26 NOTE — OUTREACH NOTE
Sepsis Week 3 Survey      Responses   Facility patient discharged from?  [place]   Does the patient have one of the following disease processes/diagnoses(primary or secondary)?  Sepsis   Week 3 attempt successful?  Yes   Call start time  1509   Call end time  1510   Discharge diagnosis  Sepsis due to urinary tract infection    Meds reviewed with patient/caregiver?  Yes   Is the patient having any side effects they believe may be caused by any medication additions or changes?  No   Does the patient have all medications related to this admission filled (includes all antibiotics, inhalers, nebulizers,steroids,etc.)  Yes   Is the patient taking all medications as directed (includes completed medication regime)?  Yes   Does the patient have a primary care provider?   No   Does the patient have an appointment with their PCP within 7 days of discharge?  No   Has home health visited the patient within 72 hours of discharge?  N/A   Psychosocial issues?  No   Did the patient receive a copy of their discharge instructions?  Yes   Nursing interventions  Reviewed instructions with patient   What is the patient's perception of their health status since discharge?  Improving   Nursing interventions  Nurse provided patient education   Week 3 call completed?  Yes   Revoked  No further contact(revokes)-requires comment   Graduated/Revoked comments  Declined to participate in call.          Sung Tafoya, WILLIAM

## 2019-07-31 ENCOUNTER — HOSPITAL ENCOUNTER (OUTPATIENT)
Dept: RADIOLOGY | Facility: HOSPITAL | Age: 69
Discharge: HOME | End: 2019-07-31
Attending: NURSE PRACTITIONER
Payer: MEDICARE

## 2019-07-31 ENCOUNTER — OFFICE VISIT (OUTPATIENT)
Dept: FAMILY MEDICINE | Facility: CLINIC | Age: 69
End: 2019-07-31
Payer: MEDICARE

## 2019-07-31 VITALS
WEIGHT: 227.75 LBS | HEART RATE: 67 BPM | HEIGHT: 61 IN | BODY MASS INDEX: 43 KG/M2 | SYSTOLIC BLOOD PRESSURE: 136 MMHG | DIASTOLIC BLOOD PRESSURE: 86 MMHG

## 2019-07-31 DIAGNOSIS — Z01.818 PRE-OP EXAM: Primary | ICD-10-CM

## 2019-07-31 DIAGNOSIS — K21.9 GASTROESOPHAGEAL REFLUX DISEASE, ESOPHAGITIS PRESENCE NOT SPECIFIED: ICD-10-CM

## 2019-07-31 DIAGNOSIS — I10 ESSENTIAL HYPERTENSION: ICD-10-CM

## 2019-07-31 DIAGNOSIS — Z01.818 PRE-OP EXAM: ICD-10-CM

## 2019-07-31 PROCEDURE — 99214 OFFICE O/P EST MOD 30 MIN: CPT | Mod: S$PBB,,, | Performed by: NURSE PRACTITIONER

## 2019-07-31 PROCEDURE — 99214 PR OFFICE/OUTPT VISIT, EST, LEVL IV, 30-39 MIN: ICD-10-PCS | Mod: S$PBB,,, | Performed by: NURSE PRACTITIONER

## 2019-07-31 PROCEDURE — 93010 ELECTROCARDIOGRAM REPORT: CPT | Mod: S$PBB,,, | Performed by: INTERNAL MEDICINE

## 2019-07-31 PROCEDURE — 71046 X-RAY EXAM CHEST 2 VIEWS: CPT | Mod: 26,,, | Performed by: RADIOLOGY

## 2019-07-31 PROCEDURE — 99213 OFFICE O/P EST LOW 20 MIN: CPT | Mod: PBBFAC,PO | Performed by: NURSE PRACTITIONER

## 2019-07-31 PROCEDURE — 93010 EKG 12-LEAD: ICD-10-PCS | Mod: S$PBB,,, | Performed by: INTERNAL MEDICINE

## 2019-07-31 PROCEDURE — 71046 X-RAY EXAM CHEST 2 VIEWS: CPT | Mod: TC,FY,PO

## 2019-07-31 PROCEDURE — 99999 PR PBB SHADOW E&M-EST. PATIENT-LVL III: ICD-10-PCS | Mod: PBBFAC,,, | Performed by: NURSE PRACTITIONER

## 2019-07-31 PROCEDURE — 99999 PR PBB SHADOW E&M-EST. PATIENT-LVL III: CPT | Mod: PBBFAC,,, | Performed by: NURSE PRACTITIONER

## 2019-07-31 PROCEDURE — 71046 XR CHEST PA AND LATERAL: ICD-10-PCS | Mod: 26,,, | Performed by: RADIOLOGY

## 2019-07-31 PROCEDURE — 93005 ELECTROCARDIOGRAM TRACING: CPT | Mod: PBBFAC,PO | Performed by: INTERNAL MEDICINE

## 2019-07-31 NOTE — PROGRESS NOTES
Subjective:       Patient ID: Kenna Schmitt is a 69 y.o. female.    Chief Complaint: Pre-op Exam    Patient is having a partial left knee replacement on 8/20/19 by Dr Loja. Here for pre operative clearance. HTN stable. GERD stable.     Past Medical History:  No date: GERD (gastroesophageal reflux disease)  No date: Hyperlipidemia  No date: Hypertension  10/6/2016: Personal history of colonic polyps      Comment:  Colonoscopy 2013 with Dr. Rhiannon Harrington.  Tubular                adenoma Colonoscopy 2016 Clear.     1975: Thyroid disease      Comment:  Thyroiditis    Past Surgical History:  2/10/2017: ARTHROSCOPY-KNEE; Left      Comment:  Performed by Kirby Dale MD at [facility] OR  2/10/2017: ARTHROSCOPY-KNEE DEBRIDEMENT; Left      Comment:  Performed by Kirby Dale MD at [facility] OR  2/10/2017: ARTHROSCOPY-MENISCECTOMY PARTIAL; Left      Comment:  Performed by Kirby Dale MD at [facility] OR  2014: BREAST SURGERY      Comment:  breast implants   1989: BREAST SURGERY      Comment:  breast implants  No date: CHOLECYSTECTOMY  11/23/2016: COLONOSCOPY; N/A      Comment:  Performed by Anoop Buck MD at [facility] ENDO  No date: LIPOSUCTION  No date: VAGINAL DELIVERY    Review of patient's family history indicates:  Problem: Dementia      Relation: Mother          Age of Onset: (Not Specified)  Problem: Heart disease      Relation: Father          Age of Onset: (Not Specified)          Comment: heart valve  Problem: Dementia      Relation: Father          Age of Onset: (Not Specified)  Problem: Cancer      Relation: Maternal Aunt          Age of Onset: (Not Specified)          Comment: lung cancer      Social History    Socioeconomic History      Marital status:       Spouse name: Not on file      Number of children: 1      Years of education: Not on file      Highest education level: Not on file    Occupational History      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        Employer: [employer]    Social Needs       Financial resource strain: Not on file      Food insecurity:        Worry: Not on file        Inability: Not on file      Transportation needs:        Medical: Not on file        Non-medical: Not on file    Tobacco Use      Smoking status: Former Smoker        Quit date: 2000        Years since quittin.0      Smokeless tobacco: Never Used    Substance and Sexual Activity      Alcohol use: Yes        Alcohol/week: 1.2 oz        Types: 2 Glasses of wine per week      Drug use: No      Sexual activity: Yes        Partners: Male    Lifestyle      Physical activity:        Days per week: Not on file        Minutes per session: Not on file      Stress: Not on file    Relationships      Social connections:        Talks on phone: Not on file        Gets together: Not on file        Attends Yarsanism service: Not on file        Active member of club or organization: Not on file        Attends meetings of clubs or organizations: Not on file        Relationship status: Not on file    Other Topics      Concerns:        Not on file    Social History Narrative      Rides motorcycles.  Has recently started an exercise program.      Current Outpatient Medications:  atorvastatin (LIPITOR) 20 MG tablet, TAKE 1 TABLET BY MOUTH EVERY DAY, Disp: 90 tablet, Rfl: 3  b complex vitamins capsule, Take 1 capsule by mouth once daily., Disp: , Rfl:   biotin 10,000 mcg Cap, Take by mouth., Disp: , Rfl:   chlorthalidone (HYGROTEN) 25 MG Tab, TAKE 1 TABLET BY MOUTH EVERY DAY, Disp: 90 tablet, Rfl: 3  cholecalciferol, vitamin D3, (VITAMIN D3) 2,000 unit Cap, Take 1 capsule by mouth once daily., Disp: , Rfl:   Lactobacillus rhamnosus GG (CULTURELLE) 10 billion cell capsule, Take 1 capsule by mouth once daily., Disp: , Rfl:   levothyroxine (SYNTHROID) 112 MCG tablet, TAKE 1 TABLET BY MOUTH BEFORE BREAKFAST, Disp: 90 tablet, Rfl: 3  omeprazole (PRILOSEC) 20 MG capsule, TAKE 1 CAPSULE BY MOUTH EVERY DAY, Disp: 90 capsule, Rfl: 0  valacyclovir  (VALTREX) 500 MG tablet, Take 500 mg by mouth 2 (two) times daily. PRN fever blisters, Disp: , Rfl:     No current facility-administered medications for this visit.       Review of patient's allergies indicates:   -- Penicillins -- Hives   -- Shellfish containing products -- Edema    --  Generalized swelling    Review of Systems   Constitutional: Negative.  Negative for diaphoresis, fatigue and fever.   HENT: Negative.    Respiratory: Negative.  Negative for cough and shortness of breath.    Cardiovascular: Negative.  Negative for chest pain and leg swelling.   Gastrointestinal: Negative.  Negative for abdominal pain, constipation and diarrhea.   Genitourinary: Negative.    Musculoskeletal: Positive for arthralgias.   Skin: Negative.  Negative for rash.   Neurological: Negative.  Negative for dizziness, light-headedness and headaches.   Psychiatric/Behavioral: Negative.        Objective:      Physical Exam   Constitutional: She is oriented to person, place, and time. No distress.   HENT:   Head: Normocephalic and atraumatic.   Eyes: Pupils are equal, round, and reactive to light.   Neck: Normal range of motion.   Cardiovascular: Normal rate and regular rhythm. Exam reveals no friction rub.   No murmur heard.  Pulmonary/Chest: Effort normal and breath sounds normal. No stridor. No respiratory distress.   Abdominal: Soft. Bowel sounds are normal. She exhibits no distension. There is no tenderness.   Musculoskeletal: She exhibits tenderness (tenderness).   Neurological: She is alert and oriented to person, place, and time.   Skin: No rash noted. She is not diaphoretic. No erythema.   Psychiatric: She has a normal mood and affect. Her behavior is normal.   Vitals reviewed.      Assessment:       1. Pre-op exam    2. Essential hypertension    3. Gastroesophageal reflux disease, esophagitis presence not specified        Plan:       1. Pre-op exam  Normal exam, pre operative testing scheduled. Cleared for planned  surgery.  - IN OFFICE EKG 12-LEAD (to Muse)  - X-Ray Chest PA And Lateral; Future  - CBC auto differential; Future  - Basic metabolic panel; Future    2. Essential hypertension  Stable.   - IN OFFICE EKG 12-LEAD (to Muse)  - X-Ray Chest PA And Lateral; Future  - CBC auto differential; Future  - Basic metabolic panel; Future    3. Gastroesophageal reflux disease, esophagitis presence not specified  Stable.  - IN OFFICE EKG 12-LEAD (to Muse)  - X-Ray Chest PA And Lateral; Future  - CBC auto differential; Future  - Basic metabolic panel; Future

## 2019-08-01 ENCOUNTER — TELEPHONE (OUTPATIENT)
Dept: ORTHOPEDICS | Facility: CLINIC | Age: 69
End: 2019-08-01

## 2019-08-01 RX ORDER — ATORVASTATIN CALCIUM 40 MG/1
TABLET, FILM COATED ORAL
Qty: 90 TABLET | Refills: 0 | Status: SHIPPED | OUTPATIENT
Start: 2019-08-01 | End: 2019-12-11

## 2019-08-01 NOTE — TELEPHONE ENCOUNTER
Called Macey at [facility] and notified her that the patient will have to have a preop with them. She stated that she will call the patient and let her know.----- Message from Johnathan Moralez sent at 8/1/2019  1:19 PM CDT -----  Contact: Macey with [facility] pre op  Please call about pre op, [phone]

## 2019-08-02 ENCOUNTER — TELEPHONE (OUTPATIENT)
Dept: ORTHOPEDICS | Facility: CLINIC | Age: 69
End: 2019-08-02

## 2019-08-02 ENCOUNTER — PATIENT MESSAGE (OUTPATIENT)
Dept: ORTHOPEDICS | Facility: CLINIC | Age: 69
End: 2019-08-02

## 2019-08-02 NOTE — TELEPHONE ENCOUNTER
Talked with patient and notified patient that it was ok because she had bilateral knee pain at the time. Also received notification that patient is scheduled for joint class with [facility].

## 2019-08-14 ENCOUNTER — PATIENT MESSAGE (OUTPATIENT)
Dept: ORTHOPEDICS | Facility: CLINIC | Age: 69
End: 2019-08-14

## 2019-08-14 ENCOUNTER — TELEPHONE (OUTPATIENT)
Dept: ORTHOPEDICS | Facility: CLINIC | Age: 69
End: 2019-08-14

## 2019-08-14 DIAGNOSIS — G89.29 CHRONIC PAIN OF BOTH KNEES: Primary | ICD-10-CM

## 2019-08-14 DIAGNOSIS — Z96.652 S/P LEFT UNICOMPARTMENTAL KNEE REPLACEMENT: ICD-10-CM

## 2019-08-14 DIAGNOSIS — Z51.81 ENCOUNTER FOR MONITORING COUMADIN THERAPY: ICD-10-CM

## 2019-08-14 DIAGNOSIS — M25.562 CHRONIC PAIN OF BOTH KNEES: Primary | ICD-10-CM

## 2019-08-14 DIAGNOSIS — Z79.01 ENCOUNTER FOR MONITORING COUMADIN THERAPY: ICD-10-CM

## 2019-08-14 DIAGNOSIS — G89.18 POST-OP PAIN: ICD-10-CM

## 2019-08-14 DIAGNOSIS — M25.561 CHRONIC PAIN OF BOTH KNEES: Primary | ICD-10-CM

## 2019-08-14 DIAGNOSIS — M17.0 PRIMARY OSTEOARTHRITIS OF BOTH KNEES: ICD-10-CM

## 2019-08-14 NOTE — TELEPHONE ENCOUNTER
Talked with patient and notified her that they will be calling her about delivering her walker and her bedside commode around Monday maybe before then.----- Message from Kenna Blackwell MA sent at 8/14/2019  2:10 PM CDT -----  Contact: fiona   Have not received her walker or commode yet   Call back

## 2019-08-16 RX ORDER — OXYCODONE AND ACETAMINOPHEN 5; 325 MG/1; MG/1
1 TABLET ORAL
Qty: 42 TABLET | Refills: 0 | Status: SHIPPED | OUTPATIENT
Start: 2019-08-16 | End: 2020-05-21

## 2019-08-16 RX ORDER — WARFARIN SODIUM 5 MG/1
5 TABLET ORAL DAILY
Qty: 30 TABLET | Refills: 11 | Status: SHIPPED | OUTPATIENT
Start: 2019-08-16 | End: 2020-05-21

## 2019-08-20 ENCOUNTER — OUTSIDE PLACE OF SERVICE (OUTPATIENT)
Dept: ADMINISTRATIVE | Facility: OTHER | Age: 69
End: 2019-08-20
Payer: MEDICARE

## 2019-08-20 PROCEDURE — 27446 PR PLASTY KNEE,MED OR LAT COMPARTMT: ICD-10-PCS | Mod: LT,,, | Performed by: ORTHOPAEDIC SURGERY

## 2019-08-20 PROCEDURE — 27446 REVISION OF KNEE JOINT: CPT | Mod: LT,,, | Performed by: ORTHOPAEDIC SURGERY

## 2019-09-04 ENCOUNTER — DOCUMENTATION ONLY (OUTPATIENT)
Dept: ORTHOPEDICS | Facility: CLINIC | Age: 69
End: 2019-09-04

## 2019-09-04 ENCOUNTER — OFFICE VISIT (OUTPATIENT)
Dept: ORTHOPEDICS | Facility: CLINIC | Age: 69
End: 2019-09-04
Payer: MEDICARE

## 2019-09-04 VITALS — WEIGHT: 227 LBS | BODY MASS INDEX: 42.86 KG/M2 | HEIGHT: 61 IN

## 2019-09-04 DIAGNOSIS — Z96.652 S/P LEFT UNICOMPARTMENTAL KNEE REPLACEMENT: Primary | ICD-10-CM

## 2019-09-04 PROCEDURE — 99213 OFFICE O/P EST LOW 20 MIN: CPT | Mod: PBBFAC,PN | Performed by: ORTHOPAEDIC SURGERY

## 2019-09-04 PROCEDURE — 99999 PR PBB SHADOW E&M-EST. PATIENT-LVL III: CPT | Mod: PBBFAC,,, | Performed by: ORTHOPAEDIC SURGERY

## 2019-09-04 PROCEDURE — 99024 POSTOP FOLLOW-UP VISIT: CPT | Mod: POP,,, | Performed by: ORTHOPAEDIC SURGERY

## 2019-09-04 PROCEDURE — 99024 SUTURE REMOVAL: ICD-10-PCS | Mod: POP,,, | Performed by: ORTHOPAEDIC SURGERY

## 2019-09-04 PROCEDURE — 99999 PR PBB SHADOW E&M-EST. PATIENT-LVL III: ICD-10-PCS | Mod: PBBFAC,,, | Performed by: ORTHOPAEDIC SURGERY

## 2019-09-04 NOTE — PROGRESS NOTES
Patient came in for post-op appointment today. Per Dr. Loja's order sutures were removed from left knee. Patient tolerated well. Incision site was clean, dry and intact. No signs or symptoms of infection.

## 2019-09-06 ENCOUNTER — TELEPHONE (OUTPATIENT)
Dept: ORTHOPEDICS | Facility: CLINIC | Age: 69
End: 2019-09-06

## 2019-09-06 NOTE — TELEPHONE ENCOUNTER
Talked with patient and notified patient that Dr. Loja recommends that patient take the coumadin for the full 3 weeks and that the coumadin clinic will let her know when her last dose is. Patient verbalized understanding and had no further questions. Notified patient that I will let the coumadin clinic know.  ----- Message from Libby Villagomez PharmD sent at 9/6/2019  2:41 PM CDT -----  Patient reporting that she was advised by Dr. Loja to take one more dose of warfarin then discontinue. I need to verify that this is correct. In the future, can we be alerted when this happens? This happened on another patient recently. We are sending the home health nurse out unnecessarily and patient told the nurse she did not need a level drawn so she is already gone from the patient's home. We also do not want to take the patient's word, we need it verified with Dr. Loja each time. Thanks

## 2019-09-09 ENCOUNTER — CLINICAL SUPPORT (OUTPATIENT)
Dept: REHABILITATION | Facility: HOSPITAL | Age: 69
End: 2019-09-09
Attending: ORTHOPAEDIC SURGERY
Payer: MEDICARE

## 2019-09-09 DIAGNOSIS — M25.662 STIFFNESS OF LEFT KNEE: ICD-10-CM

## 2019-09-09 DIAGNOSIS — R26.9 GAIT ABNORMALITY: ICD-10-CM

## 2019-09-09 PROCEDURE — 97162 PT EVAL MOD COMPLEX 30 MIN: CPT | Mod: PO

## 2019-09-09 PROCEDURE — 97110 THERAPEUTIC EXERCISES: CPT | Mod: PO

## 2019-09-09 NOTE — PATIENT INSTRUCTIONS
Heel Slides    With towel around left heel, gently pull knee up with towel until stretch is felt. Hold 5 seconds.  Repeat 10 times left leg per set. Do 1 sets per session. Do 3 sessions per day.      Straight Leg Raise     With left leg straight, other leg bent, raise straight leg until knees are even. Slowly lower. Roll on your side and repeat lifting top leg up, on other side, lifting bottom leg up, and on stomach kicking back (squeezing your rear end before you kick back).  Repeat 10 times left leg per set. Do 1 sets per session. Do 3 sessions per day.           Clam Shells    Lie on side with knees bent. Raise top leg, keeping knee bent and ankles together. Do not let your hips roll back as your raise your leg.  Repeat 10 times each leg per set. Do 1 sets per session. Do 3 sessions per day.    Copyright © [employer]. All rights reserved.  Stretching: Calf - Towel        Sit with knee straight and towel looped around left foot. Gently pull on towel until stretch is felt in calf. Hold 30 seconds.  Repeat 3 times per set. Do 1 sets per session. Do 3 sessions per day.     [email or web address]     Copyright © [employer]. All rights reserved.

## 2019-09-09 NOTE — PLAN OF CARE
[facility]  Physical Therapy Initial Evaluation    Name: Kenna Schmitt  Clinic Number: [id number]    Therapy Diagnosis:   Encounter Diagnoses   Name Primary?    Gait abnormality     Stiffness of left knee      Physician: Tino Loja MD    Physician Orders: PT Eval and Treat   Medical Diagnosis from Referral: Z96.652 (ICD-10-CM) - S/P left unicompartmental knee replacement  Evaluation Date: 9/9/2019  Authorization Period Expiration: 9/3/2020  Plan of Care Expiration: 11/8/19  Visit # / Visits authorized: 1/ 1    Time In: 7:00 am  Time Out: 7:45 am  Total Billable Time: 45 minutes    Precautions: Standard and Fall    Subjective   Date of onset: 8/20/19  History of current condition - Kenna reports: she has been getting PT at the house. She lives on the 2nd floor and has been going up the steps as necessary. She takes it slow and is able to perform. She has been able to get out of the house into the community this weekend. She had a medial compartment knee replacement on the left. She is already feeling better since having the surgery compared to prior. About a week ago, she was able to wean from the walker to the cane. She feels she is getting around well with the cane.      Medical History:   Past Medical History:   Diagnosis Date    GERD (gastroesophageal reflux disease)     Hyperlipidemia     Hypertension     Personal history of colonic polyps 10/6/2016    Colonoscopy 2013 with Dr. Rhiannon Harrington.  Tubular adenoma Colonoscopy 2016 Clear.       Thyroid disease 1975    Thyroiditis       Surgical History:   Kenna Schmitt  has a past surgical history that includes Cholecystectomy; Liposuction; Colonoscopy (N/A, 11/23/2016); Vaginal delivery; Breast surgery (2014); and Breast surgery (1989).    Medications:   Kenna has a current medication list which includes the following prescription(s): atorvastatin, b complex vitamins, biotin, chlorthalidone, cholecalciferol (vitamin d3),  lactobacillus rhamnosus gg, levothyroxine, omeprazole, oxycodone-acetaminophen, valacyclovir, and warfarin.    Allergies:   Review of patient's allergies indicates:   Allergen Reactions    Penicillins Hives    Shellfish containing products Edema     Generalized swelling        Imaging, x-ray: Lateral patellar tilt is noted right greater than left.  Medial compartment narrowing is noted bilaterally.  Femoral and tibial spurring is noted medially bilaterally.  Mild varus deformity is noted bilaterally.  No significant suprapatellar joint effusion on the right is noted, a small right quadriceps spur off the right patella is noted.    A minimal suprapatellar joint effusion on the left is noted.  Superior and inferior patellar articular surface spurring is noted along with quadriceps and patellar tendon spurring off the patella.  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; works out of house; has to be able to travel to go to [employer] and [facility]- hasn't been able to do that  Prior Level of Function: unable to get in/out of tub  Current Level of Function: same as above; difficulty with stairs    Pain:  Current 0/10, worst 0/10 tightness, best 0/10   Location: left anterior/medial knee   Description: Tight  Aggravating Factors: bending; walking far distances  Easing Factors: ice and elevation    Pts goals: get back to walking normally, dancing, ride motorcycle    Objective     Observation: no acute distress; good scar closure    Posture: fair      Range of Motion:   Knee Left active Left Passive Right Active R passive   Flexion 105 112 125 130   Extension 0 0 0 0         Lower Extremity Strength  Right LE  Left LE    Knee extension:  5/5 Knee extension: NT   Knee flexion: 5/5 Knee flexion: NT   Hip flexion: 5/5 Hip flexion: 5/5   Hip extension:  4+/5 Hip extension: 4+/5   Hip abduction: 4+/5 Hip abduction: 4+/5   Hip adduction: 4+/5 Hip adduction 4+/5   Ankle dorsiflexion: 5/5 Ankle dorsiflexion: 5/5   Ankle plantarflexion: 5/5 Ankle plantarflexion: 5/5       Special Tests:  NP due to post-op status    Function:    - Sit <--> Stand: independent   - Bed Mobility: independent    Joint Mobility: Patellar hypomobility    Palpation: no tenderness to palpation; mild scar mobility limitations    Sensation: no sensation deficits    Edema: mild edema consistent with post-op status    Girth Measurement Joint line 5 cm below 10 cm above   Left 49.5 cm 42 cm 61 cm   Right 48 cm 40 cm 63 cm         CMS Impairment/Limitation/Restriction for FOTO Knee Survey    Therapist reviewed FOTO scores for Kenna Schmitt on 2019.   FOTO documents entered into [employer] - see Media section.    Limitation Score: 47%  Category: Mobility         TREATMENT   Treatment Time In: 7:30 am  Treatment Time Out: 7:45 am  Total Treatment time separate from Evaluation: 15 minutes    Kenna received therapeutic exercises to develop strength, endurance, ROM and flexibility for 15 minutes includin-way SLR x10 each  Heel slides x10  Towel calf stretch x30 sec  SL clamshells x10 each red theraband    Home Exercises and Patient Education Provided    Education provided:   - healing timeline    Written Home Exercises Provided: yes.  Exercises were reviewed and Kenna was able to demonstrate them prior to the end of the session.  Kenna demonstrated good  understanding of the education provided.     See EMR under Patient Instructions for exercises provided 2019.    Assessment   Kenna is a 69 y.o. female referred to outpatient Physical Therapy with a medical diagnosis of s/p left unicompartmental knee replacement. Pt presents with decreased knee ROM and LE strength, balance problem, gait  abnormality, and decreased tolerance for functional mobility. She would benefit from skilled PT services to improve mobility and improve function to return to her prior level of function.     Pt prognosis is Excellent.   Pt will benefit from skilled outpatient Physical Therapy to address the deficits stated above and in the chart below, provide pt/family education, and to maximize pt's level of independence.     Plan of care discussed with patient: Yes  Pt's spiritual, cultural and educational needs considered and patient is agreeable to the plan of care and goals as stated below:     Anticipated Barriers for therapy: none anticipated    Medical Necessity is demonstrated by the following  History  Co-morbidities and personal factors that may impact the plan of care Co-morbidities:   HTN, thyroid disease, GERD, obesity    Personal Factors:   no deficits     moderate   Examination  Body Structures and Functions, activity limitations and participation restrictions that may impact the plan of care Body Regions:   lower extremities    Body Systems:    gross symmetry  ROM  strength  gross coordinated movement  balance  gait  transfers  transitions  motor control  motor learning  edema  scar formation    Participation Restrictions:   Unable to walk for exercise, dance, ride motorcycle    Activity limitations:   Learning and applying knowledge  no deficits    General Tasks and Commands  no deficits    Communication  no deficits    Mobility  lifting and carrying objects  walking  driving (bike, car, motorcycle)    Self care  washing oneself (bathing, drying, washing hands)    Domestic Life  shopping  cooking  doing house work (cleaning house, washing dishes, laundry)    Interactions/Relationships  family relationships    Life Areas  employment    Community and Social Life  community life  recreation and leisure         moderate   Clinical Presentation evolving clinical presentation with changing clinical characteristics  moderate   Decision Making/ Complexity Score: moderate     Goals:  Short Term Goals: 4 weeks   - amb 1 mile on level tile without pain provocation, need for rest, or assistive device  - do SLS >10 sec without LOB or pain  - do anterior step up of 6  without pain provocation    Long Term Goals: 8 weeks   - pt will demonstrate decreased measurable edema for improved healing  - pt will demonstrate 5/5 LE strength to demonstrate improved stability  - pt will report ability to perform normal household activities and recreational activities for return to prior level of function     Plan   Plan of care Certification: 9/9/2019 to 11/8/19.    Outpatient Physical Therapy 2 times weekly for 8 weeks to include the following interventions: Gait Training, Manual Therapy, Moist Heat/ Ice, Neuromuscular Re-ed, Patient Education, Therapeutic Activites and Therapeutic Exercise.     Christianne Pryor, PT

## 2019-09-13 ENCOUNTER — CLINICAL SUPPORT (OUTPATIENT)
Dept: REHABILITATION | Facility: HOSPITAL | Age: 69
End: 2019-09-13
Attending: ORTHOPAEDIC SURGERY
Payer: MEDICARE

## 2019-09-13 DIAGNOSIS — M25.662 STIFFNESS OF LEFT KNEE: ICD-10-CM

## 2019-09-13 DIAGNOSIS — R26.9 GAIT ABNORMALITY: ICD-10-CM

## 2019-09-13 PROCEDURE — 97110 THERAPEUTIC EXERCISES: CPT | Mod: PO

## 2019-09-13 NOTE — PROGRESS NOTES
Physical Therapy Daily Treatment Note     Name: Kenna Schmitt  Clinic Number: [id number]    Therapy Diagnosis:   Encounter Diagnoses   Name Primary?    Gait abnormality     Stiffness of left knee      Physician: Tino Loja MD  Physician Orders: PT Eval and Treat   Medical Diagnosis from Referral: Z96.652 (ICD-10-CM) - S/P left unicompartmental knee replacement  Evaluation Date: 9/9/2019  Authorization Period Expiration: 9/3/2020  Plan of Care Expiration: 11/8/19  Visit # / Visits authorized: 2/ 1     Time In: 7:05 am  Time Out: 78:10 am  Total Billable Time: 55 minutes     Precautions: Standard and Fall      Subjective     Pt reports: that her knee is feeling good she is w/o c/o pn today. states that she discontinued cane yesterday. .  She was compliant with home exercise program.  Response to previous treatment: no adverse effects noted  Functional change: progressed to amb w/o AD    Pain: 0/10  Location: left knee      Objective     Kenna received therapeutic exercises to develop strength, endurance, ROM, flexibility, posture and core stabilization for 60 minutes including:  Recumbent bike x 10 min (she has one at home)  GSS 2 min  Stair flex stretch 2 min  HSS 2 min  Mini Squat 20x  Heel raise 20x  Lat step-up 4 inch 20x  HS curl 20x  LAQ 20x  SLR 20x (min lag)  LLR 20x  Prone hip ext 20x  Prone flex stretch 2 min with strap  QS 20x 5 sec hold  Stairs 5x reciprocal steps    Pt received PROM for L knee flex/ext 3 x 20 sec each    Kenna received the following manual therapy techniques:  were applied to the:  for 00 minutes, including:    Kenna participated in neuromuscular re-education activities to improve:  for 00 minutes. The following activities were included:    Kenna received cold pack for 10 minutes to L knee to decrease circulation, pain, and swelling.    Home Exercises Provided and Patient Education Provided     Education provided:   - safety with stairs  -scar massage    Written Home Exercises  Provided: Patient instructed to cont prior HEP.  Exercises were reviewed and Kenna was able to demonstrate them prior to the end of the session.  Kenna demonstrated good  understanding of the education provided.     See EMR under Patient Instructions for exercises provided prior visit.    Assessment     Pt with good vasyl to all activities, no c/o knee pn throughout tx. Pt with min ext lag with SLR , some R knee valgus with descending stairs that pt was able to correct with VCs. Pt with good quad contraction initiation. Pt is progressing well with amb and is no longer using AD.  Kenna is progressing well towards her goals.   Pt prognosis is Good.     Pt will continue to benefit from skilled outpatient physical therapy to address the deficits listed in the problem list box on initial evaluation, provide pt/family education and to maximize pt's level of independence in the home and community environment.     Pt's spiritual, cultural and educational needs considered and pt agreeable to plan of care and goals.    Anticipated barriers to physical therapy: none anticipated       Goals:   Short Term Goals: 4 weeks   - amb 1 mile on level tile without pain provocation, need for rest, or assistive device  - do SLS >10 sec without LOB or pain  - do anterior step up of 6  without pain provocation     Long Term Goals: 8 weeks   - pt will demonstrate decreased measurable edema for improved healing  - pt will demonstrate 5/5 LE strength to demonstrate improved stability  - pt will report ability to perform normal household activities and recreational activities for return to prior level of function       Plan     Outpatient Physical Therapy 2 times weekly for 8 weeks to include the following interventions: Gait Training, Manual Therapy, Moist Heat/ Ice, Neuromuscular Re-ed, Patient Education, Therapeutic Activites and Therapeutic Exercise.        Larry Fregoso, PTA

## 2019-09-17 ENCOUNTER — CLINICAL SUPPORT (OUTPATIENT)
Dept: REHABILITATION | Facility: HOSPITAL | Age: 69
End: 2019-09-17
Attending: ORTHOPAEDIC SURGERY
Payer: MEDICARE

## 2019-09-17 DIAGNOSIS — R26.9 GAIT ABNORMALITY: ICD-10-CM

## 2019-09-17 DIAGNOSIS — M25.662 STIFFNESS OF LEFT KNEE: ICD-10-CM

## 2019-09-17 PROCEDURE — 97110 THERAPEUTIC EXERCISES: CPT | Mod: PO

## 2019-09-17 NOTE — PROGRESS NOTES
Physical Therapy Daily Treatment Note     Name: Kenna Schmitt  Clinic Number: [id number]    Therapy Diagnosis:   Encounter Diagnoses   Name Primary?    Gait abnormality     Stiffness of left knee      Physician: Tino Loja MD  Physician Orders: PT Eval and Treat   Medical Diagnosis from Referral: Z96.652 (ICD-10-CM) - S/P left unicompartmental knee replacement  Evaluation Date: 9/9/2019  Authorization Period Expiration: 9/3/2020  Plan of Care Expiration: 11/8/19  Visit # / Visits authorized: 3/ 1     Time In: 7:00 am  Time Out: 8:10 am  Total Billable Time: 55 minutes     Precautions: Standard and Fall      Subjective     Pt reports: that her knee is  w/o c/o pn today. states that she gen mm soreness for 2 days post last tx. . .  She was compliant with home exercise program.  Response to previous treatment: no adverse effects noted  Functional change: progressed to amb w/o AD    Pain: 0/10  Location: left knee      Objective     Kenna received therapeutic exercises to develop strength, endurance, ROM, flexibility, posture and core stabilization for 60 minutes including:  Recumbent bike x 10 min (she has one at home)  GSS 2 min  Stair flex stretch 2 min  HSS 2 min  Shuttle Leg Press 50# B 20x  Shuttle Calf Press 50# 20x  U Shuttle Leg Press 37.5# 20x B  Lat step-up 4 inch 20x  HS curl 20x  LAQ 20x 2#  SLR 20x 2#  LLR 20x 2#  Prone hip ext 20x 2#  Prone flex stretch 2 min with strap  QS 20x 5 sec hold  Stairs 5x reciprocal steps NP    Pt received PROM for L knee flex/ext 3 x 20 sec each  PROM flex in prone 120 degrees, Ext in sitting +2  Kenna received the following manual therapy techniques:  were applied to the:  for 00 minutes, including:    Kenna participated in neuromuscular re-education activities to improve:  for 00 minutes. The following activities were included:    Kenna received cold pack for 10 minutes to L knee to decrease circulation, pain, and swelling.    Home Exercises Provided and Patient  Education Provided     Education provided:   - safety with stairs  -scar massage    Written Home Exercises Provided: Patient instructed to cont prior HEP.  Exercises were reviewed and Kenna was able to demonstrate them prior to the end of the session.  Kenna demonstrated good  understanding of the education provided.     See EMR under Patient Instructions for exercises provided prior visit.    Assessment     Pt with good vasyl to all activities, with progression of resistance and exs with no c/o knee pn throughout tx. Pt with no ext lag with SLR with resistance today  Pt with good quad contraction initiation. Pt is progressing well with strengthening exs and with her ROM.  Kenna is progressing well towards her goals.   Pt prognosis is Good.     Pt will continue to benefit from skilled outpatient physical therapy to address the deficits listed in the problem list box on initial evaluation, provide pt/family education and to maximize pt's level of independence in the home and community environment.     Pt's spiritual, cultural and educational needs considered and pt agreeable to plan of care and goals.    Anticipated barriers to physical therapy: none anticipated       Goals:   Short Term Goals: 4 weeks   - amb 1 mile on level tile without pain provocation, need for rest, or assistive device  - do SLS >10 sec without LOB or pain  - do anterior step up of 6  without pain provocation     Long Term Goals: 8 weeks   - pt will demonstrate decreased measurable edema for improved healing  - pt will demonstrate 5/5 LE strength to demonstrate improved stability  - pt will report ability to perform normal household activities and recreational activities for return to prior level of function       Plan     Outpatient Physical Therapy 2 times weekly for 8 weeks to include the following interventions: Gait Training, Manual Therapy, Moist Heat/ Ice, Neuromuscular Re-ed, Patient Education, Therapeutic Activites and Therapeutic Exercise.         Larry Fregoso, PTA

## 2019-09-20 ENCOUNTER — CLINICAL SUPPORT (OUTPATIENT)
Dept: REHABILITATION | Facility: HOSPITAL | Age: 69
End: 2019-09-20
Attending: ORTHOPAEDIC SURGERY
Payer: MEDICARE

## 2019-09-20 DIAGNOSIS — R26.9 GAIT ABNORMALITY: ICD-10-CM

## 2019-09-20 DIAGNOSIS — M25.662 STIFFNESS OF LEFT KNEE: ICD-10-CM

## 2019-09-20 PROCEDURE — 97110 THERAPEUTIC EXERCISES: CPT | Mod: PO

## 2019-09-20 NOTE — PROGRESS NOTES
Physical Therapy Daily Treatment Note     Name: Kenna Schmitt  Clinic Number: [id number]    Therapy Diagnosis:   Encounter Diagnoses   Name Primary?    Gait abnormality     Stiffness of left knee      Physician: Tino Loja MD  Physician Orders: PT Eval and Treat   Medical Diagnosis from Referral: Z96.652 (ICD-10-CM) - S/P left unicompartmental knee replacement  Evaluation Date: 9/9/2019  Authorization Period Expiration: 9/3/2020  Plan of Care Expiration: 11/8/19  Visit # / Visits authorized: 4/ 1     Time In: 7:00 am  Time Out: 8:10 am  Total Billable Time: 55 minutes     Precautions: Standard and Fall      Subjective     Pt reports: no adverse effects after last tx, however reports some minor mm soreness this AM. . .  She was compliant with home exercise program.  Response to previous treatment: no adverse effects noted  Functional change: progressed to amb w/o AD    Pain: 0/10  Location: left knee      Objective     Kenna received therapeutic exercises to develop strength, endurance, ROM, flexibility, posture and core stabilization for 60 minutes including:  Recumbent bike x 10 min (she has one at home)  GSS 2 min  Stair flex stretch 2 min  HSS 2 min  Shuttle Leg Press 50# B 30x  Shuttle Calf Press 50# 30x  U Shuttle Leg Press 37.5# 30x B  Lat step-up 6 inch 20x  HS curl 30x 2#  LAQ 30x 2#  SLR 30x 2#  LLR 30x 2#  Prone hip ext 30x 2#  Prone flex stretch 2 min with strap  QS 30x 5 sec hold  Stairs 5x reciprocal steps NP    Pt received PROM for L knee flex/ext 3 x 20 sec each  PROM flex in prone 120 degrees, Ext in sitting +2 (previous)  Kenna received the following manual therapy techniques:  were applied to the:  for 00 minutes, including:    Kenna participated in neuromuscular re-education activities to improve:  for 00 minutes. The following activities were included:    Kenna received cold pack for 10 minutes to L knee to decrease circulation, pain, and swelling.    Home Exercises Provided and Patient  Education Provided     Education provided:   - safety with stairs  -scar massage    Written Home Exercises Provided: Patient instructed to cont prior HEP.  Exercises were reviewed and Kenna was able to demonstrate them prior to the end of the session.  Kenna demonstrated good  understanding of the education provided.     See EMR under Patient Instructions for exercises provided prior visit.    Assessment     Pt with good vasyl , with progression of rep with all activities today with no c/o knee pn throughout tx.  Pt able to initiate and maintain quality quad contraction . Pt is progressing well with strengthening exs and with her ROM continues to improve. Vasyl to stairs in increasing. .  Kenna is progressing well towards her goals.   Pt prognosis is Good.     Pt will continue to benefit from skilled outpatient physical therapy to address the deficits listed in the problem list box on initial evaluation, provide pt/family education and to maximize pt's level of independence in the home and community environment.     Pt's spiritual, cultural and educational needs considered and pt agreeable to plan of care and goals.    Anticipated barriers to physical therapy: none anticipated       Goals:   Short Term Goals: 4 weeks   - amb 1 mile on level tile without pain provocation, need for rest, or assistive device  - do SLS >10 sec without LOB or pain  - do anterior step up of 6  without pain provocation     Long Term Goals: 8 weeks   - pt will demonstrate decreased measurable edema for improved healing  - pt will demonstrate 5/5 LE strength to demonstrate improved stability  - pt will report ability to perform normal household activities and recreational activities for return to prior level of function       Plan     Outpatient Physical Therapy 2 times weekly for 8 weeks to include the following interventions: Gait Training, Manual Therapy, Moist Heat/ Ice, Neuromuscular Re-ed, Patient Education, Therapeutic Activites and  Therapeutic Exercise.        Larry Fregoso, PTA

## 2019-09-24 ENCOUNTER — CLINICAL SUPPORT (OUTPATIENT)
Dept: REHABILITATION | Facility: HOSPITAL | Age: 69
End: 2019-09-24
Attending: ORTHOPAEDIC SURGERY
Payer: MEDICARE

## 2019-09-24 DIAGNOSIS — M25.662 STIFFNESS OF LEFT KNEE: ICD-10-CM

## 2019-09-24 DIAGNOSIS — R26.9 GAIT ABNORMALITY: ICD-10-CM

## 2019-09-24 PROCEDURE — 97110 THERAPEUTIC EXERCISES: CPT | Mod: PO

## 2019-09-24 NOTE — PROGRESS NOTES
Physical Therapy Daily Treatment Note     Name: Kenna Schmitt  Clinic Number: [id number]    Therapy Diagnosis:   Encounter Diagnoses   Name Primary?    Gait abnormality     Stiffness of left knee      Physician: Tino Loja MD  Physician Orders: PT Eval and Treat   Medical Diagnosis from Referral: Z96.652 (ICD-10-CM) - S/P left unicompartmental knee replacement  Evaluation Date: 9/9/2019  Authorization Period Expiration: 9/3/2020  Plan of Care Expiration: 11/8/19  Visit # / Visits authorized: 5/ 1     Time In: 7:00 am  Time Out: 8:12 am  Total Billable Time: 55 minutes     Precautions: Standard and Fall      Subjective     Pt reports: reports she had  some  mm soreness post last tx, she is w/o complaint this morning . .  She was compliant with home exercise program.  Response to previous treatment: mm soreness  Functional change: progressed to amb w/o AD    Pain: 0/10  Location: left knee      Objective     Kenna received therapeutic exercises to develop strength, endurance, ROM, flexibility, posture and core stabilization for 60 minutes including:  Recumbent bike x 10 min (she has one at home)  GSS 2 min  Stair flex stretch 2 min  HSS 2 min  Shuttle Leg Press 50# B 30x  Shuttle Calf Press 50# 30x  U Shuttle Leg Press 37.5# 30x B  Lat step-up 6 inch 20x  HS curl 30x 2#  LAQ 30x 2#  SLR 30x 2#  LLR 30x 2#  Prone hip ext 30x 2#  Prone flex stretch 2 min with strap  prone hang 4# 4 min  QS 30x 5 sec hold  Stairs 5x reciprocal steps NP    Pt received PROM for L knee flex/ext 3 x 20 sec each  PROM flex in prone 120 degrees, Ext in sitting +2 (previous)  Kenna received the following manual therapy techniques:  were applied to the:  for 00 minutes, including:    Kenna participated in neuromuscular re-education activities to improve:  for 00 minutes. The following activities were included:    Kenna received cold pack for 10 minutes to L knee to decrease circulation, pain, and swelling.    Home Exercises Provided and  Patient Education Provided     Education provided:   - safety with stairs  -scar massage    Written Home Exercises Provided: Patient instructed to cont prior HEP.  Exercises were reviewed and Kenna was able to demonstrate them prior to the end of the session.  Kenna demonstrated good  understanding of the education provided.     See EMR under Patient Instructions for exercises provided prior visit.    Assessment     Pt with good vasyl to ther ex today with no c/o L knee pn throughout tx. Pt did note some R knee pn with HSS and LAQ today.   Pt able to initiate and maintain quality quad contraction . Pt is progressing well with strengthening exs and with her ROM continues to improve.  .  Kenna is progressing well towards her goals.   Pt prognosis is Good.     Pt will continue to benefit from skilled outpatient physical therapy to address the deficits listed in the problem list box on initial evaluation, provide pt/family education and to maximize pt's level of independence in the home and community environment.     Pt's spiritual, cultural and educational needs considered and pt agreeable to plan of care and goals.    Anticipated barriers to physical therapy: none anticipated       Goals:   Short Term Goals: 4 weeks   - amb 1 mile on level tile without pain provocation, need for rest, or assistive device  - do SLS >10 sec without LOB or pain  - do anterior step up of 6  without pain provocation     Long Term Goals: 8 weeks   - pt will demonstrate decreased measurable edema for improved healing  - pt will demonstrate 5/5 LE strength to demonstrate improved stability  - pt will report ability to perform normal household activities and recreational activities for return to prior level of function       Plan     Outpatient Physical Therapy 2 times weekly for 8 weeks to include the following interventions: Gait Training, Manual Therapy, Moist Heat/ Ice, Neuromuscular Re-ed, Patient Education, Therapeutic Activites and  Therapeutic Exercise.        Larry Fregsoo, PTA

## 2019-09-27 ENCOUNTER — CLINICAL SUPPORT (OUTPATIENT)
Dept: REHABILITATION | Facility: HOSPITAL | Age: 69
End: 2019-09-27
Attending: ORTHOPAEDIC SURGERY
Payer: MEDICARE

## 2019-09-27 DIAGNOSIS — M25.662 STIFFNESS OF LEFT KNEE: ICD-10-CM

## 2019-09-27 DIAGNOSIS — R26.9 GAIT ABNORMALITY: ICD-10-CM

## 2019-09-27 PROCEDURE — 97110 THERAPEUTIC EXERCISES: CPT | Mod: PO

## 2019-09-27 NOTE — PROGRESS NOTES
Physical Therapy Daily Treatment Note     Name: Kenna Schmitt  Clinic Number: [id number]    Therapy Diagnosis:   Encounter Diagnoses   Name Primary?    Gait abnormality     Stiffness of left knee      Physician: Tino Loja MD  Physician Orders: PT Eval and Treat   Medical Diagnosis from Referral: Z96.652 (ICD-10-CM) - S/P left unicompartmental knee replacement  Evaluation Date: 9/9/2019  Authorization Period Expiration: 9/3/2020  Plan of Care Expiration: 11/8/19  Visit # / Visits authorized: 6/ 1     Time In: 7:00 am  Time Out: 8:12 am  Total Billable Time: 55 minutes     Precautions: Standard and Fall      Subjective     Pt reports: reports she had less  mm soreness post last tx, she is w/o complaint this morning . .  She was compliant with home exercise program.  Response to previous treatment: mm soreness  Functional change: progressed to amb w/o AD    Pain: 0/10  Location: left knee      Objective     Kenna received therapeutic exercises to develop strength, endurance, ROM, flexibility, posture and core stabilization for 60 minutes including:  Recumbent bike x 10 min (she has one at home)  GSS 2 min  Stair flex stretch 2 min  HSS 2 min  Shuttle Leg Press 62.5# B 30x  Shuttle Calf Press 62.5# 30x  U Shuttle Leg Press 37.5# 30x B  Lat step-up 6 inch 20x  HS curl 30x 2#  LAQ 30x 2#  SLR 30x 2#  LLR 30x 2#  Prone hip ext 30x 2#  Prone flex stretch 2 min with strap  prone hang 4# 4 min  QS 30x 5 sec hold  Stairs 5x reciprocal steps NP    Kenna participated in neuromuscular re-education activities to improve:  for 5 minutes. The following activities were included:  SlS B 3 x 30 sec  Tandem stance B 3 x 30 sec ([facility] next visit)    Pt received PROM for L knee flex/ext 3 x 20 sec each  PROM flex in prone 125 degrees, Ext in sitting +4     Kenna received cold pack for 10 minutes to L knee to decrease circulation, pain, and swelling.    Home Exercises Provided and Patient Education Provided     Education provided:    - safety with stairs  -scar massage    Written Home Exercises Provided: Patient instructed to cont prior HEP.  Exercises were reviewed and Kenna was able to demonstrate them prior to the end of the session.  Kenna demonstrated good  understanding of the education provided.     See EMR under Patient Instructions for exercises provided prior visit.    Assessment     Pt with good vasyl to ther ex with progression of exs bryson with no c/o L knee pn throughout tx.  Pt able to initiate and maintain quality quad contraction . Pt is progressing well with strengthening exs and with her ROM continues to improve.  Pt demonstrates good SL bal on R, fair on L , good with tandem stance and appears ready to progress with this activity.  Kenna is progressing well towards her goals.   Pt prognosis is Good.     Pt will continue to benefit from skilled outpatient physical therapy to address the deficits listed in the problem list box on initial evaluation, provide pt/family education and to maximize pt's level of independence in the home and community environment.     Pt's spiritual, cultural and educational needs considered and pt agreeable to plan of care and goals.    Anticipated barriers to physical therapy: none anticipated       Goals:   Short Term Goals: 4 weeks   - amb 1 mile on level tile without pain provocation, need for rest, or assistive device  - do SLS >10 sec without LOB or pain  - do anterior step up of 6  without pain provocation     Long Term Goals: 8 weeks   - pt will demonstrate decreased measurable edema for improved healing  - pt will demonstrate 5/5 LE strength to demonstrate improved stability  - pt will report ability to perform normal household activities and recreational activities for return to prior level of function       Plan     Outpatient Physical Therapy 2 times weekly for 8 weeks to include the following interventions: Gait Training, Manual Therapy, Moist Heat/ Ice, Neuromuscular Re-ed, Patient  Education, Therapeutic Activites and Therapeutic Exercise.        Larry Fregoso, PTA

## 2019-09-30 ENCOUNTER — CLINICAL SUPPORT (OUTPATIENT)
Dept: REHABILITATION | Facility: HOSPITAL | Age: 69
End: 2019-09-30
Attending: ORTHOPAEDIC SURGERY
Payer: MEDICARE

## 2019-09-30 DIAGNOSIS — R26.9 GAIT ABNORMALITY: ICD-10-CM

## 2019-09-30 DIAGNOSIS — M25.662 STIFFNESS OF LEFT KNEE: ICD-10-CM

## 2019-09-30 PROCEDURE — 97110 THERAPEUTIC EXERCISES: CPT | Mod: PO

## 2019-09-30 NOTE — PLAN OF CARE
[facility]  Physical Therapy Initial Evaluation    Name: Kenna Schmitt  Clinic Number: [id number]    Therapy Diagnosis:   Encounter Diagnoses   Name Primary?    Gait abnormality     Stiffness of left knee      Physician: Tino Loja MD    Physician Orders: PT Eval and Treat   Medical Diagnosis from Referral: Z96.652 (ICD-10-CM) - S/P left unicompartmental knee replacement  Evaluation Date: 9/30/2019  Authorization Period Expiration: 9/3/2020  Plan of Care Expiration: 11/8/19  Visit # / Visits authorized: 1/ 1    Time In: 11:00 am  Time Out: 12:00 pm  Total Billable Time: 45 minutes    Precautions: Standard and Fall    Subjective   Overall she feels she is doing well, with less pain and more mobility. She is able to walk without an AD. She feels her stamina is still decreased, but overall feels good.      Medical History:   Past Medical History:   Diagnosis Date    GERD (gastroesophageal reflux disease)     Hyperlipidemia     Hypertension     Personal history of colonic polyps 10/6/2016    Colonoscopy 2013 with Dr. Rhiannon Harrington.  Tubular adenoma Colonoscopy 2016 Clear.       Thyroid disease 1975    Thyroiditis       Surgical History:   Kenna Schmitt  has a past surgical history that includes Cholecystectomy; Liposuction; Colonoscopy (N/A, 11/23/2016); Vaginal delivery; Breast surgery (2014); and Breast surgery (1989).    Medications:   Kenna has a current medication list which includes the following prescription(s): atorvastatin, b complex vitamins, biotin, chlorthalidone, cholecalciferol (vitamin d3), lactobacillus rhamnosus gg, levothyroxine, omeprazole, oxycodone-acetaminophen, valacyclovir, and warfarin.    Allergies:   Review of patient's allergies indicates:   Allergen Reactions    Penicillins Hives    Shellfish containing products Edema     Generalized swelling        Imaging, x-ray: Lateral patellar tilt is noted right greater than left.  Medial compartment narrowing  is noted bilaterally.  Femoral and tibial spurring is noted medially bilaterally.  Mild varus deformity is noted bilaterally.  No significant suprapatellar joint effusion on the right is noted, a small right quadriceps spur off the right patella is noted.    A minimal suprapatellar joint effusion on the left is noted.  Superior and inferior patellar articular surface spurring is noted along with quadriceps and patellar tendon spurring off the patella.  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; works out of house; has to be able to travel to go to [employer] and [employer]- hasn't been able to do that  Prior Level of Function: unable to get in/out of tub  Current Level of Function: same as above; difficulty with stairs    Pain:  Current 0/10, worst 0/10 tightness, best 0/10   Location: left anterior/medial knee   Description: Tight  Aggravating Factors: bending; walking far distances  Easing Factors: ice and elevation    Pts goals: get back to walking normally, dancing, ride motorcycle    Objective     Observation: no acute distress; good scar closure    Posture: fair      Range of Motion:   Knee Left active Left Passive Right Active R passive   Flexion 128 130 125 130   Extension 0 0 0 0         Lower Extremity Strength  Right LE  Left LE    Knee extension: 5/5 Knee extension: 4+/5   Knee flexion: 5/5 Knee flexion: 5/5   Hip flexion: 5/5 Hip flexion: 5/5   Hip extension:  5/5 Hip extension: 5/5   Hip abduction: 5/5 Hip abduction: 5/5   Hip adduction: 5/5 Hip adduction 5/5   Ankle dorsiflexion: 5/5 Ankle dorsiflexion: 5/5   Ankle plantarflexion: 5/5 Ankle plantarflexion: 5/5       Special Tests:  NP due to post-op  status    Function:    - Sit <--> Stand: independent   - Bed Mobility: independent    Joint Mobility: Patellar hypomobility    Palpation: no tenderness to palpation; mild scar mobility limitations    Sensation: no sensation deficits    Edema: mild edema consistent with post-op status    Girth Measurement Joint line 5 cm below 10 cm above   Left 49.5 cm 42 cm 61 cm   Right 48 cm 40 cm 63 cm         CMS Impairment/Limitation/Restriction for FOTO Knee Survey    Therapist reviewed FOTO scores for Kenna Schmitt on 9/30/2019.   FOTO documents entered into [employer] - see Media section.    Limitation Score: 47%  Category: Mobility         TREATMENT   See daily note    Assessment   Kenna is a 69 y.o. female referred to outpatient Physical Therapy with a medical diagnosis of s/p left unicompartmental knee replacement. Pt presents with decreased knee ROM and LE strength, balance problem, gait abnormality, and decreased tolerance for functional mobility. She has demonstrated improved strength and ROM, with improved gait and balance. However, she continues to have some pain and weakness with her ADLs. She would benefit from skilled PT services to improve mobility and improve function to return to her prior level of function.     Pt prognosis is Excellent.   Pt will benefit from skilled outpatient Physical Therapy to address the deficits stated above and in the chart below, provide pt/family education, and to maximize pt's level of independence.     Plan of care discussed with patient: Yes  Pt's spiritual, cultural and educational needs considered and patient is agreeable to the plan of care and goals as stated below:     Anticipated Barriers for therapy: none anticipated    Medical Necessity is demonstrated by the following  History  Co-morbidities and personal factors that may impact the plan of care Co-morbidities:   HTN, thyroid disease, GERD, obesity    Personal Factors:   no deficits     moderate   Examination  Body Structures  and Functions, activity limitations and participation restrictions that may impact the plan of care Body Regions:   lower extremities    Body Systems:    gross symmetry  ROM  strength  gross coordinated movement  balance  gait  transfers  transitions  motor control  motor learning  edema  scar formation    Participation Restrictions:   Unable to walk for exercise, dance, ride motorcycle    Activity limitations:   Learning and applying knowledge  no deficits    General Tasks and Commands  no deficits    Communication  no deficits    Mobility  lifting and carrying objects  walking  driving (bike, car, motorcycle)    Self care  washing oneself (bathing, drying, washing hands)    Domestic Life  shopping  cooking  doing house work (cleaning house, washing dishes, laundry)    Interactions/Relationships  family relationships    Life Areas  employment    Community and Social Life  community life  recreation and leisure         moderate   Clinical Presentation evolving clinical presentation with changing clinical characteristics moderate   Decision Making/ Complexity Score: moderate     Goals:  Short Term Goals: 4 weeks   - amb 1 mile on level tile without pain provocation, need for rest, or assistive device. Met 9/30/19.   - do SLS >10 sec without LOB or pain. Met 9/30/19.   - do anterior step up of 6  without pain provocation. Met 9/30/19.     Long Term Goals: 8 weeks   - pt will demonstrate decreased measurable edema for improved healing (progressing, not met)  - pt will demonstrate 5/5 LE strength to demonstrate improved stability (progressing, not met)  - pt will report ability to perform normal household activities and recreational activities for return to prior level of function (progressing, not met)    Plan   Plan of care Certification: 9/30/2019 to 11/8/19.    Outpatient Physical Therapy 2 times weekly for 8 weeks to include the following interventions: Gait Training, Manual Therapy, Moist Heat/ Ice, Neuromuscular  Re-ed, Patient Education, Therapeutic Activites and Therapeutic Exercise.     Christianne Pryor, PT

## 2019-09-30 NOTE — PROGRESS NOTES
"  Physical Therapy Daily Treatment Note     Name: Kenna Schmitt  Clinic Number: [id number]    Therapy Diagnosis:   Encounter Diagnoses   Name Primary?    Gait abnormality     Stiffness of left knee      Physician: Tino Loja MD  Physician Orders: PT Eval and Treat   Medical Diagnosis from Referral: Z96.652 (ICD-10-CM) - S/P left unicompartmental knee replacement  Evaluation Date: 9/9/2019  Authorization Period Expiration: 9/3/2020  Plan of Care Expiration: 11/8/19  Visit # / Visits authorized: 7/ 12     Time In: 11:00 am  Time Out: 12:00 pm  Total Billable Time: 30 minutes     Precautions: Standard and Fall      Subjective     Pt reports: she was a little sore this weekend after helping her granddaughter pack and move, requiring a lot of lifting and stairs.   She was compliant with home exercise program.  Response to previous treatment: mm soreness  Functional change: progressed to amb w/o AD    Pain: 0/10  Location: left knee      Objective     Kenna received therapeutic exercises to develop strength, endurance, ROM, flexibility, posture and core stabilization for 50 minutes including:  Recumbent bike x 10 min (she has one at home)- NP  GSS 2 min  Stair flex stretch 2 min  HSS 2 min  Shuttle Leg Press 62.5# B 30x  Shuttle Calf Press 62.5# 30x  U Shuttle Leg Press 37.5# 30x B  Step-up 12 inch 10x  HS curl 30x 2#  LAQ 30x 2#  SLR 30x 3#  LLR 30x 3#  Prone hip ext 30x 3#  SL clamshell red theraband 2x10 each   Standing hip abduction, hip extension yellow theraband 2x10  Lateral step down 4" box x20 each  Lateral monster walks red theraband around knees x50' each  Prone flex stretch 2 min with strap  prone hang 4# 4 min  QS 30x 5 sec hold- NP  Stairs 5x reciprocal steps NP    Kenna participated in neuromuscular re-education activities to improve:  for 5 minutes. The following activities were included:  SlS B 3 x 30 sec  Tandem stance B on Airex 3 x 30 sec    Pt received PROM for L knee flex/ext 3 x 20 sec " each  PROM flex in prone 130 degrees, Ext in sitting +4     Kenna received cold pack for 10 minutes to L knee to decrease circulation, pain, and swelling.- NP    Home Exercises Provided and Patient Education Provided     Education provided:   - safety with stairs  -scar massage    Written Home Exercises Provided: Patient instructed to cont prior HEP.  Exercises were reviewed and Kenna was able to demonstrate them prior to the end of the session.  Kenna demonstrated good  understanding of the education provided.     See EMR under Patient Instructions for exercises provided prior visit.    Assessment     Good tolerance for increased resistance and increased functional standing exercises. She is progressing with strength and balance. Pt returns to see Dr. Loja this week for a follow-up.   Kenna is progressing well towards her goals.   Pt prognosis is Good.     Pt will continue to benefit from skilled outpatient physical therapy to address the deficits listed in the problem list box on initial evaluation, provide pt/family education and to maximize pt's level of independence in the home and community environment.     Pt's spiritual, cultural and educational needs considered and pt agreeable to plan of care and goals.    Anticipated barriers to physical therapy: none anticipated       Goals:   Short Term Goals: 4 weeks   - amb 1 mile on level tile without pain provocation, need for rest, or assistive device. Met 9/30/19.   - do SLS >10 sec without LOB or pain. Met 9/30/19.   - do anterior step up of 6  without pain provocation. Met 9/30/19.      Long Term Goals: 8 weeks   - pt will demonstrate decreased measurable edema for improved healing. (progressing, not met)  - pt will demonstrate 5/5 LE strength to demonstrate improved stability (progressing, not met)  - pt will report ability to perform normal household activities and recreational activities for return to prior level of function (progressing, not met)      Plan      Outpatient Physical Therapy 2 times weekly for 8 weeks to include the following interventions: Gait Training, Manual Therapy, Moist Heat/ Ice, Neuromuscular Re-ed, Patient Education, Therapeutic Activites and Therapeutic Exercise.        Christianne Pryor, PT

## 2019-10-01 DIAGNOSIS — Z96.652 S/P LEFT UNICOMPARTMENTAL KNEE REPLACEMENT: Primary | ICD-10-CM

## 2019-10-01 DIAGNOSIS — K21.9 GASTROESOPHAGEAL REFLUX DISEASE WITHOUT ESOPHAGITIS: ICD-10-CM

## 2019-10-01 RX ORDER — OMEPRAZOLE 20 MG/1
CAPSULE, DELAYED RELEASE ORAL
Qty: 90 CAPSULE | Refills: 0 | Status: SHIPPED | OUTPATIENT
Start: 2019-10-01 | End: 2019-12-30

## 2019-10-02 ENCOUNTER — OFFICE VISIT (OUTPATIENT)
Dept: ORTHOPEDICS | Facility: CLINIC | Age: 69
End: 2019-10-02
Payer: MEDICARE

## 2019-10-02 ENCOUNTER — HOSPITAL ENCOUNTER (OUTPATIENT)
Dept: RADIOLOGY | Facility: HOSPITAL | Age: 69
Discharge: HOME | End: 2019-10-02
Attending: ORTHOPAEDIC SURGERY
Payer: MEDICARE

## 2019-10-02 VITALS — WEIGHT: 227 LBS | HEIGHT: 61 IN | BODY MASS INDEX: 42.86 KG/M2

## 2019-10-02 DIAGNOSIS — Z96.652 S/P LEFT UNICOMPARTMENTAL KNEE REPLACEMENT: Primary | ICD-10-CM

## 2019-10-02 DIAGNOSIS — Z96.652 S/P LEFT UNICOMPARTMENTAL KNEE REPLACEMENT: ICD-10-CM

## 2019-10-02 PROCEDURE — 73562 XR KNEE ORTHO LEFT: ICD-10-PCS | Mod: 26,LT,, | Performed by: RADIOLOGY

## 2019-10-02 PROCEDURE — 73562 X-RAY EXAM OF KNEE 3: CPT | Mod: TC,PO,LT

## 2019-10-02 PROCEDURE — 99024 PR POST-OP FOLLOW-UP VISIT: ICD-10-PCS | Mod: POP,,, | Performed by: ORTHOPAEDIC SURGERY

## 2019-10-02 PROCEDURE — 99024 POSTOP FOLLOW-UP VISIT: CPT | Mod: POP,,, | Performed by: ORTHOPAEDIC SURGERY

## 2019-10-02 PROCEDURE — 73560 XR KNEE ORTHO LEFT: ICD-10-PCS | Mod: 26,59,RT, | Performed by: RADIOLOGY

## 2019-10-02 PROCEDURE — 73560 X-RAY EXAM OF KNEE 1 OR 2: CPT | Mod: TC,PO,RT,59

## 2019-10-02 PROCEDURE — 73560 X-RAY EXAM OF KNEE 1 OR 2: CPT | Mod: 26,59,RT, | Performed by: RADIOLOGY

## 2019-10-02 PROCEDURE — 99213 OFFICE O/P EST LOW 20 MIN: CPT | Mod: PBBFAC,25,PN | Performed by: ORTHOPAEDIC SURGERY

## 2019-10-02 PROCEDURE — 99999 PR PBB SHADOW E&M-EST. PATIENT-LVL III: CPT | Mod: PBBFAC,,, | Performed by: ORTHOPAEDIC SURGERY

## 2019-10-02 PROCEDURE — 99999 PR PBB SHADOW E&M-EST. PATIENT-LVL III: ICD-10-PCS | Mod: PBBFAC,,, | Performed by: ORTHOPAEDIC SURGERY

## 2019-10-02 PROCEDURE — 73562 X-RAY EXAM OF KNEE 3: CPT | Mod: 26,LT,, | Performed by: RADIOLOGY

## 2019-10-07 RX ORDER — MONTELUKAST SODIUM 10 MG/1
TABLET ORAL
Qty: 90 TABLET | Refills: 1 | OUTPATIENT
Start: 2019-10-07

## 2019-10-31 ENCOUNTER — PATIENT MESSAGE (OUTPATIENT)
Dept: FAMILY MEDICINE | Facility: CLINIC | Age: 69
End: 2019-10-31

## 2019-10-31 ENCOUNTER — PATIENT MESSAGE (OUTPATIENT)
Dept: OTOLARYNGOLOGY | Facility: CLINIC | Age: 69
End: 2019-10-31

## 2019-10-31 DIAGNOSIS — Z12.31 ENCOUNTER FOR SCREENING MAMMOGRAM FOR BREAST CANCER: Primary | ICD-10-CM

## 2019-11-04 RX ORDER — ATORVASTATIN CALCIUM 40 MG/1
TABLET, FILM COATED ORAL
Qty: 90 TABLET | Refills: 0 | OUTPATIENT
Start: 2019-11-04

## 2019-11-27 ENCOUNTER — HOSPITAL ENCOUNTER (OUTPATIENT)
Dept: RADIOLOGY | Facility: HOSPITAL | Age: 69
Discharge: HOME | End: 2019-11-27
Attending: EMERGENCY MEDICINE
Payer: MEDICARE

## 2019-11-27 DIAGNOSIS — Z12.31 ENCOUNTER FOR SCREENING MAMMOGRAM FOR BREAST CANCER: ICD-10-CM

## 2019-11-27 PROCEDURE — 77063 BREAST TOMOSYNTHESIS BI: CPT | Mod: 26,,, | Performed by: RADIOLOGY

## 2019-11-27 PROCEDURE — 77067 MAMMO DIGITAL SCREENING BILAT WITH TOMOSYNTHESIS_CAD: ICD-10-PCS | Mod: 26,,, | Performed by: RADIOLOGY

## 2019-11-27 PROCEDURE — 77067 SCR MAMMO BI INCL CAD: CPT | Mod: TC,PO

## 2019-11-27 PROCEDURE — 77067 SCR MAMMO BI INCL CAD: CPT | Mod: 26,,, | Performed by: RADIOLOGY

## 2019-11-27 PROCEDURE — 77063 MAMMO DIGITAL SCREENING BILAT WITH TOMOSYNTHESIS_CAD: ICD-10-PCS | Mod: 26,,, | Performed by: RADIOLOGY

## 2019-12-11 ENCOUNTER — OFFICE VISIT (OUTPATIENT)
Dept: ONCOLOGY | Facility: CLINIC | Age: 69
End: 2019-12-11

## 2019-12-11 ENCOUNTER — LAB (OUTPATIENT)
Dept: LAB | Facility: HOSPITAL | Age: 69
End: 2019-12-11

## 2019-12-11 VITALS
SYSTOLIC BLOOD PRESSURE: 130 MMHG | HEART RATE: 99 BPM | RESPIRATION RATE: 16 BRPM | DIASTOLIC BLOOD PRESSURE: 79 MMHG | OXYGEN SATURATION: 97 % | HEIGHT: 60 IN | TEMPERATURE: 97.6 F | BODY MASS INDEX: 29.92 KG/M2 | WEIGHT: 152.4 LBS

## 2019-12-11 DIAGNOSIS — Z00.00 HEALTHCARE MAINTENANCE: ICD-10-CM

## 2019-12-11 DIAGNOSIS — D75.1 ERYTHROCYTOSIS: ICD-10-CM

## 2019-12-11 DIAGNOSIS — D72.9 NEUTROPHILIC LEUKOCYTOSIS: ICD-10-CM

## 2019-12-11 DIAGNOSIS — D72.9 NEUTROPHILIC LEUKOCYTOSIS: Primary | ICD-10-CM

## 2019-12-11 LAB
BASOPHILS # BLD AUTO: 0.11 10*3/MM3 (ref 0–0.2)
BASOPHILS NFR BLD AUTO: 1 % (ref 0–1.5)
DEPRECATED RDW RBC AUTO: 46.3 FL (ref 37–54)
EOSINOPHIL # BLD AUTO: 0.51 10*3/MM3 (ref 0–0.4)
EOSINOPHIL NFR BLD AUTO: 4.5 % (ref 0.3–6.2)
ERYTHROCYTE [DISTWIDTH] IN BLOOD BY AUTOMATED COUNT: 13.7 % (ref 12.3–15.4)
HCT VFR BLD AUTO: 48 % (ref 34–46.6)
HGB BLD-MCNC: 16 G/DL (ref 12–15.9)
IMM GRANULOCYTES # BLD AUTO: 0.04 10*3/MM3 (ref 0–0.05)
IMM GRANULOCYTES NFR BLD AUTO: 0.4 % (ref 0–0.5)
LYMPHOCYTES # BLD AUTO: 3.13 10*3/MM3 (ref 0.7–3.1)
LYMPHOCYTES NFR BLD AUTO: 27.5 % (ref 19.6–45.3)
MCH RBC QN AUTO: 30.7 PG (ref 26.6–33)
MCHC RBC AUTO-ENTMCNC: 33.3 G/DL (ref 31.5–35.7)
MCV RBC AUTO: 92.1 FL (ref 79–97)
MONOCYTES # BLD AUTO: 0.64 10*3/MM3 (ref 0.1–0.9)
MONOCYTES NFR BLD AUTO: 5.6 % (ref 5–12)
NEUTROPHILS # BLD AUTO: 6.97 10*3/MM3 (ref 1.7–7)
NEUTROPHILS NFR BLD AUTO: 61 % (ref 42.7–76)
NRBC BLD AUTO-RTO: 0 /100 WBC (ref 0–0.2)
PLATELET # BLD AUTO: 169 10*3/MM3 (ref 140–450)
PMV BLD AUTO: 12.4 FL (ref 6–12)
RBC # BLD AUTO: 5.21 10*6/MM3 (ref 3.77–5.28)
WBC NRBC COR # BLD: 11.4 10*3/MM3 (ref 3.4–10.8)

## 2019-12-11 PROCEDURE — 99213 OFFICE O/P EST LOW 20 MIN: CPT | Performed by: INTERNAL MEDICINE

## 2019-12-11 PROCEDURE — 36415 COLL VENOUS BLD VENIPUNCTURE: CPT

## 2019-12-11 PROCEDURE — 85025 COMPLETE CBC W/AUTO DIFF WBC: CPT

## 2019-12-11 RX ORDER — MULTIPLE VITAMINS W/ MINERALS TAB 9MG-400MCG
1 TAB ORAL DAILY
COMMUNITY
End: 2021-01-19

## 2019-12-11 RX ORDER — CETIRIZINE HYDROCHLORIDE 5 MG/1
5 TABLET ORAL DAILY
COMMUNITY
End: 2022-01-05 | Stop reason: SDUPTHER

## 2019-12-11 NOTE — PROGRESS NOTES
[facility] OUTPATIENT FOLLOW UP CLINIC VISIT    REASON FOR FOLLOW-UP:    1.  Erythrocytosis  2.  Mild leukocytosis    HISTORY OF PRESENT ILLNESS:  Jazmyn Castaneda is a 69 y.o. female who returns today for follow up of the above issue.     She quit smoking for a few months but resumed.  This is a stressful time of year for her with a history of a lot of deaths of people close to her around this time of year.  She complains of continuous postnasal drip.  No fevers or chills.  She had a kidney stone back in July.    HEMATOLOGIC HISTORY:  She recently switched primary care providers. On 3/24/2017 her hemoglobin was 16.8 with hematocrit 51.3%. As of 4/6/2017 the hemoglobin was 16.2 with hematocrit 50.6%. White blood cells and platelets of been normal. She states that she was not aware of any erythrocytosis prior to that.    Seen initially on 5/8/2017 with hemoglobin of 16.1 and hematocrit 49.4%.  Erythropoietin level was normal at 4.8.  Carbon monoxide level was normal at 2.5%.  JAK2 V617F and exon 12 mutations were negative.    She returned on 5/26/2017 for follow-up.  Her hemoglobin is stable.  She will follow-up in a few months.  She will try to quit smoking.    Subsequent labs stable.    PAST MEDICAL, SURGICAL, FAMILY, AND SOCIAL HISTORIES were reviewed with the patient and in the electronic medical record, and were updated if indicated.    ALLERGIES:  Allergies   Allergen Reactions   • Cephalosporins Swelling   • Penicillins Swelling   • Ciprofloxacin Rash   • Sulfa Antibiotics Other (See Comments)     Patient states she lost her vision temporarily when using Sulfa based eye ointment, also, broke out in rash w/oral medication.       MEDICATIONS:  The medication list has been reviewed with the patient by the medical assistant, and the list has been updated in the electronic medical record, which I reviewed.  Medication dosages and frequencies were confirmed to be accurate.    REVIEW OF  "SYSTEMS:  PAIN:  See Vital Signs below.  GENERAL:  No fevers, chills, night sweats, or unintended weight loss.    SKIN:  No rashes or non-healing lesions.  HEME/LYMPH:  No abnormal bleeding.  No palpable lymphadenopathy.  EYES:  No vision changes or diplopia.  ENT:  No sore throat or difficulty swallowing.    Chronic postnasal drip.  RESPIRATORY:  No cough, shortness of breath, hemoptysis, or wheezing.  CARDIOVASCULAR:  No chest pain, palpitations, orthopnea, or dyspnea on exertion.  GASTROINTESTINAL: No nausea vomiting diarrhea or constipation.  GENITOURINARY:  No dysuria or hematuria.  MUSCULOSKELETAL:  No joint pain, swelling, or erythema.  Low back pain  NEUROLOGIC:  No dizziness, loss of consciousness, or seizures.  PSYCHIATRIC:  Stress and anxiety    Vitals:    12/11/19 1325   BP: 130/79   Pulse: 99   Resp: 16   Temp: 97.6 °F (36.4 °C)   TempSrc: Oral   SpO2: 97%   Weight: 69.1 kg (152 lb 6.4 oz)   Height: 152.4 cm (60\")   PainSc: 0-No pain  Comment: erythocytosis       PHYSICAL EXAMINATION:  GENERAL:  Well-developed well-nourished female; awake, alert and oriented, in no acute distress.  HEAD:  Normocephalic, atraumatic.  EARS:  Hearing intact.  NOSE:  Septum midline.  No excoriations or nasal discharge.  LYMPH:  No cervical, supraclavicular lymphadenopathy  CHEST:  Lungs are clear to auscultation bilaterally.  No wheezes, rales, or rhonchi.  HEART:  Regular rate; normal rhythm.  No murmurs, gallops or rubs.  EXTREMITIES:  No loving, cyanosis, or edema.    DIAGNOSTIC DATA:  Results for orders placed or performed in visit on 12/11/19   CBC Auto Differential   Result Value Ref Range    WBC 11.40 (H) 3.40 - 10.80 10*3/mm3    RBC 5.21 3.77 - 5.28 10*6/mm3    Hemoglobin 16.0 (H) 12.0 - 15.9 g/dL    Hematocrit 48.0 (H) 34.0 - 46.6 %    MCV 92.1 79.0 - 97.0 fL    MCH 30.7 26.6 - 33.0 pg    MCHC 33.3 31.5 - 35.7 g/dL    RDW 13.7 12.3 - 15.4 %    RDW-SD 46.3 37.0 - 54.0 fl    MPV 12.4 (H) 6.0 - 12.0 fL    Platelets " 169 140 - 450 10*3/mm3    Neutrophil % 61.0 42.7 - 76.0 %    Lymphocyte % 27.5 19.6 - 45.3 %    Monocyte % 5.6 5.0 - 12.0 %    Eosinophil % 4.5 0.3 - 6.2 %    Basophil % 1.0 0.0 - 1.5 %    Immature Grans % 0.4 0.0 - 0.5 %    Neutrophils, Absolute 6.97 1.70 - 7.00 10*3/mm3    Lymphocytes, Absolute 3.13 (H) 0.70 - 3.10 10*3/mm3    Monocytes, Absolute 0.64 0.10 - 0.90 10*3/mm3    Eosinophils, Absolute 0.51 (H) 0.00 - 0.40 10*3/mm3    Basophils, Absolute 0.11 0.00 - 0.20 10*3/mm3    Immature Grans, Absolute 0.04 0.00 - 0.05 10*3/mm3    nRBC 0.0 0.0 - 0.2 /100 WBC       IMAGING:  None reviewed    ASSESSMENT:  This is a 69 y.o. female with:  1.  Erythrocytosis:  I suspect this is secondary polycythemia related to smoking.  COURTNEY 2 mutation analysis was negative.  Laboratory evaluation otherwise was unremarkable.  Hemoglobin and hematocrit are reasonably stable overall but up some from July when her numbers were normal.    2.  Mild leukocytosis with neutrophilia: Also likely secondary to smoking.  Her white blood cell count is only minimally elevated at 11,000 today.    PLAN:  1.  She will continue to try to quit smoking  2.  Return in 6 months for follow-up with a CBC  3.  Refer for primary care

## 2019-12-30 DIAGNOSIS — K21.9 GASTROESOPHAGEAL REFLUX DISEASE WITHOUT ESOPHAGITIS: ICD-10-CM

## 2019-12-30 RX ORDER — OMEPRAZOLE 20 MG/1
CAPSULE, DELAYED RELEASE ORAL
Qty: 90 CAPSULE | Refills: 0 | Status: SHIPPED | OUTPATIENT
Start: 2019-12-30 | End: 2020-02-21 | Stop reason: SDUPTHER

## 2019-12-31 ENCOUNTER — OFFICE VISIT (OUTPATIENT)
Dept: FAMILY MEDICINE CLINIC | Facility: CLINIC | Age: 69
End: 2019-12-31

## 2019-12-31 VITALS
OXYGEN SATURATION: 96 % | HEART RATE: 106 BPM | BODY MASS INDEX: 29.64 KG/M2 | SYSTOLIC BLOOD PRESSURE: 160 MMHG | WEIGHT: 151 LBS | TEMPERATURE: 97.8 F | DIASTOLIC BLOOD PRESSURE: 102 MMHG | HEIGHT: 60 IN

## 2019-12-31 DIAGNOSIS — D72.9 NEUTROPHILIC LEUKOCYTOSIS: ICD-10-CM

## 2019-12-31 DIAGNOSIS — D75.1 ERYTHROCYTOSIS: ICD-10-CM

## 2019-12-31 DIAGNOSIS — J30.1 NON-SEASONAL ALLERGIC RHINITIS DUE TO POLLEN: ICD-10-CM

## 2019-12-31 DIAGNOSIS — I10 ESSENTIAL HYPERTENSION: ICD-10-CM

## 2019-12-31 DIAGNOSIS — Z00.00 MEDICARE ANNUAL WELLNESS VISIT, SUBSEQUENT: Primary | ICD-10-CM

## 2019-12-31 DIAGNOSIS — J01.10 ACUTE NON-RECURRENT FRONTAL SINUSITIS: ICD-10-CM

## 2019-12-31 DIAGNOSIS — E78.2 MIXED HYPERLIPIDEMIA: ICD-10-CM

## 2019-12-31 PROCEDURE — G0439 PPPS, SUBSEQ VISIT: HCPCS | Performed by: FAMILY MEDICINE

## 2019-12-31 PROCEDURE — 99203 OFFICE O/P NEW LOW 30 MIN: CPT | Performed by: FAMILY MEDICINE

## 2019-12-31 RX ORDER — MONTELUKAST SODIUM 10 MG/1
10 TABLET ORAL NIGHTLY
Qty: 90 TABLET | Refills: 3 | Status: SHIPPED | OUTPATIENT
Start: 2019-12-31 | End: 2020-10-16

## 2019-12-31 RX ORDER — AZITHROMYCIN 250 MG/1
TABLET, FILM COATED ORAL
Qty: 6 TABLET | Refills: 0 | Status: SHIPPED | OUTPATIENT
Start: 2019-12-31 | End: 2020-06-29

## 2019-12-31 NOTE — PROGRESS NOTES
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 female who presents for a Subsequent Medicare Wellness Visit.    htn- doing well off meds. Had a high level today. Checks at home and is always 110/70    hld- labs 6 months ago where ok, diet controlled.     Allergies-having worsening cough for one day. Zyrtec helps some.     Erythrocytosis and leukocytosis- following with heme/onc and trying to quit smoking.           Recent Hospitalizations:  Recently treated at the following:  [facility].    Current Medical Providers:  Patient Care Team:  Provider, No Known as PCP - General  Ha García MD as PCP - Claims Attributed  Ha García MD as Consulting Physician (Hematology and Oncology)    Health Habits and Functional and Cognitive Screening and Depression Screening:  Functional & Cognitive Status 2019   Do you have difficulty preparing food and eating? No   Do you have difficulty bathing yourself, getting dressed or grooming yourself? No   Do you have difficulty using the toilet? No   Do you have difficulty moving around from place to place? No   Do you have trouble with steps or getting out of a bed or a chair? No   Current Diet Well Balanced Diet   Dental Exam Up to date   Eye Exam Up to date   Exercise (times per week) 3 times per week   Current Exercise Activities Include Walking   Do you need help using the phone?  No   Are you deaf or do you have serious difficulty hearing?  No   Do you need help with transportation? No   Do you need help shopping? No   Do you need help preparing meals?  No   Do you need help with housework?  No   Do you need help with laundry? No   Do you need help taking your medications? No   Do you need help managing money? No   Do you ever drive or ride in a car  without wearing a seat belt? No   Have you felt unusual stress, anger or loneliness in the last month? No   Who do you live with? Alone   If you need help, do you have trouble finding someone available to you? No   Have you been bothered in the last four weeks by sexual problems? No   Do you have difficulty concentrating, remembering or making decisions? No       Compared to one year ago, the patient feels her physical health is the same and her mental health is the same.    Depression Screen:  PHQ-2/PHQ-9 Depression Screening 4/17/2019   Little interest or pleasure in doing things 0   Feeling down, depressed, or hopeless 0   Trouble falling or staying asleep, or sleeping too much -   Feeling tired or having little energy -   Poor appetite or overeating -   Feeling bad about yourself - or that you are a failure or have let yourself or your family down -   Trouble concentrating on things, such as reading the newspaper or watching television -   Moving or speaking so slowly that other people could have noticed. Or the opposite - being so fidgety or restless that you have been moving around a lot more than usual -   Thoughts that you would be better off dead, or of hurting yourself in some way -   Total Score 0   If you checked off any problems, how difficult have these problems made it for you to do your work, take care of things at home, or get along with other people? -         Past Medical/Family/Social History:  The following portions of the patient's history were reviewed and updated as appropriate: allergies, current medications, past family history, past medical history, past social history, past surgical history and problem list.    Allergies   Allergen Reactions   • Cephalosporins Swelling   • Penicillins Swelling   • Ciprofloxacin Rash   • Sulfa Antibiotics Other (See Comments)     Patient states she lost her vision temporarily when using Sulfa based eye ointment, also, broke out in rash w/oral medication.  "        Current Outpatient Medications:   •  cetirizine (zyrTEC) 5 MG tablet, Take 5 mg by mouth Daily., Disp: , Rfl:   •  Multiple Vitamins-Minerals (MULTIVITAMIN WITH MINERALS) tablet tablet, Take 1 tablet by mouth Daily., Disp: , Rfl:   •  azithromycin (ZITHROMAX Z-WILEY) 250 MG tablet, Take 2 tablets the first day, then 1 tablet daily for 4 days., Disp: 6 tablet, Rfl: 0  •  montelukast (SINGULAIR) 10 MG tablet, Take 1 tablet by mouth Every Night., Disp: 90 tablet, Rfl: 3    Aspirin use counseling: Does not need ASA (and currently is not on it)    Current medication list contains no high risk medications.  No harmful drug interactions have been identified.     Family History   Problem Relation Age of Onset   • Hypertension Mother    • Aneurysm Mother    • Heart failure Mother    • Heart disease Mother    • Heart disease Father    • Hypertension Father    • Kidney disease Father    • Cancer Father 82        Bladder   • Hypertension Sister    • Cancer Maternal Aunt    • Stroke Maternal Uncle    • Glaucoma Maternal Grandmother    • Cancer Maternal Grandmother    • Stroke Maternal Grandfather    • Aneurysm Maternal Grandfather    • Cancer Maternal Aunt    • Cancer Maternal Aunt    • Liver cancer Other    • Pancreatic cancer Other    • Malig Hyperthermia Neg Hx        Social History     Tobacco Use   • Smoking status: Current Some Day Smoker     Packs/day: 1.00     Types: Cigarettes   • Smokeless tobacco: Never Used   • Tobacco comment: pt states trying to quit   Substance Use Topics   • Alcohol use: Yes     Comment: \"on occasion\"       Past Surgical History:   Procedure Laterality Date   • BREAST EXCISIONAL BIOPSY Right     30 something when it was done; says it was precancerous   • CATARACT EXTRACTION, BILATERAL     •  SECTION     • CYSTOSCOPY N/A 2019    Procedure: CYSTOSCOPY AND STENT PLACEMENT;  Surgeon: Alen Lal MD;  Location: [facility];  Service: Urology   • MASTOID SURGERY     • NASAL " "SEPTAL RECONSTRUCTION     • SINUS SURGERY      scraped and prior deviated septum   • URETEROSCOPY LASER LITHOTRIPSY WITH STENT INSERTION Right 7/18/2019    Procedure: CYSTOSCOPY, RIGHT URETEROSCOPY, LASER LITHOTRIPSY, STONE BASKET EXTRACTION, STENT PLACEMENT WITHOUT STRINGS;  Surgeon: Alfredo Gongora Jr., MD;  Location: [facility];  Service: Urology       Patient Active Problem List   Diagnosis   • Acute pyelonephritis   • Acute cystitis   • Nephrolithiasis   • Allergic rhinitis   • Diverticulosis of large intestine without hemorrhage   • Hiatal hernia   • Medicare annual wellness visit, subsequent   • Exogenous obesity   • Encounter for screening colonoscopy   • Visit for screening mammogram   • Breast mass, left   • Essential hypertension   • Erythrocytosis   • Vertigo   • Acute non-recurrent frontal sinusitis   • Bronchitis   • Neutrophilic leukocytosis   • Mixed hyperlipidemia   • Right hydronephrosis with urinary obstruction due to ureteral calculus   • Sepsis due to urinary tract infection (CMS/HCC)   • Allergy to multiple antibiotics   • Tobacco abuse   • Ureteral stone       Review of Systems   Respiratory: Negative for shortness of breath.    Cardiovascular: Negative for chest pain.       Objective     Vitals:    12/31/19 1115   BP: (!) 160/102   Pulse: 106   Temp: 97.8 °F (36.6 °C)   TempSrc: Temporal   SpO2: 96%   Weight: 68.5 kg (151 lb)   Height: 152.4 cm (60\")       Patient's Body mass index is 29.49 kg/m². BMI is above normal parameters. Recommendations include: exercise counseling.      No exam data present    The patient has no evidence of cognitve impairment.  The patient has no evidence of cognitive impairment. 3/3 items were correctly remembered at 5 minutes.     Physical Exam   Constitutional: She is oriented to person, place, and time. She appears well-developed and well-nourished.   Cardiovascular: Normal rate, regular rhythm, normal heart sounds and intact distal pulses. "   Pulmonary/Chest: Effort normal and breath sounds normal.   Musculoskeletal: Normal range of motion. She exhibits no edema.   Neurological: She is alert and oriented to person, place, and time.   Skin: Skin is warm and dry.   Psychiatric: She has a normal mood and affect. Her behavior is normal.       Recent Lab Results:  Lab Results   Component Value Date     (H) 04/17/2019     Lab Results   Component Value Date    TRIG 133 04/17/2019    HDL 35 (L) 04/17/2019    VLDL 26.6 04/17/2019    LDLHDL 3.27 04/17/2019       Assessment/Plan   Age-appropriate Screening Schedule:  Refer to the list below for future screening recommendations based on patient's age, sex and/or medical conditions.      Health Maintenance   Topic Date Due   • TDAP/TD VACCINES (1 - Tdap) 07/20/1959   • ZOSTER VACCINE (1 of 2) 07/20/1998   • COLONOSCOPY  03/24/2017   • INFLUENZA VACCINE  08/01/2019   • LIPID PANEL  04/17/2020   • MAMMOGRAM  05/09/2021   • PNEUMOCOCCAL VACCINE (65+ HIGH RISK)  Completed       Medicare Risks and Personalized Health Plan:  Advance Directive Discussion      CMS-Preventive Services Quick Reference  Medicare Preventive Services Addressed:  Annual Wellness Visit (AWV)    Advance Care Planning:  Patient does not have an advance directive - information provided to the patient today    Diagnoses and all orders for this visit:    1. Medicare annual wellness visit, subsequent (Primary)    2. Essential hypertension    3. Mixed hyperlipidemia    4. Non-seasonal allergic rhinitis due to pollen  -     montelukast (SINGULAIR) 10 MG tablet; Take 1 tablet by mouth Every Night.  Dispense: 90 tablet; Refill: 3    5. Erythrocytosis    6. Neutrophilic leukocytosis    7. Acute non-recurrent frontal sinusitis    Other orders  -     azithromycin (ZITHROMAX Z-WILEY) 250 MG tablet; Take 2 tablets the first day, then 1 tablet daily for 4 days.  Dispense: 6 tablet; Refill: 0        An After Visit Summary and PPPS with all of these plans  were given to the patient.      Follow Up:  Return in about 6 months (around 6/30/2020) for Recheck.          Pt to monitor Bp and f/u for highs.   Pt will try abx if treatment for allergies is not helping in a week.     Discussed risk factors. Pt will consider c-scope. Declines for now.

## 2020-01-01 NOTE — H&P
[facility]  [phone]    HISTORY AND PHYSICAL   [facility]        Patient Identification:  Name: Jazmyn Castaneda  Age: 66 y.o.  Sex: female  :  1950  MRN: [id number]                     Primary Care Physician: No Known Provider    Chief Complaint:  Fevers chills and dysuria    Admission Dx: Pyelonephritis    History of Present Illness:   This is a 66-year-old female in relatively good health who presents to the hospital with dysuria fevers and chills.  Her symptoms started about 3 or 4 days ago she had some back pain that went away but is had some on and off urinary symptoms since that time.  Today however the back pain came again and stayed and she also developed significant dysuria with urination.  She also experienced some frequency and hesitancy as well.  She denies any histories of kidney stones but has had UTIs in the past.    Past Medical History:  Past Medical History   Diagnosis Date   • Collapse of lung      history of in past post trauma   • Kidney infection    • Pneumonia    • Urinary tract infection    • Vertigo      past history of several times     Past Surgical History:  Past Surgical History   Procedure Laterality Date   •  section     • Breast biopsy Right    • Sinus surgery       scraped and prior deviated septum   • Nasal septal reconstruction     • Mastoid surgery     • Cataract extraction, bilateral        Home Meds:  No prescriptions prior to admission.       Allergies:  Allergies   Allergen Reactions   • Cephalosporins    • Ciprofloxacin    • Penicillins    • Sulfa Antibiotics      Immunizations:  There is no immunization history for the selected administration types on file for this patient.  Social History:   Social History     Social History Narrative   • No narrative on file     Social History   Substance Use Topics   • Smoking status: Former Smoker     Packs/day: 1.00     Types: Cigarettes   • Smokeless tobacco: Not on file   •  "Alcohol use Yes      Comment: \"on occasion\"     Family History:  History reviewed. No pertinent family history.     Review of Systems  Review of Systems - History obtained from the patient  General ROS: negative for - chills, fatigue, weight gain or weight loss  Psychological ROS: negative for - anxiety or depression  Ophthalmic ROS: negative for - blurry vision or double vision  ENT ROS: negative for - epistaxis or nasal discharge  Allergy and Immunology ROS: negative for - latex or seasonal allergies  Hematological and Lymphatic ROS: negative for - bleeding problems or bruising  Endocrine ROS: negative for - polydipsia/polyuria or temperature intolerance  Breast ROS: negative  Respiratory ROS: negative for - cough, shortness of breath or sputum changes  Cardiovascular ROS: no chest pain or dyspnea on exertion  Gastrointestinal ROS: no abdominal pain, change in bowel habits, or black or bloody stools  Genito-Urinary ROS: no dysuria, trouble voiding, or hematuria  Musculoskeletal ROS: negative for - joint pain or muscle pain  Neurological ROS: negative for - headaches, impaired coordination/balance or numbness/tingling  Dermatological ROS: negative for rash and skin lesion changes      Objective:  tMax 24 hrs: Temp (24hrs), Av.5 °F (36.9 °C), Min:98.1 °F (36.7 °C), Max:98.8 °F (37.1 °C)    Vitals Ranges:   Temp:  [98.1 °F (36.7 °C)-98.8 °F (37.1 °C)] 98.1 °F (36.7 °C)  Heart Rate:  [114-134] 114  Resp:  [16] 16  BP: (100-167)/() 100/88      Exam:  Visit Vitals   • /88 (BP Location: Left arm, Patient Position: Standing)   • Pulse 114   • Temp 98.1 °F (36.7 °C) (Oral)   • Resp 16   • Ht 60\" (152.4 cm)   • Wt 140 lb (63.5 kg)   • SpO2 94%   • BMI 27.34 kg/m2       General Appearance:    Alert, cooperative, no distress, appears stated age   Head:    Normocephalic, without obvious abnormality, atraumatic   Eyes:    PERRL, conjunctiva/corneas clear, EOM's intact, both eyes   Ears:    Normal external ear " canals, both ears   Nose:   Nares normal, septum midline, mucosa normal, no drainage    or sinus tenderness   Throat:   Lips, mucosa, and tongue normal   Neck:   Supple, symmetrical, trachea midline, no adenopathy;     thyroid:  no enlargement/tenderness/nodules; no carotid    bruit or JVD   Back:     Symmetric, no curvature, ROM normal, no CVA tenderness   Lungs:     Clear to auscultation bilaterally, respirations unlabored   Chest Wall:    left flank pain     Heart:    Regular rate and rhythm, S1 and S2 normal, no murmur, rub   or gallop   Abdomen:     Soft, non-tender, bowel sounds active all four quadrants,     no masses, no hepatomegaly, no splenomegaly   Extremities:   Extremities normal, atraumatic, no cyanosis or edema   Pulses:   2+ and symmetric all extremities   Skin:   Skin color, texture, turgor normal, no rashes or lesions   Lymph nodes:   Cervical, supraclavicular, and axillary nodes normal   Neurologic:   CNII-XII intact, normal strength, sensation intact throughout      IMAGING SUMMARY:  Imaging Results (last 72 hours)     Procedure Component Value Units Date/Time    CT Abdomen Pelvis Without Contrast [[id number]] Collected:  02/16/17 0154     Updated:  02/16/17 0155    Narrative:       CT ABDOMEN AND PELVIS WITHOUT CONTRAST.     TECHNIQUE: A routine CT scan of the abdomen and pelvis was performed  with coronal and sagittal reconstructed images.     HISTORY: Left pyelonephritis.     COMPARISON: No prior studies for comparison.     FINDINGS:  Lung bases demonstrate no consolidation or effusion.          Liver demonstrates homogeneous parenchyma, no definite mass. There are  low attenuation lesions in the liver measuring fluid density and  measuring up to 2.2 cm, these are likely to be cysts. Unremarkable  gallbladder. No intra or extrahepatic biliary ductal dilatation.      The spleen is unremarkable. Pancreas is unremarkable, no pancreatic  ductal dilatation. Adrenal glands are within normal limits.      Mild dilatation of the left ureter with periureteral fat stranding.  Nonobstructing stone of the right kidney measures 0.5 cm. There is mild  dilatation of the distal two thirds of the right ureter with  periureteral fat stranding. Circumferential mural thickening of the  urinary bladder, with mild perivesical fat stranding. No definite renal  calculi. Urinary bladder is unremarkable.         Small and large bowel loops are within normal limits. Diverticulosis of  the colon. Appendix is normal.     No significant retroperitoneal lymphadenopathy.              Impression:       1.  Severe bilateral UTI, left pyelonephritis cannot be excluded on this  noncontrast study. Nonobstructing 0.5 cm stone of the right kidney.   2.  Moderately severe cystitis.  3.  Diverticulosis of the colon, appendix is normal.  4.  Small hiatal hernia. Hepatic cysts measuring up to 2.2 cm.                LABS SUMMARY:    Results from last 7 days  Lab Units 02/16/17  0043   WBC 10*3/mm3 19.28*   HEMOGLOBIN g/dL 17.2*   PLATELETS 10*3/mm3 290     Results from last 7 days  Lab Units 02/16/17  0043   SODIUM mmol/L 140   POTASSIUM mmol/L 4.3   CHLORIDE mmol/L 101   TOTAL CO2 mmol/L 24.8   BUN mg/dL 12   CREATININE mg/dL 0.74   CALCIUM mg/dL 10.9*   Estimated Creatinine Clearance: 56 mL/min (by C-G formula based on Cr of 0.74).      Assessment:  Active Problems:    Acute pyelonephritis    Acute cystitis    Nephrolithiasis    Plan:  We'll aggressively hydrate and start on IV antibiotics.  She has significant allergies which makes choosing antibiotics difficult.  We'll cover with meropenem for now until culture results are returned.  Otherwise IV pain medications for pain control.    Cecilio Prado MD  2/16/2017  4:30 AM     none

## 2020-02-21 ENCOUNTER — PATIENT MESSAGE (OUTPATIENT)
Dept: FAMILY MEDICINE | Facility: CLINIC | Age: 70
End: 2020-02-21

## 2020-02-21 DIAGNOSIS — K21.9 GASTROESOPHAGEAL REFLUX DISEASE WITHOUT ESOPHAGITIS: ICD-10-CM

## 2020-02-26 RX ORDER — OMEPRAZOLE 20 MG/1
20 CAPSULE, DELAYED RELEASE ORAL DAILY
Qty: 90 CAPSULE | Refills: 3 | Status: SHIPPED | OUTPATIENT
Start: 2020-02-26 | End: 2021-03-05 | Stop reason: SDUPTHER

## 2020-02-27 RX ORDER — OMEPRAZOLE 20 MG/1
CAPSULE, DELAYED RELEASE ORAL
Qty: 90 CAPSULE | Refills: 0 | OUTPATIENT
Start: 2020-02-27

## 2020-02-27 NOTE — PROGRESS NOTES
Refill Authorization Note     is requesting a refill authorization.    Brief assessment and rationale for refill: QUICK DC: duplicate                                         Comments:   Last Prescribed Info:        Ordering Provider: -- IVAN #:  -- NPI:  --    Authorizing Provider: Anthony Varner Jr., MD IVAN #:  [id number] NPI:  [id number]    Ordering User:  Anthony Varner Jr., MD            Diagnosis Association: Gastroesophageal reflux disease without esophagitis (K21.9)      Original Order:  omeprazole (PRILOSEC) 20 MG capsule [[id number]]      Pharmacy:  [id number] [place] [id number] AT [facility] IVAN #:  [id number]     Pharmacy Comments:  --          Fill quantity remaining:  -- Fill quantity used:  -- Next fill due: --       Outpatient Medication Detail      Disp Refills Start End    omeprazole (PRILOSEC) 20 MG capsule 90 capsule 3 2/26/2020     Sig - Route: Take 1 capsule (20 mg total) by mouth once daily. - Oral    Sent to pharmacy as: omeprazole (PRILOSEC) 20 MG capsule    E-Prescribing Status: Receipt confirmed by pharmacy (2/26/2020  9:23 AM CST)         Appointments  past 12m or future 3m with PCP    Date Provider   Last Visit   1/14/2019 Anthony Varner Jr., MD   Next Visit   5/1/2020 Anthony Varner Jr., MD

## 2020-03-27 DIAGNOSIS — E78.5 HYPERLIPIDEMIA: ICD-10-CM

## 2020-03-27 DIAGNOSIS — E03.4 HYPOTHYROIDISM DUE TO ACQUIRED ATROPHY OF THYROID: ICD-10-CM

## 2020-03-31 RX ORDER — ATORVASTATIN CALCIUM 20 MG/1
TABLET, FILM COATED ORAL
Qty: 90 TABLET | Refills: 0 | Status: SHIPPED | OUTPATIENT
Start: 2020-03-31 | End: 2020-08-06

## 2020-03-31 RX ORDER — LEVOTHYROXINE SODIUM 112 UG/1
TABLET ORAL
Qty: 90 TABLET | Refills: 0 | Status: SHIPPED | OUTPATIENT
Start: 2020-03-31 | End: 2020-07-01

## 2020-03-31 NOTE — PROGRESS NOTES
Refill Authorization Note     is requesting a refill authorization.    Brief assessment and rationale for refill: APPROVE: prr          Medication Therapy Plan: NTBO(CMP/LIPID); NTBS(TSH/CMP/LIPID); Labs will be suspended at this time                              Comments:   Refill Center Care Gap Closure protocols temporarily suspended.   Requested Prescriptions   Pending Prescriptions Disp Refills    atorvastatin (LIPITOR) 20 MG tablet [Pharmacy Med Name: ATORVASTATIN 20MG TABLETS] 90 tablet 0     Sig: TAKE 1 TABLET BY MOUTH EVERY DAY       Cardiovascular:  Antilipid - Statins Failed - 3/30/2020  9:52 PM        Failed - Lipid Panel completed in last 360 days     Lab Results   Component Value Date    CHOL 191 01/18/2019    HDL 62 01/18/2019    LDLCALC 103.4 01/18/2019    TRIG 128 01/18/2019             Failed - ALT is 94 or below and within 360 days     ALT   Date Value Ref Range Status   01/18/2019 19 10 - 44 U/L Final   05/03/2018 20 10 - 44 U/L Final   05/11/2017 18 10 - 44 U/L Final              Failed - AST is 54 or below and within 360 days     AST   Date Value Ref Range Status   01/18/2019 19 10 - 40 U/L Final   05/03/2018 23 10 - 40 U/L Final   05/11/2017 22 10 - 40 U/L Final              Passed - Patient is at least 18 years old        Passed - Office visit in past 12 months or future 90 days.     Recent Outpatient Visits            6 months ago S/P left unicompartmental knee replacement    [facility] Tino Loja MD    6 months ago S/P left unicompartmental knee replacement    [facility]    8 months ago Pre-op exam    [facility] Milly Buchanan NP    8 months ago Chronic pain of both knees    [facility] Tino Loja [facility]    1 year ago Essential hypertension    [facility] Anthony Varner Jr., MD          Future Appointments              In 1 month Anthony Varner Jr., MD  [facility]                Powered by [employer] - 3/30/2020  9:52 PM        Unable to evaluate active med list or whether request is a duplicate.       levothyroxine (SYNTHROID) 112 MCG tablet [Pharmacy Med Name: LEVOTHYROXINE 0.112MG (112MCG) TABS] 90 tablet 0     Sig: TAKE 1 TABLET BY MOUTH BEFORE BREAKFAST AS DIRECTED       Endocrinology:  Hypothyroid Agents Failed - 3/30/2020  9:52 PM        Failed - Manual Review: FT4 is not required if last TSH is WNL.        Failed - TSH in normal range and within 360 days     TSH   Date Value Ref Range Status   03/15/2019 2.457 0.400 - 4.000 uIU/mL Final   01/18/2019 6.562 (H) 0.400 - 4.000 uIU/mL Final   05/03/2018 3.855 0.400 - 4.000 uIU/mL Final              Passed - Patient is at least 18 years old        Passed - Office visit in past 12 months or future 90 days.     Recent Outpatient Visits            6 months ago S/P left unicompartmental knee replacement    [facility] Tino Loja MD    6 months ago S/P left unicompartmental knee replacement    [facility] Lo[facility]    8 months ago Pre-op exam    [facility] Milly Buchanan NP    8 months ago Chronic pain of both knees    [facility] Lo[facility]    1 year ago Essential hypertension    [facility] Anthony Varner Jr., MD          Future Appointments              In 1 month Anthony Varner Jr., MD [facility]                Passed - T4 free within 1080 days     Free T4   Date Value Ref Range Status   01/18/2019 0.78 0.71 - 1.51 ng/dL Final   10/23/2014 1.42 0.71 - 1.51 ng/dL Final              Powered by [employer] - 3/30/2020  9:52 PM        Unable to evaluate active med list or whether request is a duplicate.         Appointments  past 12m or future 3m with PCP    Date Provider   Last Visit   1/14/2019 Anthony Varner Jr., MD   Next Visit    5/1/2020 Anthony Varner Jr., MD   .  ED visits in past 90 days: 0       Note composed:10:00 PM 03/30/2020

## 2020-04-28 ENCOUNTER — PATIENT MESSAGE (OUTPATIENT)
Dept: FAMILY MEDICINE | Facility: CLINIC | Age: 70
End: 2020-04-28

## 2020-05-06 ENCOUNTER — PATIENT MESSAGE (OUTPATIENT)
Dept: ADMINISTRATIVE | Facility: HOSPITAL | Age: 70
End: 2020-05-06

## 2020-05-21 ENCOUNTER — OFFICE VISIT (OUTPATIENT)
Dept: FAMILY MEDICINE | Facility: CLINIC | Age: 70
End: 2020-05-21
Payer: MEDICARE

## 2020-05-21 VITALS
BODY MASS INDEX: 43.03 KG/M2 | HEART RATE: 70 BPM | RESPIRATION RATE: 16 BRPM | WEIGHT: 227.75 LBS | OXYGEN SATURATION: 97 % | DIASTOLIC BLOOD PRESSURE: 84 MMHG | SYSTOLIC BLOOD PRESSURE: 134 MMHG

## 2020-05-21 DIAGNOSIS — K21.9 GASTROESOPHAGEAL REFLUX DISEASE WITHOUT ESOPHAGITIS: ICD-10-CM

## 2020-05-21 DIAGNOSIS — E03.4 HYPOTHYROIDISM DUE TO ACQUIRED ATROPHY OF THYROID: Primary | Chronic | ICD-10-CM

## 2020-05-21 DIAGNOSIS — Z86.010 PERSONAL HISTORY OF COLONIC POLYPS: Chronic | ICD-10-CM

## 2020-05-21 DIAGNOSIS — E78.5 HYPERLIPIDEMIA, UNSPECIFIED HYPERLIPIDEMIA TYPE: Chronic | ICD-10-CM

## 2020-05-21 PROBLEM — M25.662 STIFFNESS OF LEFT KNEE: Status: RESOLVED | Noted: 2019-09-09 | Resolved: 2020-05-21

## 2020-05-21 PROBLEM — Z79.01 PROPHYLACTIC USE OF WARFARIN FOR VENOUS THROMBOEMBOLISM: Status: RESOLVED | Noted: 2019-08-14 | Resolved: 2020-05-21

## 2020-05-21 PROBLEM — R26.9 GAIT ABNORMALITY: Status: RESOLVED | Noted: 2019-09-09 | Resolved: 2020-05-21

## 2020-05-21 PROCEDURE — 99214 PR OFFICE/OUTPT VISIT, EST, LEVL IV, 30-39 MIN: ICD-10-PCS | Mod: S$PBB,,, | Performed by: EMERGENCY MEDICINE

## 2020-05-21 PROCEDURE — 99999 PR PBB SHADOW E&M-EST. PATIENT-LVL IV: ICD-10-PCS | Mod: PBBFAC,,, | Performed by: EMERGENCY MEDICINE

## 2020-05-21 PROCEDURE — 99214 OFFICE O/P EST MOD 30 MIN: CPT | Mod: PBBFAC,PO | Performed by: EMERGENCY MEDICINE

## 2020-05-21 PROCEDURE — 99999 PR PBB SHADOW E&M-EST. PATIENT-LVL IV: CPT | Mod: PBBFAC,,, | Performed by: EMERGENCY MEDICINE

## 2020-05-21 PROCEDURE — 99214 OFFICE O/P EST MOD 30 MIN: CPT | Mod: S$PBB,,, | Performed by: EMERGENCY MEDICINE

## 2020-05-21 NOTE — PATIENT INSTRUCTIONS
Kenna,    Thank you for coming in today.  He was good to see you.    We will update her blood work to check her thyroid, liver, kidney, and cholesterol numbers.  We should have lives numbers back relatively quickly.    Your major challenge continues to be your weight.  This is very difficult and I understand how hard this is. I too struggle with this.  I do not have any easy answer.    I do recommend the vaccines that we discussed.  I certainly respect her wish if you choose not to get them.  I do think it is in your best interest to complete pneumococcal vaccines and influenza vaccine.    He will be due for colonoscopy next year.    Respectfully,    Anthony Varner MD

## 2020-05-21 NOTE — PROGRESS NOTES
Subjective:   THIS NOTE IS DONE WITH VOICE RECOGNITION        Patient ID: Jose Schmitt is a 69 y.o. female.    Chief Complaint: Annual Exam      HPI     She does have hypothyroidism is currently being treated 112 micrograms a day.    Results for JOSE SCHMITT (MRN [id number]) as of 5/21/2020 06:29   Ref. Range 5/3/2018 07:17 1/18/2019 07:27 3/15/2019 09:49   TSH Latest Ref Range: 0.400 - 4.000 uIU/mL 3.855 6.562 (H) 2.457     Hyperlipidemia monitoring.  Atorvastatin 20 milligrams.  No issues with tolerance.  She is due for updating her labs.    Lab Results   Component Value Date    CHOL 191 01/18/2019    CHOL 203 (H) 05/03/2018    CHOL 167 05/11/2017     Lab Results   Component Value Date    HDL 62 01/18/2019    HDL 60 05/03/2018    HDL 53 05/11/2017     Lab Results   Component Value Date    LDLCALC 103.4 01/18/2019    LDLCALC 117.8 05/03/2018    LDLCALC 89.6 05/11/2017     Lab Results   Component Value Date    TRIG 128 01/18/2019    TRIG 126 05/03/2018    TRIG 122 05/11/2017     Lab Results   Component Value Date    CHOLHDL 32.5 01/18/2019    CHOLHDL 29.6 05/03/2018    CHOLHDL 31.7 05/11/2017     She does have a history of colon polyps.  In 2013 showed a tubular adenoma identified.  Her colonoscopy in 2016 was unremarkable.  It is recommended she repeat your colonoscopy in 2021.    Gastroesophageal reflux disease is currently well controlled.  We did talk about trying to moderate her does for reduce her dose with every other day dosing.  She will give this a try.  She is not having any bleeding or significant reflux activity at this point.    Her activity levels remain quite low.  She is not exercising.  Her weight is a challenge.  She remains unable to lose substantial amounts awake.    She has declined offers for influenza vaccine and pneumococcal vaccine.  This was discussed.  Given the current COVID crisis, I asked her to reconsider.  She does not want to have any additional immunizations.    Immunization History    Administered Date(s) Administered    Zoster 09/19/2014     Pneumococcal vaccines discussed and encouraged  Influenza vaccine this fall recommended  Shingles vaccine reviewed and encouraged        Current Outpatient Medications   Medication Sig Dispense Refill    atorvastatin (LIPITOR) 20 MG tablet TAKE 1 TABLET BY MOUTH EVERY DAY 90 tablet 0    b complex vitamins capsule Take 1 capsule by mouth once daily.      biotin 10,000 mcg Cap Take by mouth.      chlorthalidone (HYGROTEN) 25 MG Tab TAKE 1 TABLET BY MOUTH EVERY DAY 90 tablet 3    cholecalciferol, vitamin D3, (VITAMIN D3) 2,000 unit Cap Take 1 capsule by mouth once daily.      Lactobacillus rhamnosus GG (CULTURELLE) 10 billion cell capsule Take 1 capsule by mouth once daily.      levothyroxine (SYNTHROID) 112 MCG tablet TAKE 1 TABLET BY MOUTH BEFORE BREAKFAST AS DIRECTED 90 tablet 0    omeprazole (PRILOSEC) 20 MG capsule Take 1 capsule (20 mg total) by mouth once daily. 90 capsule 3    oxyCODONE-acetaminophen (PERCOCET) 5-325 mg per tablet Take 1 tablet by mouth every 4 to 6 hours as needed for Pain. 42 tablet 0    valacyclovir (VALTREX) 500 MG tablet Take 500 mg by mouth 2 (two) times daily. PRN fever blisters      warfarin (COUMADIN) 5 MG tablet Take 1 tablet (5 mg total) by mouth Daily. Take first dose on 08/19/19 between 5pm and 6pm. Take 3 weeks only 30 tablet 11     No current facility-administered medications for this visit.          Review of Systems   Constitutional: Negative for activity change, appetite change, chills, diaphoresis, fatigue, fever and unexpected weight change.   HENT: Negative for congestion, ear pain, hearing loss, rhinorrhea, trouble swallowing and voice change.    Eyes: Negative for pain and visual disturbance.   Respiratory: Negative for cough, chest tightness and shortness of breath.    Cardiovascular: Negative for chest pain, palpitations and leg swelling.   Gastrointestinal: Negative for abdominal distention,  abdominal pain and blood in stool.   Genitourinary: Negative for difficulty urinating, flank pain, frequency and urgency.   Musculoskeletal: Negative for arthralgias, back pain, joint swelling, myalgias, neck pain and neck stiffness.   Skin: Negative for pallor and rash.   Neurological: Negative for dizziness, tremors, syncope, weakness and headaches.   Hematological: Negative for adenopathy.   Psychiatric/Behavioral: Negative for dysphoric mood and sleep disturbance. The patient is not nervous/anxious.        Objective:      Physical Exam   Constitutional: She is oriented to person, place, and time. She appears well-developed and well-nourished. No distress.   HENT:   Head: Normocephalic and atraumatic.   Nose: Nose normal.   Mouth/Throat: Oropharynx is clear and moist.   Eyes: Pupils are equal, round, and reactive to light. Conjunctivae and EOM are normal. No scleral icterus.   Neck: Normal range of motion. Neck supple. No JVD present. No thyromegaly present.   Cardiovascular: Normal rate, regular rhythm, normal heart sounds and intact distal pulses.   No murmur heard.  Pulmonary/Chest: Effort normal and breath sounds normal. She has no wheezes. She has no rales.   Abdominal: Soft. Bowel sounds are normal. She exhibits no distension. There is no tenderness.   Musculoskeletal: Normal range of motion. She exhibits no edema or tenderness.   Lymphadenopathy:     She has no cervical adenopathy.   Neurological: She is alert and oriented to person, place, and time. She has normal reflexes. No cranial nerve deficit. Coordination normal.   Skin: Skin is warm and dry. No rash noted. No erythema.   Psychiatric: She has a normal mood and affect. Her behavior is normal.   Vitals reviewed.      Assessment:       1. Hypothyroidism due to acquired atrophy of thyroid    2. Hyperlipidemia, unspecified hyperlipidemia type    3. Gastroesophageal reflux disease without esophagitis    4. BMI 40.0-44.9, adult    5. Personal history of  colonic polyps        Plan:   1. Hypothyroidism due to acquired atrophy of thyroid  Clinically stable  Update labs  No change in medications pending labs    - TSH; Future    2. Hyperlipidemia, unspecified hyperlipidemia type  Status unknown, likely stable  Weight loss encouraged  Continue current medicines until we have additional laboratory studies  - Comprehensive metabolic panel; Future  - Lipid Panel; Future    3. Gastroesophageal reflux disease without esophagitis  Stable  Every-other-day dosing with proton pump inhibitor trial recommended  Additional weight loss recommended    4.  BMI 40-44.9, adult  She is very aware of the long-term impact of her health with this weight  There are no easy answers.  Encouraged to continue to address calories being concerns.  Exercise encouraged.    5.  Personal history of colon polyps  Colonoscopy due in 2021.  Currently without in symptoms.    We did discuss immunizations, COVID, in that he to protect herself.  She does not feel she needs any of these interventions.

## 2020-05-22 ENCOUNTER — PATIENT MESSAGE (OUTPATIENT)
Dept: FAMILY MEDICINE | Facility: CLINIC | Age: 70
End: 2020-05-22

## 2020-05-25 ENCOUNTER — LAB VISIT (OUTPATIENT)
Dept: LAB | Facility: HOSPITAL | Age: 70
End: 2020-05-25
Attending: EMERGENCY MEDICINE
Payer: MEDICARE

## 2020-05-25 DIAGNOSIS — E78.5 HYPERLIPIDEMIA, UNSPECIFIED HYPERLIPIDEMIA TYPE: Chronic | ICD-10-CM

## 2020-05-25 DIAGNOSIS — E03.4 HYPOTHYROIDISM DUE TO ACQUIRED ATROPHY OF THYROID: Chronic | ICD-10-CM

## 2020-05-25 LAB
ALBUMIN SERPL BCP-MCNC: 4 G/DL (ref 3.5–5.2)
ALP SERPL-CCNC: 95 U/L (ref 55–135)
ALT SERPL W/O P-5'-P-CCNC: 21 U/L (ref 10–44)
ANION GAP SERPL CALC-SCNC: 8 MMOL/L (ref 8–16)
AST SERPL-CCNC: 22 U/L (ref 10–40)
BILIRUB SERPL-MCNC: 0.5 MG/DL (ref 0.1–1)
BUN SERPL-MCNC: 13 MG/DL (ref 8–23)
CALCIUM SERPL-MCNC: 10.1 MG/DL (ref 8.7–10.5)
CHLORIDE SERPL-SCNC: 100 MMOL/L (ref 95–110)
CHOLEST SERPL-MCNC: 173 MG/DL (ref 120–199)
CHOLEST/HDLC SERPL: 2.8 {RATIO} (ref 2–5)
CO2 SERPL-SCNC: 35 MMOL/L (ref 23–29)
CREAT SERPL-MCNC: 0.7 MG/DL (ref 0.5–1.4)
EST. GFR  (AFRICAN AMERICAN): >60 ML/MIN/1.73 M^2
EST. GFR  (NON AFRICAN AMERICAN): >60 ML/MIN/1.73 M^2
GLUCOSE SERPL-MCNC: 95 MG/DL (ref 70–110)
HDLC SERPL-MCNC: 62 MG/DL (ref 40–75)
HDLC SERPL: 35.8 % (ref 20–50)
LDLC SERPL CALC-MCNC: 78 MG/DL (ref 63–159)
NONHDLC SERPL-MCNC: 111 MG/DL
POTASSIUM SERPL-SCNC: 3.8 MMOL/L (ref 3.5–5.1)
PROT SERPL-MCNC: 7 G/DL (ref 6–8.4)
SODIUM SERPL-SCNC: 143 MMOL/L (ref 136–145)
TRIGL SERPL-MCNC: 165 MG/DL (ref 30–150)
TSH SERPL DL<=0.005 MIU/L-ACNC: 2.81 UIU/ML (ref 0.4–4)

## 2020-05-25 PROCEDURE — 80061 LIPID PANEL: CPT

## 2020-05-25 PROCEDURE — 36415 COLL VENOUS BLD VENIPUNCTURE: CPT | Mod: PO

## 2020-05-25 PROCEDURE — 84443 ASSAY THYROID STIM HORMONE: CPT

## 2020-05-25 PROCEDURE — 80053 COMPREHEN METABOLIC PANEL: CPT

## 2020-05-27 ENCOUNTER — APPOINTMENT (OUTPATIENT)
Dept: LAB | Facility: HOSPITAL | Age: 70
End: 2020-05-27

## 2020-06-29 ENCOUNTER — OFFICE VISIT (OUTPATIENT)
Dept: FAMILY MEDICINE CLINIC | Facility: CLINIC | Age: 70
End: 2020-06-29

## 2020-06-29 VITALS
TEMPERATURE: 98.1 F | SYSTOLIC BLOOD PRESSURE: 138 MMHG | HEIGHT: 60 IN | DIASTOLIC BLOOD PRESSURE: 82 MMHG | HEART RATE: 92 BPM | BODY MASS INDEX: 29.17 KG/M2 | WEIGHT: 148.6 LBS | OXYGEN SATURATION: 98 %

## 2020-06-29 DIAGNOSIS — Z12.11 COLON CANCER SCREENING: ICD-10-CM

## 2020-06-29 DIAGNOSIS — D72.9 NEUTROPHILIC LEUKOCYTOSIS: ICD-10-CM

## 2020-06-29 DIAGNOSIS — E78.2 MIXED HYPERLIPIDEMIA: Primary | ICD-10-CM

## 2020-06-29 DIAGNOSIS — J30.1 NON-SEASONAL ALLERGIC RHINITIS DUE TO POLLEN: ICD-10-CM

## 2020-06-29 DIAGNOSIS — Z12.31 VISIT FOR SCREENING MAMMOGRAM: ICD-10-CM

## 2020-06-29 DIAGNOSIS — D75.1 ERYTHROCYTOSIS: ICD-10-CM

## 2020-06-29 DIAGNOSIS — D22.9 NEVUS: ICD-10-CM

## 2020-06-29 DIAGNOSIS — I10 ESSENTIAL HYPERTENSION: ICD-10-CM

## 2020-06-29 PROCEDURE — 99214 OFFICE O/P EST MOD 30 MIN: CPT | Performed by: FAMILY MEDICINE

## 2020-06-29 NOTE — PROGRESS NOTES
Subjective   Jazmyn Castaneda is a 69 y.o. female.     Chief Complaint   Patient presents with   • spot on right arm   • Allergies     Follow up        History of Present Illness   htn- doing well off meds. Checks at home and is always 110/70     hld-  diet controlled. Due labs.     Allergies- Zyrtec helps      Erythrocytosis and leukocytosis- following with heme/onc and trying to quit smoking. Will see them again this month. Have cut back on smoking.    Spot on her arm. For a few weeks, is growing.     The following portions of the patient's history were reviewed and updated as appropriate: allergies, current medications, past family history, past medical history, past social history, past surgical history and problem list.    Past Medical History:   Diagnosis Date   • Anesthesia     WOKE UP DURING CATARACT SURGERY   • Collapse of lung     history of in past post trauma   • Diverticulosis    • Environmental allergies     Some pollens, grass, trees, flowers   • Essential hypertension 3/24/2017   • Exogenous obesity 3/24/2017   • H/O Pneumonia    • Infectious mononucleosis    • Kidney infection    • Kidney stone    • Mixed hyperlipidemia 2019   • Neuromuscular disorder (CMS/HCC)    • Sepsis secondary to UTI (CMS/HCC) 2019   • Tobacco abuse 2019   • Urinary tract infection    • Vertigo     past history of several times       Past Surgical History:   Procedure Laterality Date   • BREAST EXCISIONAL BIOPSY Right     30 something when it was done; says it was precancerous   • CATARACT EXTRACTION, BILATERAL     •  SECTION     • CYSTOSCOPY N/A 2019    Procedure: CYSTOSCOPY AND STENT PLACEMENT;  Surgeon: Alen Lal MD;  Location: [facility];  Service: Urology   • MASTOID SURGERY     • NASAL SEPTAL RECONSTRUCTION     • SINUS SURGERY      scraped and prior deviated septum   • URETEROSCOPY LASER LITHOTRIPSY WITH STENT INSERTION Right 2019    Procedure: CYSTOSCOPY, RIGHT  "URETEROSCOPY, LASER LITHOTRIPSY, STONE BASKET EXTRACTION, STENT PLACEMENT WITHOUT STRINGS;  Surgeon: Alfredo Gongora Jr., MD;  Location: [facility];  Service: Urology       Family History   Problem Relation Age of Onset   • Hypertension Mother    • Aneurysm Mother    • Heart failure Mother    • Heart disease Mother    • Heart disease Father    • Hypertension Father    • Kidney disease Father    • Cancer Father 82        Bladder   • Hypertension Sister    • Cancer Maternal Aunt    • Stroke Maternal Uncle    • Glaucoma Maternal Grandmother    • Cancer Maternal Grandmother    • Stroke Maternal Grandfather    • Aneurysm Maternal Grandfather    • Cancer Maternal Aunt    • Cancer Maternal Aunt    • Liver cancer Other    • Pancreatic cancer Other    • Malig Hyperthermia Neg Hx        Social History     Socioeconomic History   • Marital status:      Spouse name: Not on file   • Number of children: 2   • Years of education: College   • Highest education level: Not on file   Occupational History   • Occupation: Medical technologist     Employer: RETIRED   Tobacco Use   • Smoking status: Current Some Day Smoker     Packs/day: 1.00     Types: Cigarettes   • Smokeless tobacco: Never Used   • Tobacco comment: pt states trying to quit   Substance and Sexual Activity   • Alcohol use: Yes     Comment: \"on occasion\"   • Drug use: No       Review of Systems   Respiratory: Negative for shortness of breath.    Cardiovascular: Negative for chest pain.       Objective   Visit Vitals  /82   Pulse 92   Temp 98.1 °F (36.7 °C) (Temporal)   Ht 152.4 cm (60\")   Wt 67.4 kg (148 lb 9.6 oz)   SpO2 98%   BMI 29.02 kg/m²     Body mass index is 29.02 kg/m².  Physical Exam   Constitutional: She is oriented to person, place, and time. She appears well-developed and well-nourished.   Cardiovascular: Normal rate, regular rhythm, normal heart sounds and intact distal pulses.   Pulmonary/Chest: Effort normal and breath sounds normal. "   Musculoskeletal: Normal range of motion. She exhibits no edema.   Neurological: She is alert and oriented to person, place, and time.   Skin: Skin is warm and dry.   Flat light pigmented lesion 2 cm across with center raised papule with stuck on appearance.    Psychiatric: She has a normal mood and affect. Her behavior is normal.         Assessment/Plan   Jazmyn was seen today for spot on right arm and allergies.    Diagnoses and all orders for this visit:    Mixed hyperlipidemia  -     Comprehensive Metabolic Panel  -     Lipid Panel    Essential hypertension    Non-seasonal allergic rhinitis due to pollen    Neutrophilic leukocytosis    Erythrocytosis    Nevus  -     Cancel: Ambulatory Referral to Dermatology  -     Ambulatory Referral to Dermatology    Visit for screening mammogram  -     Mammo Screening Bilateral With CAD; Future    Colon cancer screening  -     Cologuard - Stool, Per Rectum; Future      Doing well, cont meds, f/u in 6 months.

## 2020-06-30 DIAGNOSIS — E83.52 HYPERCALCEMIA: Primary | ICD-10-CM

## 2020-06-30 DIAGNOSIS — Z82.49 FAMILY HISTORY OF ABDOMINAL AORTIC ANEURYSM (AAA): Primary | ICD-10-CM

## 2020-06-30 DIAGNOSIS — R74.8 ELEVATED ALKALINE PHOSPHATASE LEVEL: ICD-10-CM

## 2020-06-30 LAB
ALBUMIN SERPL-MCNC: 4.3 G/DL (ref 3.5–5.2)
ALBUMIN/GLOB SERPL: 1.5 G/DL
ALP SERPL-CCNC: 160 U/L (ref 39–117)
ALT SERPL-CCNC: 13 U/L (ref 1–33)
AST SERPL-CCNC: 16 U/L (ref 1–32)
BILIRUB SERPL-MCNC: 0.4 MG/DL (ref 0.2–1.2)
BUN SERPL-MCNC: 14 MG/DL (ref 8–23)
BUN/CREAT SERPL: 18.9 (ref 7–25)
CALCIUM SERPL-MCNC: 10.7 MG/DL (ref 8.6–10.5)
CHLORIDE SERPL-SCNC: 105 MMOL/L (ref 98–107)
CHOLEST SERPL-MCNC: 221 MG/DL (ref 0–200)
CO2 SERPL-SCNC: 26.3 MMOL/L (ref 22–29)
CREAT SERPL-MCNC: 0.74 MG/DL (ref 0.57–1)
GLOBULIN SER CALC-MCNC: 2.9 GM/DL
GLUCOSE SERPL-MCNC: 125 MG/DL (ref 65–99)
HDLC SERPL-MCNC: 34 MG/DL (ref 40–60)
LDLC SERPL CALC-MCNC: 158 MG/DL (ref 0–100)
POTASSIUM SERPL-SCNC: 4.7 MMOL/L (ref 3.5–5.2)
PROT SERPL-MCNC: 7.2 G/DL (ref 6–8.5)
SODIUM SERPL-SCNC: 138 MMOL/L (ref 136–145)
TRIGL SERPL-MCNC: 144 MG/DL (ref 0–150)
VLDLC SERPL CALC-MCNC: 28.8 MG/DL

## 2020-07-01 DIAGNOSIS — E03.4 HYPOTHYROIDISM DUE TO ACQUIRED ATROPHY OF THYROID: ICD-10-CM

## 2020-07-01 DIAGNOSIS — R74.8 ELEVATED ALKALINE PHOSPHATASE LEVEL: ICD-10-CM

## 2020-07-01 DIAGNOSIS — Z12.11 COLON CANCER SCREENING: ICD-10-CM

## 2020-07-01 DIAGNOSIS — E83.52 HYPERCALCEMIA: ICD-10-CM

## 2020-07-01 DIAGNOSIS — Z12.31 VISIT FOR SCREENING MAMMOGRAM: ICD-10-CM

## 2020-07-01 DIAGNOSIS — Z82.49 FAMILY HISTORY OF ABDOMINAL AORTIC ANEURYSM (AAA): ICD-10-CM

## 2020-07-01 DIAGNOSIS — I10 ESSENTIAL HYPERTENSION: ICD-10-CM

## 2020-07-01 RX ORDER — CHLORTHALIDONE 25 MG/1
TABLET ORAL
Qty: 90 TABLET | Refills: 1 | Status: SHIPPED | OUTPATIENT
Start: 2020-07-01 | End: 2021-01-25

## 2020-07-01 RX ORDER — LEVOTHYROXINE SODIUM 112 UG/1
TABLET ORAL
Qty: 90 TABLET | Refills: 3 | Status: SHIPPED | OUTPATIENT
Start: 2020-07-01 | End: 2021-07-12

## 2020-07-01 NOTE — TELEPHONE ENCOUNTER
Refill Authorization Note    is requesting a refill authorization.    Brief assessment and rationale for refill: APPROVE: prr               Medication reconciliation completed: No                         Comments:      Requested Prescriptions   Signed Prescriptions Disp Refills    chlorthalidone (HYGROTEN) 25 MG Tab 90 tablet 1     Sig: TAKE 1 TABLET BY MOUTH EVERY DAY       Cardiovascular: Diuretics - Thiazide Passed - 7/1/2020 10:12 AM        Passed - Patient is at least 18 years old        Passed - Last BP in normal range within 360 days.     BP Readings from Last 3 Encounters:   05/21/20 134/84   07/31/19 136/86   04/09/19 136/80              Passed - Office visit in past 12 months or future 90 days.     Recent Outpatient Visits            1 month ago Hypothyroidism due to acquired atrophy of thyroid    [facility] Anthony Varner Jr., MD    9 months ago S/P left unicompartmental knee replacement    [facility] Tino Loja MD    10 months ago S/P left unicompartmental knee replacement    [facility] Tino Loja MD    11 months ago Pre-op exam    [facility] Milly Buchanan NP    11 months ago Chronic pain of both knees    [facility] Tino Loja MD                    Passed - Ca in normal range and within 360 days     Calcium   Date Value Ref Range Status   05/25/2020 10.1 8.7 - 10.5 mg/dL Final   07/31/2019 10.0 8.7 - 10.5 mg/dL Final   01/18/2019 10.0 8.7 - 10.5 mg/dL Final              Passed - Cr is 1.3 or below and within 180 days     Creatinine   Date Value Ref Range Status   05/25/2020 0.7 0.5 - 1.4 mg/dL Final   07/31/2019 0.8 0.5 - 1.4 mg/dL Final   01/18/2019 0.7 0.5 - 1.4 mg/dL Final              Passed - K in normal range and within 180 days     Potassium   Date Value Ref Range Status   05/25/2020 3.8 3.5 - 5.1 mmol/L Final   07/31/2019 4.2 3.5 - 5.1 mmol/L Final   01/18/2019 3.9 3.5 -  5.1 mmol/L Final              Passed - Na is between 130 and 148 and within 180 days     Sodium   Date Value Ref Range Status   05/25/2020 143 136 - 145 mmol/L Final   07/31/2019 141 136 - 145 mmol/L Final   01/18/2019 141 136 - 145 mmol/L Final              Passed - eGFR within 180 days     eGFR if non    Date Value Ref Range Status   05/25/2020 >60.0 >60 mL/min/1.73 m^2 Final     Comment:     Calculation used to obtain the estimated glomerular filtration  rate (eGFR) is the CKD-EPI equation.      07/31/2019 >60.0 >60 mL/min/1.73 m^2 Final     Comment:     Calculation used to obtain the estimated glomerular filtration  rate (eGFR) is the CKD-EPI equation.      01/18/2019 >60.0 >60 mL/min/1.73 m^2 Final     Comment:     Calculation used to obtain the estimated glomerular filtration  rate (eGFR) is the CKD-EPI equation.        eGFR if    Date Value Ref Range Status   05/25/2020 >60.0 >60 mL/min/1.73 m^2 Final   07/31/2019 >60.0 >60 mL/min/1.73 m^2 Final   01/18/2019 >60.0 >60 mL/min/1.73 m^2 Final                levothyroxine (SYNTHROID) 112 MCG tablet 90 tablet 3     Sig: TAKE 1 TABLET BY MOUTH BEFORE BREAKFAST AS DIRECTED       Endocrinology:  Hypothyroid Agents Failed - 7/1/2020 10:12 AM        Failed - Manual Review: FT4 is not required if last TSH is WNL.        Passed - Patient is at least 18 years old        Passed - Office visit in past 12 months or future 90 days.     Recent Outpatient Visits            1 month ago Hypothyroidism due to acquired atrophy of thyroid    [facility] Anthony Varner Jr., MD    9 months ago S/P left unicompartmental knee replacement    [facility] Loja [facility]    10 months ago S/P left unicompartmental knee replacement    [facility] T[facility] Lo[facility]    11 months ago Pre-op exam    [facility] Milly Buchanan NP    11 months ago Chronic pain of both knees    [facility] [place] Tino Loja MD                    Passed - TSH in normal range and within 360 days     TSH   Date Value Ref Range Status   05/25/2020 2.811 0.400 - 4.000 uIU/mL Final   03/15/2019 2.457 0.400 - 4.000 uIU/mL Final   01/18/2019 6.562 (H) 0.400 - 4.000 uIU/mL Final              Passed - T4 free within 1080 days     Free T4   Date Value Ref Range Status   01/18/2019 0.78 0.71 - 1.51 ng/dL Final   10/23/2014 1.42 0.71 - 1.51 ng/dL Final                  Appointments  past 12m or future 3m with PCP    Date Provider   Last Visit   5/21/2020 Anthony Varner Jr., MD   Next Visit   Visit date not found Anthony Varner Jr., MD   ED visits in past 90 days: [unfilled]     Note composed:2:58 PM 07/01/2020

## 2020-07-02 LAB
25(OH)D3+25(OH)D2 SERPL-MCNC: 23.3 NG/ML (ref 30–100)
ALP BONE CFR SERPL: 62 % (ref 14–68)
ALP INTEST CFR SERPL: 0 % (ref 0–18)
ALP LIVER CFR SERPL: 38 % (ref 18–85)
ALP SERPL-CCNC: 156 IU/L (ref 39–117)
CA-I SERPL ISE-MCNC: 6.1 MG/DL (ref 4.5–5.6)
PTH-INTACT SERPL-MCNC: 65 PG/ML (ref 15–65)

## 2020-07-06 DIAGNOSIS — R74.8 ELEVATED ALKALINE PHOSPHATASE LEVEL: ICD-10-CM

## 2020-07-06 DIAGNOSIS — E83.52 HYPERCALCEMIA: Primary | ICD-10-CM

## 2020-07-28 ENCOUNTER — OFFICE VISIT (OUTPATIENT)
Dept: ONCOLOGY | Facility: CLINIC | Age: 70
End: 2020-07-28

## 2020-07-28 ENCOUNTER — LAB (OUTPATIENT)
Dept: LAB | Facility: HOSPITAL | Age: 70
End: 2020-07-28

## 2020-07-28 VITALS
SYSTOLIC BLOOD PRESSURE: 148 MMHG | BODY MASS INDEX: 29.67 KG/M2 | RESPIRATION RATE: 16 BRPM | TEMPERATURE: 98.4 F | HEART RATE: 88 BPM | WEIGHT: 151.1 LBS | OXYGEN SATURATION: 96 % | DIASTOLIC BLOOD PRESSURE: 78 MMHG | HEIGHT: 60 IN

## 2020-07-28 DIAGNOSIS — D75.1 ERYTHROCYTOSIS: ICD-10-CM

## 2020-07-28 DIAGNOSIS — D72.9 NEUTROPHILIC LEUKOCYTOSIS: Primary | ICD-10-CM

## 2020-07-28 DIAGNOSIS — D72.9 NEUTROPHILIC LEUKOCYTOSIS: ICD-10-CM

## 2020-07-28 LAB
BASOPHILS # BLD AUTO: 0.1 10*3/MM3 (ref 0–0.2)
BASOPHILS NFR BLD AUTO: 0.9 % (ref 0–1.5)
DEPRECATED RDW RBC AUTO: 45.7 FL (ref 37–54)
EOSINOPHIL # BLD AUTO: 0.35 10*3/MM3 (ref 0–0.4)
EOSINOPHIL NFR BLD AUTO: 3.2 % (ref 0.3–6.2)
ERYTHROCYTE [DISTWIDTH] IN BLOOD BY AUTOMATED COUNT: 13.5 % (ref 12.3–15.4)
HCT VFR BLD AUTO: 47.6 % (ref 34–46.6)
HGB BLD-MCNC: 15.8 G/DL (ref 12–15.9)
IMM GRANULOCYTES # BLD AUTO: 0.07 10*3/MM3 (ref 0–0.05)
IMM GRANULOCYTES NFR BLD AUTO: 0.6 % (ref 0–0.5)
LYMPHOCYTES # BLD AUTO: 2.78 10*3/MM3 (ref 0.7–3.1)
LYMPHOCYTES NFR BLD AUTO: 25.6 % (ref 19.6–45.3)
MCH RBC QN AUTO: 30.6 PG (ref 26.6–33)
MCHC RBC AUTO-ENTMCNC: 33.2 G/DL (ref 31.5–35.7)
MCV RBC AUTO: 92.2 FL (ref 79–97)
MONOCYTES # BLD AUTO: 0.67 10*3/MM3 (ref 0.1–0.9)
MONOCYTES NFR BLD AUTO: 6.2 % (ref 5–12)
NEUTROPHILS NFR BLD AUTO: 6.88 10*3/MM3 (ref 1.7–7)
NEUTROPHILS NFR BLD AUTO: 63.5 % (ref 42.7–76)
NRBC BLD AUTO-RTO: 0 /100 WBC (ref 0–0.2)
PLATELET # BLD AUTO: 248 10*3/MM3 (ref 140–450)
PMV BLD AUTO: 12.3 FL (ref 6–12)
RBC # BLD AUTO: 5.16 10*6/MM3 (ref 3.77–5.28)
WBC # BLD AUTO: 10.85 10*3/MM3 (ref 3.4–10.8)

## 2020-07-28 PROCEDURE — 85025 COMPLETE CBC W/AUTO DIFF WBC: CPT

## 2020-07-28 PROCEDURE — 99213 OFFICE O/P EST LOW 20 MIN: CPT | Performed by: INTERNAL MEDICINE

## 2020-07-28 PROCEDURE — 36415 COLL VENOUS BLD VENIPUNCTURE: CPT

## 2020-07-28 RX ORDER — OXYMETAZOLINE HYDROCHLORIDE 0.05 G/100ML
2 SPRAY NASAL 2 TIMES DAILY
COMMUNITY
End: 2021-01-19

## 2020-07-28 RX ORDER — FLUTICASONE PROPIONATE 50 MCG
1 SPRAY, SUSPENSION (ML) NASAL DAILY
COMMUNITY
End: 2022-01-05 | Stop reason: SDUPTHER

## 2020-07-28 NOTE — PROGRESS NOTES
[facility] GROUP OUTPATIENT FOLLOW UP CLINIC VISIT    REASON FOR FOLLOW-UP:    1.  Erythrocytosis  2.  Mild leukocytosis    HISTORY OF PRESENT ILLNESS:  Jazmyn Castaneda is a 70 y.o. female who returns today for follow up of the above issue.     She is seeing Dr. Yi for primary care.    She has hyperparathyroidism and may require surgery for this.  She is also had some recent issues with vertigo.    She quit smoking 30 days ago and hopes to continue to abstain from smoking.    HEMATOLOGIC HISTORY:  She recently switched primary care providers. On 3/24/2017 her hemoglobin was 16.8 with hematocrit 51.3%. As of 4/6/2017 the hemoglobin was 16.2 with hematocrit 50.6%. White blood cells and platelets of been normal. She states that she was not aware of any erythrocytosis prior to that.    Seen initially on 5/8/2017 with hemoglobin of 16.1 and hematocrit 49.4%.  Erythropoietin level was normal at 4.8.  Carbon monoxide level was normal at 2.5%.  JAK2 V617F and exon 12 mutations were negative.    She returned on 5/26/2017 for follow-up.  Her hemoglobin is stable.  She will follow-up in a few months.  She will try to quit smoking.    Subsequent labs stable.    PAST MEDICAL, SURGICAL, FAMILY, AND SOCIAL HISTORIES were reviewed with the patient and in the electronic medical record, and were updated if indicated.    ALLERGIES:  Allergies   Allergen Reactions   • Cephalosporins Swelling   • Penicillins Swelling   • Ciprofloxacin Rash   • Sulfa Antibiotics Unknown - Low Severity     Patient states she lost her vision temporarily when using Sulfa based eye ointment, also, broke out in rash w/oral medication.       MEDICATIONS:  The medication list has been reviewed with the patient by the medical assistant, and the list has been updated in the electronic medical record, which I reviewed.  Medication dosages and frequencies were confirmed to be accurate.    REVIEW OF SYSTEMS:  PAIN:  See Vital Signs below.  GENERAL:   "No fevers, chills, night sweats, or unintended weight loss.    SKIN:  No rashes or non-healing lesions.  HEME/LYMPH:  No abnormal bleeding.  No palpable lymphadenopathy.  EYES:  No vision changes or diplopia.  ENT:  No sore throat or difficulty swallowing.    Chronic postnasal drip.  Vertigo.  RESPIRATORY:  No cough, shortness of breath, hemoptysis, or wheezing.  CARDIOVASCULAR:  No chest pain, palpitations, orthopnea, or dyspnea on exertion.  GASTROINTESTINAL: No nausea vomiting diarrhea or constipation.  GENITOURINARY:  No dysuria or hematuria.  MUSCULOSKELETAL:  No joint pain, swelling, or erythema.  Low back pain  NEUROLOGIC:  No dizziness, loss of consciousness, or seizures.  PSYCHIATRIC:  Stress and anxiety which she did not complain about today.  7/28/2020 ROS unchanged except for noted      Vitals:    07/28/20 1331 07/28/20 1339   BP:  148/78   Pulse:  88   Resp:  16   Temp:  98.4 °F (36.9 °C)   TempSrc:  Temporal   SpO2:  96%   Weight:  68.5 kg (151 lb 1.6 oz)   Height:  152.4 cm (60\")   PainSc: Comment: leukocytosis 0-No pain  Comment: leukocytosis       PHYSICAL EXAMINATION:  General:  No acute distress, awake, alert and oriented  Skin:  Warm and dry, no visible rash  HEENT:  normocephalic/atraumatic.  Wearing a mask.  Chest:  Normal respiratory effort  Extremities:  No visible clubbing, cyanosis, or edema  Neuro/psych:  Grossly non-focal.  Normal mood and affect.        DIAGNOSTIC DATA:  Results for orders placed or performed in visit on 07/28/20   CBC Auto Differential   Result Value Ref Range    WBC 10.85 (H) 3.40 - 10.80 10*3/mm3    RBC 5.16 3.77 - 5.28 10*6/mm3    Hemoglobin 15.8 12.0 - 15.9 g/dL    Hematocrit 47.6 (H) 34.0 - 46.6 %    MCV 92.2 79.0 - 97.0 fL    MCH 30.6 26.6 - 33.0 pg    MCHC 33.2 31.5 - 35.7 g/dL    RDW 13.5 12.3 - 15.4 %    RDW-SD 45.7 37.0 - 54.0 fl    MPV 12.3 (H) 6.0 - 12.0 fL    Platelets 248 140 - 450 10*3/mm3    Neutrophil % 63.5 42.7 - 76.0 %    Lymphocyte % 25.6 19.6 - " 45.3 %    Monocyte % 6.2 5.0 - 12.0 %    Eosinophil % 3.2 0.3 - 6.2 %    Basophil % 0.9 0.0 - 1.5 %    Immature Grans % 0.6 (H) 0.0 - 0.5 %    Neutrophils, Absolute 6.88 1.70 - 7.00 10*3/mm3    Lymphocytes, Absolute 2.78 0.70 - 3.10 10*3/mm3    Monocytes, Absolute 0.67 0.10 - 0.90 10*3/mm3    Eosinophils, Absolute 0.35 0.00 - 0.40 10*3/mm3    Basophils, Absolute 0.10 0.00 - 0.20 10*3/mm3    Immature Grans, Absolute 0.07 (H) 0.00 - 0.05 10*3/mm3    nRBC 0.0 0.0 - 0.2 /100 WBC       IMAGING:  None reviewed    ASSESSMENT:  This is a 70 y.o. female with:  1.  Erythrocytosis:  I suspect this is secondary polycythemia related to smoking.  COURTNEY 2 mutation analysis was negative.  Laboratory evaluation otherwise was unremarkable.  Hemoglobin and hematocrit are essentially normal today    2.  Mild leukocytosis with neutrophilia: Also likely secondary to smoking.  Her white blood cell count is barely elevated today    PLAN:  1.  She will continue to try to abstain from cigarettes.  Successful now for 30 days.  2.  At this point I think she can follow-up with primary care with a CBC 1-2 times per year.  I can see her back in the office if necessary for a significant increase in her blood counts.

## 2020-08-04 DIAGNOSIS — E78.5 HYPERLIPIDEMIA: ICD-10-CM

## 2020-08-04 NOTE — TELEPHONE ENCOUNTER
No new care gaps identified.  Powered by [employer]. Reference number: [id number]. 8/04/2020 11:44:10 AM   MAGDALENET

## 2020-08-06 RX ORDER — ATORVASTATIN CALCIUM 20 MG/1
TABLET, FILM COATED ORAL
Qty: 90 TABLET | Refills: 3 | Status: SHIPPED | OUTPATIENT
Start: 2020-08-06 | End: 2021-07-16

## 2020-08-06 NOTE — TELEPHONE ENCOUNTER
Refill Authorization Note    is requesting a refill authorization.    Brief assessment and rationale for refill: APPROVE: prr          Medication Therapy Plan: CD    Medication reconciliation completed: No                         Comments:      Orders Placed This Encounter    atorvastatin (LIPITOR) 20 MG tablet      Requested Prescriptions   Signed Prescriptions Disp Refills    atorvastatin (LIPITOR) 20 MG tablet 90 tablet 3     Sig: TAKE 1 TABLET BY MOUTH EVERY DAY       Cardiovascular:  Antilipid - Statins Passed - 8/4/2020 11:43 AM        Passed - Patient is at least 18 years old        Passed - Office visit in past 12 months or future 90 days.     Recent Outpatient Visits            2 months ago Hypothyroidism due to acquired atrophy of thyroid    [facility] Anthony Varner Jr., MD    10 months ago S/P left unicompartmental knee replacement    [facility] Tino Loja MD    11 months ago S/P left unicompartmental knee replacement    [facility] Tino Loja MD    1 year ago Pre-op exam    [facility] Milly Buchanan, JULIANNA    1 year ago Chronic pain of both knees    [facility] Tino Loja MD                    Passed - Lipid Panel completed in last 360 days     Lab Results   Component Value Date    CHOL 173 05/25/2020    HDL 62 05/25/2020    LDLCALC 78.0 05/25/2020    TRIG 165 (H) 05/25/2020             Passed - ALT is 94 or below and within 360 days     ALT   Date Value Ref Range Status   05/25/2020 21 10 - 44 U/L Final   01/18/2019 19 10 - 44 U/L Final   05/03/2018 20 10 - 44 U/L Final              Passed - AST is 54 or below and within 360 days     AST   Date Value Ref Range Status   05/25/2020 22 10 - 40 U/L Final   01/18/2019 19 10 - 40 U/L Final   05/03/2018 23 10 - 40 U/L Final                  Appointments  past 12m or future 3m with PCP    Date Provider   Last Visit   5/21/2020 Anthony HALL  Telly Tidwell MD   Next Visit   Visit date not found Anthony Varner Jr., MD   ED visits in past 90 days: [unfilled]     Note composed:10:02 AM 08/06/2020

## 2020-08-14 ENCOUNTER — HOSPITAL ENCOUNTER (OUTPATIENT)
Dept: MAMMOGRAPHY | Facility: HOSPITAL | Age: 70
Discharge: HOME | End: 2020-08-14
Admitting: FAMILY MEDICINE

## 2020-08-14 ENCOUNTER — HOSPITAL ENCOUNTER (OUTPATIENT)
Dept: ULTRASOUND IMAGING | Facility: HOSPITAL | Age: 70
Discharge: HOME | End: 2020-08-14

## 2020-08-14 DIAGNOSIS — Z12.31 VISIT FOR SCREENING MAMMOGRAM: ICD-10-CM

## 2020-08-14 PROCEDURE — 76706 US ABDL AORTA SCREEN AAA: CPT

## 2020-08-14 PROCEDURE — 77063 BREAST TOMOSYNTHESIS BI: CPT

## 2020-08-14 PROCEDURE — 77067 SCR MAMMO BI INCL CAD: CPT

## 2020-10-01 ENCOUNTER — PATIENT MESSAGE (OUTPATIENT)
Dept: ADMINISTRATIVE | Facility: OTHER | Age: 70
End: 2020-10-01

## 2020-10-01 ENCOUNTER — PATIENT MESSAGE (OUTPATIENT)
Dept: OTHER | Facility: OTHER | Age: 70
End: 2020-10-01

## 2020-10-08 ENCOUNTER — TRANSCRIBE ORDERS (OUTPATIENT)
Dept: ADMINISTRATIVE | Facility: HOSPITAL | Age: 70
End: 2020-10-08

## 2020-10-08 DIAGNOSIS — Z78.0 POST-MENOPAUSAL: Primary | ICD-10-CM

## 2020-10-08 DIAGNOSIS — E83.52 HYPERCALCEMIA: ICD-10-CM

## 2020-10-15 DIAGNOSIS — J30.1 NON-SEASONAL ALLERGIC RHINITIS DUE TO POLLEN: ICD-10-CM

## 2020-10-16 RX ORDER — MONTELUKAST SODIUM 10 MG/1
TABLET ORAL
Qty: 90 TABLET | Refills: 3 | Status: SHIPPED | OUTPATIENT
Start: 2020-10-16 | End: 2022-01-05 | Stop reason: SDUPTHER

## 2020-10-22 ENCOUNTER — HOSPITAL ENCOUNTER (OUTPATIENT)
Dept: BONE DENSITY | Facility: HOSPITAL | Age: 70
Discharge: HOME | End: 2020-10-22
Admitting: OTOLARYNGOLOGY

## 2020-10-22 DIAGNOSIS — E83.52 HYPERCALCEMIA: ICD-10-CM

## 2020-10-22 DIAGNOSIS — Z78.0 POST-MENOPAUSAL: ICD-10-CM

## 2020-10-22 PROCEDURE — 77080 DXA BONE DENSITY AXIAL: CPT

## 2020-11-03 ENCOUNTER — PATIENT OUTREACH (OUTPATIENT)
Dept: OTHER | Facility: OTHER | Age: 70
End: 2020-11-03

## 2020-11-03 NOTE — PROGRESS NOTES
Digital Medicine: Health  Introduction    Introduced Kenna Schmitt to Digital Medicine. Discussed health  role and recommended lifestyle modifications.    The history is provided by the patient.           Additional Enrollment Details: Patient reports that she is taking her blood pressure before her medication. She will switch to taking it around lunchtime.     HYPERTENSION  Explained hypertension digital medicine goals including BP goal less than or equal to 130/80mmHg, improved convenience of BP management and reduced risk of heart attack, kidney failure, stroke, eye disease, dementia, and death.      Explained non-pharmacologic therapies like low salt diet and physical activity can reduce blood pressure. .      Explained that we expect patient to submit several blood pressure readings per week at random times of the day, but at least 30 minutes after taking blood pressure medications. Instructed patient not to allow anyone else to use their blood pressure monitor and phone as data submitted is directly entered into medical record. Reviewed and confirmed appropriate blood pressure monitoring technique.         Patient's BP goal is less than or equal to 130/80.Patient's BP average is 145/77 mmHg, which is above goal, per 2017 ACC/AHA Hypertension Guidelines.    Explained to the patient that the Digital Medicine team is not available for emergencies. Advised patient call [facility] ([phone] or [phone]) or 911 if needed.                 Diet-Assessed      Additional diet details: Encouraged to decrease sodium intake to <1500 mg per day. Recommend increasing more whole foods and less processed foods.       Physical Activity-Assessed      Additional physical activity details: Encouraged to increase exercise to at least 150 minutes per week.        Medication Adherence-Medication adherence was assessed.  Patient continue taking medication as prescribed.          Substance, Sleep,  Stress-Assessed  stress-not assessed  Details:  Intervention(s):    Sleep-not assessed  Details:  Intervention(s):    Alcohol -assessed  Details:She will have a drink   Intervention(s):    Tobacco-Assessed  Details:No tobacco use   Intervention(s):          Provided patient education.  Reviewed Device Techniques.     Addressed patient questions and patient has my contact information if needed prior to next outreach. Patient verbalizes understanding.      Explained the importance of self-monitoring and medication adherence. Encouraged the patient to communicate with their health  for lifestyle modifications to help improve or maintain a healthy lifestyle.        Explained to the patient that the Digital Medicine team is not available for emergencies. Advised patient call [facility] ([phone] or [phone]) or 911 if needed.            There are no preventive care reminders to display for this patient.      Last 5 Patient Entered Readings                                      Current 30 Day Average: 145/77     Recent Readings 11/3/2020 11/2/2020 11/1/2020 10/31/2020 10/31/2020    SBP (mmHg) 124 138 139 194 188    DBP (mmHg) 69 62 74 56 83    Pulse 70 79 77 85 87

## 2020-11-03 NOTE — LETTER
November 3, 2020     Kenna Schmitt  [address] [place] [id number]       Dear Kenna,    Welcome to [facility]! Our goal is to make care effective, proactive and convenient by using data you send us from home to better treat your chronic conditions.                My name is Ashleigh Arreguin, and I am your dedicated Digital Medicine clinician. As an expert in medication management, I will help ensure that the medications you are taking continue to provide the intended benefits and help you reach your goals. You can reach me directly at [phone] or by sending me a message directly through your [facility] account.      I am Yessi Salomon and I will be your health . My job is to help you identify lifestyle changes to improve your disease control. We will talk about nutrition, exercise, and other ways you may be able to adjust your current habits to better your health. Additionally, we will help ensure you are completing the tests and screenings that are necessary to help manage your conditions. You can reach me directly at [phone] or by sending me a message directly through your [facility] account.    Most importantly, YOU are at the center of this team. Together, we will work to improve your overall health and encourage you to meet your goals for a healthier lifestyle.     What we expect from YOU:  · Please take frequent home blood pressure measurements. We ask that you take at least 1 blood pressure reading per week, but more information will better help us get you know you. Be sure you rest for a few minutes before taking the reading in a quiet, comfortable place.     Be available to receive phone calls or [employer]ue[employer] messages, when appropriate, from your care team. Please let us know if there are any specific days or times that work best for us to reach you via phone.     Complete routine tests and screenings. Dont worry, we will help keep you on track!           What you should  expect from your Digital Medicine Care Team:   We will work with you to create a personalized plan of care and provide you with encouragement and education, including regarding lifestyle changes, that could help you manage your disease states.     We will adjust your current medications, if needed, and continue to monitor your long-term progress.     We will provide you and your physician with monthly progress reports after you have been in the program for more than 30 days.     We will send you reminders through [employer] and text messages to help ensure you do not miss any testing deadlines to help manage your disease states.    You will be able to reach us by phone or through your [facility] account by clicking our names under Care Team on the right side of the home screen.    We look forward to working with you to help manage your health,    Sincerely,    Your Digital Medicine Team    Please visit our websites to learn more:   · Hypertension: [email or web address]      Remember, we are not available for emergencies. If you have an emergency, please contact your doctors office directly or call [facility] ([phone] or [phone]) or 911.

## 2020-11-04 ENCOUNTER — PATIENT MESSAGE (OUTPATIENT)
Dept: ADMINISTRATIVE | Facility: OTHER | Age: 70
End: 2020-11-04

## 2020-11-05 ENCOUNTER — TELEPHONE (OUTPATIENT)
Dept: AUDIOLOGY | Facility: CLINIC | Age: 70
End: 2020-11-05

## 2020-11-05 NOTE — TELEPHONE ENCOUNTER
11/16/2020 - Patient decided to renew warranty. New expiration will be 11/22/2021.    11/05/2020 - Contacted patient to let her know her hearing aid warranty is expiring on 11/22/2020. The patient would like to take some time to think about whether or not she would like to renew for another year. The patient was also told she is due for her annual if she would like to schedule.

## 2020-11-09 ENCOUNTER — PATIENT OUTREACH (OUTPATIENT)
Dept: OTHER | Facility: OTHER | Age: 70
End: 2020-11-09

## 2020-11-09 NOTE — PROGRESS NOTES
Digital Medicine: Clinician Introduction    Kenna Schmitt is a 70 y.o. female who is newly enrolled in the Digital Medicine Clinic.    Takes BP med in the morning and after talking to HC, she has switched to checking BP around noon (>1 hour after taking meds). Drinks 1 cup coffee in the morning but waiting >30 min after coffee to check BP. BP technique reported by patient was appropriate.     Was on amlodipine in the past and had ankle edema         The history is provided by the patient.      Review of patient's allergies indicates:   -- Penicillins -- Hives   -- Shellfish containing products -- Edema    --  Generalized swelling  Completed Medication Reconciliation      HYPERTENSION  Explained hypertension digital medicine goals including BP goal less than or equal to 130/80mmHg, improved convenience of BP management and reduced risk of heart attack, kidney failure, stroke, eye disease, dementia, and death.     Explained non-pharmacologic therapies like low salt diet and physical activity can reduce blood pressure.       Explained that we expect patient to submit several blood pressure readings per week at random times of the day, but at least 30 minutes after taking blood pressure medications. Instructed patient not to allow anyone else to use their blood pressure monitor and phone as data submitted is directly entered into medical record. Reviewed and confirmed appropriate blood pressure monitoring technique.         Reviewed signs/symptoms of hypertension (headache, changes in vision, chest pain, shortness of breath)   Reviewed signs/symptoms of hypotension (lightheaded, dizziness, weakness)     Patient's BP goal is less than or equal to 130/80. Patients BP average is 145/77 mmHg, which is above goal, per 2017 ACC/AHA Hypertension Guidelines. Denies any recent or current hypertensive or hypotensive s/s.         Last 5 Patient Entered Readings                                      Current 30 Day Average: 145/77      Recent Readings 11/5/2020 11/3/2020 11/2/2020 11/1/2020 10/31/2020    SBP (mmHg) 139 124 138 139 194    DBP (mmHg) 66 69 62 74 56    Pulse 70 70 79 77 85                Depression Screening  Kenna Schmitt screened negative on the depression screening.     Sleep Apnea Screening  Patient not previously diagnosed with GRISELDA   Patient reports she does snore. Not currently interested in sleep medicine referral. Aware that we may revist the discussion in the future.     Medication Affordability Screening  Patient did not answer the medication affordability questionnaires. Patient is currently not having problems affording medications    Medication Adherence-Medication adherence was assessed.  Patient continue taking medication as prescribed.          Denies any missed doses - pill box      ASSESSMENT(S)  Patients BP average is 145/77 mmHg, which is above goal. Patient's BP goal is less than or equal to 130/80.     Prior to 11/03/20 call with , patient was taking BP readings before meds. Last reading with proper technique was closer to goal than previous readings. She is managed on first line BP agent thiazide like diuretic      Hypertension Plan  Continue current therapy.  Continue current diet/physical activity routine.  Instructed to charge device.  Plan to follow up in 1 month to assess trend after more BP readings submitted using proper technique     Addressed patient questions and patient has my contact information if needed prior to next outreach. Patient verbalizes understanding.      Explained the importance of self-monitoring and medication adherence. Encouraged the patient to communicate with their health  for lifestyle modifications to help improve or maintain a healthy lifestyle.        Sent link to [facility] webpages and my contact information via [employer] for future questions.        Explained to the patient that the Digital Medicine team is not available for emergencies. Advised  patient call [facility] ([phone] or [phone]) or [phone] if needed.            There are no preventive care reminders to display for this patient.       Current Medication Regimen:  Hypertension Medications             chlorthalidone (HYGROTEN) 25 MG Tab TAKE 1 TABLET BY MOUTH EVERY DAY

## 2020-11-15 ENCOUNTER — PATIENT OUTREACH (OUTPATIENT)
Dept: ADMINISTRATIVE | Facility: OTHER | Age: 70
End: 2020-11-15

## 2020-11-15 NOTE — PROGRESS NOTES
Health Maintenance Due   Topic Date Due    TETANUS VACCINE  07/20/1968    Pneumococcal Vaccine (65+ Low/Medium Risk) (1 of 2 - PCV13) 07/20/2013    Shingles Vaccine (2 of 3) 11/14/2014    Influenza Vaccine (1) 08/01/2020     Updates were requested from care everywhere.  Chart was reviewed for overdue Proactive [facility] Encounters (NELA) topics (CRS, Breast Cancer Screening, Eye exam)  Health Maintenance has been updated.  LINKS immunization registry triggered.  Immunizations were not able to be reconciled. Patient not found in LINKS.

## 2020-11-16 ENCOUNTER — OFFICE VISIT (OUTPATIENT)
Dept: OTOLARYNGOLOGY | Facility: CLINIC | Age: 70
End: 2020-11-16
Payer: MEDICARE

## 2020-11-16 ENCOUNTER — CLINICAL SUPPORT (OUTPATIENT)
Dept: AUDIOLOGY | Facility: CLINIC | Age: 70
End: 2020-11-16
Payer: MEDICARE

## 2020-11-16 ENCOUNTER — CLINICAL SUPPORT (OUTPATIENT)
Dept: AUDIOLOGY | Facility: CLINIC | Age: 70
End: 2020-11-16

## 2020-11-16 VITALS — WEIGHT: 227.75 LBS | HEIGHT: 61 IN | BODY MASS INDEX: 43 KG/M2

## 2020-11-16 DIAGNOSIS — R68.89 SMALL EAR CANAL: ICD-10-CM

## 2020-11-16 DIAGNOSIS — H61.23 BILATERAL IMPACTED CERUMEN: ICD-10-CM

## 2020-11-16 DIAGNOSIS — Z97.4 WEARS HEARING AID IN BOTH EARS: Primary | ICD-10-CM

## 2020-11-16 DIAGNOSIS — H90.3 BILATERAL SENSORINEURAL HEARING LOSS: Primary | ICD-10-CM

## 2020-11-16 DIAGNOSIS — Z97.4 WEARS HEARING AID IN BOTH EARS: ICD-10-CM

## 2020-11-16 PROCEDURE — 99214 PR OFFICE/OUTPT VISIT, EST, LEVL IV, 30-39 MIN: ICD-10-PCS | Mod: 25,S$PBB,, | Performed by: NURSE PRACTITIONER

## 2020-11-16 PROCEDURE — 99999 PR PBB SHADOW E&M-EST. PATIENT-LVL I: ICD-10-PCS | Mod: PBBFAC,,,

## 2020-11-16 PROCEDURE — 99999 PR PBB SHADOW E&M-EST. PATIENT-LVL I: ICD-10-PCS | Mod: PBBFAC,,, | Performed by: AUDIOLOGIST-HEARING AID FITTER

## 2020-11-16 PROCEDURE — G0268 REMOVAL OF IMPACTED WAX MD: HCPCS | Mod: S$PBB,,, | Performed by: NURSE PRACTITIONER

## 2020-11-16 PROCEDURE — G0268 REMOVAL OF IMPACTED WAX MD: HCPCS | Mod: PBBFAC,PO | Performed by: NURSE PRACTITIONER

## 2020-11-16 PROCEDURE — 99214 OFFICE O/P EST MOD 30 MIN: CPT | Mod: 25,S$PBB,, | Performed by: NURSE PRACTITIONER

## 2020-11-16 PROCEDURE — V5299 PR HEARING SERVICE: ICD-10-PCS | Mod: CSM,,, | Performed by: AUDIOLOGIST-HEARING AID FITTER

## 2020-11-16 PROCEDURE — 99999 PR PBB SHADOW E&M-EST. PATIENT-LVL I: CPT | Mod: PBBFAC,,,

## 2020-11-16 PROCEDURE — 99211 OFF/OP EST MAY X REQ PHY/QHP: CPT | Mod: PBBFAC,27,PO,25

## 2020-11-16 PROCEDURE — 99213 OFFICE O/P EST LOW 20 MIN: CPT | Mod: PBBFAC,PO,25 | Performed by: NURSE PRACTITIONER

## 2020-11-16 PROCEDURE — 99999 PR PBB SHADOW E&M-EST. PATIENT-LVL I: CPT | Mod: PBBFAC,,, | Performed by: AUDIOLOGIST-HEARING AID FITTER

## 2020-11-16 PROCEDURE — 99999 PR PBB SHADOW E&M-EST. PATIENT-LVL III: ICD-10-PCS | Mod: PBBFAC,,, | Performed by: NURSE PRACTITIONER

## 2020-11-16 PROCEDURE — 92552 PURE TONE AUDIOMETRY AIR: CPT | Mod: PBBFAC,PO | Performed by: AUDIOLOGIST-HEARING AID FITTER

## 2020-11-16 PROCEDURE — 99999 PR PBB SHADOW E&M-EST. PATIENT-LVL III: CPT | Mod: PBBFAC,,, | Performed by: NURSE PRACTITIONER

## 2020-11-16 PROCEDURE — G0268 PR REMOVAL OF IMPACTED WAX MD: ICD-10-PCS | Mod: S$PBB,,, | Performed by: NURSE PRACTITIONER

## 2020-11-16 PROCEDURE — 92556 SPEECH AUDIOMETRY COMPLETE: CPT | Mod: PBBFAC,PO | Performed by: AUDIOLOGIST-HEARING AID FITTER

## 2020-11-16 PROCEDURE — V5299 HEARING SERVICE: HCPCS | Mod: CSM,,, | Performed by: AUDIOLOGIST-HEARING AID FITTER

## 2020-11-16 NOTE — PROGRESS NOTES
Kenna Schmitt was seen 11/16/2020 for an audiological evaluation. Patient complains of hearing loss. Patient complains of hearing loss. She wears binaural Phonak RICs. Pt is being followed medically for asymmetrical hearing loss.      Results reveal a bilateral mild-to-moderately severe sensorineural hearing loss with some asymmetry from 750-2K Hz,  AS>AD.    Speech Reception Thresholds were  30 dBHL for the right ear and 50 dBHL for the left ear.    Word recognition scores were good for the right ear and excellent for the left ear.  Slight changes were noted when compared to previous hearing testing from 6/5/18.     Audiogram results were reviewed in detail with patient and all questions were answered. Results will be reviewed by ENT at the completion of this note. Recommend continued daily use of binaural amplification, hearing test in one year and hearing protection in loud noise. Pt was seen immediately following this encounter for a HA adjustment.

## 2020-11-16 NOTE — PROGRESS NOTES
Kenna Schmitt came in on 11/16/2020 for a hearing aid follow up. Pt stated she has no trouble with the hearing aids. She has purchased supplies online because she did not want to come to the clinic to pick them up. Reviewed insertion and removal, daily care and maintenance, and battery and wax filter change procedure with patient. Recalculated thresholds from new hearing test. Cleaned both aids and changed the wax filters and domes. Listening check revealed aids to sound appropriate for her hearing loss. Informed pt that a notification will be mailed when it is time for her annual hearing test and that she should call the audiology clinic to schedule the appts to coordinate them.  She should call the clinic PRN otherwise. Also informed her that if domes or wax filters are needed, call the clinic and we will leave them at the 2nd floor check in desk to be picked up at the earliest convenience.

## 2020-11-16 NOTE — PROGRESS NOTES
Subjective:       Patient ID: Kenna Schmitt is a 70 y.o. female.    Chief Complaint: No chief complaint on file.    Hearing Loss:    Associated symptoms: tinnitus.       Patient wears hearing aids. She returns today for annual audiogram, hearing aid adjustment, ear cleaning. No h/o noise exposure. Her mother had HL after age 60. No other ENT symptoms or concerns.     Review of Systems   Constitutional: Negative.    HENT: Positive for hearing loss and tinnitus.    Eyes: Negative.    Respiratory: Negative.    Cardiovascular: Negative.    Gastrointestinal: Negative.    Musculoskeletal: Negative.    Skin: Negative.    Neurological: Negative.    Hematological: Negative.    Psychiatric/Behavioral: Negative.        Objective:      Physical Exam  Vitals signs and nursing note reviewed.   Constitutional:       General: She is not in acute distress.     Appearance: She is well-developed. She is not ill-appearing or diaphoretic.   HENT:      Head: Normocephalic and atraumatic.      Right Ear: Hearing, tympanic membrane, ear canal and external ear normal. No middle ear effusion. Tympanic membrane is not erythematous.      Left Ear: Hearing, tympanic membrane, ear canal and external ear normal.  No middle ear effusion. Tympanic membrane is not erythematous.      Nose: Nose normal. No mucosal edema or rhinorrhea.      Right Sinus: No maxillary sinus tenderness or frontal sinus tenderness.      Left Sinus: No maxillary sinus tenderness or frontal sinus tenderness.      Mouth/Throat:      Mouth: Mucous membranes are not pale, not dry and not cyanotic. No oral lesions.      Pharynx: Uvula midline. No oropharyngeal exudate or posterior oropharyngeal erythema.   Eyes:      General: Lids are normal. No scleral icterus.        Right eye: No discharge.         Left eye: No discharge.      Conjunctiva/sclera: Conjunctivae normal.      Pupils: Pupils are equal, round, and reactive to light.   Neck:      Musculoskeletal: Normal range of  motion and neck supple.      Thyroid: No thyroid mass or thyromegaly.      Trachea: Trachea normal. No tracheal deviation.   Cardiovascular:      Rate and Rhythm: Normal rate.   Pulmonary:      Effort: Pulmonary effort is normal. No respiratory distress.      Breath sounds: No stridor. No wheezing.   Musculoskeletal: Normal range of motion.   Lymphadenopathy:      Head:      Right side of head: No submental, submandibular, tonsillar, preauricular or posterior auricular adenopathy.      Left side of head: No submental, submandibular, tonsillar, preauricular or posterior auricular adenopathy.      Cervical: No cervical adenopathy.      Right cervical: No superficial or posterior cervical adenopathy.     Left cervical: No superficial or posterior cervical adenopathy.   Skin:     General: Skin is warm and dry.      Coloration: Skin is not pale.      Findings: No lesion or rash.   Neurological:      Mental Status: She is alert and oriented to person, place, and time.      Coordination: Coordination normal.      Gait: Gait normal.   Psychiatric:         Speech: Speech normal.         Behavior: Behavior normal. Behavior is cooperative.         Thought Content: Thought content normal.         Judgment: Judgment normal.       SEPARATE PROCEDURE IN OFFICE:   Procedure: Removal of impacted cerumen, bilateral   Pre Procedure Diagnosis: Cerumen Impaction   Post Procedure Diagnosis: Cerumen Impaction   Verbal informed consent in regards to risk of trauma to ear canal, ear drum or hearing, discomfort during procedure and/or inability to remove cerumen impaction in one session or unforeseen events or complications.   No anesthesia.     Procedure in detail:   Ear canal visualized bilateral with appropriate size ear speculum utilizing Operating Head Binocular Otomicroscope   Utilizing the following:  Ring curet, delicate alligator forceps, and/or suction cannula was used. The impacted cerumen of the ear canals was completely removed  AD, partially removed AS, atraumatically. The TM and EAC were then inspected and found to be clear of wax. See description of TMs/EACs in PE above.   Complications: No   Condition: Improved/Good    Assessment:     Mild to moderately-severe SNHL AU (kofi-crossing asymmetry)    Congenitally small ear canals  Cerumen impactions removed AU  Plan:     Recommend repeat audiogram in one year.  Recommend hearing aid adjustment    Patient is encouraged to wear ear protection in loud noise.  Counseled to avoid Qtips  Return to clinic as needed for further ENT concerns.

## 2020-12-02 ENCOUNTER — PATIENT OUTREACH (OUTPATIENT)
Dept: OTHER | Facility: OTHER | Age: 70
End: 2020-12-02

## 2020-12-02 NOTE — PROGRESS NOTES
Digital Medicine: Health  Follow-Up    The history is provided by the patient.             Reason for review: Blood pressure not at goal        Topics Covered on Call: physical activity and Diet    Additional Follow-up details: Average blood pressure today is 146/76. Blood pressure readings have been up and down and she is unsure why.     12/09: Addressed task and sent resources about stress management through email.             Diet-Change    Patient reports eating or drinking the following: She reports that she doesn't usually watch how much sodium she is eating. She does a lot of canned food. Recommend rinsing off her vegetables to help reduce the sodium intake. She also uses [employer] a lot and discussed that it still has a lot of salt in it. Reviewed need to try to reduce how much she is using. She drinks about 4 bottles of propel daily. Reviewed that 1 bottle of propel has about 230 mg of sodium in it. She is getting over 900 mg of sodium in just her propel. Reviewed need to decrease this and increase plain water.     Intervention(s): DASH diet/sodium reduction education      Physical Activity-no change to routine  No change to exercise routine.       Additional physical activity details: She still hasn't been getting any exercise in. She walks daily with her dog but it's not very far. She feels that she can increase this.       Medication Adherence-Medication adherence was assessed.        Substance, Sleep, Stress-change  stress-not assessed  Details:  Intervention(s):    Sleep-not assessed  Details:  Intervention(s):    Alcohol -assessed  Details:She will have a drink three times a week.   Intervention(s):    Tobacco-Not Assessed  Details:  Intervention(s):          Provided patient education.       Addressed patient questions and patient has my contact information if needed prior to next outreach. Patient verbalizes understanding.             There are no preventive care reminders to display for this  patient.      Last 5 Patient Entered Readings                                      Current 30 Day Average: 146/76     Recent Readings 12/2/2020 11/29/2020 11/27/2020 11/25/2020 11/24/2020    SBP (mmHg) 140 142 155 133 178    DBP (mmHg) 77 76 90 66 89    Pulse 66 77 72 79 81

## 2020-12-08 ENCOUNTER — PATIENT OUTREACH (OUTPATIENT)
Dept: OTHER | Facility: OTHER | Age: 70
End: 2020-12-08

## 2020-12-08 DIAGNOSIS — I10 ESSENTIAL HYPERTENSION: Primary | ICD-10-CM

## 2020-12-08 RX ORDER — VALSARTAN 80 MG/1
80 TABLET ORAL DAILY
Qty: 30 TABLET | Refills: 3 | Status: SHIPPED | OUTPATIENT
Start: 2020-12-08 | End: 2021-04-05 | Stop reason: SDUPTHER

## 2020-12-08 NOTE — PROGRESS NOTES
Digital Medicine: Clinician Follow-Up    States she has been having family issues lately and she's stressed. Daughter is a flight and patient is worried about daughter getting COVID. 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     States she doesn't have time for meditation and yoga but open to receiving resources in case she changes her mind.     The history is provided by the patient.      Review of patient's allergies indicates:   -- Penicillins -- Hives   -- Shellfish containing products -- Edema    --  Generalized swelling  Follow-up reason(s): routine follow up.     Hypertension    Patient's blood pressure is stable.   Patient is not experiencing signs/symptoms of hypotension.  Patient is not experiencing signs/symptoms of hypertension.            Last 5 Patient Entered Readings                                      Current 30 Day Average: 146/77     Recent Readings 12/8/2020 12/7/2020 12/5/2020 12/4/2020 12/3/2020    SBP (mmHg) 131 163 125 135 149    DBP (mmHg) 87 67 81 52 82    Pulse 79 81 83 90 77                 Depression Screening  Did not address depression screening.    Sleep Apnea Screening    Did not address sleep apnea screening.     Medication Affordability Screening  Did not address medication affordability screening.     Medication Adherence-Medication adherence was assessed.          ASSESSMENT(S)  Patients BP average is 146/77 mmHg, which is above goal. Patient's BP goal is less than or equal to 130/80.     Avg BP remains above goal. She is on first line BP agent thiazide like diuretic. Previously tried amlodipine but had ankle edema. Recent BMP reviewed - electrolytes and kidney function WNL.        Hypertension Plan  Hypertension Medication Change. Start valsartan 80 mg daily and continue chlorthalidone 25 mg daily as prescribed. Counseled on potential side effects and administration time  Labs ordered. BMP recommended after starting ARB  Expected Lab Date:  Continue current diet/physical activity routine.  Placed task for health  follow up. Asking HC to provide patient with meditation and yoga resources to help relieve stress  Plan to follow up in 2-3 weeks to assess tolerance to ARB           There are no preventive care reminders to display for this patient.  There are no preventive care reminders to display for this patient.      Hypertension Medications             chlorthalidone (HYGROTEN) 25 MG Tab TAKE 1 TABLET BY MOUTH EVERY DAY

## 2020-12-29 ENCOUNTER — HOSPITAL ENCOUNTER (OUTPATIENT)
Dept: CARDIOLOGY | Facility: HOSPITAL | Age: 70
Discharge: HOME | End: 2020-12-29

## 2020-12-29 ENCOUNTER — HOSPITAL ENCOUNTER (OUTPATIENT)
Dept: GENERAL RADIOLOGY | Facility: HOSPITAL | Age: 70
Discharge: HOME | End: 2020-12-29

## 2020-12-29 ENCOUNTER — LAB (OUTPATIENT)
Dept: LAB | Facility: HOSPITAL | Age: 70
End: 2020-12-29

## 2020-12-29 ENCOUNTER — TRANSCRIBE ORDERS (OUTPATIENT)
Dept: LAB | Facility: HOSPITAL | Age: 70
End: 2020-12-29

## 2020-12-29 ENCOUNTER — TRANSCRIBE ORDERS (OUTPATIENT)
Dept: ADMINISTRATIVE | Facility: HOSPITAL | Age: 70
End: 2020-12-29

## 2020-12-29 DIAGNOSIS — E21.0 PRIMARY HYPERPARATHYROIDISM (HCC): ICD-10-CM

## 2020-12-29 DIAGNOSIS — E06.0 ACUTE THYROIDITIS: ICD-10-CM

## 2020-12-29 DIAGNOSIS — Z01.818 PRE-OP TESTING: ICD-10-CM

## 2020-12-29 DIAGNOSIS — E55.9 VITAMIN D DEFICIENCY: ICD-10-CM

## 2020-12-29 DIAGNOSIS — E21.3 HYPERPARATHYROIDISM (HCC): ICD-10-CM

## 2020-12-29 DIAGNOSIS — Z01.818 PREOP TESTING: ICD-10-CM

## 2020-12-29 DIAGNOSIS — Z01.818 PRE-OP TESTING: Primary | ICD-10-CM

## 2020-12-29 DIAGNOSIS — E55.9 VITAMIN D DEFICIENCY, UNSPECIFIED: Primary | ICD-10-CM

## 2020-12-29 DIAGNOSIS — E55.9 VITAMIN D DEFICIENCY: Primary | ICD-10-CM

## 2020-12-29 LAB
ANION GAP SERPL CALCULATED.3IONS-SCNC: 6.6 MMOL/L (ref 5–15)
BASOPHILS # BLD AUTO: 0.09 10*3/MM3 (ref 0–0.2)
BASOPHILS NFR BLD AUTO: 0.8 % (ref 0–1.5)
BUN SERPL-MCNC: 11 MG/DL (ref 8–23)
BUN/CREAT SERPL: 18 (ref 7–25)
CALCIUM SPEC-SCNC: 10.1 MG/DL (ref 8.6–10.5)
CHLORIDE SERPL-SCNC: 106 MMOL/L (ref 98–107)
CO2 SERPL-SCNC: 25.4 MMOL/L (ref 22–29)
CREAT SERPL-MCNC: 0.61 MG/DL (ref 0.57–1)
DEPRECATED RDW RBC AUTO: 40.6 FL (ref 37–54)
EOSINOPHIL # BLD AUTO: 0.35 10*3/MM3 (ref 0–0.4)
EOSINOPHIL NFR BLD AUTO: 3.1 % (ref 0.3–6.2)
ERYTHROCYTE [DISTWIDTH] IN BLOOD BY AUTOMATED COUNT: 12.4 % (ref 12.3–15.4)
GFR SERPL CREATININE-BSD FRML MDRD: 96 ML/MIN/1.73
GLUCOSE SERPL-MCNC: 100 MG/DL (ref 65–99)
HCT VFR BLD AUTO: 45.3 % (ref 34–46.6)
HGB BLD-MCNC: 15.6 G/DL (ref 12–15.9)
IMM GRANULOCYTES # BLD AUTO: 0.04 10*3/MM3 (ref 0–0.05)
IMM GRANULOCYTES NFR BLD AUTO: 0.4 % (ref 0–0.5)
LYMPHOCYTES # BLD AUTO: 2.43 10*3/MM3 (ref 0.7–3.1)
LYMPHOCYTES NFR BLD AUTO: 21.6 % (ref 19.6–45.3)
MCH RBC QN AUTO: 31.3 PG (ref 26.6–33)
MCHC RBC AUTO-ENTMCNC: 34.4 G/DL (ref 31.5–35.7)
MCV RBC AUTO: 91 FL (ref 79–97)
MONOCYTES # BLD AUTO: 0.75 10*3/MM3 (ref 0.1–0.9)
MONOCYTES NFR BLD AUTO: 6.7 % (ref 5–12)
NEUTROPHILS NFR BLD AUTO: 67.4 % (ref 42.7–76)
NEUTROPHILS NFR BLD AUTO: 7.61 10*3/MM3 (ref 1.7–7)
NRBC BLD AUTO-RTO: 0 /100 WBC (ref 0–0.2)
PLATELET # BLD AUTO: 292 10*3/MM3 (ref 140–450)
PMV BLD AUTO: 11.7 FL (ref 6–12)
POTASSIUM SERPL-SCNC: 3.9 MMOL/L (ref 3.5–5.2)
PTH-INTACT SERPL-MCNC: 116 PG/ML (ref 15–65)
QT INTERVAL: 401 MS
RBC # BLD AUTO: 4.98 10*6/MM3 (ref 3.77–5.28)
SODIUM SERPL-SCNC: 138 MMOL/L (ref 136–145)
T3FREE SERPL-MCNC: 4.14 PG/ML (ref 2–4.4)
T4 SERPL-MCNC: 8.09 MCG/DL (ref 4.5–11.7)
TSH SERPL DL<=0.05 MIU/L-ACNC: 4.66 UIU/ML (ref 0.27–4.2)
WBC # BLD AUTO: 11.27 10*3/MM3 (ref 3.4–10.8)

## 2020-12-29 PROCEDURE — 84481 FREE ASSAY (FT-3): CPT

## 2020-12-29 PROCEDURE — 84436 ASSAY OF TOTAL THYROXINE: CPT

## 2020-12-29 PROCEDURE — 36415 COLL VENOUS BLD VENIPUNCTURE: CPT

## 2020-12-29 PROCEDURE — 93010 ELECTROCARDIOGRAM REPORT: CPT | Performed by: INTERNAL MEDICINE

## 2020-12-29 PROCEDURE — 84443 ASSAY THYROID STIM HORMONE: CPT

## 2020-12-29 PROCEDURE — 82652 VIT D 1 25-DIHYDROXY: CPT

## 2020-12-29 PROCEDURE — 85025 COMPLETE CBC W/AUTO DIFF WBC: CPT

## 2020-12-29 PROCEDURE — 93005 ELECTROCARDIOGRAM TRACING: CPT | Performed by: OTOLARYNGOLOGY

## 2020-12-29 PROCEDURE — 83970 ASSAY OF PARATHORMONE: CPT

## 2020-12-29 PROCEDURE — 71046 X-RAY EXAM CHEST 2 VIEWS: CPT

## 2020-12-29 PROCEDURE — 80048 BASIC METABOLIC PNL TOTAL CA: CPT

## 2020-12-30 ENCOUNTER — PATIENT OUTREACH (OUTPATIENT)
Dept: OTHER | Facility: OTHER | Age: 70
End: 2020-12-30

## 2020-12-31 LAB — 1,25(OH)2D SERPL-MCNC: 66 PG/ML (ref 19.9–79.3)

## 2021-01-07 ENCOUNTER — LAB VISIT (OUTPATIENT)
Dept: LAB | Facility: HOSPITAL | Age: 71
End: 2021-01-07
Attending: EMERGENCY MEDICINE
Payer: COMMERCIAL

## 2021-01-07 DIAGNOSIS — I10 ESSENTIAL HYPERTENSION: ICD-10-CM

## 2021-01-07 LAB
ANION GAP SERPL CALC-SCNC: 10 MMOL/L (ref 8–16)
BUN SERPL-MCNC: 16 MG/DL (ref 8–23)
CALCIUM SERPL-MCNC: 9.6 MG/DL (ref 8.7–10.5)
CHLORIDE SERPL-SCNC: 104 MMOL/L (ref 95–110)
CO2 SERPL-SCNC: 29 MMOL/L (ref 23–29)
CREAT SERPL-MCNC: 0.7 MG/DL (ref 0.5–1.4)
EST. GFR  (AFRICAN AMERICAN): >60 ML/MIN/1.73 M^2
EST. GFR  (NON AFRICAN AMERICAN): >60 ML/MIN/1.73 M^2
GLUCOSE SERPL-MCNC: 80 MG/DL (ref 70–110)
POTASSIUM SERPL-SCNC: 4.1 MMOL/L (ref 3.5–5.1)
SODIUM SERPL-SCNC: 143 MMOL/L (ref 136–145)

## 2021-01-07 PROCEDURE — 36415 COLL VENOUS BLD VENIPUNCTURE: CPT | Mod: PO

## 2021-01-07 PROCEDURE — 80048 BASIC METABOLIC PNL TOTAL CA: CPT

## 2021-01-15 ENCOUNTER — PATIENT MESSAGE (OUTPATIENT)
Dept: OTOLARYNGOLOGY | Facility: CLINIC | Age: 71
End: 2021-01-15

## 2021-01-16 ENCOUNTER — HOSPITAL ENCOUNTER (OUTPATIENT)
Dept: RADIOLOGY | Facility: HOSPITAL | Age: 71
Discharge: HOME | End: 2021-01-16
Attending: EMERGENCY MEDICINE
Payer: MEDICARE

## 2021-01-16 DIAGNOSIS — Z12.31 ENCOUNTER FOR SCREENING MAMMOGRAM FOR MALIGNANT NEOPLASM OF BREAST: ICD-10-CM

## 2021-01-16 PROCEDURE — 77067 SCR MAMMO BI INCL CAD: CPT | Mod: 26,,, | Performed by: RADIOLOGY

## 2021-01-16 PROCEDURE — 77067 MAMMO DIGITAL SCREENING BILAT WITH TOMO: ICD-10-PCS | Mod: 26,,, | Performed by: RADIOLOGY

## 2021-01-16 PROCEDURE — 77063 BREAST TOMOSYNTHESIS BI: CPT | Mod: 26,,, | Performed by: RADIOLOGY

## 2021-01-16 PROCEDURE — 77067 SCR MAMMO BI INCL CAD: CPT | Mod: TC,PO

## 2021-01-16 PROCEDURE — 77063 MAMMO DIGITAL SCREENING BILAT WITH TOMO: ICD-10-PCS | Mod: 26,,, | Performed by: RADIOLOGY

## 2021-01-19 ENCOUNTER — OFFICE VISIT (OUTPATIENT)
Dept: FAMILY MEDICINE CLINIC | Facility: CLINIC | Age: 71
End: 2021-01-19

## 2021-01-19 VITALS
HEIGHT: 60 IN | HEART RATE: 97 BPM | OXYGEN SATURATION: 97 % | TEMPERATURE: 97.8 F | BODY MASS INDEX: 30.04 KG/M2 | WEIGHT: 153 LBS | SYSTOLIC BLOOD PRESSURE: 120 MMHG | DIASTOLIC BLOOD PRESSURE: 84 MMHG

## 2021-01-19 DIAGNOSIS — Z72.0 TOBACCO ABUSE: Chronic | ICD-10-CM

## 2021-01-19 DIAGNOSIS — R94.31 ABNORMAL EKG: ICD-10-CM

## 2021-01-19 DIAGNOSIS — Z00.00 MEDICARE ANNUAL WELLNESS VISIT, SUBSEQUENT: Primary | ICD-10-CM

## 2021-01-19 DIAGNOSIS — I10 ESSENTIAL HYPERTENSION: ICD-10-CM

## 2021-01-19 DIAGNOSIS — E78.2 MIXED HYPERLIPIDEMIA: ICD-10-CM

## 2021-01-19 DIAGNOSIS — J30.1 SEASONAL ALLERGIC RHINITIS DUE TO POLLEN: ICD-10-CM

## 2021-01-19 DIAGNOSIS — M81.0 AGE-RELATED OSTEOPOROSIS WITHOUT CURRENT PATHOLOGICAL FRACTURE: ICD-10-CM

## 2021-01-19 PROBLEM — N30.00 ACUTE CYSTITIS: Status: RESOLVED | Noted: 2017-02-16 | Resolved: 2021-01-19

## 2021-01-19 PROBLEM — A41.9 SEPSIS DUE TO URINARY TRACT INFECTION: Status: RESOLVED | Noted: 2019-07-04 | Resolved: 2021-01-19

## 2021-01-19 PROBLEM — N39.0 SEPSIS DUE TO URINARY TRACT INFECTION (HCC): Status: RESOLVED | Noted: 2019-07-04 | Resolved: 2021-01-19

## 2021-01-19 PROBLEM — N10 ACUTE PYELONEPHRITIS: Status: RESOLVED | Noted: 2017-02-16 | Resolved: 2021-01-19

## 2021-01-19 PROBLEM — J40 BRONCHITIS: Status: RESOLVED | Noted: 2018-10-15 | Resolved: 2021-01-19

## 2021-01-19 PROCEDURE — 99214 OFFICE O/P EST MOD 30 MIN: CPT | Performed by: FAMILY MEDICINE

## 2021-01-19 PROCEDURE — G0439 PPPS, SUBSEQ VISIT: HCPCS | Performed by: FAMILY MEDICINE

## 2021-01-19 RX ORDER — INFLUENZA A VIRUS A/MICHIGAN/45/2015 X-275 (H1N1) ANTIGEN (FORMALDEHYDE INACTIVATED), INFLUENZA A VIRUS A/SINGAPORE/INFIMH-16-0019/2016 IVR-186 (H3N2) ANTIGEN (FORMALDEHYDE INACTIVATED), INFLUENZA B VIRUS B/PHUKET/3073/2013 ANTIGEN (FORMALDEHYDE INACTIVATED), AND INFLUENZA B VIRUS B/MARYLAND/15/2016 BX-69A ANTIGEN (FORMALDEHYDE INACTIVATED) 60; 60; 60; 60 UG/.7ML; UG/.7ML; UG/.7ML; UG/.7ML
INJECTION, SUSPENSION INTRAMUSCULAR
COMMUNITY
Start: 2020-10-21 | End: 2021-01-19

## 2021-01-19 NOTE — PROGRESS NOTES
"Subsequent Medicare Wellness Visit   The ABC's of the Annual Wellness Visit    Chief Complaint   Patient presents with   • Medicare Wellness-subsequent       HPI:  Jazmyn Castaneda, -1950, is a 70 y.o. female who presents for a Subsequent Medicare Wellness Visit.    htn- doing well off meds. Had a high level today. Checks at home and is always 110/70    hld- labs 6 months ago where ok, diet controlled.     Allergies-having worsening cough for one day. Zyrtec helps some.     Erythrocytosis and leukocytosis- following with heme/onc and trying to quit smoking. They discharged her.     Osteoporosis- bd recent. ent put her on calium and vit d. Recheck in 2 years.     Had parathyroid removed for hyperparathyroidism. Has f/u with ent.    Had an abnormal ekg and pt is concerned. Reviewed this. CXR was ok. Had to take \"heart medication\" as a child. Pt unsure of history or what medication it was. Pt has a strong fh for cardiomyopathy.     Recent Hospitalizations:  No hospitalization(s) within the last year..    Current Medical Providers:  Patient Care Team:  Samira Yi MD as PCP - General (Family Medicine)    Health Habits and Functional and Cognitive Screening and Depression Screening:  Functional & Cognitive Status 2021   Do you have difficulty preparing food and eating? No   Do you have difficulty bathing yourself, getting dressed or grooming yourself? No   Do you have difficulty using the toilet? No   Do you have difficulty moving around from place to place? No   Do you have trouble with steps or getting out of a bed or a chair? No   Current Diet Well Balanced Diet   Dental Exam Not up to date        Dental Exam Comment -   Eye Exam Up to date   Exercise (times per week) 0 times per week   Current Exercises Include No Regular Exercise   Current Exercise Activities Include -   Do you need help using the phone?  No   Are you deaf or do you have serious difficulty hearing?  Yes   Do you need help with " transportation? No   Do you need help shopping? No   Do you need help preparing meals?  No   Do you need help with housework?  No   Do you need help with laundry? No   Do you need help taking your medications? No   Do you need help managing money? No   Do you ever drive or ride in a car without wearing a seat belt? No   Have you felt unusual stress, anger or loneliness in the last month? No   Who do you live with? Alone   If you need help, do you have trouble finding someone available to you? No   Have you been bothered in the last four weeks by sexual problems? -   Do you have difficulty concentrating, remembering or making decisions? No       Compared to one year ago, the patient feels her physical health is the same and her mental health is the same.    Depression Screen:  PHQ-2/PHQ-9 Depression Screening 1/19/2021   Little interest or pleasure in doing things 0   Feeling down, depressed, or hopeless 0   Trouble falling or staying asleep, or sleeping too much 0   Feeling tired or having little energy 0   Poor appetite or overeating 0   Feeling bad about yourself - or that you are a failure or have let yourself or your family down 0   Trouble concentrating on things, such as reading the newspaper or watching television 0   Moving or speaking so slowly that other people could have noticed. Or the opposite - being so fidgety or restless that you have been moving around a lot more than usual 0   Thoughts that you would be better off dead, or of hurting yourself in some way 0   Total Score 0   If you checked off any problems, how difficult have these problems made it for you to do your work, take care of things at home, or get along with other people? -         Past Medical/Family/Social History:  The following portions of the patient's history were reviewed and updated as appropriate: allergies, current medications, past family history, past medical history, past social history, past surgical history and problem  "list.    Allergies   Allergen Reactions   • Cephalosporins Swelling   • Penicillins Swelling   • Ciprofloxacin Rash   • Sulfa Antibiotics Unknown - Low Severity     Patient states she lost her vision temporarily when using Sulfa based eye ointment, also, broke out in rash w/oral medication.         Current Outpatient Medications:   •  calcium carbonate-cholecalciferol (Calcium 500+D) 500-400 MG-UNIT tablet tablet, Take 1 tablet by mouth Daily., Disp: , Rfl:   •  fluticasone (FLONASE) 50 MCG/ACT nasal spray, 2 sprays into the nostril(s) as directed by provider Daily., Disp: , Rfl:   •  montelukast (SINGULAIR) 10 MG tablet, TAKE 1 TABLET EVERY NIGHT, Disp: 90 tablet, Rfl: 3  •  cetirizine (zyrTEC) 5 MG tablet, Take 5 mg by mouth Daily., Disp: , Rfl:     Aspirin use counseling: Does not need ASA (and currently is not on it)    Current medication list contains no high risk medications.  No harmful drug interactions have been identified.     Family History   Problem Relation Age of Onset   • Hypertension Mother    • Aneurysm Mother    • Heart failure Mother    • Heart disease Mother    • Heart disease Father    • Hypertension Father    • Kidney disease Father    • Cancer Father 82        Bladder   • Hypertension Sister    • Cancer Maternal Aunt    • Stroke Maternal Uncle    • Glaucoma Maternal Grandmother    • Cancer Maternal Grandmother    • Stroke Maternal Grandfather    • Aneurysm Maternal Grandfather    • Cancer Maternal Aunt    • Cancer Maternal Aunt    • Liver cancer Other    • Pancreatic cancer Other    • Malig Hyperthermia Neg Hx        Social History     Tobacco Use   • Smoking status: Former Smoker     Packs/day: 1.00     Types: Cigarettes     Quit date: 2020     Years since quittin.5   • Smokeless tobacco: Never Used   Substance Use Topics   • Alcohol use: Yes     Comment: \"on occasion\"       Past Surgical History:   Procedure Laterality Date   • BREAST EXCISIONAL BIOPSY Right     30 something when it " "was done; says it was precancerous   • CATARACT EXTRACTION, BILATERAL     •  SECTION     • CYSTOSCOPY N/A 2019    Procedure: CYSTOSCOPY AND STENT PLACEMENT;  Surgeon: Alen Lal MD;  Location: [facility];  Service: Urology   • MASTOID SURGERY     • NASAL SEPTAL RECONSTRUCTION     • SINUS SURGERY      scraped and prior deviated septum   • URETEROSCOPY LASER LITHOTRIPSY WITH STENT INSERTION Right 2019    Procedure: CYSTOSCOPY, RIGHT URETEROSCOPY, LASER LITHOTRIPSY, STONE BASKET EXTRACTION, STENT PLACEMENT WITHOUT STRINGS;  Surgeon: Alfredo Gongora Jr., MD;  Location: [facility];  Service: Urology       Patient Active Problem List   Diagnosis   • Nephrolithiasis   • Allergic rhinitis   • Diverticulosis of large intestine without hemorrhage   • Hiatal hernia   • Medicare annual wellness visit, subsequent   • Exogenous obesity   • Encounter for screening colonoscopy   • Visit for screening mammogram   • Breast mass, left   • Essential hypertension   • Erythrocytosis   • Vertigo   • Acute non-recurrent frontal sinusitis   • Neutrophilic leukocytosis   • Mixed hyperlipidemia   • Right hydronephrosis with urinary obstruction due to ureteral calculus   • Allergy to multiple antibiotics   • Tobacco abuse   • Ureteral stone   • Colon cancer screening   • Age-related osteoporosis without current pathological fracture       Review of Systems   Constitutional: Negative for fever.   Respiratory: Negative for shortness of breath.    Cardiovascular: Negative for chest pain.       Objective     Vitals:    21 1406   BP: 120/84   Pulse: 97   Temp: 97.8 °F (36.6 °C)   TempSrc: Infrared   SpO2: 97%   Weight: 69.4 kg (153 lb)   Height: 152.4 cm (60\")       Patient's Body mass index is 29.88 kg/m². BMI is above normal parameters. Recommendations include: exercise counseling.      No exam data present    The patient has no evidence of cognitve impairment.  The patient has no evidence of cognitive " impairment. 3/3 items were correctly remembered at 5 minutes.     Physical Exam   Constitutional: She is oriented to person, place, and time. She appears well-developed and well-nourished.   Cardiovascular: Normal rate, regular rhythm and normal heart sounds.   Pulmonary/Chest: Effort normal and breath sounds normal.   Musculoskeletal: Normal range of motion.   Neurological: She is alert and oriented to person, place, and time.   Skin: Skin is warm and dry.   Psychiatric: Her behavior is normal.       Recent Lab Results:  Lab Results   Component Value Date     (H) 06/29/2020     Lab Results   Component Value Date    TRIG 144 06/29/2020    HDL 34 (L) 06/29/2020    VLDL 28.8 06/29/2020    LDLHDL 3.27 04/17/2019       Assessment/Plan   Age-appropriate Screening Schedule:  Refer to the list below for future screening recommendations based on patient's age, sex and/or medical conditions.      Health Maintenance   Topic Date Due   • TDAP/TD VACCINES (1 - Tdap) 07/20/1967   • ZOSTER VACCINE (1 of 2) 07/20/1998   • LIPID PANEL  06/29/2021   • MAMMOGRAM  08/14/2022   • DXA SCAN  10/22/2022   • INFLUENZA VACCINE  Completed       Medicare Risks and Personalized Health Plan:  Advance Directive Discussion      CMS-Preventive Services Quick Reference  Medicare Preventive Services Addressed:  Annual Wellness Visit (AWV)    Advance Care Planning:  Patient does not have an advance directive - information provided to the patient today    Diagnoses and all orders for this visit:    1. Medicare annual wellness visit, subsequent (Primary)    2. Mixed hyperlipidemia  -     Comprehensive Metabolic Panel  -     Lipid Panel    3. Essential hypertension    4. Seasonal allergic rhinitis due to pollen    5. Tobacco abuse    6. Age-related osteoporosis without current pathological fracture    7. Abnormal EKG  -     Adult Transthoracic Echo Complete W/ Cont if Necessary Per Protocol; Future    Other orders  -     Cancel: CBC &  Differential        An After Visit Summary and PPPS with all of these plans were given to the patient.      Follow Up:  Return in about 6 months (around 7/19/2021) for Recheck.          Cont meds. F/U in 6 months.     Discussed risk factors.

## 2021-01-20 ENCOUNTER — TELEPHONE (OUTPATIENT)
Dept: FAMILY MEDICINE CLINIC | Facility: CLINIC | Age: 71
End: 2021-01-20

## 2021-01-20 LAB
ALBUMIN SERPL-MCNC: 4.1 G/DL (ref 3.7–4.7)
ALBUMIN/GLOB SERPL: 1.5 {RATIO} (ref 1.2–2.2)
ALP SERPL-CCNC: 165 IU/L (ref 39–117)
ALT SERPL-CCNC: 16 IU/L (ref 0–32)
AST SERPL-CCNC: 17 IU/L (ref 0–40)
BILIRUB SERPL-MCNC: 0.2 MG/DL (ref 0–1.2)
BUN SERPL-MCNC: 10 MG/DL (ref 8–27)
BUN/CREAT SERPL: 14 (ref 12–28)
CALCIUM SERPL-MCNC: 10.7 MG/DL (ref 8.7–10.3)
CHLORIDE SERPL-SCNC: 103 MMOL/L (ref 96–106)
CHOLEST SERPL-MCNC: 205 MG/DL (ref 100–199)
CO2 SERPL-SCNC: 24 MMOL/L (ref 20–29)
CREAT SERPL-MCNC: 0.7 MG/DL (ref 0.57–1)
GLOBULIN SER CALC-MCNC: 2.8 G/DL (ref 1.5–4.5)
GLUCOSE SERPL-MCNC: 94 MG/DL (ref 65–99)
HDLC SERPL-MCNC: 39 MG/DL
LDLC SERPL CALC-MCNC: 137 MG/DL (ref 0–99)
POTASSIUM SERPL-SCNC: 4.9 MMOL/L (ref 3.5–5.2)
PROT SERPL-MCNC: 6.9 G/DL (ref 6–8.5)
SODIUM SERPL-SCNC: 141 MMOL/L (ref 134–144)
TRIGL SERPL-MCNC: 159 MG/DL (ref 0–149)
VLDLC SERPL CALC-MCNC: 29 MG/DL (ref 5–40)

## 2021-01-20 NOTE — TELEPHONE ENCOUNTER
----- Message from Samira Yi MD sent at 1/20/2021  8:52 AM EST -----  Your cholesterol is borderline high. Please work on diet and exercise and we can avoid medication.  Your calcium and alkaline phosphatase are still high.  Please make sure you follow-up with your ENT doctor about your parathyroids.

## 2021-01-20 NOTE — TELEPHONE ENCOUNTER
The following patient returned your call. The information stated was given to her and she verbalized an understanding. Please note pt states she had surgery on 1/8 and they have her high doses of Calcium and Vitamin D.

## 2021-01-22 DIAGNOSIS — I10 ESSENTIAL HYPERTENSION: ICD-10-CM

## 2021-01-25 RX ORDER — CHLORTHALIDONE 25 MG/1
TABLET ORAL
Qty: 90 TABLET | Refills: 0 | Status: SHIPPED | OUTPATIENT
Start: 2021-01-25 | End: 2021-03-08

## 2021-02-04 ENCOUNTER — HOSPITAL ENCOUNTER (OUTPATIENT)
Dept: CARDIOLOGY | Facility: HOSPITAL | Age: 71
Discharge: HOME | End: 2021-02-04
Admitting: FAMILY MEDICINE

## 2021-02-04 VITALS
SYSTOLIC BLOOD PRESSURE: 120 MMHG | DIASTOLIC BLOOD PRESSURE: 84 MMHG | WEIGHT: 153 LBS | BODY MASS INDEX: 30.04 KG/M2 | HEART RATE: 100 BPM | HEIGHT: 60 IN

## 2021-02-04 DIAGNOSIS — R94.31 ABNORMAL EKG: ICD-10-CM

## 2021-02-04 PROCEDURE — 93306 TTE W/DOPPLER COMPLETE: CPT | Performed by: INTERNAL MEDICINE

## 2021-02-04 PROCEDURE — 93306 TTE W/DOPPLER COMPLETE: CPT

## 2021-02-05 ENCOUNTER — TELEPHONE (OUTPATIENT)
Dept: FAMILY MEDICINE CLINIC | Facility: CLINIC | Age: 71
End: 2021-02-05

## 2021-02-05 PROBLEM — I50.32 CHRONIC DIASTOLIC (CONGESTIVE) HEART FAILURE: Status: ACTIVE | Noted: 2021-02-05

## 2021-02-05 LAB
AORTIC ARCH: 1.6 CM
ASCENDING AORTA: 3.4 CM
BH CV ECHO MEAS - ACS: 1.7 CM
BH CV ECHO MEAS - AO MAX PG (FULL): 2.5 MMHG
BH CV ECHO MEAS - AO MAX PG: 4.8 MMHG
BH CV ECHO MEAS - AO MEAN PG (FULL): 1 MMHG
BH CV ECHO MEAS - AO MEAN PG: 2 MMHG
BH CV ECHO MEAS - AO V2 MAX: 109 CM/SEC
BH CV ECHO MEAS - AO V2 MEAN: 64.5 CM/SEC
BH CV ECHO MEAS - AO V2 VTI: 21.2 CM
BH CV ECHO MEAS - ASC AORTA: 3.4 CM
BH CV ECHO MEAS - AVA(I,A): 2.3 CM^2
BH CV ECHO MEAS - AVA(I,D): 2.3 CM^2
BH CV ECHO MEAS - AVA(V,A): 2.4 CM^2
BH CV ECHO MEAS - AVA(V,D): 2.4 CM^2
BH CV ECHO MEAS - BSA(HAYCOCK): 1.7 M^2
BH CV ECHO MEAS - BSA: 1.7 M^2
BH CV ECHO MEAS - BZI_BMI: 29.9 KILOGRAMS/M^2
BH CV ECHO MEAS - BZI_METRIC_HEIGHT: 152.4 CM
BH CV ECHO MEAS - BZI_METRIC_WEIGHT: 69.4 KG
BH CV ECHO MEAS - EDV(MOD-SP2): 32 ML
BH CV ECHO MEAS - EDV(MOD-SP4): 26 ML
BH CV ECHO MEAS - EDV(TEICH): 70 ML
BH CV ECHO MEAS - EF(CUBED): 69.2 %
BH CV ECHO MEAS - EF(MOD-BP): 59.6 %
BH CV ECHO MEAS - EF(MOD-SP2): 59.4 %
BH CV ECHO MEAS - EF(MOD-SP4): 61.5 %
BH CV ECHO MEAS - EF(TEICH): 61.4 %
BH CV ECHO MEAS - ESV(MOD-SP2): 13 ML
BH CV ECHO MEAS - ESV(MOD-SP4): 10 ML
BH CV ECHO MEAS - ESV(TEICH): 27 ML
BH CV ECHO MEAS - FS: 32.5 %
BH CV ECHO MEAS - IVS/LVPW: 1
BH CV ECHO MEAS - IVSD: 1.2 CM
BH CV ECHO MEAS - LAT PEAK E' VEL: 5.9 CM/SEC
BH CV ECHO MEAS - LV DIASTOLIC VOL/BSA (35-75): 15.6 ML/M^2
BH CV ECHO MEAS - LV MASS(C)D: 165.5 GRAMS
BH CV ECHO MEAS - LV MASS(C)DI: 99.3 GRAMS/M^2
BH CV ECHO MEAS - LV MAX PG: 2.2 MMHG
BH CV ECHO MEAS - LV MEAN PG: 1 MMHG
BH CV ECHO MEAS - LV SYSTOLIC VOL/BSA (12-30): 6 ML/M^2
BH CV ECHO MEAS - LV V1 MAX: 74.4 CM/SEC
BH CV ECHO MEAS - LV V1 MEAN: 50.5 CM/SEC
BH CV ECHO MEAS - LV V1 VTI: 14.3 CM
BH CV ECHO MEAS - LVIDD: 4 CM
BH CV ECHO MEAS - LVIDS: 2.7 CM
BH CV ECHO MEAS - LVLD AP2: 6.1 CM
BH CV ECHO MEAS - LVLD AP4: 5.8 CM
BH CV ECHO MEAS - LVLS AP2: 4.6 CM
BH CV ECHO MEAS - LVLS AP4: 5 CM
BH CV ECHO MEAS - LVOT AREA (M): 3.5 CM^2
BH CV ECHO MEAS - LVOT AREA: 3.5 CM^2
BH CV ECHO MEAS - LVOT DIAM: 2.1 CM
BH CV ECHO MEAS - LVPWD: 1.2 CM
BH CV ECHO MEAS - MED PEAK E' VEL: 6.4 CM/SEC
BH CV ECHO MEAS - MV A DUR: 0.12 SEC
BH CV ECHO MEAS - MV A MAX VEL: 81.4 CM/SEC
BH CV ECHO MEAS - MV DEC SLOPE: 275 CM/SEC^2
BH CV ECHO MEAS - MV DEC TIME: 0.2 SEC
BH CV ECHO MEAS - MV E MAX VEL: 53.6 CM/SEC
BH CV ECHO MEAS - MV E/A: 0.66
BH CV ECHO MEAS - MV MAX PG: 4.2 MMHG
BH CV ECHO MEAS - MV MEAN PG: 2 MMHG
BH CV ECHO MEAS - MV P1/2T MAX VEL: 49.7 CM/SEC
BH CV ECHO MEAS - MV P1/2T: 52.9 MSEC
BH CV ECHO MEAS - MV V2 MAX: 102 CM/SEC
BH CV ECHO MEAS - MV V2 MEAN: 69.7 CM/SEC
BH CV ECHO MEAS - MV V2 VTI: 19.1 CM
BH CV ECHO MEAS - MVA P1/2T LCG: 4.4 CM^2
BH CV ECHO MEAS - MVA(P1/2T): 4.2 CM^2
BH CV ECHO MEAS - MVA(VTI): 2.6 CM^2
BH CV ECHO MEAS - PA MAX PG (FULL): 1.5 MMHG
BH CV ECHO MEAS - PA MAX PG: 2.7 MMHG
BH CV ECHO MEAS - PA V2 MAX: 82.8 CM/SEC
BH CV ECHO MEAS - PULM A REVS DUR: 0.12 SEC
BH CV ECHO MEAS - PULM A REVS VEL: 43.7 CM/SEC
BH CV ECHO MEAS - PULM DIAS VEL: 28.7 CM/SEC
BH CV ECHO MEAS - PULM S/D: 1.5
BH CV ECHO MEAS - PULM SYS VEL: 42.8 CM/SEC
BH CV ECHO MEAS - PVA(V,A): 1.9 CM^2
BH CV ECHO MEAS - PVA(V,D): 1.9 CM^2
BH CV ECHO MEAS - QP/QS: 0.68
BH CV ECHO MEAS - RV MAX PG: 1.3 MMHG
BH CV ECHO MEAS - RV MEAN PG: 1 MMHG
BH CV ECHO MEAS - RV V1 MAX: 56.8 CM/SEC
BH CV ECHO MEAS - RV V1 MEAN: 39.8 CM/SEC
BH CV ECHO MEAS - RV V1 VTI: 11.8 CM
BH CV ECHO MEAS - RVOT AREA: 2.8 CM^2
BH CV ECHO MEAS - RVOT DIAM: 1.9 CM
BH CV ECHO MEAS - SI(CUBED): 26.6 ML/M^2
BH CV ECHO MEAS - SI(LVOT): 29.7 ML/M^2
BH CV ECHO MEAS - SI(MOD-SP2): 11.4 ML/M^2
BH CV ECHO MEAS - SI(MOD-SP4): 9.6 ML/M^2
BH CV ECHO MEAS - SI(TEICH): 25.8 ML/M^2
BH CV ECHO MEAS - SUP REN AO DIAM: 1.8 CM
BH CV ECHO MEAS - SV(CUBED): 44.3 ML
BH CV ECHO MEAS - SV(LVOT): 49.5 ML
BH CV ECHO MEAS - SV(MOD-SP2): 19 ML
BH CV ECHO MEAS - SV(MOD-SP4): 16 ML
BH CV ECHO MEAS - SV(RVOT): 33.5 ML
BH CV ECHO MEAS - SV(TEICH): 43 ML
BH CV ECHO MEASUREMENTS AVERAGE E/E' RATIO: 8.72
BH CV XLRA - RV BASE: 2.5 CM
BH CV XLRA - RV LENGTH: 5.4 CM
BH CV XLRA - RV MID: 1.9 CM
BH CV XLRA - TDI S': 7.3 CM/SEC
LEFT ATRIUM VOLUME INDEX: 9 ML/M2
LV EF 2D ECHO EST: 60 %
MAXIMAL PREDICTED HEART RATE: 148 BPM
SINUS: 2.9 CM
STJ: 2.5 CM
STRESS TARGET HR: 126 BPM

## 2021-02-05 NOTE — TELEPHONE ENCOUNTER
HUB INFORMED PATIENT OF THE NOTE LEFT ABOUT HER HEART PROBLEMS AND SHE HAS MADE AN APPT TO COME IN AND DISCUSS THIS WITH DR GALAN.

## 2021-02-05 NOTE — TELEPHONE ENCOUNTER
----- Message from Samira Yi MD sent at 2/5/2021 12:38 PM EST -----  The echo of your heart shows some mild diastolic congestive heart failure.  This can cause swelling in the legs and shortness of breath as well as exercise intolerance.  I can either refer you to a cardiologist to discuss this further or you can make a follow-up appointment with me and we can discuss medications that can help this issue.

## 2021-02-05 NOTE — TELEPHONE ENCOUNTER
[email or web address]     Okay for hub to read    The echo of your heart shows some mild diastolic congestive heart failure.  This can cause swelling in the legs and shortness of breath as well as exercise intolerance.  I can either refer you to a cardiologist to discuss this further or you can make a follow-up appointment with me and we can discuss medications that can help this issue.

## 2021-02-08 ENCOUNTER — OFFICE VISIT (OUTPATIENT)
Dept: FAMILY MEDICINE CLINIC | Facility: CLINIC | Age: 71
End: 2021-02-08

## 2021-02-08 VITALS
DIASTOLIC BLOOD PRESSURE: 90 MMHG | SYSTOLIC BLOOD PRESSURE: 140 MMHG | BODY MASS INDEX: 30.15 KG/M2 | HEART RATE: 87 BPM | WEIGHT: 153.6 LBS | HEIGHT: 60 IN | OXYGEN SATURATION: 99 % | TEMPERATURE: 97.1 F

## 2021-02-08 DIAGNOSIS — I50.32 CHRONIC DIASTOLIC (CONGESTIVE) HEART FAILURE (HCC): Primary | ICD-10-CM

## 2021-02-08 DIAGNOSIS — I10 ESSENTIAL HYPERTENSION: ICD-10-CM

## 2021-02-08 PROBLEM — J01.10 ACUTE NON-RECURRENT FRONTAL SINUSITIS: Status: RESOLVED | Noted: 2018-10-15 | Resolved: 2021-02-08

## 2021-02-08 PROCEDURE — 99214 OFFICE O/P EST MOD 30 MIN: CPT | Performed by: FAMILY MEDICINE

## 2021-02-08 RX ORDER — LISINOPRIL 2.5 MG/1
2.5 TABLET ORAL DAILY
Qty: 90 TABLET | Refills: 1 | Status: SHIPPED | OUTPATIENT
Start: 2021-02-08 | End: 2022-01-05 | Stop reason: SDUPTHER

## 2021-02-08 NOTE — PROGRESS NOTES
Subjective   Jazmyn Castaneda is a 70 y.o. female.     Chief Complaint   Patient presents with   • Results       History of Present Illness   Patient had an abnormal EKG when she got her surgery clearance and that led us to get an echo.  The echo showed left ventricular wall thickness consistent with moderate concentric hypertrophy and grade 1 impaired relaxation.  Is here to go over those results further.  Patient reports that she has no symptoms of congestive heart failure such as swelling in her legs or shortness of breath. Declines seeing a cardiologist. Mother had cfh 2/2 a leaky valve and sister had it 2/2 a form of tachycardia. She is unsure but her sister had an ablation for it.     The following portions of the patient's history were reviewed and updated as appropriate: allergies, current medications, past family history, past medical history, past social history, past surgical history and problem list.    Past Medical History:   Diagnosis Date   • Age-related osteoporosis without current pathological fracture 2021   • Anesthesia     WOKE UP DURING CATARACT SURGERY   • Chronic diastolic (congestive) heart failure (CMS/HCC) 2021   • Collapse of lung 1980s    history of in past post trauma   • Diverticulosis    • Environmental allergies     Some pollens, grass, trees, flowers   • Essential hypertension 3/24/2017   • Exogenous obesity 3/24/2017   • H/O Pneumonia    • Infectious mononucleosis    • Kidney infection    • Kidney stone    • Mixed hyperlipidemia 2019   • Neuromuscular disorder (CMS/HCC)    • Sepsis secondary to UTI (CMS/HCC) 2019   • Tobacco abuse 2019   • Urinary tract infection    • Vertigo     past history of several times       Past Surgical History:   Procedure Laterality Date   • BREAST EXCISIONAL BIOPSY Right     30 something when it was done; says it was precancerous   • CATARACT EXTRACTION, BILATERAL     •  SECTION     • CYSTOSCOPY N/A 2019     "Procedure: CYSTOSCOPY AND STENT PLACEMENT;  Surgeon: Alen Lal MD;  Location: [facility];  Service: Urology   • MASTOID SURGERY     • NASAL SEPTAL RECONSTRUCTION     • SINUS SURGERY      scraped and prior deviated septum   • URETEROSCOPY LASER LITHOTRIPSY WITH STENT INSERTION Right 2019    Procedure: CYSTOSCOPY, RIGHT URETEROSCOPY, LASER LITHOTRIPSY, STONE BASKET EXTRACTION, STENT PLACEMENT WITHOUT STRINGS;  Surgeon: Alfredo Gongora Jr., MD;  Location: [facility];  Service: Urology       Family History   Problem Relation Age of Onset   • Hypertension Mother    • Aneurysm Mother    • Heart failure Mother    • Heart disease Mother    • Heart disease Father    • Hypertension Father    • Kidney disease Father    • Cancer Father 82        Bladder   • Hypertension Sister    • Cancer Maternal Aunt    • Stroke Maternal Uncle    • Glaucoma Maternal Grandmother    • Cancer Maternal Grandmother    • Stroke Maternal Grandfather    • Aneurysm Maternal Grandfather    • Cancer Maternal Aunt    • Cancer Maternal Aunt    • Liver cancer Other    • Pancreatic cancer Other    • Malig Hyperthermia Neg Hx        Social History     Socioeconomic History   • Marital status:      Spouse name: Not on file   • Number of children: 2   • Years of education: College   • Highest education level: Not on file   Occupational History   • Occupation: Medical technologist     Employer: RETIRED   Tobacco Use   • Smoking status: Former Smoker     Packs/day: 1.00     Types: Cigarettes     Quit date: 2020     Years since quittin.6   • Smokeless tobacco: Never Used   Substance and Sexual Activity   • Alcohol use: Yes     Comment: \"on occasion\"   • Drug use: No       Review of Systems   Respiratory: Negative for shortness of breath.    Cardiovascular: Negative for chest pain and leg swelling.       Objective   Visit Vitals  /90 (BP Location: Right arm, Patient Position: Sitting)   Pulse 87   Temp 97.1 °F " "(36.2 °C)   Ht 152.4 cm (60\")   Wt 69.7 kg (153 lb 9.6 oz)   SpO2 99%   BMI 30.00 kg/m²     Body mass index is 30 kg/m².  Physical Exam  Constitutional:       Appearance: Normal appearance. She is well-developed.   Cardiovascular:      Rate and Rhythm: Normal rate and regular rhythm.      Heart sounds: Normal heart sounds.   Pulmonary:      Effort: Pulmonary effort is normal.      Breath sounds: Normal breath sounds.   Musculoskeletal: Normal range of motion.         General: No swelling.   Skin:     General: Skin is warm and dry.      Findings: No rash.   Neurological:      General: No focal deficit present.      Mental Status: She is alert and oriented to person, place, and time.   Psychiatric:         Mood and Affect: Mood normal.         Behavior: Behavior normal.           Assessment/Plan   Diagnoses and all orders for this visit:    1. Chronic diastolic (congestive) heart failure (CMS/HCC) (Primary)  -     lisinopril (PRINIVIL,ZESTRIL) 2.5 MG tablet; Take 1 tablet by mouth Daily.  Dispense: 90 tablet; Refill: 1    2. Essential hypertension  -     lisinopril (PRINIVIL,ZESTRIL) 2.5 MG tablet; Take 1 tablet by mouth Daily.  Dispense: 90 tablet; Refill: 1        Discussed risks and benefits of medication and will only use a small dose and monitor BP and stop it for lows. Pt declines cardiology referral. Answered all pt's questions.        "

## 2021-02-11 ENCOUNTER — APPOINTMENT (OUTPATIENT)
Dept: VACCINE CLINIC | Facility: HOSPITAL | Age: 71
End: 2021-02-11

## 2021-02-11 ENCOUNTER — TELEPHONE (OUTPATIENT)
Dept: FAMILY MEDICINE | Facility: CLINIC | Age: 71
End: 2021-02-11

## 2021-02-11 ENCOUNTER — CLINICAL SUPPORT (OUTPATIENT)
Dept: FAMILY MEDICINE | Facility: CLINIC | Age: 71
End: 2021-02-11
Payer: MEDICARE

## 2021-02-11 ENCOUNTER — PATIENT MESSAGE (OUTPATIENT)
Dept: FAMILY MEDICINE | Facility: CLINIC | Age: 71
End: 2021-02-11

## 2021-02-11 VITALS — HEART RATE: 78 BPM | SYSTOLIC BLOOD PRESSURE: 137 MMHG | DIASTOLIC BLOOD PRESSURE: 75 MMHG

## 2021-02-11 DIAGNOSIS — I10 ESSENTIAL HYPERTENSION: Primary | ICD-10-CM

## 2021-02-11 PROCEDURE — 99999 PR PBB SHADOW E&M-EST. PATIENT-LVL II: ICD-10-PCS | Mod: PBBFAC,,,

## 2021-02-11 PROCEDURE — 99999 PR PBB SHADOW E&M-EST. PATIENT-LVL II: CPT | Mod: PBBFAC,,,

## 2021-02-11 PROCEDURE — 99499 NO LOS: ICD-10-PCS | Mod: S$GLB,,, | Performed by: EMERGENCY MEDICINE

## 2021-02-11 PROCEDURE — 99499 UNLISTED E&M SERVICE: CPT | Mod: S$GLB,,, | Performed by: EMERGENCY MEDICINE

## 2021-02-22 ENCOUNTER — IMMUNIZATION (OUTPATIENT)
Dept: VACCINE CLINIC | Facility: HOSPITAL | Age: 71
End: 2021-02-22

## 2021-02-22 PROCEDURE — 91300 HC SARSCOV02 VAC 30MCG/0.3ML IM: CPT | Performed by: INTERNAL MEDICINE

## 2021-02-22 PROCEDURE — 0001A: CPT | Performed by: INTERNAL MEDICINE

## 2021-03-05 DIAGNOSIS — K21.9 GASTROESOPHAGEAL REFLUX DISEASE WITHOUT ESOPHAGITIS: ICD-10-CM

## 2021-03-05 RX ORDER — OMEPRAZOLE 20 MG/1
20 CAPSULE, DELAYED RELEASE ORAL DAILY
Qty: 90 CAPSULE | Refills: 3 | Status: SHIPPED | OUTPATIENT
Start: 2021-03-05 | End: 2021-07-22

## 2021-03-15 ENCOUNTER — IMMUNIZATION (OUTPATIENT)
Dept: VACCINE CLINIC | Facility: HOSPITAL | Age: 71
End: 2021-03-15

## 2021-03-15 PROCEDURE — 0002A: CPT | Performed by: INTERNAL MEDICINE

## 2021-03-15 PROCEDURE — 91300 HC SARSCOV02 VAC 30MCG/0.3ML IM: CPT | Performed by: INTERNAL MEDICINE

## 2021-04-05 DIAGNOSIS — I10 ESSENTIAL HYPERTENSION: ICD-10-CM

## 2021-04-05 RX ORDER — VALSARTAN 80 MG/1
80 TABLET ORAL DAILY
Qty: 30 TABLET | Refills: 3 | Status: CANCELLED | OUTPATIENT
Start: 2021-04-05 | End: 2022-04-05

## 2021-04-17 ENCOUNTER — HOSPITAL ENCOUNTER (INPATIENT)
Facility: HOSPITAL | Age: 71
LOS: 6 days | Discharge: NURSING, REHAB OR LONG-TERM CARE | End: 2021-04-23
Attending: EMERGENCY MEDICINE | Admitting: INTERNAL MEDICINE

## 2021-04-17 ENCOUNTER — APPOINTMENT (OUTPATIENT)
Dept: CT IMAGING | Facility: HOSPITAL | Age: 71
End: 2021-04-17

## 2021-04-17 ENCOUNTER — APPOINTMENT (OUTPATIENT)
Dept: GENERAL RADIOLOGY | Facility: HOSPITAL | Age: 71
End: 2021-04-17

## 2021-04-17 DIAGNOSIS — R47.01 EXPRESSIVE APHASIA: ICD-10-CM

## 2021-04-17 DIAGNOSIS — I63.9 ACUTE ISCHEMIC STROKE (HCC): Primary | ICD-10-CM

## 2021-04-17 LAB
ALBUMIN SERPL-MCNC: 4.3 G/DL (ref 3.5–5.2)
ALBUMIN/GLOB SERPL: 1.4 G/DL
ALP SERPL-CCNC: 150 U/L (ref 39–117)
ALT SERPL W P-5'-P-CCNC: 21 U/L (ref 1–33)
ANION GAP SERPL CALCULATED.3IONS-SCNC: 15.9 MMOL/L (ref 5–15)
AST SERPL-CCNC: 31 U/L (ref 1–32)
BACTERIA UR QL AUTO: ABNORMAL /HPF
BASOPHILS # BLD AUTO: 0.08 10*3/MM3 (ref 0–0.2)
BASOPHILS NFR BLD AUTO: 0.7 % (ref 0–1.5)
BILIRUB SERPL-MCNC: 0.6 MG/DL (ref 0–1.2)
BILIRUB UR QL STRIP: NEGATIVE
BUN SERPL-MCNC: 14 MG/DL (ref 8–23)
BUN/CREAT SERPL: 26.9 (ref 7–25)
CALCIUM SPEC-SCNC: 9.8 MG/DL (ref 8.6–10.5)
CHLORIDE SERPL-SCNC: 100 MMOL/L (ref 98–107)
CLARITY UR: CLEAR
CO2 SERPL-SCNC: 21.1 MMOL/L (ref 22–29)
COLOR UR: YELLOW
CREAT SERPL-MCNC: 0.52 MG/DL (ref 0.57–1)
DEPRECATED RDW RBC AUTO: 43.7 FL (ref 37–54)
EOSINOPHIL # BLD AUTO: 0.07 10*3/MM3 (ref 0–0.4)
EOSINOPHIL NFR BLD AUTO: 0.6 % (ref 0.3–6.2)
ERYTHROCYTE [DISTWIDTH] IN BLOOD BY AUTOMATED COUNT: 13 % (ref 12.3–15.4)
GFR SERPL CREATININE-BSD FRML MDRD: 116 ML/MIN/1.73
GLOBULIN UR ELPH-MCNC: 3.1 GM/DL
GLUCOSE SERPL-MCNC: 96 MG/DL (ref 65–99)
GLUCOSE UR STRIP-MCNC: NEGATIVE MG/DL
HCT VFR BLD AUTO: 51.3 % (ref 34–46.6)
HGB BLD-MCNC: 16.8 G/DL (ref 12–15.9)
HGB UR QL STRIP.AUTO: ABNORMAL
HYALINE CASTS UR QL AUTO: ABNORMAL /LPF
IMM GRANULOCYTES # BLD AUTO: 0.05 10*3/MM3 (ref 0–0.05)
IMM GRANULOCYTES NFR BLD AUTO: 0.4 % (ref 0–0.5)
KETONES UR QL STRIP: ABNORMAL
LEUKOCYTE ESTERASE UR QL STRIP.AUTO: ABNORMAL
LIPASE SERPL-CCNC: 15 U/L (ref 13–60)
LYMPHOCYTES # BLD AUTO: 2.82 10*3/MM3 (ref 0.7–3.1)
LYMPHOCYTES NFR BLD AUTO: 23.3 % (ref 19.6–45.3)
MCH RBC QN AUTO: 30.1 PG (ref 26.6–33)
MCHC RBC AUTO-ENTMCNC: 32.7 G/DL (ref 31.5–35.7)
MCV RBC AUTO: 91.8 FL (ref 79–97)
MONOCYTES # BLD AUTO: 0.72 10*3/MM3 (ref 0.1–0.9)
MONOCYTES NFR BLD AUTO: 5.9 % (ref 5–12)
NEUTROPHILS NFR BLD AUTO: 69.1 % (ref 42.7–76)
NEUTROPHILS NFR BLD AUTO: 8.37 10*3/MM3 (ref 1.7–7)
NITRITE UR QL STRIP: NEGATIVE
NRBC BLD AUTO-RTO: 0 /100 WBC (ref 0–0.2)
PH UR STRIP.AUTO: <=5 [PH] (ref 5–8)
PLATELET # BLD AUTO: 338 10*3/MM3 (ref 140–450)
PMV BLD AUTO: 11.6 FL (ref 6–12)
POTASSIUM SERPL-SCNC: 4.8 MMOL/L (ref 3.5–5.2)
PROT SERPL-MCNC: 7.4 G/DL (ref 6–8.5)
PROT UR QL STRIP: NEGATIVE
RBC # BLD AUTO: 5.59 10*6/MM3 (ref 3.77–5.28)
RBC # UR: ABNORMAL /HPF
REF LAB TEST METHOD: ABNORMAL
SODIUM SERPL-SCNC: 137 MMOL/L (ref 136–145)
SP GR UR STRIP: 1.01 (ref 1–1.03)
SQUAMOUS #/AREA URNS HPF: ABNORMAL /HPF
UROBILINOGEN UR QL STRIP: ABNORMAL
WBC # BLD AUTO: 12.11 10*3/MM3 (ref 3.4–10.8)
WBC UR QL AUTO: ABNORMAL /HPF

## 2021-04-17 PROCEDURE — U0005 INFEC AGEN DETEC AMPLI PROBE: HCPCS | Performed by: EMERGENCY MEDICINE

## 2021-04-17 PROCEDURE — 70450 CT HEAD/BRAIN W/O DYE: CPT

## 2021-04-17 PROCEDURE — 85025 COMPLETE CBC W/AUTO DIFF WBC: CPT | Performed by: EMERGENCY MEDICINE

## 2021-04-17 PROCEDURE — U0004 COV-19 TEST NON-CDC HGH THRU: HCPCS | Performed by: EMERGENCY MEDICINE

## 2021-04-17 PROCEDURE — 80053 COMPREHEN METABOLIC PANEL: CPT | Performed by: EMERGENCY MEDICINE

## 2021-04-17 PROCEDURE — 84145 PROCALCITONIN (PCT): CPT | Performed by: NURSE PRACTITIONER

## 2021-04-17 PROCEDURE — P9612 CATHETERIZE FOR URINE SPEC: HCPCS

## 2021-04-17 PROCEDURE — 71045 X-RAY EXAM CHEST 1 VIEW: CPT

## 2021-04-17 PROCEDURE — 81001 URINALYSIS AUTO W/SCOPE: CPT | Performed by: EMERGENCY MEDICINE

## 2021-04-17 PROCEDURE — 83690 ASSAY OF LIPASE: CPT | Performed by: EMERGENCY MEDICINE

## 2021-04-17 PROCEDURE — 99285 EMERGENCY DEPT VISIT HI MDM: CPT

## 2021-04-17 RX ORDER — SODIUM CHLORIDE 0.9 % (FLUSH) 0.9 %
10 SYRINGE (ML) INJECTION AS NEEDED
Status: DISCONTINUED | OUTPATIENT
Start: 2021-04-17 | End: 2021-04-23 | Stop reason: HOSPADM

## 2021-04-17 RX ORDER — ASPIRIN 81 MG/1
324 TABLET, CHEWABLE ORAL ONCE
Status: DISCONTINUED | OUTPATIENT
Start: 2021-04-17 | End: 2021-04-19

## 2021-04-18 ENCOUNTER — APPOINTMENT (OUTPATIENT)
Dept: GENERAL RADIOLOGY | Facility: HOSPITAL | Age: 71
End: 2021-04-18

## 2021-04-18 ENCOUNTER — APPOINTMENT (OUTPATIENT)
Dept: CARDIOLOGY | Facility: HOSPITAL | Age: 71
End: 2021-04-18

## 2021-04-18 ENCOUNTER — APPOINTMENT (OUTPATIENT)
Dept: CT IMAGING | Facility: HOSPITAL | Age: 71
End: 2021-04-18

## 2021-04-18 ENCOUNTER — ANESTHESIA EVENT (OUTPATIENT)
Dept: PERIOP | Facility: HOSPITAL | Age: 71
End: 2021-04-18

## 2021-04-18 ENCOUNTER — ANESTHESIA (OUTPATIENT)
Dept: PERIOP | Facility: HOSPITAL | Age: 71
End: 2021-04-18

## 2021-04-18 LAB
ANION GAP SERPL CALCULATED.3IONS-SCNC: 11.3 MMOL/L (ref 5–15)
BASOPHILS # BLD AUTO: 0.11 10*3/MM3 (ref 0–0.2)
BASOPHILS NFR BLD AUTO: 0.7 % (ref 0–1.5)
BUN SERPL-MCNC: 13 MG/DL (ref 8–23)
BUN/CREAT SERPL: 18.6 (ref 7–25)
CALCIUM SPEC-SCNC: 9.5 MG/DL (ref 8.6–10.5)
CHLORIDE SERPL-SCNC: 106 MMOL/L (ref 98–107)
CHOLEST SERPL-MCNC: 171 MG/DL (ref 0–200)
CO2 SERPL-SCNC: 21.7 MMOL/L (ref 22–29)
CREAT SERPL-MCNC: 0.7 MG/DL (ref 0.57–1)
D-LACTATE SERPL-SCNC: 0.8 MMOL/L (ref 0.5–2)
DEPRECATED RDW RBC AUTO: 43.3 FL (ref 37–54)
EOSINOPHIL # BLD AUTO: 0.19 10*3/MM3 (ref 0–0.4)
EOSINOPHIL NFR BLD AUTO: 1.2 % (ref 0.3–6.2)
ERYTHROCYTE [DISTWIDTH] IN BLOOD BY AUTOMATED COUNT: 12.8 % (ref 12.3–15.4)
GFR SERPL CREATININE-BSD FRML MDRD: 82 ML/MIN/1.73
GLUCOSE BLDC GLUCOMTR-MCNC: 114 MG/DL (ref 70–130)
GLUCOSE BLDC GLUCOMTR-MCNC: 90 MG/DL (ref 70–130)
GLUCOSE BLDC GLUCOMTR-MCNC: 91 MG/DL (ref 70–130)
GLUCOSE BLDC GLUCOMTR-MCNC: 95 MG/DL (ref 70–130)
GLUCOSE SERPL-MCNC: 90 MG/DL (ref 65–99)
HBA1C MFR BLD: 5.71 % (ref 4.8–5.6)
HCT VFR BLD AUTO: 42.3 % (ref 34–46.6)
HDLC SERPL-MCNC: 30 MG/DL (ref 40–60)
HGB BLD-MCNC: 14.5 G/DL (ref 12–15.9)
IMM GRANULOCYTES # BLD AUTO: 0.11 10*3/MM3 (ref 0–0.05)
IMM GRANULOCYTES NFR BLD AUTO: 0.7 % (ref 0–0.5)
INR PPP: 1.05 (ref 0.9–1.1)
LDLC SERPL CALC-MCNC: 125 MG/DL (ref 0–100)
LDLC/HDLC SERPL: 4.13 {RATIO}
LYMPHOCYTES # BLD AUTO: 3.37 10*3/MM3 (ref 0.7–3.1)
LYMPHOCYTES NFR BLD AUTO: 22 % (ref 19.6–45.3)
MCH RBC QN AUTO: 31.6 PG (ref 26.6–33)
MCHC RBC AUTO-ENTMCNC: 34.3 G/DL (ref 31.5–35.7)
MCV RBC AUTO: 92.2 FL (ref 79–97)
MONOCYTES # BLD AUTO: 1.08 10*3/MM3 (ref 0.1–0.9)
MONOCYTES NFR BLD AUTO: 7 % (ref 5–12)
NEUTROPHILS NFR BLD AUTO: 10.46 10*3/MM3 (ref 1.7–7)
NEUTROPHILS NFR BLD AUTO: 68.4 % (ref 42.7–76)
NRBC BLD AUTO-RTO: 0 /100 WBC (ref 0–0.2)
PLATELET # BLD AUTO: 284 10*3/MM3 (ref 140–450)
PMV BLD AUTO: 11.5 FL (ref 6–12)
POTASSIUM SERPL-SCNC: 3.6 MMOL/L (ref 3.5–5.2)
PROCALCITONIN SERPL-MCNC: 0.05 NG/ML (ref 0–0.25)
PROTHROMBIN TIME: 13.5 SECONDS (ref 11.7–14.2)
RBC # BLD AUTO: 4.59 10*6/MM3 (ref 3.77–5.28)
SARS-COV-2 ORF1AB RESP QL NAA+PROBE: NOT DETECTED
SODIUM SERPL-SCNC: 139 MMOL/L (ref 136–145)
T4 FREE SERPL-MCNC: 1.37 NG/DL (ref 0.93–1.7)
TRIGL SERPL-MCNC: 85 MG/DL (ref 0–150)
TSH SERPL DL<=0.05 MIU/L-ACNC: 4.25 UIU/ML (ref 0.27–4.2)
VLDLC SERPL-MCNC: 16 MG/DL (ref 5–40)
WBC # BLD AUTO: 15.32 10*3/MM3 (ref 3.4–10.8)

## 2021-04-18 PROCEDURE — 25010000002 HEPARIN (PORCINE) PER 1000 UNITS: Performed by: NEUROLOGICAL SURGERY

## 2021-04-18 PROCEDURE — C1769 GUIDE WIRE: HCPCS | Performed by: NEUROLOGICAL SURGERY

## 2021-04-18 PROCEDURE — 25010000002 PROPOFOL 10 MG/ML EMULSION: Performed by: NURSE ANESTHETIST, CERTIFIED REGISTERED

## 2021-04-18 PROCEDURE — 80061 LIPID PANEL: CPT | Performed by: NURSE PRACTITIONER

## 2021-04-18 PROCEDURE — 0042T HC CT CEREBRAL PERFUSION W/WO CONTRAST: CPT

## 2021-04-18 PROCEDURE — 84443 ASSAY THYROID STIM HORMONE: CPT | Performed by: NURSE PRACTITIONER

## 2021-04-18 PROCEDURE — 25010000002 NEOSTIGMINE 5 MG/10ML SOLUTION: Performed by: NURSE ANESTHETIST, CERTIFIED REGISTERED

## 2021-04-18 PROCEDURE — 85610 PROTHROMBIN TIME: CPT | Performed by: NURSE PRACTITIONER

## 2021-04-18 PROCEDURE — 70496 CT ANGIOGRAPHY HEAD: CPT

## 2021-04-18 PROCEDURE — 36415 COLL VENOUS BLD VENIPUNCTURE: CPT | Performed by: NURSE PRACTITIONER

## 2021-04-18 PROCEDURE — C1887 CATHETER, GUIDING: HCPCS | Performed by: NEUROLOGICAL SURGERY

## 2021-04-18 PROCEDURE — 83036 HEMOGLOBIN GLYCOSYLATED A1C: CPT | Performed by: NURSE PRACTITIONER

## 2021-04-18 PROCEDURE — 83605 ASSAY OF LACTIC ACID: CPT | Performed by: NURSE PRACTITIONER

## 2021-04-18 PROCEDURE — 80048 BASIC METABOLIC PNL TOTAL CA: CPT | Performed by: NURSE PRACTITIONER

## 2021-04-18 PROCEDURE — 0 IODIXANOL PER 1 ML: Performed by: NEUROLOGICAL SURGERY

## 2021-04-18 PROCEDURE — 25010000002 HEPARIN (PORCINE) PER 1000 UNITS: Performed by: NURSE ANESTHETIST, CERTIFIED REGISTERED

## 2021-04-18 PROCEDURE — C1884 EMBOLIZATION PROTECT SYST: HCPCS | Performed by: NEUROLOGICAL SURGERY

## 2021-04-18 PROCEDURE — 25010000002 PHENYLEPHRINE 10 MG/ML SOLUTION 5 ML VIAL: Performed by: NEUROLOGICAL SURGERY

## 2021-04-18 PROCEDURE — 85025 COMPLETE CBC W/AUTO DIFF WBC: CPT | Performed by: NURSE PRACTITIONER

## 2021-04-18 PROCEDURE — 25010000002 PHENYLEPHRINE PER 1 ML: Performed by: NURSE ANESTHETIST, CERTIFIED REGISTERED

## 2021-04-18 PROCEDURE — C1773 RET DEV, INSERTABLE: HCPCS | Performed by: NEUROLOGICAL SURGERY

## 2021-04-18 PROCEDURE — 25010000002 ALBUMIN HUMAN 5% PER 50 ML: Performed by: NURSE ANESTHETIST, CERTIFIED REGISTERED

## 2021-04-18 PROCEDURE — 03CG3Z7 EXTIRPATION OF MATTER FROM INTRACRANIAL ARTERY USING STENT RETRIEVER, PERCUTANEOUS APPROACH: ICD-10-PCS | Performed by: NEUROLOGICAL SURGERY

## 2021-04-18 PROCEDURE — 03CL3ZZ EXTIRPATION OF MATTER FROM LEFT INTERNAL CAROTID ARTERY, PERCUTANEOUS APPROACH: ICD-10-PCS | Performed by: NEUROLOGICAL SURGERY

## 2021-04-18 PROCEDURE — 99292 CRITICAL CARE ADDL 30 MIN: CPT | Performed by: PSYCHIATRY & NEUROLOGY

## 2021-04-18 PROCEDURE — C1760 CLOSURE DEV, VASC: HCPCS | Performed by: NEUROLOGICAL SURGERY

## 2021-04-18 PROCEDURE — 25010000002 ONDANSETRON PER 1 MG: Performed by: NURSE ANESTHETIST, CERTIFIED REGISTERED

## 2021-04-18 PROCEDURE — 25010000002 DEXAMETHASONE PER 1 MG: Performed by: NURSE ANESTHETIST, CERTIFIED REGISTERED

## 2021-04-18 PROCEDURE — 039L3ZZ DRAINAGE OF LEFT INTERNAL CAROTID ARTERY, PERCUTANEOUS APPROACH: ICD-10-PCS | Performed by: NEUROLOGICAL SURGERY

## 2021-04-18 PROCEDURE — C1894 INTRO/SHEATH, NON-LASER: HCPCS | Performed by: NEUROLOGICAL SURGERY

## 2021-04-18 PROCEDURE — 037L3ZZ DILATION OF LEFT INTERNAL CAROTID ARTERY, PERCUTANEOUS APPROACH: ICD-10-PCS | Performed by: NEUROLOGICAL SURGERY

## 2021-04-18 PROCEDURE — 61645 PERQ ART M-THROMBECT &/NFS: CPT | Performed by: NEUROLOGICAL SURGERY

## 2021-04-18 PROCEDURE — C1725 CATH, TRANSLUMIN NON-LASER: HCPCS | Performed by: NEUROLOGICAL SURGERY

## 2021-04-18 PROCEDURE — 99291 CRITICAL CARE FIRST HOUR: CPT | Performed by: PSYCHIATRY & NEUROLOGY

## 2021-04-18 PROCEDURE — 25010000002 SUCCINYLCHOLINE PER 20 MG: Performed by: NURSE ANESTHETIST, CERTIFIED REGISTERED

## 2021-04-18 PROCEDURE — 82962 GLUCOSE BLOOD TEST: CPT

## 2021-04-18 PROCEDURE — 25010000002 FENTANYL CITRATE (PF) 100 MCG/2ML SOLUTION: Performed by: NURSE ANESTHETIST, CERTIFIED REGISTERED

## 2021-04-18 PROCEDURE — P9041 ALBUMIN (HUMAN),5%, 50ML: HCPCS | Performed by: NURSE ANESTHETIST, CERTIFIED REGISTERED

## 2021-04-18 PROCEDURE — 84439 ASSAY OF FREE THYROXINE: CPT | Performed by: NURSE PRACTITIONER

## 2021-04-18 PROCEDURE — 0 IOPAMIDOL PER 1 ML: Performed by: INTERNAL MEDICINE

## 2021-04-18 PROCEDURE — 70498 CT ANGIOGRAPHY NECK: CPT

## 2021-04-18 RX ORDER — PROMETHAZINE HYDROCHLORIDE 25 MG/1
25 TABLET ORAL ONCE AS NEEDED
Status: DISCONTINUED | OUTPATIENT
Start: 2021-04-18 | End: 2021-04-23

## 2021-04-18 RX ORDER — FENTANYL CITRATE 50 UG/ML
50 INJECTION, SOLUTION INTRAMUSCULAR; INTRAVENOUS
Status: DISCONTINUED | OUTPATIENT
Start: 2021-04-18 | End: 2021-04-20

## 2021-04-18 RX ORDER — HEPARIN SODIUM 1000 [USP'U]/ML
INJECTION, SOLUTION INTRAVENOUS; SUBCUTANEOUS AS NEEDED
Status: DISCONTINUED | OUTPATIENT
Start: 2021-04-18 | End: 2021-04-18 | Stop reason: SURG

## 2021-04-18 RX ORDER — ASPIRIN 300 MG/1
300 SUPPOSITORY RECTAL DAILY
Status: DISCONTINUED | OUTPATIENT
Start: 2021-04-18 | End: 2021-04-19

## 2021-04-18 RX ORDER — DEXAMETHASONE SODIUM PHOSPHATE 10 MG/ML
INJECTION INTRAMUSCULAR; INTRAVENOUS AS NEEDED
Status: DISCONTINUED | OUTPATIENT
Start: 2021-04-18 | End: 2021-04-18 | Stop reason: SURG

## 2021-04-18 RX ORDER — ONDANSETRON 2 MG/ML
4 INJECTION INTRAMUSCULAR; INTRAVENOUS EVERY 6 HOURS PRN
Status: DISCONTINUED | OUTPATIENT
Start: 2021-04-18 | End: 2021-04-20

## 2021-04-18 RX ORDER — FENTANYL CITRATE 50 UG/ML
INJECTION, SOLUTION INTRAMUSCULAR; INTRAVENOUS AS NEEDED
Status: DISCONTINUED | OUTPATIENT
Start: 2021-04-18 | End: 2021-04-18 | Stop reason: SURG

## 2021-04-18 RX ORDER — DIPHENHYDRAMINE HCL 25 MG
25 CAPSULE ORAL
Status: DISCONTINUED | OUTPATIENT
Start: 2021-04-18 | End: 2021-04-20

## 2021-04-18 RX ORDER — SODIUM CHLORIDE 9 MG/ML
100 INJECTION, SOLUTION INTRAVENOUS CONTINUOUS
Status: DISCONTINUED | OUTPATIENT
Start: 2021-04-18 | End: 2021-04-20

## 2021-04-18 RX ORDER — SODIUM CHLORIDE 0.9 % (FLUSH) 0.9 %
10 SYRINGE (ML) INJECTION EVERY 12 HOURS SCHEDULED
Status: DISCONTINUED | OUTPATIENT
Start: 2021-04-18 | End: 2021-04-20

## 2021-04-18 RX ORDER — ACETAMINOPHEN 650 MG/1
650 SUPPOSITORY RECTAL EVERY 4 HOURS PRN
Status: DISCONTINUED | OUTPATIENT
Start: 2021-04-18 | End: 2021-04-23

## 2021-04-18 RX ORDER — PHENYLEPHRINE HYDROCHLORIDE 10 MG/ML
INJECTION INTRAVENOUS
Status: COMPLETED
Start: 2021-04-18 | End: 2021-04-18

## 2021-04-18 RX ORDER — NEOSTIGMINE METHYLSULFATE 0.5 MG/ML
INJECTION, SOLUTION INTRAVENOUS AS NEEDED
Status: DISCONTINUED | OUTPATIENT
Start: 2021-04-18 | End: 2021-04-18 | Stop reason: SURG

## 2021-04-18 RX ORDER — NALOXONE HCL 0.4 MG/ML
0.2 VIAL (ML) INJECTION AS NEEDED
Status: DISCONTINUED | OUTPATIENT
Start: 2021-04-18 | End: 2021-04-23 | Stop reason: HOSPADM

## 2021-04-18 RX ORDER — GLYCOPYRROLATE 0.2 MG/ML
INJECTION INTRAMUSCULAR; INTRAVENOUS AS NEEDED
Status: DISCONTINUED | OUTPATIENT
Start: 2021-04-18 | End: 2021-04-18 | Stop reason: SURG

## 2021-04-18 RX ORDER — HYDROMORPHONE HYDROCHLORIDE 1 MG/ML
0.5 INJECTION, SOLUTION INTRAMUSCULAR; INTRAVENOUS; SUBCUTANEOUS
Status: DISCONTINUED | OUTPATIENT
Start: 2021-04-18 | End: 2021-04-20

## 2021-04-18 RX ORDER — HYDRALAZINE HYDROCHLORIDE 20 MG/ML
5 INJECTION INTRAMUSCULAR; INTRAVENOUS
Status: DISCONTINUED | OUTPATIENT
Start: 2021-04-18 | End: 2021-04-20

## 2021-04-18 RX ORDER — ONDANSETRON 2 MG/ML
INJECTION INTRAMUSCULAR; INTRAVENOUS AS NEEDED
Status: DISCONTINUED | OUTPATIENT
Start: 2021-04-18 | End: 2021-04-18 | Stop reason: SURG

## 2021-04-18 RX ORDER — HYDROCODONE BITARTRATE AND ACETAMINOPHEN 7.5; 325 MG/1; MG/1
1 TABLET ORAL ONCE AS NEEDED
Status: DISCONTINUED | OUTPATIENT
Start: 2021-04-18 | End: 2021-04-23

## 2021-04-18 RX ORDER — LIDOCAINE HYDROCHLORIDE 20 MG/ML
INJECTION, SOLUTION INFILTRATION; PERINEURAL AS NEEDED
Status: DISCONTINUED | OUTPATIENT
Start: 2021-04-18 | End: 2021-04-18 | Stop reason: SURG

## 2021-04-18 RX ORDER — HEPARIN SODIUM 5000 [USP'U]/ML
5000 INJECTION, SOLUTION INTRAVENOUS; SUBCUTANEOUS EVERY 8 HOURS SCHEDULED
Status: DISCONTINUED | OUTPATIENT
Start: 2021-04-19 | End: 2021-04-23 | Stop reason: HOSPADM

## 2021-04-18 RX ORDER — SUCCINYLCHOLINE CHLORIDE 20 MG/ML
INJECTION INTRAMUSCULAR; INTRAVENOUS AS NEEDED
Status: DISCONTINUED | OUTPATIENT
Start: 2021-04-18 | End: 2021-04-18 | Stop reason: SURG

## 2021-04-18 RX ORDER — ACETAMINOPHEN 160 MG/5ML
650 SOLUTION ORAL EVERY 4 HOURS PRN
Status: DISCONTINUED | OUTPATIENT
Start: 2021-04-18 | End: 2021-04-23

## 2021-04-18 RX ORDER — ACETAMINOPHEN 325 MG/1
650 TABLET ORAL EVERY 4 HOURS PRN
Status: DISCONTINUED | OUTPATIENT
Start: 2021-04-18 | End: 2021-04-23

## 2021-04-18 RX ORDER — OXYCODONE AND ACETAMINOPHEN 7.5; 325 MG/1; MG/1
1 TABLET ORAL ONCE AS NEEDED
Status: DISCONTINUED | OUTPATIENT
Start: 2021-04-18 | End: 2021-04-23

## 2021-04-18 RX ORDER — SODIUM CHLORIDE 0.9 % (FLUSH) 0.9 %
10 SYRINGE (ML) INJECTION AS NEEDED
Status: DISCONTINUED | OUTPATIENT
Start: 2021-04-18 | End: 2021-04-20

## 2021-04-18 RX ORDER — LABETALOL HYDROCHLORIDE 5 MG/ML
5 INJECTION, SOLUTION INTRAVENOUS
Status: DISCONTINUED | OUTPATIENT
Start: 2021-04-18 | End: 2021-04-20

## 2021-04-18 RX ORDER — IODIXANOL 320 MG/ML
300 INJECTION, SOLUTION INTRAVASCULAR
Status: COMPLETED | OUTPATIENT
Start: 2021-04-18 | End: 2021-04-18

## 2021-04-18 RX ORDER — EPHEDRINE SULFATE 50 MG/ML
5 INJECTION, SOLUTION INTRAVENOUS ONCE AS NEEDED
Status: DISCONTINUED | OUTPATIENT
Start: 2021-04-18 | End: 2021-04-20

## 2021-04-18 RX ORDER — SODIUM CHLORIDE, SODIUM LACTATE, POTASSIUM CHLORIDE, CALCIUM CHLORIDE 600; 310; 30; 20 MG/100ML; MG/100ML; MG/100ML; MG/100ML
INJECTION, SOLUTION INTRAVENOUS CONTINUOUS PRN
Status: DISCONTINUED | OUTPATIENT
Start: 2021-04-18 | End: 2021-04-18 | Stop reason: SURG

## 2021-04-18 RX ORDER — NITROGLYCERIN 0.4 MG/1
0.4 TABLET SUBLINGUAL
Status: DISCONTINUED | OUTPATIENT
Start: 2021-04-18 | End: 2021-04-20

## 2021-04-18 RX ORDER — PROPOFOL 10 MG/ML
VIAL (ML) INTRAVENOUS AS NEEDED
Status: DISCONTINUED | OUTPATIENT
Start: 2021-04-18 | End: 2021-04-18 | Stop reason: SURG

## 2021-04-18 RX ORDER — ASPIRIN 325 MG
325 TABLET ORAL DAILY
Status: DISCONTINUED | OUTPATIENT
Start: 2021-04-18 | End: 2021-04-19

## 2021-04-18 RX ORDER — DIPHENHYDRAMINE HYDROCHLORIDE 50 MG/ML
12.5 INJECTION INTRAMUSCULAR; INTRAVENOUS
Status: DISCONTINUED | OUTPATIENT
Start: 2021-04-18 | End: 2021-04-20

## 2021-04-18 RX ORDER — ALBUMIN, HUMAN INJ 5% 5 %
SOLUTION INTRAVENOUS CONTINUOUS PRN
Status: DISCONTINUED | OUTPATIENT
Start: 2021-04-18 | End: 2021-04-18 | Stop reason: SURG

## 2021-04-18 RX ORDER — ATORVASTATIN CALCIUM 80 MG/1
80 TABLET, FILM COATED ORAL NIGHTLY
Status: DISCONTINUED | OUTPATIENT
Start: 2021-04-18 | End: 2021-04-19

## 2021-04-18 RX ORDER — FLUMAZENIL 0.1 MG/ML
0.2 INJECTION INTRAVENOUS AS NEEDED
Status: DISCONTINUED | OUTPATIENT
Start: 2021-04-18 | End: 2021-04-20

## 2021-04-18 RX ORDER — ROCURONIUM BROMIDE 10 MG/ML
INJECTION, SOLUTION INTRAVENOUS AS NEEDED
Status: DISCONTINUED | OUTPATIENT
Start: 2021-04-18 | End: 2021-04-18 | Stop reason: SURG

## 2021-04-18 RX ORDER — PROMETHAZINE HYDROCHLORIDE 25 MG/1
25 SUPPOSITORY RECTAL ONCE AS NEEDED
Status: DISCONTINUED | OUTPATIENT
Start: 2021-04-18 | End: 2021-04-23

## 2021-04-18 RX ORDER — ONDANSETRON 2 MG/ML
4 INJECTION INTRAMUSCULAR; INTRAVENOUS ONCE AS NEEDED
Status: COMPLETED | OUTPATIENT
Start: 2021-04-18 | End: 2021-04-21

## 2021-04-18 RX ADMIN — PROPOFOL 150 MG: 10 INJECTION, EMULSION INTRAVENOUS at 10:02

## 2021-04-18 RX ADMIN — HEPARIN SODIUM 1000 UNITS: 1000 INJECTION INTRAVENOUS; SUBCUTANEOUS at 13:41

## 2021-04-18 RX ADMIN — NEOSTIGMINE METHYLSULFATE 2 MG: 0.5 INJECTION INTRAVENOUS at 14:36

## 2021-04-18 RX ADMIN — LIDOCAINE HYDROCHLORIDE 60 MG: 20 INJECTION, SOLUTION INFILTRATION; PERINEURAL at 10:02

## 2021-04-18 RX ADMIN — ROCURONIUM BROMIDE 10 MG: 50 INJECTION INTRAVENOUS at 12:10

## 2021-04-18 RX ADMIN — DEXAMETHASONE SODIUM PHOSPHATE 4 MG: 10 INJECTION INTRAMUSCULAR; INTRAVENOUS at 10:25

## 2021-04-18 RX ADMIN — FENTANYL CITRATE 50 MCG: 50 INJECTION INTRAMUSCULAR; INTRAVENOUS at 14:36

## 2021-04-18 RX ADMIN — ONDANSETRON 4 MG: 2 INJECTION INTRAMUSCULAR; INTRAVENOUS at 14:36

## 2021-04-18 RX ADMIN — ROCURONIUM BROMIDE 10 MG: 50 INJECTION INTRAVENOUS at 10:02

## 2021-04-18 RX ADMIN — PHENYLEPHRINE HYDROCHLORIDE 200 MCG: 10 INJECTION INTRAVENOUS at 10:16

## 2021-04-18 RX ADMIN — IOPAMIDOL 150 ML: 755 INJECTION, SOLUTION INTRAVENOUS at 09:17

## 2021-04-18 RX ADMIN — PHENYLEPHRINE HYDROCHLORIDE 100 MCG: 10 INJECTION INTRAVENOUS at 15:03

## 2021-04-18 RX ADMIN — ALBUMIN HUMAN: 0.05 INJECTION, SOLUTION INTRAVENOUS at 12:08

## 2021-04-18 RX ADMIN — ALBUMIN HUMAN: 0.05 INJECTION, SOLUTION INTRAVENOUS at 12:37

## 2021-04-18 RX ADMIN — GLYCOPYRROLATE 0.4 MG: 0.2 INJECTION INTRAMUSCULAR; INTRAVENOUS at 14:36

## 2021-04-18 RX ADMIN — PHENYLEPHRINE HYDROCHLORIDE 1 MCG/KG/MIN: 10 INJECTION, SOLUTION INTRAMUSCULAR; INTRAVENOUS; SUBCUTANEOUS at 10:15

## 2021-04-18 RX ADMIN — SODIUM CHLORIDE 100 ML/HR: 9 INJECTION, SOLUTION INTRAVENOUS at 08:52

## 2021-04-18 RX ADMIN — SODIUM CHLORIDE 100 ML/HR: 9 INJECTION, SOLUTION INTRAVENOUS at 01:36

## 2021-04-18 RX ADMIN — IODIXANOL 189 ML: 320 INJECTION, SOLUTION INTRAVASCULAR at 15:32

## 2021-04-18 RX ADMIN — SODIUM CHLORIDE, POTASSIUM CHLORIDE, SODIUM LACTATE AND CALCIUM CHLORIDE: 600; 310; 30; 20 INJECTION, SOLUTION INTRAVENOUS at 09:56

## 2021-04-18 RX ADMIN — ROCURONIUM BROMIDE 30 MG: 50 INJECTION INTRAVENOUS at 10:11

## 2021-04-18 RX ADMIN — HEPARIN SODIUM 2000 UNITS: 1000 INJECTION INTRAVENOUS; SUBCUTANEOUS at 12:51

## 2021-04-18 RX ADMIN — SUCCINYLCHOLINE CHLORIDE 120 MG: 20 INJECTION, SOLUTION INTRAMUSCULAR; INTRAVENOUS; PARENTERAL at 10:02

## 2021-04-18 RX ADMIN — SODIUM CHLORIDE, POTASSIUM CHLORIDE, SODIUM LACTATE AND CALCIUM CHLORIDE: 600; 310; 30; 20 INJECTION, SOLUTION INTRAVENOUS at 15:10

## 2021-04-18 RX ADMIN — GLYCOPYRROLATE 0.2 MG: 0.2 INJECTION INTRAMUSCULAR; INTRAVENOUS at 10:37

## 2021-04-18 RX ADMIN — PHENYLEPHRINE HYDROCHLORIDE 100 MCG: 10 INJECTION INTRAVENOUS at 13:01

## 2021-04-18 RX ADMIN — HEPARIN SODIUM 7000 UNITS: 1000 INJECTION INTRAVENOUS; SUBCUTANEOUS at 10:36

## 2021-04-18 RX ADMIN — SODIUM CHLORIDE, PRESERVATIVE FREE 10 ML: 5 INJECTION INTRAVENOUS at 01:36

## 2021-04-18 RX ADMIN — PHENYLEPHRINE HYDROCHLORIDE 100 MCG: 10 INJECTION INTRAVENOUS at 10:06

## 2021-04-18 RX ADMIN — PHENYLEPHRINE HYDROCHLORIDE 100 MCG: 10 INJECTION INTRAVENOUS at 10:11

## 2021-04-18 RX ADMIN — PHENYLEPHRINE HYDROCHLORIDE 200 MCG: 10 INJECTION INTRAVENOUS at 15:09

## 2021-04-18 RX ADMIN — FENTANYL CITRATE 50 MCG: 50 INJECTION INTRAMUSCULAR; INTRAVENOUS at 14:55

## 2021-04-18 RX ADMIN — PHENYLEPHRINE HYDROCHLORIDE 100 MCG: 10 INJECTION INTRAVENOUS at 14:41

## 2021-04-18 RX ADMIN — PHENYLEPHRINE HYDROCHLORIDE 1 MCG/KG/MIN: 10 INJECTION INTRAVENOUS at 16:04

## 2021-04-18 RX ADMIN — GLYCOPYRROLATE 0.2 MG: 0.2 INJECTION INTRAMUSCULAR; INTRAVENOUS at 11:57

## 2021-04-18 NOTE — CONSULTS
"[facility]    Reason for consult: critical care management    HPI:  Mrs. Castaneda is a 69yo WF who was brought to the ER yesterday for garbled speech and confusion.  She was admitted and had worsening NIH.  She was then taken to CT and then to OR for clot retreval.  She is now recovering in ICU.  She is currently on pressor and is nonverbal. She opens eyes briefly but does not follow commands or verbally reply.  History is obtained from the chart.     Past Medical History:   Diagnosis Date   • Acute ischemic stroke (CMS/HCC) 2021   • Age-related osteoporosis without current pathological fracture 2021   • Anesthesia     WOKE UP DURING CATARACT SURGERY   • Chronic diastolic (congestive) heart failure (CMS/HCC) 2021   • Collapse of lung 1980s    history of in past post trauma   • Diverticulosis    • Environmental allergies     Some pollens, grass, trees, flowers   • Essential hypertension 3/24/2017   • Exogenous obesity 3/24/2017   • H/O Pneumonia    • Infectious mononucleosis    • Kidney infection    • Kidney stone    • Mixed hyperlipidemia 2019   • Neuromuscular disorder (CMS/[facility])    • Sepsis secondary to UTI (CMS/HCC) 2019   • Tobacco abuse 2019   • Urinary tract infection    • Vertigo     past history of several times     Social History     Socioeconomic History   • Marital status:      Spouse name: Not on file   • Number of children: 2   • Years of education: College   • Highest education level: Not on file   Tobacco Use   • Smoking status: Former Smoker     Packs/day: 1.00     Types: Cigarettes     Quit date: 2020     Years since quittin.8   • Smokeless tobacco: Never Used   Substance and Sexual Activity   • Alcohol use: Yes     Comment: \"on occasion\"   • Drug use: No     Family History   Problem Relation Age of Onset   • Hypertension Mother    • Aneurysm Mother    • Heart failure Mother    • Heart disease Mother    • Heart disease Father    • " Hypertension Father    • Kidney disease Father    • Cancer Father 82        Bladder   • Hypertension Sister    • Cancer Maternal Aunt    • Stroke Maternal Uncle    • Glaucoma Maternal Grandmother    • Cancer Maternal Grandmother    • Stroke Maternal Grandfather    • Aneurysm Maternal Grandfather    • Cancer Maternal Aunt    • Cancer Maternal Aunt    • Liver cancer Other    • Pancreatic cancer Other    • Malig Hyperthermia Neg Hx      MEDS: reviewed as per chart  ALL: cephalosporins, pcn, cipro, sulfa  ROS: UTO    Vital Sign Min/Max for last 24 hours  Temp  Min: 97.6 °F (36.4 °C)  Max: 99.4 °F (37.4 °C)   BP  Min: 83/54  Max: 176/97   Pulse  Min: 85  Max: 108   Resp  Min: 16  Max: 18   SpO2  Min: 90 %  Max: 99 %   No data recorded   Weight  Min: 69.1 kg (152 lb 4.8 oz)  Max: 69.1 kg (152 lb 4.8 oz)     GEN:   appears ill, obese, very sleepy, does open eyes  HEENT: PERRL, normal sclera, mmm, no jvd, trachea midline, neck supple  CHEST: CTA bilat, no wheezes, no crackles, no use of accessory muscles  CV: RRR, no m/g/r  ABD: soft, nt, nd +bs, no hepatosplenomegaly  EXT: no c/c/e  SKIN: no rashes, no xanthomas, nl turgor, warm, dry   LYMPH: no palpable cervical or supraclavicular lymphadenopathy  NEURO: unable to cooperate with exam appears symmetric  PSYCH: unable to assess  MSK: No kyphoscoliosis, appers to have 5/5 left sided strength    Labs: 4/18: reviewed:  ldl 125  Cr 0.7  Bicarb 21.7  Wbc 15.3  hgb 14.5  plts 284    Ct head: reviewed: left sided infarct    A/P:  1. Acute CVA -- s/p retreval -- admit to ICU, serial neurologic evaluation, bp control as per goal determined by JEFF/neurology.  Risk factor modification  2. Hypercholesterolemia -- statin  3. Chronic diastolic chf  4. HTN --hold meds, currently hypotensive  5. Hypotension -- pressor to meet bp goal      CC 31

## 2021-04-18 NOTE — ED NOTES
confused and slurring speech per son but insure of when symptoms actually started. has not seen her for a week. Pt in mask in triage  Triage in appropriate PPE     Kierra Elizondo, RN  04/17/21 2014

## 2021-04-18 NOTE — NURSING NOTE
Multiple meds on MAR hold including nighttime Lipitor, called [facility] and spoke with Dr. Javier, states OK to hold lipitor tonight and have MAR hold meds be addressed tomorrow. RONN Rae RN

## 2021-04-18 NOTE — NURSING NOTE
NIH noted to increase from 10 to 14 on assessment.  Difficulty getting patient to follow commands noted.  Aphasic and left pupil now noted to be square and remains reactive (previously documented and confirmed by nightshift RN to be round and reactive).  Paged Dr. Loza.  BP and BG checked.  BP consistently about 20mmHg lower in the left arm than the right.  Continuing to check BP in left arm- noted to be SBP in the 80's.  Rapid called, charge RN notified, and Dr. Loza paged STAT.  Discussed case with Dr. Loza.  Team D called, Team D imaging ordered. Dr. Loza headed to ER CT to read CT.  Dr. Loza discussed case with ICU charge RN during rapid.  1L NS fluid bolus ordered and started prior to transport to CT with rapid response team-tolerating well, BP improved to 102 SBP prior to transport.  Transfer orders received from Dr. Loza to the unit.  RRT stated they may re-evaluate transfer.      Dr. Durham notified of Team D and updated on patient condition.  Dr. Durham states that he wants the patient transferred to ICU after the Rapid.  Called Maranda Singh, RN (CCU charge) to notify of discussion with Dr. Durham.      Daughter Laine notified of decline.  Will call to update Laine with results of Team D.  Laine to call and update her brother Nando.

## 2021-04-18 NOTE — CODE DOCUMENTATION
Responded to rapid for increased NIH and neuro change.  Per documentation of ER physician on 4/17 arrival NIH 1 scored for dysarthria.  Per ER RN @ 9pm on 4/17 and reported to staff NIH 10 upon transfer to [address] [place] [address].    Last known normal unknown.  However, she did send non-sensical text to son around 7 pm 4/17.    0845 arrived to bedside NIH 10  0847 Dr Eid updated per primary RN and team D images ordered.  NS fluid bolus ordered and started.  SBP 90s-low 100s.  0854 time delay d/t IV insertion  0900 Arrived to ER CT.  Dr Samaniego at bedside.  Pt appears worse.  NIH repeated with score increasing to 13.    0915 OR updated on potential thrombectomy candidate.  0921 Not thrombectomy candidate.  Or updated and pt transferred to ICU room 380  0935 Dr Samaniego to bedside.  States that radiologist just called and states that pt does have a M1 occlusion that is possibly retrievable.  0938 OR notified and ON-Call team called in.  Dr Cheng notified of potential candidate by Dr Samaniego.  Time delay d/t Dr Cheng reviewing images.      0948 Time delay.  OR called and asked to hold pt in ICU until call team arrives.    0954 To OR.  Report to ANNE MARIE and Graciela THOMAS

## 2021-04-18 NOTE — NURSING NOTE
Spoke with Laine Garza patient's daughter to verify medical history and obtain consent for MRI.  MRI had this nurse verify with daughter that urinary stents had been removed.

## 2021-04-18 NOTE — PERIOPERATIVE NURSING NOTE
Pre-procedure NIH: 14  Decision time: 0929  In room : 0955  Procedure start: 1013  Arterial access Puncture time: 1015  Guide catheter placement time: 1033  First thrombectomy device placement time (first pass):  1046  Subsequent device placement time: 1059, 1213, 1233, 1304, 1327, 1423  Time of recanalization:  1423  Final TICI Flow: 2b  Procedure End time:  1444    **These values were verbally verified with physician performing procedure.

## 2021-04-18 NOTE — PLAN OF CARE
Goal Outcome Evaluation:  Plan of Care Reviewed With: patient  Progress: no change  Outcome Summary: Patient arrived on unit via stretcher.  Patient had on her own jeans, shoes, and socks.  Strong urine odor present.  Patient wasn't able to tell me her name at the beginning of the assessment.  Patient able to follow simple commands.  Patient quickly forgets current task and has to be reminded multiple times.  Patient got very agitated while doing the NIH.  Patient did request food and attempted to get up when this nurse explained why she couldn't eat.  Responses by patient doesn't make sense at times.  Patient later in the assessment could tell this nurse her name and that she is at the hospital.  Patient is disoriented to time and situation.

## 2021-04-18 NOTE — H&P
"    Patient Name:  Jazmyn Castaneda  YOB: 1950  MRN:  [id number]  Admit Date:  4/17/2021  Patient Care Team:  Samira Yi MD as PCP - General (Family Medicine)      Subjective   History Present Illness     Chief Complaint   Patient presents with   • Altered Mental Status     History of Present Illness   Ms. Castaneda is a 70 year old female with history of Hypertension, Chronic diastolic heart failure, hyperlipidemia, who presents to the ED with word finding difficulty.  Patient last known normal was Wednesday, tonight around 7pm she sent her son a text message that didn't make any sense, patient was then found to have word finding trouble and slurred speech, patient can not tell when the symptoms actually started.  She denies any fall or injury, no headache, no other weakness, no vision changes.  In the ED CT of her head shows scattered areas of decreased attentuation within the left MCA territory, areas of evolving infarction cannot be excluded.  No midline shift or hemorrhage.  Na 137, potassium 4.8, creatinine .52, lipase 15, wbc 12.11, hct 16.8, hct 51.3. chest xray shows left basilar consolidation may be atelectasis, rule out infection.  Will obtain procal and lactic acid.  Patient does not appear to be septic at this time. She is frustrated with her word finding difficulty and is able to stay she needs help at home and she feels \"not right\".  She is able to follow all commands and has not other focal deficits.  She does say she has had some trouble eating, will keep her npo until seen by speech.    Review of Systems   Unable to perform ROS: Mental status change    patient able to give much info because of severe expressive aphasia, unable to tell me when symtoms started.    Personal History     Past Medical History:   Diagnosis Date   • Acute ischemic stroke (CMS/HCC) 4/17/2021   • Age-related osteoporosis without current pathological fracture 1/19/2021   • Anesthesia     WOKE UP DURING " CATARACT SURGERY   • Chronic diastolic (congestive) heart failure (CMS/HCC) 2021   • Collapse of lung     history of in past post trauma   • Diverticulosis    • Environmental allergies     Some pollens, grass, trees, flowers   • Essential hypertension 3/24/2017   • Exogenous obesity 3/24/2017   • H/O Pneumonia    • Infectious mononucleosis    • Kidney infection    • Kidney stone    • Mixed hyperlipidemia 2019   • Neuromuscular disorder (CMS/HCC)    • Sepsis secondary to UTI (CMS/HCC) 2019   • Tobacco abuse 2019   • Urinary tract infection    • Vertigo     past history of several times     Past Surgical History:   Procedure Laterality Date   • BREAST EXCISIONAL BIOPSY Right     30 something when it was done; says it was precancerous   • CATARACT EXTRACTION, BILATERAL     •  SECTION     • CYSTOSCOPY N/A 2019    Procedure: CYSTOSCOPY AND STENT PLACEMENT;  Surgeon: Alen Lal MD;  Location: [facility];  Service: Urology   • MASTOID SURGERY     • NASAL SEPTAL RECONSTRUCTION     • SINUS SURGERY      scraped and prior deviated septum   • URETEROSCOPY LASER LITHOTRIPSY WITH STENT INSERTION Right 2019    Procedure: CYSTOSCOPY, RIGHT URETEROSCOPY, LASER LITHOTRIPSY, STONE BASKET EXTRACTION, STENT PLACEMENT WITHOUT STRINGS;  Surgeon: Alfredo Gongora Jr., MD;  Location: [facility];  Service: Urology     Family History   Problem Relation Age of Onset   • Hypertension Mother    • Aneurysm Mother    • Heart failure Mother    • Heart disease Mother    • Heart disease Father    • Hypertension Father    • Kidney disease Father    • Cancer Father 82        Bladder   • Hypertension Sister    • Cancer Maternal Aunt    • Stroke Maternal Uncle    • Glaucoma Maternal Grandmother    • Cancer Maternal Grandmother    • Stroke Maternal Grandfather    • Aneurysm Maternal Grandfather    • Cancer Maternal Aunt    • Cancer Maternal Aunt    • Liver cancer Other    • Pancreatic cancer  "Other    • Malig Hyperthermia Neg Hx      Social History     Tobacco Use   • Smoking status: Former Smoker     Packs/day: 1.00     Types: Cigarettes     Quit date: 2020     Years since quittin.8   • Smokeless tobacco: Never Used   Substance Use Topics   • Alcohol use: Yes     Comment: \"on occasion\"   • Drug use: No     No current facility-administered medications on file prior to encounter.     Current Outpatient Medications on File Prior to Encounter   Medication Sig Dispense Refill   • calcium carbonate-cholecalciferol (Calcium 500+D) 500-400 MG-UNIT tablet tablet Take 1 tablet by mouth Daily.     • cetirizine (zyrTEC) 5 MG tablet Take 5 mg by mouth Daily.     • fluticasone (FLONASE) 50 MCG/ACT nasal spray 2 sprays into the nostril(s) as directed by provider Daily.     • lisinopril (PRINIVIL,ZESTRIL) 2.5 MG tablet Take 1 tablet by mouth Daily. 90 tablet 1   • montelukast (SINGULAIR) 10 MG tablet TAKE 1 TABLET EVERY NIGHT 90 tablet 3     Allergies   Allergen Reactions   • Cephalosporins Swelling   • Penicillins Swelling   • Ciprofloxacin Rash   • Sulfa Antibiotics Unknown - Low Severity     Patient states she lost her vision temporarily when using Sulfa based eye ointment, also, broke out in rash w/oral medication.       Objective    Objective     Vital Signs  Temp:  [97.8 °F (36.6 °C)-98 °F (36.7 °C)] 97.8 °F (36.6 °C)  Heart Rate:  [] 100  Resp:  [16-18] 16  BP: (135-176)/(72-97) 176/97  SpO2:  [92 %-99 %] 96 %  on   ;   Device (Oxygen Therapy): room air  Body mass index is 30 kg/m².    Physical Exam  Vitals and nursing note reviewed.   Constitutional:       General: She is not in acute distress.     Appearance: She is well-developed.   HENT:      Head: Normocephalic.   Neck:      Vascular: No JVD.   Cardiovascular:      Rate and Rhythm: Normal rate and regular rhythm.      Comments: NSR on the monitor with heart rate 78, denies chest pain or shortness of breath  Pulmonary:      Effort: Pulmonary " effort is normal.      Breath sounds: Normal breath sounds.      Comments: Lung sounds clear, sats 98% on room air during my exam.  Abdominal:      General: Bowel sounds are normal. There is no distension.      Palpations: Abdomen is soft.      Tenderness: There is no abdominal tenderness.   Musculoskeletal:         General: Normal range of motion.      Cervical back: Normal range of motion.      Right lower leg: No edema.      Left lower leg: No edema.   Skin:     General: Skin is warm and dry.      Capillary Refill: Capillary refill takes less than 2 seconds.   Neurological:      Mental Status: She is alert and oriented to person, place, and time.      Comments: NIHSS 1 at this time for expressive aphasia but no dysarthria.  NO facial droop or focal weakness.   Psychiatric:         Attention and Perception: Attention normal.         Mood and Affect: Mood normal.         Behavior: Behavior normal.         Cognition and Memory: Cognition normal.         Judgment: Judgment normal.      Comments: Slowed speech with expressive aphasia but her words are clear, she does have          Results Review:  I reviewed the patient's new clinical results.  I reviewed the patient's new imaging results and agree with the interpretation.  I reviewed the patient's other test results and agree with the interpretation  I personally viewed and interpreted the patient's EKG/Telemetry data  Discussed with ED provider.    Lab Results (last 24 hours)     Procedure Component Value Units Date/Time    CBC & Differential [[id number]]  (Abnormal) Collected: 04/17/21 2150    Specimen: Blood from Arm, Left Updated: 04/17/21 2203    Narrative:      The following orders were created for panel order CBC & Differential.  Procedure                               Abnormality         Status                     ---------                               -----------         ------                     CBC Auto Differential[[id number]]        Abnormal             Final result                 Please view results for these tests on the individual orders.    Comprehensive Metabolic Panel [[id number]]  (Abnormal) Collected: 04/17/21 2150    Specimen: Blood from Arm, Left Updated: 04/17/21 2220     Glucose 96 mg/dL      BUN 14 mg/dL      Creatinine 0.52 mg/dL      Sodium 137 mmol/L      Potassium 4.8 mmol/L      Comment: Specimen hemolyzed.  Results may be affected.        Chloride 100 mmol/L      CO2 21.1 mmol/L      Calcium 9.8 mg/dL      Total Protein 7.4 g/dL      Albumin 4.30 g/dL      ALT (SGPT) 21 U/L      Comment: Specimen hemolyzed.  Results may be affected.        AST (SGOT) 31 U/L      Comment: Specimen hemolyzed.  Results may be affected.        Alkaline Phosphatase 150 U/L      Total Bilirubin 0.6 mg/dL      eGFR Non African Amer 116 mL/min/1.73      Globulin 3.1 gm/dL      A/G Ratio 1.4 g/dL      BUN/Creatinine Ratio 26.9     Anion Gap 15.9 mmol/L     Narrative:      GFR Normal >60  Chronic Kidney Disease <60  Kidney Failure <15      Lipase [[id number]]  (Normal) Collected: 04/17/21 2150    Specimen: Blood from Arm, Left Updated: 04/17/21 2219     Lipase 15 U/L     Urinalysis With Microscopic If Indicated (No Culture) - Urine, Catheter [[id number]]  (Abnormal) Collected: 04/17/21 2150    Specimen: Urine, Catheter Updated: 04/17/21 2205     Color, UA Yellow     Appearance, UA Clear     pH, UA <=5.0     Specific Gravity, UA 1.011     Glucose, UA Negative     Ketones, UA 40 mg/dL (2+)     Bilirubin, UA Negative     Blood, UA Trace     Protein, UA Negative     Leuk Esterase, UA Trace     Nitrite, UA Negative     Urobilinogen, UA 1.0 E.U./dL    CBC Auto Differential [[id number]]  (Abnormal) Collected: 04/17/21 2150    Specimen: Blood from Arm, Left Updated: 04/17/21 2203     WBC 12.11 10*3/mm3      RBC 5.59 10*6/mm3      Hemoglobin 16.8 g/dL      Hematocrit 51.3 %      MCV 91.8 fL      MCH 30.1 pg      MCHC 32.7 g/dL      RDW 13.0 %      RDW-SD 43.7 fl      MPV 11.6 fL       Platelets 338 10*3/mm3      Neutrophil % 69.1 %      Lymphocyte % 23.3 %      Monocyte % 5.9 %      Eosinophil % 0.6 %      Basophil % 0.7 %      Immature Grans % 0.4 %      Neutrophils, Absolute 8.37 10*3/mm3      Lymphocytes, Absolute 2.82 10*3/mm3      Monocytes, Absolute 0.72 10*3/mm3      Eosinophils, Absolute 0.07 10*3/mm3      Basophils, Absolute 0.08 10*3/mm3      Immature Grans, Absolute 0.05 10*3/mm3      nRBC 0.0 /100 WBC     Urinalysis, Microscopic Only - Urine, Catheter [[id number]]  (Abnormal) Collected: 04/17/21 2150    Specimen: Urine, Catheter Updated: 04/17/21 2205     RBC, UA 0-2 /HPF      WBC, UA 6-12 /HPF      Bacteria, UA None Seen /HPF      Squamous Epithelial Cells, UA 0-2 /HPF      Hyaline Casts, UA None Seen /LPF      Methodology Automated Microscopy    COVID PRE-OP / PRE-PROCEDURE SCREENING ORDER (NO ISOLATION) - Swab, Nasopharynx [[id number]] Collected: 04/17/21 2336    Specimen: Swab from Nasopharynx Updated: 04/17/21 2345    Narrative:      The following orders were created for panel order COVID PRE-OP / PRE-PROCEDURE SCREENING ORDER (NO ISOLATION) - Swab, Nasopharynx.  Procedure                               Abnormality         Status                     ---------                               -----------         ------                     COVID-19,APTIMA PANTHER,...[[id number]]                      In process                   Please view results for these tests on the individual orders.    COVID-19,APTIMA PANTHERKERON IN-HOUSE, NP/OP SWAB IN UTM/VTM/SALINE TRANSPORT MEDIA,24 HR TAT - Swab, Nasopharynx [[id number]] Collected: 04/17/21 2336    Specimen: Swab from Nasopharynx Updated: 04/17/21 2345          Imaging Results (Last 24 Hours)     Procedure Component Value Units Date/Time    CT Head Without Contrast [[id number]] Collected: 04/17/21 2304     Updated: 04/17/21 2312    Narrative:      CT HEAD WITHOUT CONTRAST     HISTORY: Altered mental status and slurred speech     COMPARISON: None  available.     TECHNIQUE: Axial CT imaging was obtained through the brain. No IV  contrast was administered.     FINDINGS:  No acute intracranial hemorrhage is identified. There are multifocal  areas of decreased attenuation which are noted within the left MCA  territory. Subacute infarct cannot be excluded. Single largest area is  noted within the left frontal lobe, and measures up to 2.8 cm on the  coronal series. There is no midline shift or mass effect. No calvarial  fracture is seen. Inspissated secretions are noted within the sphenoid  sinus. Mild mucosal thickening is suspected within the maxillary  sinuses. Left mastoid air cells appear relatively underpneumatized,  which may reflect changes of chronic mastoiditis/otitis media. No  calvarial fracture is seen. Ventricles are normal in size. There is  relatively mild microangiopathic change.       Impression:      Scattered areas of decreased attenuation are identified within the left  MCA territory. Areas of evolving infarction cannot be excluded. No  hemorrhagic transformation is seen. There is no midline shift or mass  effect.     Radiation dose reduction techniques were utilized, including automated  exposure control and exposure modulation based on body size.     This report was finalized on 4/17/2021 11:09 PM by Dr. Hillary Menendez M.D.       XR Chest 1 View [[id number]] Collected: 04/17/21 2247     Updated: 04/17/21 2251    Narrative:      SINGLE VIEW OF THE CHEST     HISTORY: Altered mental status     COMPARISON: 12/29/2020     FINDINGS:  Heart size is within normal limits for technique. Biapical scarring is  again seen. Background changes of COPD are noted. There is some left  basilar consolidation. This may represent atelectasis, but correlation  with any evidence of infection is recommended. There is no pneumothorax  or pleural effusion.       Impression:      Left basilar consolidation may represent atelectasis. However,  correlation with any  evidence of infection is recommended.     This report was finalized on 4/17/2021 10:48 PM by Dr. Hillary Menendez M.D.             Results for orders placed during the hospital encounter of 02/04/21    Adult Transthoracic Echo Complete W/ Cont if Necessary Per Protocol    Interpretation Summary  · Calculated left ventricular EF = 59.6% Estimated left ventricular EF = 60% Estimated left ventricular EF was in agreement with the calculated left ventricular EF. Left ventricular systolic function is normal. Normal left ventricular cavity size noted. Left ventricular wall thickness is consistent with moderate concentric hypertrophy. All left ventricular wall segments contract normally. Left ventricular diastolic function is consistent with (grade I) impaired relaxation.  · There is moderate, anterior mitral leaflet thickening present. Trace mitral valve regurgitation is present. No significant mitral valve stenosis is present.      No orders to display        Assessment/Plan     Active Hospital Problems    Diagnosis  POA   • **Acute ischemic stroke (CMS/HCC) [I63.9]  Yes   • Chronic diastolic (congestive) heart failure (CMS/HCC) [I50.32]  Yes   • Mixed hyperlipidemia [E78.2]  Yes   • Essential hypertension [I10]  Yes   • Allergic rhinitis [J30.9]  Yes     Ms. Castaneda is a 70 year old female with history of Hypertension, Chronic diastolic heart failure, hyperlipidemia, who presents to the ED with word finding difficulty.    Acute ischemic stroke  -Stroke order set initiated  -NPO until seen by speech therapy  -consult neurology  -obtain 2 d echo with agitated saline, last echo in 2/2021 showed CHF with EF 60% and patient started on lisinopril   -telemetry unit for monitoring  -PT/OT to eval and treat  -started on ASA in the ED, will continue  -will need to start lipitor when cleared to take PO    HTN/hyperlipidemia  -monitor BP, stable at this time, will not aggressively treat given acute infarct but monitor  closely  -patient does not have a statin on her medication list.      · I discussed the patient's findings and my recommendations with patient and ED provider.    VTE Prophylaxis - SCDs.  Code Status - Full code.       SANAZ Collazo  [facility]  04/18/21  00:40 EDT

## 2021-04-18 NOTE — SIGNIFICANT NOTE
04/18/21 1105   OTHER   Discipline physical therapist   Rehab Time/Intention   Session Not Performed other (see comments)  (PT holding today. Rapid response called this AM for increased NIH and neuro changes, pt to OR. PT will follow up tomorrow if appropriate.)   Recommendation   PT - Next Appointment 04/19/21

## 2021-04-18 NOTE — ANESTHESIA PREPROCEDURE EVALUATION
Anesthesia Evaluation     Nursing notes reviewed   no history of anesthetic complications:  NPO Solid Status: Waived due to emergency  NPO Liquid Status: Waived due to emergency           Airway   Mallampati: II  TM distance: >3 FB  Neck ROM: full  no difficulty expected  Dental - normal exam     Pulmonary - normal exam   (+) pneumonia resolved , a smoker Former,   (-) COPD, asthma, lung cancer  Cardiovascular - normal exam  Exercise tolerance: good (4-7 METS)    ECG reviewed  Rhythm: regular  Rate: normal    (+) hypertension well controlled less than 2 medications, valvular problems/murmurs MR, CHF Diastolic >=55%, hyperlipidemia,   (-) past MI, CAD, dysrhythmias, angina, cardiac stents, CABG    ROS comment: TTE 02/2021    ·Calculated left ventricular EF = 59.6% Estimated left ventricular EF = 60% Estimated left ventricular EF was in agreement with the calculated left ventricular EF. Left ventricular systolic function is normal. Normal left ventricular cavity size noted. Left ventricular wall thickness is consistent with moderate concentric hypertrophy. All left ventricular wall segments contract normally. Left ventricular diastolic function is consistent with (grade I) impaired relaxation.  ·There is moderate, anterior mitral leaflet thickening present. Trace mitral valve regurgitation is present. No significant mitral valve stenosis is present.       Neuro/Psych  (+) CVA residual symptoms, dizziness/light headedness, numbness,     (-) seizures, TIA  GI/Hepatic/Renal/Endo    (+) obesity,  hiatal hernia,  renal disease stones,   (-) GERD, PUD, hepatitis, liver disease, diabetes, GI bleed, no thyroid disorder    Musculoskeletal (-) negative ROS    Abdominal  - normal exam   Substance History - negative use     OB/GYN negative ob/gyn ROS         Other - negative ROS                       Anesthesia Plan    ASA 4 - emergent     general     intravenous induction     Anesthetic plan, all risks, benefits, and  alternatives have been provided, discussed and informed consent has been obtained with: patient.    Plan discussed with CRNA.

## 2021-04-18 NOTE — ED NOTES
"Nursing report ED to floor  Jazmyn Castaneda  70 y.o.  female    HPI (triage note):   Chief Complaint   Patient presents with   • Altered Mental Status       Admitting doctor:   Neil Flores MD    Admitting diagnosis:   The primary encounter diagnosis was Acute ischemic stroke (CMS/HCC). A diagnosis of Expressive aphasia was also pertinent to this visit.    Code status:   Current Code Status     Date Active Code Status Order ID Comments User Context       Prior    Advance Care Planning Activity          Allergies:   Cephalosporins, Penicillins, Ciprofloxacin, and Sulfa antibiotics    Weight:   There were no vitals filed for this visit.    Most recent vitals:   Vitals:    04/17/21 2115 04/17/21 2117 04/17/21 2125 04/17/21 2338   BP:   135/90 144/72   Pulse: 104  100 98   Resp: 17  16 16   Temp:    97.8 °F (36.6 °C)   TempSrc:    Oral   SpO2: 97%  92% 96%   Height:  152.4 cm (60\")         Active LDAs/IV Access:   Lines, Drains & Airways    Active LDAs     Name:   Placement date:   Placement time:   Site:   Days:    Ureteral Drain/Stent Right ureter 6 Fr.   07/18/19    0921    Right ureter   639                Labs (abnormal labs have a star):   Labs Reviewed   COMPREHENSIVE METABOLIC PANEL - Abnormal; Notable for the following components:       Result Value    Creatinine 0.52 (*)     CO2 21.1 (*)     Alkaline Phosphatase 150 (*)     BUN/Creatinine Ratio 26.9 (*)     Anion Gap 15.9 (*)     All other components within normal limits    Narrative:     GFR Normal >60  Chronic Kidney Disease <60  Kidney Failure <15     URINALYSIS W/ MICROSCOPIC IF INDICATED (NO CULTURE) - Abnormal; Notable for the following components:    Ketones, UA 40 mg/dL (2+) (*)     Blood, UA Trace (*)     Leuk Esterase, UA Trace (*)     All other components within normal limits   CBC WITH AUTO DIFFERENTIAL - Abnormal; Notable for the following components:    WBC 12.11 (*)     RBC 5.59 (*)     Hemoglobin 16.8 (*)     Hematocrit 51.3 (*) "     Neutrophils, Absolute 8.37 (*)     All other components within normal limits   URINALYSIS, MICROSCOPIC ONLY - Abnormal; Notable for the following components:    WBC, UA 6-12 (*)     All other components within normal limits   LIPASE - Normal   COVID PRE-OP / PRE-PROCEDURE SCREENING ORDER (NO ISOLATION)    Narrative:     The following orders were created for panel order COVID PRE-OP / PRE-PROCEDURE SCREENING ORDER (NO ISOLATION) - Swab, Nasopharynx.  Procedure                               Abnormality         Status                     ---------                               -----------         ------                     COVID-19,APTIMA PANTHER,...[[id number]]                      In process                   Please view results for these tests on the individual orders.   COVID-19,APTIMA PANTHER,[facility],NP/OP SWAB IN UTM/VTM/[facility] TRANSPORT MEDIA,24 HR TAT   CBC AND DIFFERENTIAL    Narrative:     The following orders were created for panel order CBC & Differential.  Procedure                               Abnormality         Status                     ---------                               -----------         ------                     CBC Auto Differential[[id number]]        Abnormal            Final result                 Please view results for these tests on the individual orders.       EKG:   No orders to display       Meds given in ED:   Medications   sodium chloride 0.9 % flush 10 mL (has no administration in time range)   aspirin chewable tablet 324 mg (324 mg Oral Not Given 4/17/21 2696)       Imaging results:  CT Head Without Contrast    Result Date: 4/17/2021  Scattered areas of decreased attenuation are identified within the left MCA territory. Areas of evolving infarction cannot be excluded. No hemorrhagic transformation is seen. There is no midline shift or mass effect.  Radiation dose reduction techniques were utilized, including automated exposure control and exposure modulation based on body  "size.  This report was finalized on 2021 11:09 PM by Dr. Hillary Menendez M.D.      XR Chest 1 View    Result Date: 2021  Left basilar consolidation may represent atelectasis. However, correlation with any evidence of infection is recommended.  This report was finalized on 2021 10:48 PM by Dr. Hillary Menendez M.D.        Ambulatory status:   - up w/ assist    Social issues:   Social History     Socioeconomic History   • Marital status:      Spouse name: Not on file   • Number of children: 2   • Years of education: College   • Highest education level: Not on file   Tobacco Use   • Smoking status: Former Smoker     Packs/day: 1.00     Types: Cigarettes     Quit date: 2020     Years since quittin.8   • Smokeless tobacco: Never Used   Substance and Sexual Activity   • Alcohol use: Yes     Comment: \"on occasion\"   • Drug use: No    Nursing report ED to floor     Mague Kern RN  [id number]    "

## 2021-04-18 NOTE — OP NOTE
PRE-OPERATIVE DIAGNOSIS  Acute stroke with  left proximal cervical ICA occlusion, left ICA terminus thrombus, and left MCA thrombus     POST-OPERATIVE DIAGNOSIS  Acute stroke now status post successful TICI2b revascularization of left proximal cervical carotid artery occlusion, left ICA terminus thrombus, left M2 thrombus     SURGEON  Mikhail Cheng MD       Devices used:  6 Grenadian sheath   Catheters:  VTK 5 Grenadian, Fubuki 6 Grenadian,  React aspiration catheter, 5 Grenadian Kait, 6 Grenadian Kait  Wires: 0.035 Glidewire   Microcatheter:   Velocity, Prowler select microcatheter  Microwire: Synchro 2 standard and Synchro 2 exchange length  Stent retriever: 6 x 40 mm Solitaire, 6 x 25mm Trevo stent retriever  Closure device: 6fr mynx      PROCEDURE(S)  Catheterization of right femoral artery   Aspiration of proximal ICA thrombus  Angioplasty of severe proximal left ICA stenosis  Aspiration of left ICA terminus thrombus  Stent retriever assisted mechanical thrombectomy of left M2 thrombus     Timeline:  Time in angio suite (TI):   9:55 AM  Puncture time (PT):   10:15 AM  First Pass: 10:46 AM aspiration of proximal cervical ICA thrombus  Second pass: 10:59 AM aspiration of proximal cervical ICA thrombus with successful revascularization of the proximal cervical ICA however distal ICA terminus thrombus is visible  Third pass: 1213,  aspiration of ICA terminus thrombus with partial removal of thrombus  Fourth pass: 1233,  6 mm x 40 mm Solitaire stent assisted aspiration of ICA terminus thrombus  Fifth pass: 1304,  6 mm x 25 mm Trevo stent assisted aspiration of ICA terminus thrombus  Sixth pass: 1322,  6 mm x 40 mm stent assisted aspiration of MCA thrombus  Seventh pass: 1423,  6 mm x 40 mm stent assisted aspiration of MCA thrombus with TICI 2b revascularization           Indications for procedure and informed consent:  The patient is a 70-year-old female recently admitted to the hospital with mild strokelike symptoms.  She  was evaluated by the neurology service for a mild expressive aphasia.  Earlier this morning there was a significant decline in her neurologic exam and she developed complete aphasia, left eye gaze deviation, right sided dense hemiparesis.  An emergent CTA was performed which showed a left proximal ICA occlusion, left ICA terminus thrombus, and left MCA thrombus.  The patient's daughter was consented for a endovascular emergent mechanical thrombectomy to revascularize her left carotid artery and MCA branches.  The risks and benefits of the procedure were discussed with the patient's daughter including but not limited to infection, hemorrhage, vessel injury, worsening of stroke, hemorrhage.  She expressed understanding of the risks and benefits of the procedure and elected to proceed.      Complications: None  Estimated blood loss:  Less than 200 cc     ANESTHESIA TYPE  General endotracheal anesthesia.        DESCRIPTION OF PROCEDURE   The patient was brought to the angio suite and was properly identified.  She was intubated and general endotracheal anesthesia was administered  by the anesthesia team.  Both groins were prepared and draped in a sterile fashion. A micropuncture was used to access into the right femoral artery. Using modified Seldinger technique, a 5F femoral sheath was introduced into the right common femoral artery.  The sheath was exchanged with a long 6 Palauan Fubuki sheath that was advanced into the aortic arch.  A 5 Palauan VTK catheter was introduced into the system over 35 Glidewire and navigated through the normal vascular channels and advanced selectively into the  left carotid artery.  Intravenous heparin was given with 7000 units.    The baseline angiogram of the cervical ICA demonstrated complete occlusion of the proximal left ICA shortly after the bifurcation of the common carotid artery.  A triaxial system with the suction catheter and the velocity microcatheter with the micro wire were  advanced into the internal carotid artery and subsequently in the intracranial ICA.  Runs confirmed complete occlusion of the distal  left ICA at the terminus.  The 6 Andorran sheath was advanced as proximal as possible to the occluded ICA and a 5 Andorran Kait catheter was used to aspirate thrombus from the left occluded cervical ICA.  A follow-up angiogram demonstrated some improvement with contrast filling beyond the previously occluded segment however is unable to advance the 5 Andorran Kait catheter more distal to retrieve the ICA terminus thrombus.  Multiple attempts were made using the 5 Andorran Kait catheter to aspirate thrombus and advanced over a microcatheter beyond the stenotic segment.  Next, the Kait catheter was removed.  An attempt to advance a 3.5 mm EZ filter beyond the stenotic segment was made however the stenosis was too severe.  A 3 mm x 20 mm trek balloon was then advanced over a Synchro 2 microwire and a gentle angioplasty was performed while simultaneously aspirating from the 6 Andorran sheath to remove any debris.  This was then followed up by attempting to advance the 5 Andorran Kait catheter beyond the stenotic segment however the stenosis did not improve.  Another attempt was made to advance the EZ filter however it was still too stenotic.  Next, a 4 mm x 20 mm aviator balloon was advanced over the Synchro 2 exchange length wire and centered across the stenotic segment and gently inflated to nominal pressure with simultaneous penumbra assisted aspiration from the 6 Andorran sheath catch any debris.  Before a follow-up angiogram the 5 Andorran Kait was advanced while connected to the penumbra aspiration throughout the proximal cervical ICA and to the ICA terminus.  A follow-up angiogram showed successful revascularization of the proximal left cervical ICA, but a nearly occlusive ICA terminus thrombus.  Multiple attempts were made to aspirate the thrombus with the 6 Andorran Kait catheter  however this was unsuccessful.  Next, a velocity microcatheter was advanced into the M2 segment a cross a more distal thrombus.  After 2 minutes the stent was retrieved and an attempt was made to advance the aspiration catheter through the ICA terminus thrombus.  A follow-up angiogram showed no significant improvement.  Next, the Kait catheter was removed and replaced with a react Luk[employer] aspiration catheter and a Prowler micro catheter was advanced into an occluded MCA segment and a 6 mm x 25 mm Trevo stent was deployed and retrieved after 2 minutes.  A follow-up angiogram demonstrated successful revascularization of the ICA terminus with some residual thrombus in M2 branches.  2 additional passes were made using a 6 mm x 40 mm Solitaire stent and a follow-up angiogram after each pass demonstrated improvement.  After the fourth pass with a stent retriever and seventh attempted aspiration the decision was made to end the procedure as the follow-up angiogram demonstrated significant improvement with TICI2b revascularization.  The catheters were pulled back into the cervical carotid artery and the proximal left ICA remained patent without any severe flow-limiting stenosis.  The catheters were then pulled back and a femoral angiogram was performed which showed a small caliber left femoral artery with the access site over the femoral head and no evidence of dissection or contrast extravasation.  Because the left femoral artery appeared to be small caliber the the decision was made to close with a 6 Maori Mynx closure device.  The catheters were then removed and 15 minutes of pressure was held.  The patient was extubated and transferred to the ICU in stable condition.           Interpretation of the films:  1. Right femoral artery, oblique view:  This was completed at the end of the procedure showing puncture site above the bifurcation with no extravasation of contrast     2.   Left common and Internal carotid  artery: AP, lateral and obliques.  The common carotid artery has mild tortuosity.  The external carotid artery branches appear to be filling normally however there is a complete occlusion of the proximal left ICA shortly after its origin.  Subsequent images demonstrate inflation of the 3 mm x 20 mm angioplasty balloon across the severely stenotic segment while aspirating from the proximal 6 Czech sheath to collect any debris.  Follow-up images show revascularization of the proximal cervical ICA with a large thrombus in the left ICA terminus.  There are multiple follow-up images after each attempt at aspiration of the ICA terminus thrombus.  A follow-up angiogram after stent assisted aspiration of the ICA terminus thrombus with a 6 mm x 40 mm Solitaire stent showed no significant improvement.  Another follow-up image after stent assisted aspiration of the ICA terminus with the 6 mm x 25 mm Trevo stent showed successful removal of the thrombus with revascularization of the ICA terminus.. He follow-up images demonstrated revascularization of the AMPARO branches and partial revascularization of MCA branches.  There was however a thrombus within an M2 branch of the superior division and an M2 branch of the inferior division.  A follow-up angiogram after retrieval of each thrombus can be seen with progressive revascularization.  The final follow-up images show near complete revascularization of the ICA terminus, MCA and AMPARO vessels.  There is no evidence of dissection or contrast extravasation.  Final follow-up images of the cervical carotid artery demonstrate stable successful revascularization of the proximal left cervical ICA with mild to moderate stenosis and no significant flow-limiting stenosis.  There is no evidence of carotid artery dissection or extravasation.

## 2021-04-18 NOTE — ANESTHESIA POSTPROCEDURE EVALUATION
Patient: Jazmyn Castaneda    Procedure Summary     Date: 04/18/21 Room / Location:  [facility] 19 INV /  [facility] 18/19    Anesthesia Start: 0956 Anesthesia Stop: 1533    Procedure: EMBOLECTOMY MECHANICAL (N/A ) Diagnosis:     Surgeons: Justo Cheng MD Provider: Seymour Rapp MD    Anesthesia Type: general ASA Status: 4 - Emergent          Anesthesia Type: general    Vitals  Vitals Value Taken Time   /48 04/18/21 1645   Temp     Pulse 92 04/18/21 1646   Resp     SpO2 97 % 04/18/21 1646   Vitals shown include unvalidated device data.        Post Anesthesia Care and Evaluation    Patient location during evaluation: PACU  Anesthetic complications: No anesthetic complications

## 2021-04-18 NOTE — CONSULTS
DOS: 2021  NAME: Jazmyn Castaneda   : 1950  PCP: Samira Yi MD  CC: Stroke  Referring MD: Neil Flores, Dr. Mikhail Cheng.    Neurological Problem and Interval History:  70 y.o. right-handed white female with a Hx of hyperlipidemia and hypertension who lives independently at home sent out a text yesterday to her son which did not make any sense.  For that reason her son called her back yesterday evening and noted that her speech was garbled and he went to check on her and found that she was walking around but she was confused and her speech was garbled and brought to the emergency room.  The initial NIH stroke scale as recorded by Dr. Beaulieu the emergency room physician was 1.  Later on the patient had a routine CT of the brain done but no further imaging. The patient was then transferred as a routine to the [facility] for further stroke work-up. My colleague Dr. Austin Polanco was notified of the admission last night as a routine admission.    This morning around 9 AM I was called by the nursing staff and a rapid response was organized because the patient started having worsening of the symptoms with NIH stroke scale between 10 and 14. The patient was then brought to our CT scanner stat and when I examined her and noticed that her speech was nonsensical and she followed all commands well but she definitely had drift in the right upper and lower extremities. It was also noted that her systolic blood pressure was low at 80 and so a full bolus of fluids was given and her systolic blood pressure improved and it seems that she also improved. Sensations were well noticeable in the right upper and lower extremities and the face was symmetric and the tongue was midline. It also seems that she had a left-sided preferential gaze but she was able to cross the midline to look on the right side. It was also noticeable that she has a right-sided hemianopia as well.    The NIH stroke scale was  between eight and 10 and it was fluctuating and for that reason when the CT angiogram was showing a thrombus possibly at the left M1 segment as well as severe atherosclerotic disease in the brachiocephalic artery as well as possibly in the left internal carotid artery as well, I contacted Dr. Mikhail Cheng, the neuro interventionalists who got in touch with her daughter Ms. Juan Alberto Garza on her cell phone and explained the whole situation to her. I also did the same and the daughter agreed for neuro intervention at this point. Patient then went for neuro intervention as per protocol.    Past Medical/Surgical Hx:  Past Medical History:   Diagnosis Date   • Acute ischemic stroke (CMS/HCC) 2021   • Age-related osteoporosis without current pathological fracture 2021   • Anesthesia     WOKE UP DURING CATARACT SURGERY   • Chronic diastolic (congestive) heart failure (CMS/HCC) 2021   • Collapse of lung 1980s    history of in past post trauma   • Diverticulosis    • Environmental allergies     Some pollens, grass, trees, flowers   • Essential hypertension 3/24/2017   • Exogenous obesity 3/24/2017   • H/O Pneumonia    • Infectious mononucleosis    • Kidney infection    • Kidney stone    • Mixed hyperlipidemia 2019   • Neuromuscular disorder (CMS/HCC)    • Sepsis secondary to UTI (CMS/HCC) 2019   • Tobacco abuse 2019   • Urinary tract infection    • Vertigo     past history of several times     Past Surgical History:   Procedure Laterality Date   • BREAST EXCISIONAL BIOPSY Right     30 something when it was done; says it was precancerous   • CATARACT EXTRACTION, BILATERAL     •  SECTION     • CYSTOSCOPY N/A 2019    Procedure: CYSTOSCOPY AND STENT PLACEMENT;  Surgeon: Alen Lal MD;  Location: [facility];  Service: Urology   • MASTOID SURGERY     • NASAL SEPTAL RECONSTRUCTION     • SINUS SURGERY      scraped and prior deviated septum   • URETEROSCOPY LASER LITHOTRIPSY WITH  STENT INSERTION Right 7/18/2019    Procedure: CYSTOSCOPY, RIGHT URETEROSCOPY, LASER LITHOTRIPSY, STONE BASKET EXTRACTION, STENT PLACEMENT WITHOUT STRINGS;  Surgeon: Alfredo Gongora Jr., MD;  Location: [facility];  Service: Urology       Review of Systems:    Constitutional: Afebrile looks otherwise very healthy  Cardiovascular: No chest pain or palpitations. The blood pressure was initially low which was corrected with a bolus.  Respiratory: No shortness of breath.  Gastrointestinal: No nausea and vomiting noted.  Genitourinary: Putting out good urine.  Musculoskeletal: Drift noticeable in the right upper and lower extremities but no joint pain.  Dermatological: No skin breakdown or rashes noted.  Neurological: The NIH stroke scale is fluctuating from 10-14.  Psychiatric: No anxiety or depression noted.  Ophthalmological: New right-sided hemianopia. Also has right-sided hemineglect.          Medications On Admission  Medications Prior to Admission   Medication Sig Dispense Refill Last Dose   • calcium carbonate-cholecalciferol (Calcium 500+D) 500-400 MG-UNIT tablet tablet Take 1 tablet by mouth Daily.   Unknown at Unknown time   • cetirizine (zyrTEC) 5 MG tablet Take 5 mg by mouth Daily.   Unknown at Unknown time   • fluticasone (FLONASE) 50 MCG/ACT nasal spray 2 sprays into the nostril(s) as directed by provider Daily.   Unknown at Unknown time   • lisinopril (PRINIVIL,ZESTRIL) 2.5 MG tablet Take 1 tablet by mouth Daily. 90 tablet 1 Unknown at Unknown time   • montelukast (SINGULAIR) 10 MG tablet TAKE 1 TABLET EVERY NIGHT 90 tablet 3 Unknown at Unknown time       Allergies:  Allergies   Allergen Reactions   • Cephalosporins Swelling   • Penicillins Swelling   • Ciprofloxacin Rash   • Sulfa Antibiotics Unknown - Low Severity     Patient states she lost her vision temporarily when using Sulfa based eye ointment, also, broke out in rash w/oral medication.       Social Hx:  Social History     Socioeconomic  "History   • Marital status:      Spouse name: Not on file   • Number of children: 2   • Years of education: College   • Highest education level: Not on file   Tobacco Use   • Smoking status: Former Smoker     Packs/day: 1.00     Types: Cigarettes     Quit date: 2020     Years since quittin.8   • Smokeless tobacco: Never Used   Substance and Sexual Activity   • Alcohol use: Yes     Comment: \"on occasion\"   • Drug use: No       Family Hx:  Family History   Problem Relation Age of Onset   • Hypertension Mother    • Aneurysm Mother    • Heart failure Mother    • Heart disease Mother    • Heart disease Father    • Hypertension Father    • Kidney disease Father    • Cancer Father 82        Bladder   • Hypertension Sister    • Cancer Maternal Aunt    • Stroke Maternal Uncle    • Glaucoma Maternal Grandmother    • Cancer Maternal Grandmother    • Stroke Maternal Grandfather    • Aneurysm Maternal Grandfather    • Cancer Maternal Aunt    • Cancer Maternal Aunt    • Liver cancer Other    • Pancreatic cancer Other    • Malig Hyperthermia Neg Hx        Review of Imaging (Interpretation of images not reports):      Laboratory Results:   Lab Results   Component Value Date    GLUCOSE 90 2021    CALCIUM 9.5 2021     2021    K 3.6 2021    CO2 21.7 (L) 2021     2021    BUN 13 2021    CREATININE 0.70 2021    EGFRIFAFRI 100 2021    EGFRIFNONA 82 2021    BCR 18.6 2021    ANIONGAP 11.3 2021     Lab Results   Component Value Date    WBC 15.32 (H) 2021    HGB 14.5 2021    HCT 42.3 2021    MCV 92.2 2021     2021     Lab Results   Component Value Date    CHOL 171 2021     Lab Results   Component Value Date    HDL 30 (L) 2021    HDL 39 (L) 2021    HDL 34 (L) 2020     Lab Results   Component Value Date     (H) 2021     (H) 2021     (H) 2020 " "    Lab Results   Component Value Date    TRIG 85 04/18/2021    TRIG 159 (H) 01/19/2021    TRIG 144 06/29/2020     Lab Results   Component Value Date    HGBA1C 5.71 (H) 04/18/2021     Lab Results   Component Value Date    INR 1.05 04/18/2021    PROTIME 13.5 04/18/2021         Physical Examination:  /81   Pulse 97   Temp 98.2 °F (36.8 °C) (Oral)   Resp 18   Ht 152.4 cm (60\")   Wt 69.1 kg (152 lb 4.8 oz)   SpO2 92%   BMI 29.74 kg/m²   General Appearance:   Well developed, well nourished, well groomed, alert, and cooperative.  HEENT: Normocephalic.  Normal fundoscopic exam including normal retina, discs are flat with sharp margins, normal vasculature.  Neck and Spine: Normal range of motion.  Normal alignment. No mass or tenderness. No bruits.  Cardiac: Regular rate and rhythm. No murmurs.  Peripheral Vasculature: Radial and pedal pulses are equal and symmetric. No signs of distal embolization.  Extremities:    No edema or deformities. Normal joint ROM. ANGELA hoses and SCD's in place  Skin:    No rashes or birth marks.    Neurological examination:  Higher Integrative  Function: Oriented to time, place and person. Normal registration, recall, attention span and concentration. Normal language including comprehension, spontaneous speech, repetition, reading, writing, naming and vocabulary. No neglect with normal visual-spatial function and construction. Normal fund of knowledge and higher integrative function.  CN II: Pupils are equal, round, and reactive to light. Normal visual acuity and visual fields.    CN III IV VI: Extraocular movements are full without nystagmus.   CN V: Normal facial sensation and strength of muscles of mastication.  CN VII: Facial movements are symmetric. No weakness.  CN VIII:   Auditory acuity is normal.  CN IX & X:   Symmetric palatal movement.  CN XI: Sternocleidomastoid and trapezius are normal.  No weakness.  CN XII:   The tongue is midline.  No atrophy or " fasciculations.  Motor: Normal muscle strength, bulk and tone in upper and lower extremities.  No fasciculations, rigidity, spasticity, or abnormal movements.  Reflexes: 2+ in the upper and lower extremities. Plantar responses are flexor.  Sensation: Normal to light touch, pinprick, vibration, temperature, and proprioception in arms and legs. Normal graphesthesia and no extinction on DSS.  Station and Gait: Normal gait and station.    Coordination: Finger to nose test shows no dysmetria.  Rapid alternating movements are normal.  Heel to shin normal.    NIHSS:    Baseline  0-->Alert: keenly responsive  2-->Answers neither question correctly  0-->Performs both tasks correctly  1-->Partial gaze palsy: gaze is abnormal in one or both eyes, but forced deviation or total gaze paresis is not present  2=Complete hemianopia  0=Normal symmetric movement  0-->No drift: limb holds 90 (or 45) degrees for full 10 secs  1-->Drift: limb holds 90 (or 45) degrees, but drifts down before full 10 seconds: does not hit bed or other support  0-->No drift: limb holds 90 (or 45) degrees for full 10 secs  1-->Drift: limb holds 90 (or 45) degrees, but drifts down before full 10 seconds: does not hit bed or other support  2=Present in two limbs  2=Severe to total sensory loss; patient is not aware of being touched in face, arm, leg  2-->Severe aphasia: all communication is through fragmentary expression: great need for inference, questioning, and guessing by the listener. Range of information that can be exchanged is limited: listener carries burden of communication. Examiner cannot identify materials provided from patient response.  2=Severe; patient speech is so slurred as to be unintelligible in the absence of or our of proportion to any dysphagia, or is mute/anarthric  2=Profound celeste-inattention or celeste-inattention to more than one modality.    Total score: 16    Diagnoses / Discussion:  70 y.o. who presents with Sx of acute  cerebrovascular event with evolving stroke which initially in the emergency room was one and then it worsened. Currently ranging between 10-16 at times fluctuating and for that reason the neuro imaging was performed that agrees with it.    Plan:  Aspirin 650 mg rectal suppository   Hydration with intravenous fluids normal saline bolus and the blood pressure improved  Neuro checks as per protocol  Check lipid panel, Hgb A1C  Lipitor 80 mg  Non-pharmacological DVT prophylaxis  LIBERTAD will be performed once medically stable.  CT/CTA/CTP showed the following which was also reviewed on the PACS with our neuroradiologist Dr. Shaun Weir:   FINDINGS: The following findings are present:  1. An initial noncontrast CT scan of the brain was performed. The  ventricles are normal in size and midline. There is some mild chronic  small vessel ischemic change. There are several vague areas of decreased  density in the left MCA territory consistent with early evolutionary  change of acute infarcts and this is similar to the CT scan performed 11  hours ago. There is no acute intracranial hemorrhage. There is some very  mild mass effect with early effacement of the cortical sulci. The  findings of the noncontrast CT scan were communicated to the team D  physician when imaging made available at 9:15 AM. The postcontrast  findings were communicated when imaging made available at 9:30 AM.  2. Evaluation for stenosis is based on NASCET criteria. The vertebral  arteries are patent with dominance of the right vertebral artery. Both  supply the basilar artery. There is also severe stenosis at the origin  of the left innominate artery proximal to the left vertebral artery. The  degree of stenosis is somewhat difficult to measure but is probably in  the range of 80%. No posterior communicating arteries are present.  3. The right cervical carotid artery demonstrates some atheromatous  irregularity at the origin of the right internal carotid  artery without  significant stenosis.  4. The left cervical carotid artery demonstrates very severe stenosis at  the origin of the left internal carotid artery with an adjacent low  density atheromatous plaque or thrombus as best seen on images 118  through 122. The degree of stenosis is approximately 85-90%. The  internal carotid artery just above this level has a short segment of  normal caliber and then becomes quite small in caliber at the level of  C1 and extending into the carotid canal and carotid siphon. No definite  dissection is identified.  5. The intracranial CT angiography shows very small caliber left  internal carotid artery as described above. 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 There is subsequent reconstitution of the left middle  cerebral artery via crossover filling from the anterior communicating  artery and left A1 segment. The caliber of the left A1 and M1 segments  appears normal. No thrombus is seen in these segments. No thrombus is  identified in the left M2 segments.  6. The color CT perfusion imaging demonstrates abnormality in the left  frontal lobe with cerebral blood flow less than 30% having a volume of 5  mL. There is a larger area of Tmax greater than 6 seconds in the left  frontal lobe and extending into the mid parietal region with a volume of  17 mL. The mismatch ratio measures 3.4.   MRI of the brain is currently pending.  EKG telemetry did not show any evidence of cardiac abnormalities  PT/OT/ST will be starting from tomorrow.  Stroke Education  Blood pressure control to keep the systolic blood pressure between 160 and 180 if possible with intravenous fluids.  Goal LDL <70-recommend high dose statins-   Serum glucose < 140     Discussed signs and symptoms of stroke and the progression of the NIH stroke scale that needed urgent stroke intervention as discussed above.. Instructed to follow a low fat  diet including the Mediterranean diet. Instructed to take all medications daily as prescribed. Encouraged 30 minutes of exercise 3-4 times a week as tolerated. Stay well hydrated and use IV fluids to maintain perfusion. Discussed potential side effects of new medications and signs and symptoms to report. As per Dr. Cheng, the patient might need to be stented to maintain the circulation and for that reason might need to be on low heparin dose without bolus. Reviewed stroke risk factors and stroke prevention plan. Patient's daughter verbalized understanding and agreed with plan. Patient will be in the intensive care unit setting for the next 24 to 48 hours.    I have discussed the above with the ICU team and family.  Time spent with patient: 90 minutes in critical care evaluation and management of the patient.  Coding    Dictated using Dragon dictation.     Patient was reevaluated following retrieval of the thrombus from the left ICA and the left MCA distal segment.  Please review the surgical notes of Dr. Mikhail Cheng for all the details.    NIH stroke scale:    4 hours post treatment  2-->Not alert: requires repeated stimulation to attend, or is obtunded and requires strong or painful stimulation to make movements (not stereotyped)  2-->Answers neither question correctly  0-->Performs both tasks correctly  1-->Partial gaze palsy: gaze is abnormal in one or both eyes, but forced deviation or total gaze paresis is not present  1=Partial hemianopia  0=Normal symmetric movement  0-->No drift: limb holds 90 (or 45) degrees for full 10 secs  1-->Drift: limb holds 90 (or 45) degrees, but drifts down before full 10 seconds: does not hit bed or other support  0-->No drift: limb holds 90 (or 45) degrees for full 10 secs  0-->No drift: limb holds 90 (or 45) degrees for full 10 secs  1=Present in one limb  1=Mild to moderate sensory loss; patient feels pinprick is less sharp or is dull on the affected side; there is a loss of  superficial pain with pinprick but patient is aware She is being touched  3-->Mute, global aphasia: no usable speech or auditory comprehension  2=Severe; patient speech is so slurred as to be unintelligible in the absence of or our of proportion to any dysphagia, or is mute/anarthric  2=Profound celeste-inattention or celeste-inattention to more than one modality.    Total score: 15    Patient was initially hypotensive and the goal is to keep the systolic blood pressure between 110 and 140.  Mateo-Synephrine drip was started.  It is currently at the lowest level.  We will see how she does after the next 6 to 8 hours.  Tomorrow my colleague Dr. Mallory will be following the patient.

## 2021-04-18 NOTE — ANESTHESIA PROCEDURE NOTES
Airway  Urgency: elective    Date/Time: 4/18/2021 10:04 AM  Airway not difficult    General Information and Staff    Patient location during procedure: OR  Anesthesiologist: Seymour Rapp MD  CRNA: Douglas Sánchez CRNA    Indications and Patient Condition  Indications for airway management: airway protection    Preoxygenated: yes  MILS maintained throughout  Mask difficulty assessment: 0 - not attempted    Final Airway Details  Final airway type: endotracheal airway      Successful airway: ETT  Cuffed: yes   Successful intubation technique: direct laryngoscopy and RSI  Facilitating devices/methods: Bougie  Endotracheal tube insertion site: oral  Blade: Elizondo  Blade size: 2  Cormack-Lehane Classification: grade IIb - view of arytenoids or posterior of glottis only  Placement verified by: chest auscultation and capnometry   Cuff volume (mL): 6  Measured from: lips  ETT/EBT  to lips (cm): 21  Number of attempts at approach: 1  Assessment: lips, teeth, and gum same as pre-op and atraumatic intubation

## 2021-04-18 NOTE — ED PROVIDER NOTES
EMERGENCY DEPARTMENT ENCOUNTER    Room Number:  42/42  Date seen:  4/17/2021  PCP: Samira Yi MD  Historian: Patient, daughter      HPI:  Chief Complaint: Altered mental status  A complete HPI/ROS/PMH/PSH/SH/FH are unobtainable due to: Nothing  Context: Jazmyn Castaneda is a 70 y.o. female who presents to the ED c/o altered mental status.  Patient's daughter reports that patient was last known normal on Wednesday.  Tonight around 7 PM, patient had sent her son a text message that was nonsensical.  Patient confirms that she is having difficulty finding words.  She denies any focal weakness or sensory changes.  She denies any vision disturbance.  Patient is unsure exactly when her symptoms started.            PAST MEDICAL HISTORY  Active Ambulatory Problems     Diagnosis Date Noted   • Nephrolithiasis 02/16/2017   • Allergic rhinitis 02/18/2017   • Diverticulosis of large intestine without hemorrhage 02/18/2017   • Hiatal hernia 02/18/2017   • Medicare annual wellness visit, subsequent 03/24/2017   • Exogenous obesity 03/24/2017   • Encounter for screening colonoscopy 03/24/2017   • Visit for screening mammogram 03/24/2017   • Breast mass, left 03/24/2017   • Essential hypertension 03/24/2017   • Erythrocytosis 05/08/2017   • Vertigo 03/29/2018   • Neutrophilic leukocytosis 02/06/2019   • Mixed hyperlipidemia 04/17/2019   • Right hydronephrosis with urinary obstruction due to ureteral calculus 07/04/2019   • Allergy to multiple antibiotics 07/05/2019   • Tobacco abuse 07/06/2019   • Ureteral stone 07/18/2019   • Colon cancer screening 06/29/2020   • Age-related osteoporosis without current pathological fracture 01/19/2021   • Chronic diastolic (congestive) heart failure (CMS/HCC) 02/05/2021     Resolved Ambulatory Problems     Diagnosis Date Noted   • Acute pyelonephritis 02/16/2017   • Acute cystitis 02/16/2017   • History of drug-induced anaphylaxis 02/18/2017   • Acute non-recurrent frontal sinusitis  10/15/2018   • Bronchitis 10/15/2018   • Sepsis due to urinary tract infection (CMS/HCC) 2019     Past Medical History:   Diagnosis Date   • Acute ischemic stroke (CMS/HCC) 2021   • Anesthesia    • Collapse of lung    • Diverticulosis    • Environmental allergies    • H/O Pneumonia    • Infectious mononucleosis    • Kidney infection    • Kidney stone    • Neuromuscular disorder (CMS/HCC)    • Sepsis secondary to UTI (CMS/HCC) 2019   • Urinary tract infection          PAST SURGICAL HISTORY  Past Surgical History:   Procedure Laterality Date   • BREAST EXCISIONAL BIOPSY Right     30 something when it was done; says it was precancerous   • CATARACT EXTRACTION, BILATERAL     •  SECTION     • CYSTOSCOPY N/A 2019    Procedure: CYSTOSCOPY AND STENT PLACEMENT;  Surgeon: Alen Lal MD;  Location: [facility];  Service: Urology   • MASTOID SURGERY     • NASAL SEPTAL RECONSTRUCTION     • SINUS SURGERY      scraped and prior deviated septum   • URETEROSCOPY LASER LITHOTRIPSY WITH STENT INSERTION Right 2019    Procedure: CYSTOSCOPY, RIGHT URETEROSCOPY, LASER LITHOTRIPSY, STONE BASKET EXTRACTION, STENT PLACEMENT WITHOUT STRINGS;  Surgeon: Alfredo Gongora Jr., MD;  Location: [facility];  Service: Urology         FAMILY HISTORY  Family History   Problem Relation Age of Onset   • Hypertension Mother    • Aneurysm Mother    • Heart failure Mother    • Heart disease Mother    • Heart disease Father    • Hypertension Father    • Kidney disease Father    • Cancer Father 82        Bladder   • Hypertension Sister    • Cancer Maternal Aunt    • Stroke Maternal Uncle    • Glaucoma Maternal Grandmother    • Cancer Maternal Grandmother    • Stroke Maternal Grandfather    • Aneurysm Maternal Grandfather    • Cancer Maternal Aunt    • Cancer Maternal Aunt    • Liver cancer Other    • Pancreatic cancer Other    • Malig Hyperthermia Neg Hx          SOCIAL HISTORY  Social History  "    Socioeconomic History   • Marital status:      Spouse name: Not on file   • Number of children: 2   • Years of education: College   • Highest education level: Not on file   Tobacco Use   • Smoking status: Former Smoker     Packs/day: 1.00     Types: Cigarettes     Quit date: 2020     Years since quittin.8   • Smokeless tobacco: Never Used   Substance and Sexual Activity   • Alcohol use: Yes     Comment: \"on occasion\"   • Drug use: No         ALLERGIES  Cephalosporins, Penicillins, Ciprofloxacin, and Sulfa antibiotics        REVIEW OF SYSTEMS  Review of Systems   Review of all 14 systems is negative other than stated in the HPI above.      PHYSICAL EXAM  ED Triage Vitals   Temp Heart Rate Resp BP SpO2   21   98 °F (36.7 °C) 108 18 135/90 99 %      Temp src Heart Rate Source Patient Position BP Location FiO2 (%)   218 21 -- -- --   Oral Monitor            GENERAL: Awake and alert, no acute distress  HENT: nares patent  EYES: no scleral icterus, left pupil 3 mm and reactive, right pupil is 4 mm and slightly irregular  CV: regular rhythm, normal rate  RESPIRATORY: normal effort, lungs clear to auscultation bilaterally  ABDOMEN: soft, nontender throughout  MUSCULOSKELETAL: no deformity  NEURO: alert, moves all extremities, follows commands, normal strength throughout all extremities, normal sensation to light touch throughout all extremities.  Patient's speech is nondysarthric but it is not fluent and she does have some mild expressive aphasia and anosmia.    PSYCH:  calm, cooperative  SKIN: warm, dry    Vital signs and nursing notes reviewed.          LAB RESULTS  Recent Results (from the past 24 hour(s))   Comprehensive Metabolic Panel    Collection Time: 21  9:50 PM    Specimen: Arm, Left; Blood   Result Value Ref Range    Glucose 96 65 - 99 mg/dL    BUN 14 8 - 23 mg/dL    Creatinine 0.52 (L) 0.57 - 1.00 " mg/dL    Sodium 137 136 - 145 mmol/L    Potassium 4.8 3.5 - 5.2 mmol/L    Chloride 100 98 - 107 mmol/L    CO2 21.1 (L) 22.0 - 29.0 mmol/L    Calcium 9.8 8.6 - 10.5 mg/dL    Total Protein 7.4 6.0 - 8.5 g/dL    Albumin 4.30 3.50 - 5.20 g/dL    ALT (SGPT) 21 1 - 33 U/L    AST (SGOT) 31 1 - 32 U/L    Alkaline Phosphatase 150 (H) 39 - 117 U/L    Total Bilirubin 0.6 0.0 - 1.2 mg/dL    eGFR Non African Amer 116 >60 mL/min/1.73    Globulin 3.1 gm/dL    A/G Ratio 1.4 g/dL    BUN/Creatinine Ratio 26.9 (H) 7.0 - 25.0    Anion Gap 15.9 (H) 5.0 - 15.0 mmol/L   Lipase    Collection Time: 04/17/21  9:50 PM    Specimen: Arm, Left; Blood   Result Value Ref Range    Lipase 15 13 - 60 U/L   Urinalysis With Microscopic If Indicated (No Culture) - Urine, Catheter    Collection Time: 04/17/21  9:50 PM    Specimen: Urine, Catheter   Result Value Ref Range    Color, UA Yellow Yellow, Straw    Appearance, UA Clear Clear    pH, UA <=5.0 5.0 - 8.0    Specific Gravity, UA 1.011 1.005 - 1.030    Glucose, UA Negative Negative    Ketones, UA 40 mg/dL (2+) (A) Negative    Bilirubin, UA Negative Negative    Blood, UA Trace (A) Negative    Protein, UA Negative Negative    Leuk Esterase, UA Trace (A) Negative    Nitrite, UA Negative Negative    Urobilinogen, UA 1.0 E.U./dL 0.2 - 1.0 E.U./dL   CBC Auto Differential    Collection Time: 04/17/21  9:50 PM    Specimen: Arm, Left; Blood   Result Value Ref Range    WBC 12.11 (H) 3.40 - 10.80 10*3/mm3    RBC 5.59 (H) 3.77 - 5.28 10*6/mm3    Hemoglobin 16.8 (H) 12.0 - 15.9 g/dL    Hematocrit 51.3 (H) 34.0 - 46.6 %    MCV 91.8 79.0 - 97.0 fL    MCH 30.1 26.6 - 33.0 pg    MCHC 32.7 31.5 - 35.7 g/dL    RDW 13.0 12.3 - 15.4 %    RDW-SD 43.7 37.0 - 54.0 fl    MPV 11.6 6.0 - 12.0 fL    Platelets 338 140 - 450 10*3/mm3    Neutrophil % 69.1 42.7 - 76.0 %    Lymphocyte % 23.3 19.6 - 45.3 %    Monocyte % 5.9 5.0 - 12.0 %    Eosinophil % 0.6 0.3 - 6.2 %    Basophil % 0.7 0.0 - 1.5 %    Immature Grans % 0.4 0.0 - 0.5 %     Neutrophils, Absolute 8.37 (H) 1.70 - 7.00 10*3/mm3    Lymphocytes, Absolute 2.82 0.70 - 3.10 10*3/mm3    Monocytes, Absolute 0.72 0.10 - 0.90 10*3/mm3    Eosinophils, Absolute 0.07 0.00 - 0.40 10*3/mm3    Basophils, Absolute 0.08 0.00 - 0.20 10*3/mm3    Immature Grans, Absolute 0.05 0.00 - 0.05 10*3/mm3    nRBC 0.0 0.0 - 0.2 /100 WBC   Urinalysis, Microscopic Only - Urine, Catheter    Collection Time: 04/17/21  9:50 PM    Specimen: Urine, Catheter   Result Value Ref Range    RBC, UA 0-2 None Seen, 0-2 /HPF    WBC, UA 6-12 (A) None Seen, 0-2 /HPF    Bacteria, UA None Seen None Seen /HPF    Squamous Epithelial Cells, UA 0-2 None Seen, 0-2 /HPF    Hyaline Casts, UA None Seen None Seen /LPF    Methodology Automated Microscopy        Ordered the above labs and reviewed the results.        RADIOLOGY  CT Head Without Contrast    Result Date: 4/17/2021  CT HEAD WITHOUT CONTRAST  HISTORY: Altered mental status and slurred speech  COMPARISON: None available.  TECHNIQUE: Axial CT imaging was obtained through the brain. No IV contrast was administered.  FINDINGS: No acute intracranial hemorrhage is identified. There are multifocal areas of decreased attenuation which are noted within the left MCA territory. Subacute infarct cannot be excluded. Single largest area is noted within the left frontal lobe, and measures up to 2.8 cm on the coronal series. There is no midline shift or mass effect. No calvarial fracture is seen. Inspissated secretions are noted within the sphenoid sinus. Mild mucosal thickening is suspected within the maxillary sinuses. Left mastoid air cells appear relatively underpneumatized, which may reflect changes of chronic mastoiditis/otitis media. No calvarial fracture is seen. Ventricles are normal in size. There is relatively mild microangiopathic change.      Scattered areas of decreased attenuation are identified within the left MCA territory. Areas of evolving infarction cannot be excluded. No  hemorrhagic transformation is seen. There is no midline shift or mass effect.  Radiation dose reduction techniques were utilized, including automated exposure control and exposure modulation based on body size.  This report was finalized on 4/17/2021 11:09 PM by Dr. Hillary Menendez M.D.      XR Chest 1 View    Result Date: 4/17/2021  SINGLE VIEW OF THE CHEST  HISTORY: Altered mental status  COMPARISON: 12/29/2020  FINDINGS: Heart size is within normal limits for technique. Biapical scarring is again seen. Background changes of COPD are noted. There is some left basilar consolidation. This may represent atelectasis, but correlation with any evidence of infection is recommended. There is no pneumothorax or pleural effusion.      Left basilar consolidation may represent atelectasis. However, correlation with any evidence of infection is recommended.  This report was finalized on 4/17/2021 10:48 PM by Dr. Hillary Menendez M.D.        Ordered the above noted radiological studies. Reviewed by me in PACS.            PROCEDURES  Critical Care  Performed by: Oc Suarez MD  Authorized by: Oc Suarez MD     Critical care provider statement:     Critical care time (minutes):  35    Critical care time was exclusive of:  Separately billable procedures and treating other patients    Critical care was necessary to treat or prevent imminent or life-threatening deterioration of the following conditions:  CNS failure or compromise    Critical care was time spent personally by me on the following activities:  Ordering and review of laboratory studies, ordering and review of radiographic studies, ordering and performing treatments and interventions, re-evaluation of patient's condition, obtaining history from patient or surrogate, examination of patient, discussions with consultants and development of treatment plan with patient or surrogate                  MEDICATIONS GIVEN IN ER  Medications   sodium  chloride 0.9 % flush 10 mL (has no administration in time range)   aspirin chewable tablet 324 mg (324 mg Oral Not Given 4/17/21 2336)                   MEDICAL DECISION MAKING, PROGRESS, and CONSULTS    All labs have been independently reviewed by me.  All radiology studies have been reviewed by me and discussed with radiologist dictating the report.   EKG's independently viewed and interpreted by me.  Discussion below represents my analysis of pertinent findings related to patient's condition, differential diagnosis, treatment plan and final disposition.    Differential diagnosis includes acute ischemic stroke, acute hemorrhagic stroke, intracranial mass lesion, seizure.    ED Course as of Apr 17 2359   Sat Apr 17, 2021 2207 WBC, UA(!): 6-12 [JR]   2207 Bacteria, UA: None Seen [JR]   2207 Ketones, UA(!): 40 mg/dL (2+) [JR]   2207 Hemoglobin(!): 16.8 [JR]   2207 WBC(!): 12.11 [JR]   2330 Discussed with SANAZ Silva for A, who agrees to admit on behalf of Dr. Flores.     [JR]   2330 NIH stroke scale of 1 for expressive aphasia.  Not a TPA candidate due to delayed presentation.    [JR]   2331 I have interpreted the CT head without contrast in PACS.  There is hypodensity within the left MCA territory concerning for subacute infarct.  Also discussed these findings with Dr. Menendez, radiologist.    [JR]      ED Course User Index  [JR] Oc Suarez MD     Patient is not a TPA candidate.  She is also not an interventional candidate.  She will be admitted for further work-up and evaluation.  She was given aspirin.         I wore a mask, face shield, and gloves during this patient encounter.  Patient also wearing a surgical mask.  Hand hygeine performed before and after seeing the patient.    DIAGNOSIS  Final diagnoses:   Acute ischemic stroke (CMS/HCC)   Expressive aphasia         DISPOSITION  ADMIT            Latest Documented Vital Signs:  As of 23:59 EDT  BP- 144/72 HR- 98 Temp- 97.8 °F (36.6  °C) (Oral) O2 sat- 96%        --    Please note that portions of this were completed with a voice recognition program.          Oc Suarez MD  04/18/21 0003

## 2021-04-19 ENCOUNTER — APPOINTMENT (OUTPATIENT)
Dept: MRI IMAGING | Facility: HOSPITAL | Age: 71
End: 2021-04-19

## 2021-04-19 ENCOUNTER — APPOINTMENT (OUTPATIENT)
Dept: CT IMAGING | Facility: HOSPITAL | Age: 71
End: 2021-04-19

## 2021-04-19 ENCOUNTER — APPOINTMENT (OUTPATIENT)
Dept: CARDIOLOGY | Facility: HOSPITAL | Age: 71
End: 2021-04-19

## 2021-04-19 LAB
ANION GAP SERPL CALCULATED.3IONS-SCNC: 9.4 MMOL/L (ref 5–15)
ASCENDING AORTA: 2.7 CM
BASOPHILS # BLD AUTO: 0.04 10*3/MM3 (ref 0–0.2)
BASOPHILS NFR BLD AUTO: 0.3 % (ref 0–1.5)
BH CV ECHO MEAS - ACS: 1.7 CM
BH CV ECHO MEAS - AO MAX PG (FULL): 3.1 MMHG
BH CV ECHO MEAS - AO MAX PG: 8.1 MMHG
BH CV ECHO MEAS - AO MEAN PG (FULL): 1 MMHG
BH CV ECHO MEAS - AO MEAN PG: 4 MMHG
BH CV ECHO MEAS - AO ROOT AREA (BSA CORRECTED): 1.8
BH CV ECHO MEAS - AO ROOT AREA: 7.1 CM^2
BH CV ECHO MEAS - AO ROOT DIAM: 3 CM
BH CV ECHO MEAS - AO V2 MAX: 142 CM/SEC
BH CV ECHO MEAS - AO V2 MEAN: 93.4 CM/SEC
BH CV ECHO MEAS - AO V2 VTI: 30.4 CM
BH CV ECHO MEAS - ASC AORTA: 2.7 CM
BH CV ECHO MEAS - AVA(I,A): 2.5 CM^2
BH CV ECHO MEAS - AVA(I,D): 2.5 CM^2
BH CV ECHO MEAS - AVA(V,A): 2.5 CM^2
BH CV ECHO MEAS - AVA(V,D): 2.5 CM^2
BH CV ECHO MEAS - BSA(HAYCOCK): 1.8 M^2
BH CV ECHO MEAS - BSA: 1.7 M^2
BH CV ECHO MEAS - BZI_BMI: 30.9 KILOGRAMS/M^2
BH CV ECHO MEAS - BZI_METRIC_HEIGHT: 152.4 CM
BH CV ECHO MEAS - BZI_METRIC_WEIGHT: 71.7 KG
BH CV ECHO MEAS - EDV(MOD-SP2): 61 ML
BH CV ECHO MEAS - EDV(MOD-SP4): 52 ML
BH CV ECHO MEAS - EDV(TEICH): 83.1 ML
BH CV ECHO MEAS - EF(CUBED): 66 %
BH CV ECHO MEAS - EF(MOD-BP): 63 %
BH CV ECHO MEAS - EF(MOD-SP2): 62.3 %
BH CV ECHO MEAS - EF(MOD-SP4): 63.5 %
BH CV ECHO MEAS - EF(TEICH): 57.9 %
BH CV ECHO MEAS - ESV(MOD-SP2): 23 ML
BH CV ECHO MEAS - ESV(MOD-SP4): 19 ML
BH CV ECHO MEAS - ESV(TEICH): 35 ML
BH CV ECHO MEAS - FS: 30.2 %
BH CV ECHO MEAS - IVS/LVPW: 1
BH CV ECHO MEAS - IVSD: 1.1 CM
BH CV ECHO MEAS - LAT PEAK E' VEL: 9.2 CM/SEC
BH CV ECHO MEAS - LV DIASTOLIC VOL/BSA (35-75): 30.8 ML/M^2
BH CV ECHO MEAS - LV MASS(C)D: 162.9 GRAMS
BH CV ECHO MEAS - LV MASS(C)DI: 96.5 GRAMS/M^2
BH CV ECHO MEAS - LV MAX PG: 4.9 MMHG
BH CV ECHO MEAS - LV MEAN PG: 3 MMHG
BH CV ECHO MEAS - LV SYSTOLIC VOL/BSA (12-30): 11.3 ML/M^2
BH CV ECHO MEAS - LV V1 MAX: 111 CM/SEC
BH CV ECHO MEAS - LV V1 MEAN: 72.1 CM/SEC
BH CV ECHO MEAS - LV V1 VTI: 24 CM
BH CV ECHO MEAS - LVIDD: 4.3 CM
BH CV ECHO MEAS - LVIDS: 3 CM
BH CV ECHO MEAS - LVLD AP2: 6.7 CM
BH CV ECHO MEAS - LVLD AP4: 5.8 CM
BH CV ECHO MEAS - LVLS AP2: 5.6 CM
BH CV ECHO MEAS - LVLS AP4: 4.7 CM
BH CV ECHO MEAS - LVOT AREA (M): 3.1 CM^2
BH CV ECHO MEAS - LVOT AREA: 3.1 CM^2
BH CV ECHO MEAS - LVOT DIAM: 2 CM
BH CV ECHO MEAS - LVPWD: 1.1 CM
BH CV ECHO MEAS - MED PEAK E' VEL: 10.3 CM/SEC
BH CV ECHO MEAS - MR MAX PG: 70.9 MMHG
BH CV ECHO MEAS - MR MAX VEL: 421 CM/SEC
BH CV ECHO MEAS - MV A DUR: 0.13 SEC
BH CV ECHO MEAS - MV A MAX VEL: 95.4 CM/SEC
BH CV ECHO MEAS - MV DEC SLOPE: 593.5 CM/SEC^2
BH CV ECHO MEAS - MV DEC TIME: 0.17 SEC
BH CV ECHO MEAS - MV E MAX VEL: 116 CM/SEC
BH CV ECHO MEAS - MV E/A: 1.2
BH CV ECHO MEAS - MV MAX PG: 4.7 MMHG
BH CV ECHO MEAS - MV MEAN PG: 2 MMHG
BH CV ECHO MEAS - MV P1/2T MAX VEL: 137 CM/SEC
BH CV ECHO MEAS - MV P1/2T: 67.6 MSEC
BH CV ECHO MEAS - MV V2 MAX: 108 CM/SEC
BH CV ECHO MEAS - MV V2 MEAN: 62.6 CM/SEC
BH CV ECHO MEAS - MV V2 VTI: 41.4 CM
BH CV ECHO MEAS - MVA P1/2T LCG: 1.6 CM^2
BH CV ECHO MEAS - MVA(P1/2T): 3.3 CM^2
BH CV ECHO MEAS - MVA(VTI): 1.8 CM^2
BH CV ECHO MEAS - PA ACC TIME: 0.14 SEC
BH CV ECHO MEAS - PA MAX PG (FULL): 5.3 MMHG
BH CV ECHO MEAS - PA MAX PG: 6.9 MMHG
BH CV ECHO MEAS - PA PR(ACCEL): 17.4 MMHG
BH CV ECHO MEAS - PA V2 MAX: 131 CM/SEC
BH CV ECHO MEAS - PULM A REVS DUR: 0.12 SEC
BH CV ECHO MEAS - PULM A REVS VEL: 34.6 CM/SEC
BH CV ECHO MEAS - PULM DIAS VEL: 39.9 CM/SEC
BH CV ECHO MEAS - PULM S/D: 1.9
BH CV ECHO MEAS - PULM SYS VEL: 75 CM/SEC
BH CV ECHO MEAS - PVA(V,A): 1.3 CM^2
BH CV ECHO MEAS - PVA(V,D): 1.3 CM^2
BH CV ECHO MEAS - QP/QS: 0.48
BH CV ECHO MEAS - RAP SYSTOLE: 3 MMHG
BH CV ECHO MEAS - RV MAX PG: 1.5 MMHG
BH CV ECHO MEAS - RV MEAN PG: 1 MMHG
BH CV ECHO MEAS - RV V1 MAX: 62 CM/SEC
BH CV ECHO MEAS - RV V1 MEAN: 40.7 CM/SEC
BH CV ECHO MEAS - RV V1 VTI: 12.7 CM
BH CV ECHO MEAS - RVOT AREA: 2.8 CM^2
BH CV ECHO MEAS - RVOT DIAM: 1.9 CM
BH CV ECHO MEAS - RVSP: 37 MMHG
BH CV ECHO MEAS - SI(AO): 127.3 ML/M^2
BH CV ECHO MEAS - SI(CUBED): 31.1 ML/M^2
BH CV ECHO MEAS - SI(LVOT): 44.6 ML/M^2
BH CV ECHO MEAS - SI(MOD-SP2): 22.5 ML/M^2
BH CV ECHO MEAS - SI(MOD-SP4): 19.5 ML/M^2
BH CV ECHO MEAS - SI(TEICH): 28.5 ML/M^2
BH CV ECHO MEAS - SV(AO): 214.9 ML
BH CV ECHO MEAS - SV(CUBED): 52.5 ML
BH CV ECHO MEAS - SV(LVOT): 75.4 ML
BH CV ECHO MEAS - SV(MOD-SP2): 38 ML
BH CV ECHO MEAS - SV(MOD-SP4): 33 ML
BH CV ECHO MEAS - SV(RVOT): 36 ML
BH CV ECHO MEAS - SV(TEICH): 48.1 ML
BH CV ECHO MEAS - TAPSE (>1.6): 2.3 CM
BH CV ECHO MEAS - TR MAX VEL: 290 CM/SEC
BH CV ECHO MEASUREMENTS AVERAGE E/E' RATIO: 11.9
BH CV XLRA - RV BASE: 3.1 CM
BH CV XLRA - RV LENGTH: 5.5 CM
BH CV XLRA - RV MID: 2.1 CM
BH CV XLRA - TDI S': 15.5 CM/SEC
BUN SERPL-MCNC: 9 MG/DL (ref 8–23)
BUN/CREAT SERPL: 20.9 (ref 7–25)
CALCIUM SPEC-SCNC: 8.4 MG/DL (ref 8.6–10.5)
CHLORIDE SERPL-SCNC: 111 MMOL/L (ref 98–107)
CO2 SERPL-SCNC: 22.6 MMOL/L (ref 22–29)
CREAT SERPL-MCNC: 0.43 MG/DL (ref 0.57–1)
DEPRECATED RDW RBC AUTO: 45.2 FL (ref 37–54)
EOSINOPHIL # BLD AUTO: 0.01 10*3/MM3 (ref 0–0.4)
EOSINOPHIL NFR BLD AUTO: 0.1 % (ref 0.3–6.2)
ERYTHROCYTE [DISTWIDTH] IN BLOOD BY AUTOMATED COUNT: 13.1 % (ref 12.3–15.4)
GFR SERPL CREATININE-BSD FRML MDRD: 144 ML/MIN/1.73
GLUCOSE BLDC GLUCOMTR-MCNC: 136 MG/DL (ref 70–130)
GLUCOSE BLDC GLUCOMTR-MCNC: 87 MG/DL (ref 70–130)
GLUCOSE SERPL-MCNC: 88 MG/DL (ref 65–99)
HCT VFR BLD AUTO: 32.3 % (ref 34–46.6)
HGB BLD-MCNC: 10.8 G/DL (ref 12–15.9)
IMM GRANULOCYTES # BLD AUTO: 0.05 10*3/MM3 (ref 0–0.05)
IMM GRANULOCYTES NFR BLD AUTO: 0.4 % (ref 0–0.5)
LEFT ATRIUM VOLUME INDEX: 33 ML/M2
LV EF 2D ECHO EST: 63 %
LYMPHOCYTES # BLD AUTO: 2.33 10*3/MM3 (ref 0.7–3.1)
LYMPHOCYTES NFR BLD AUTO: 19.4 % (ref 19.6–45.3)
MAXIMAL PREDICTED HEART RATE: 148 BPM
MCH RBC QN AUTO: 32 PG (ref 26.6–33)
MCHC RBC AUTO-ENTMCNC: 33.4 G/DL (ref 31.5–35.7)
MCV RBC AUTO: 95.8 FL (ref 79–97)
MONOCYTES # BLD AUTO: 0.99 10*3/MM3 (ref 0.1–0.9)
MONOCYTES NFR BLD AUTO: 8.3 % (ref 5–12)
NEUTROPHILS NFR BLD AUTO: 71.5 % (ref 42.7–76)
NEUTROPHILS NFR BLD AUTO: 8.56 10*3/MM3 (ref 1.7–7)
NRBC BLD AUTO-RTO: 0 /100 WBC (ref 0–0.2)
PLATELET # BLD AUTO: 232 10*3/MM3 (ref 140–450)
PMV BLD AUTO: 12.2 FL (ref 6–12)
POTASSIUM SERPL-SCNC: 3.9 MMOL/L (ref 3.5–5.2)
RBC # BLD AUTO: 3.37 10*6/MM3 (ref 3.77–5.28)
SINUS: 3.1 CM
SODIUM SERPL-SCNC: 143 MMOL/L (ref 136–145)
STJ: 2.2 CM
STRESS TARGET HR: 126 BPM
WBC # BLD AUTO: 11.98 10*3/MM3 (ref 3.4–10.8)

## 2021-04-19 PROCEDURE — 92610 EVALUATE SWALLOWING FUNCTION: CPT

## 2021-04-19 PROCEDURE — 70551 MRI BRAIN STEM W/O DYE: CPT

## 2021-04-19 PROCEDURE — 97162 PT EVAL MOD COMPLEX 30 MIN: CPT

## 2021-04-19 PROCEDURE — 97166 OT EVAL MOD COMPLEX 45 MIN: CPT

## 2021-04-19 PROCEDURE — 99232 SBSQ HOSP IP/OBS MODERATE 35: CPT | Performed by: PSYCHIATRY & NEUROLOGY

## 2021-04-19 PROCEDURE — 99231 SBSQ HOSP IP/OBS SF/LOW 25: CPT | Performed by: NURSE PRACTITIONER

## 2021-04-19 PROCEDURE — 93306 TTE W/DOPPLER COMPLETE: CPT | Performed by: INTERNAL MEDICINE

## 2021-04-19 PROCEDURE — 25010000002 HEPARIN (PORCINE) PER 1000 UNITS: Performed by: NEUROLOGICAL SURGERY

## 2021-04-19 PROCEDURE — 85025 COMPLETE CBC W/AUTO DIFF WBC: CPT | Performed by: INTERNAL MEDICINE

## 2021-04-19 PROCEDURE — 97110 THERAPEUTIC EXERCISES: CPT

## 2021-04-19 PROCEDURE — 93306 TTE W/DOPPLER COMPLETE: CPT

## 2021-04-19 PROCEDURE — 97535 SELF CARE MNGMENT TRAINING: CPT

## 2021-04-19 PROCEDURE — 82962 GLUCOSE BLOOD TEST: CPT

## 2021-04-19 PROCEDURE — 80048 BASIC METABOLIC PNL TOTAL CA: CPT | Performed by: INTERNAL MEDICINE

## 2021-04-19 PROCEDURE — 70450 CT HEAD/BRAIN W/O DYE: CPT

## 2021-04-19 RX ORDER — ASPIRIN 300 MG/1
300 SUPPOSITORY RECTAL DAILY
Status: DISCONTINUED | OUTPATIENT
Start: 2021-04-19 | End: 2021-04-23

## 2021-04-19 RX ORDER — SODIUM CHLORIDE 0.9 % (FLUSH) 0.9 %
10 SYRINGE (ML) INJECTION AS NEEDED
Status: DISCONTINUED | OUTPATIENT
Start: 2021-04-19 | End: 2021-04-23 | Stop reason: HOSPADM

## 2021-04-19 RX ORDER — CLOPIDOGREL BISULFATE 75 MG/1
75 TABLET ORAL DAILY
Status: DISCONTINUED | OUTPATIENT
Start: 2021-04-20 | End: 2021-04-23

## 2021-04-19 RX ORDER — CLOPIDOGREL BISULFATE 75 MG/1
300 TABLET ORAL ONCE
Status: COMPLETED | OUTPATIENT
Start: 2021-04-19 | End: 2021-04-19

## 2021-04-19 RX ORDER — SODIUM CHLORIDE 0.9 % (FLUSH) 0.9 %
10 SYRINGE (ML) INJECTION EVERY 12 HOURS SCHEDULED
Status: DISCONTINUED | OUTPATIENT
Start: 2021-04-19 | End: 2021-04-23 | Stop reason: HOSPADM

## 2021-04-19 RX ORDER — ATORVASTATIN CALCIUM 80 MG/1
80 TABLET, FILM COATED ORAL NIGHTLY
Status: DISCONTINUED | OUTPATIENT
Start: 2021-04-19 | End: 2021-04-23

## 2021-04-19 RX ORDER — ASPIRIN 325 MG
325 TABLET ORAL DAILY
Status: DISCONTINUED | OUTPATIENT
Start: 2021-04-19 | End: 2021-04-23

## 2021-04-19 RX ADMIN — ATORVASTATIN CALCIUM 80 MG: 80 TABLET, FILM COATED ORAL at 20:37

## 2021-04-19 RX ADMIN — HEPARIN SODIUM 5000 UNITS: 5000 INJECTION INTRAVENOUS; SUBCUTANEOUS at 05:00

## 2021-04-19 RX ADMIN — SODIUM CHLORIDE, PRESERVATIVE FREE 10 ML: 5 INJECTION INTRAVENOUS at 20:37

## 2021-04-19 RX ADMIN — SODIUM CHLORIDE, PRESERVATIVE FREE 10 ML: 5 INJECTION INTRAVENOUS at 15:51

## 2021-04-19 RX ADMIN — CLOPIDOGREL 300 MG: 75 TABLET, FILM COATED ORAL at 15:50

## 2021-04-19 RX ADMIN — HEPARIN SODIUM 5000 UNITS: 5000 INJECTION INTRAVENOUS; SUBCUTANEOUS at 13:43

## 2021-04-19 RX ADMIN — HEPARIN SODIUM 5000 UNITS: 5000 INJECTION INTRAVENOUS; SUBCUTANEOUS at 22:25

## 2021-04-19 RX ADMIN — SODIUM CHLORIDE 100 ML/HR: 9 INJECTION, SOLUTION INTRAVENOUS at 03:04

## 2021-04-19 RX ADMIN — SODIUM CHLORIDE 100 ML/HR: 9 INJECTION, SOLUTION INTRAVENOUS at 15:52

## 2021-04-19 RX ADMIN — ASPIRIN 325 MG: 325 TABLET ORAL at 15:50

## 2021-04-19 NOTE — PLAN OF CARE
Goal Outcome Evaluation:  Plan of Care Reviewed With: patient     Outcome Summary: Clinical swallow eval completed. Pt initially tolerated thins via cup and straw, however, toward end of eval, coughing noted after straw sip of thin. No s/s with NTL via cup/straw. Oral manipulation and mastication functional for pureed and soft solids. Lingual residue noted with regular solids. Laryngeal elevation appeared adequate with palpation, question mistiming. Recommend [facility] soft no mixed and NTL; meds w/ pureed or NTL; upright for meals and 30 min after; slow rate; small bites/sips. ST will follow.

## 2021-04-19 NOTE — PLAN OF CARE
"Goal Outcome Evaluation:  Plan of Care Reviewed With: patient  Progress: improving  Outcome Summary: Patient is moving all extremities spontaneously, has some difficulty following commands and takes some coaching in order to assess for limb drift, no drift noted in left arm or either leg, right arm has increased muscle tone but she is moving it spontaneously. Has said a few words tonight, \"yes\", \"okay\" and a few expletives. Pt does not blink to threat with R eye, both pupils are irregular shapes but the R pupil is fixed. Mateo gtt currently off with SBP maintained 100-140. MIVF infusing. AM CT completed. Planning for MRI today. Will continue to monitor. RONN Rae RN  "

## 2021-04-19 NOTE — PROGRESS NOTES
[facility]     Mar/chart reviewed  Follow up Acute CVA s/p thrombectomy  Patient unable to provide much subjective,   Says yes, but seems to have receptive aphasia  Doesn't follow commands.     Vital Sign Min/Max for last 24 hours  Temp  Min: 98.1 °F (36.7 °C)  Max: 98.6 °F (37 °C)   BP  Min: 83/50  Max: 146/70   Pulse  Min: 68  Max: 106   Resp  Min: 16  Max: 16   SpO2  Min: 91 %  Max: 100 %   Flow (L/min)  Min: 1  Max: 5   Weight  Min: 72 kg (158 lb 11.7 oz)  Max: 72 kg (158 lb 11.7 oz)   2923/4225  Appears ill, sleepy , awakens, receptive aphasia  eomi seem intact on the right, pupils slightly unequal, normal sclera  mmm, no jvd, trachea midline, neck supple,  chest cta bilaterally, no crackles, no wheezes,   rrr,   soft, nt, nd +bs,  no c/c/ e  Skin warm, dry no rashes    Labs: 4/19: reviewed:  Glucose 88  Bun 9  Cr 0.43  Na 143  Bicarb 22  Wbc 11.98  hgb 10.8  plts 232    4/19: ct head:   Areas of evolving infarction again noted within the left cerebral   hemisphere. Overall, the affected area appears stable in size, although there is increasing mass effect, with more effacement of the sulci noted on the current study. However, there is no mass effect upon the left lateral ventricle, and there is no significant midline shift at this time. There is no evidence of hemorrhagic transformation.     A/P:  1. Acute CVA -- s/p retreval --  serial neurologic evaluation, bp control as per goal determined by JEFF/neurology.  Risk factor modification  2. Hypercholesterolemia -- statin  3. Chronic diastolic chf  4. HTN --hold meds, currently hypotensive  5. Hypotension -- pressor to meet bp goal if needed, off now.   6. Anemia  7. Cerebral edema

## 2021-04-19 NOTE — PROGRESS NOTES
Neurology Note    Patient:  Jazmyn Castaneda    YOB: 1950    REFERRING PHYSICIAN:  Neil Flores    CHIEF COMPLAINT:    stroke    HISTORY OF PRESENT ILLNESS:   The patient is stable neurologically overnite per RN, remains aphasic and weaker on the right. -130s. CTH this am with evolving scattered hypodensities in left hemisphere w/o ICH or mass effect. No seizures reported.    Past Medical History:  Past Medical History:   Diagnosis Date   • Acute ischemic stroke (CMS/HCC) 2021   • Age-related osteoporosis without current pathological fracture 2021   • Anesthesia     WOKE UP DURING CATARACT SURGERY   • Chronic diastolic (congestive) heart failure (CMS/HCC) 2021   • Collapse of lung     history of in past post trauma   • Diverticulosis    • Environmental allergies     Some pollens, grass, trees, flowers   • Essential hypertension 3/24/2017   • Exogenous obesity 3/24/2017   • H/O Pneumonia    • Infectious mononucleosis    • Kidney infection    • Kidney stone    • Mixed hyperlipidemia 2019   • Neuromuscular disorder (CMS/HCC)    • Sepsis secondary to UTI (CMS/HCC) 2019   • Tobacco abuse 2019   • Urinary tract infection    • Vertigo     past history of several times       Past Surgical History:  Past Surgical History:   Procedure Laterality Date   • BREAST EXCISIONAL BIOPSY Right     30 something when it was done; says it was precancerous   • CATARACT EXTRACTION, BILATERAL     •  SECTION     • CYSTOSCOPY N/A 2019    Procedure: CYSTOSCOPY AND STENT PLACEMENT;  Surgeon: Alen Lal MD;  Location: [facility] OR;  Service: Urology   • EMBOLECTOMY N/A 2021    Procedure: EMBOLECTOMY MECHANICAL;  Surgeon: Justo Cheng MD;  Location: [facility] OR ;  Service: Neurosurgery;  Laterality: N/A;   • MASTOID SURGERY     • NASAL SEPTAL RECONSTRUCTION     • SINUS SURGERY      scraped and prior deviated septum   • URETEROSCOPY  "LASER LITHOTRIPSY WITH STENT INSERTION Right 2019    Procedure: CYSTOSCOPY, RIGHT URETEROSCOPY, LASER LITHOTRIPSY, STONE BASKET EXTRACTION, STENT PLACEMENT WITHOUT STRINGS;  Surgeon: Alfredo Gongora Jr., MD;  Location: [facility] OR;  Service: Urology       Social History:   Social History     Socioeconomic History   • Marital status:      Spouse name: Not on file   • Number of children: 2   • Years of education: College   • Highest education level: Not on file   Tobacco Use   • Smoking status: Former Smoker     Packs/day: 1.00     Types: Cigarettes     Quit date: 2020     Years since quittin.8   • Smokeless tobacco: Never Used   Substance and Sexual Activity   • Alcohol use: Yes     Comment: \"on occasion\"   • Drug use: No        Family History:   Family History   Problem Relation Age of Onset   • Hypertension Mother    • Aneurysm Mother    • Heart failure Mother    • Heart disease Mother    • Heart disease Father    • Hypertension Father    • Kidney disease Father    • Cancer Father 82        Bladder   • Hypertension Sister    • Cancer Maternal Aunt    • Stroke Maternal Uncle    • Glaucoma Maternal Grandmother    • Cancer Maternal Grandmother    • Stroke Maternal Grandfather    • Aneurysm Maternal Grandfather    • Cancer Maternal Aunt    • Cancer Maternal Aunt    • Liver cancer Other    • Pancreatic cancer Other    • Malig Hyperthermia Neg Hx        Medications Prior to Admission:    Prior to Admission medications    Medication Sig Start Date End Date Taking? Authorizing Provider   calcium carbonate-cholecalciferol (Calcium 500+D) 500-400 MG-UNIT tablet tablet Take 1 tablet by mouth Daily.    ProviderAlessandra MD   cetirizine (zyrTEC) 5 MG tablet Take 5 mg by mouth Daily.    ProviderAlessandra MD   fluticasone (FLONASE) 50 MCG/ACT nasal spray 2 sprays into the nostril(s) as directed by provider Daily.    Alessandra Lam MD   lisinopril (PRINIVIL,ZESTRIL) 2.5 MG tablet Take 1 " tablet by mouth Daily. 2/8/21   Samira Yi MD   montelukast (SINGULAIR) 10 MG tablet TAKE 1 TABLET EVERY NIGHT 10/16/20   Sudhakar Gracia MD       Allergies:  Cephalosporins, Penicillins, Ciprofloxacin, and Sulfa antibiotics      Review of system  Review of Systems   Unable to perform ROS: Mental status change       Vitals:    04/19/21 1100   BP:    Pulse: 77   Resp:    Temp:    SpO2: 95%       Physical exam  Physical Exam  HENT:      Head: Normocephalic and atraumatic.   Cardiovascular:      Rate and Rhythm: Normal rate and regular rhythm.   Pulmonary:      Effort: Pulmonary effort is normal.   Neurological:      Mental Status: She is alert.      Comments: Alerts to voice, some word salad, does not follow verbal commands, left gaze preference, right sided neglect, right pupil postop, right facial droop, moves limbs against gravity with prompt, less briskly on the right.           Lab Results   Component Value Date    WBC 11.98 (H) 04/19/2021    HGB 10.8 (L) 04/19/2021    HCT 32.3 (L) 04/19/2021    MCV 95.8 04/19/2021     04/19/2021     Lab Results   Component Value Date    GLUCOSE 88 04/19/2021    BUN 9 04/19/2021    CREATININE 0.43 (L) 04/19/2021    EGFRIFNONA 144 04/19/2021    EGFRIFAFRI 100 01/19/2021    BCR 20.9 04/19/2021    CO2 22.6 04/19/2021    CALCIUM 8.4 (L) 04/19/2021    PROTENTOTREF 6.9 01/19/2021    ALBUMIN 4.30 04/17/2021    LABIL2 1.5 01/19/2021    AST 31 04/17/2021    ALT 21 04/17/2021         Radiological Studies:   CT Head Without Contrast    Result Date: 4/19/2021  CT HEAD WITHOUT CONTRAST  HISTORY: Stroke  COMPARISON: 04/18/2021  TECHNIQUE: Axial CT imaging was obtained brain. No IV contrast was administered.  FINDINGS: No acute intracranial hemorrhage is seen. There are somewhat amorphous areas of decreased attenuation which are seen within the left cerebral hemisphere. These are in keeping with patient's known history of stroke. There is increasing effacement of sulci  and gyri within the left cerebral hemisphere compared to prior exam. However, there is no significant mass effect upon the left lateral ventricle. There is no midline shift. Again, there is no evidence of hemorrhagic transformation. Mucosal thickening is noted within the ethmoid sinuses. Mucous retention cyst is noted within the right maxillary sinus. Left mastoid air cells are relatively underpneumatized. There is additional partial opacification of right sphenoid sinus.      Areas of evolving infarction again noted within the left cerebral hemisphere. Overall, the affected area appears stable in size, although there is increasing mass effect, with more effacement of the sulci noted on the current study. However, there is no mass effect upon the left lateral ventricle, and there is no significant midline shift at this time. There is no evidence of hemorrhagic transformation.  Radiation dose reduction techniques were utilized, including automated exposure control and exposure modulation based on body size.  This report was finalized on 4/19/2021 4:50 AM by Dr. Hillary Menendez M.D.      CT Head Without Contrast    Result Date: 4/17/2021  CT HEAD WITHOUT CONTRAST  HISTORY: Altered mental status and slurred speech  COMPARISON: None available.  TECHNIQUE: Axial CT imaging was obtained through the brain. No IV contrast was administered.  FINDINGS: No acute intracranial hemorrhage is identified. There are multifocal areas of decreased attenuation which are noted within the left MCA territory. Subacute infarct cannot be excluded. Single largest area is noted within the left frontal lobe, and measures up to 2.8 cm on the coronal series. There is no midline shift or mass effect. No calvarial fracture is seen. Inspissated secretions are noted within the sphenoid sinus. Mild mucosal thickening is suspected within the maxillary sinuses. Left mastoid air cells appear relatively underpneumatized, which may reflect changes of  chronic mastoiditis/otitis media. No calvarial fracture is seen. Ventricles are normal in size. There is relatively mild microangiopathic change.      Scattered areas of decreased attenuation are identified within the left MCA territory. Areas of evolving infarction cannot be excluded. No hemorrhagic transformation is seen. There is no midline shift or mass effect.  Radiation dose reduction techniques were utilized, including automated exposure control and exposure modulation based on body size.  This report was finalized on 4/17/2021 11:09 PM by Dr. Hillary Menendez M.D.      CT Angiogram Neck    Result Date: 4/18/2021  CT ANGIOGRAPHY OF THE HEAD AND NECK WITHOUT AND WITH INTRAVENOUS CONTRAST AND 3-D RECONSTRUCTIONS AND COLOR CT PERFUSION IMAGING OF THE BRAIN WITH INTRAVENOUS CONTRAST  HISTORY: Acute left MCA infarct with worsening of symptoms. Confusion and slurred speech. Team D evaluation.  TECHNIQUE: The CT scan was performed as an emergency procedure with CT angiography of the head and neck without and with intravenous contrast followed by color CT perfusion imaging of the brain with intravenous contrast.  FINDINGS: The following findings are present: 1. An initial noncontrast CT scan of the brain was performed. The ventricles are normal in size and midline. There is some mild chronic small vessel ischemic change. There are several vague areas of decreased density in the left MCA territory consistent with early evolutionary change of acute infarcts and this is similar to the CT scan performed 11 hours ago. There is no acute intracranial hemorrhage. There is some very mild mass effect with early effacement of the cortical sulci. The findings of the noncontrast CT scan were communicated to the team D physician when imaging made available at 9:15 AM. The postcontrast findings were communicated when imaging made available at 9:30 AM. 2. Evaluation for stenosis is based on NASCET criteria. The vertebral arteries  are patent with dominance of the right vertebral artery. Both supply the basilar artery. There is also severe stenosis at the origin of the left innominate artery proximal to the left vertebral artery. The degree of stenosis is somewhat difficult to measure but is probably in the range of 80%. No posterior communicating arteries are present. 3. The right cervical carotid artery demonstrates some atheromatous irregularity at the origin of the right internal carotid artery without significant stenosis. 4. The left cervical carotid artery demonstrates very severe stenosis at the origin of the left internal carotid artery with an adjacent low density atheromatous plaque or thrombus as best seen on images 118 through 122. The degree of stenosis is approximately 85-90%. The internal carotid artery just above this level has a short segment of normal caliber and then becomes quite small in caliber at the level of C1 and extending into the carotid canal and carotid siphon. No definite dissection is identified. 5. The intracranial CT angiography shows very small caliber left internal carotid artery as described above. 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 There is subsequent reconstitution of the left middle cerebral artery via crossover filling from the anterior communicating artery and left A1 segment. The caliber of the left A1 and M1 segments appears normal. No thrombus is seen in these segments. No thrombus is identified in the left M2 segments. 6. The color CT perfusion imaging demonstrates abnormality in the left frontal lobe with cerebral blood flow less than 30% having a volume of 5 mL. There is a larger area of Tmax greater than 6 seconds in the left frontal lobe and extending into the mid parietal region with a volume of 17 mL. The mismatch ratio measures 3.4.    Radiation dose reduction techniques were utilized, including automated  exposure control and exposure modulation based on body size.  This report was finalized on 4/18/2021 10:43 AM by Dr. Shaun Lan M.D.      XR Chest 1 View    Result Date: 4/17/2021  SINGLE VIEW OF THE CHEST  HISTORY: Altered mental status  COMPARISON: 12/29/2020  FINDINGS: Heart size is within normal limits for technique. Biapical scarring is again seen. Background changes of COPD are noted. There is some left basilar consolidation. This may represent atelectasis, but correlation with any evidence of infection is recommended. There is no pneumothorax or pleural effusion.      Left basilar consolidation may represent atelectasis. However, correlation with any evidence of infection is recommended.  This report was finalized on 4/17/2021 10:48 PM by Dr. Hillary Menendez M.D.      CT Angiogram Head    Result Date: 4/18/2021  CT ANGIOGRAPHY OF THE HEAD AND NECK WITHOUT AND WITH INTRAVENOUS CONTRAST AND 3-D RECONSTRUCTIONS AND COLOR CT PERFUSION IMAGING OF THE BRAIN WITH INTRAVENOUS CONTRAST  HISTORY: Acute left MCA infarct with worsening of symptoms. Confusion and slurred speech. Team D evaluation.  TECHNIQUE: The CT scan was performed as an emergency procedure with CT angiography of the head and neck without and with intravenous contrast followed by color CT perfusion imaging of the brain with intravenous contrast.  FINDINGS: The following findings are present: 1. An initial noncontrast CT scan of the brain was performed. The ventricles are normal in size and midline. There is some mild chronic small vessel ischemic change. There are several vague areas of decreased density in the left MCA territory consistent with early evolutionary change of acute infarcts and this is similar to the CT scan performed 11 hours ago. There is no acute intracranial hemorrhage. There is some very mild mass effect with early effacement of the cortical sulci. The findings of the noncontrast CT scan were communicated to the team D  physician when imaging made available at 9:15 AM. The postcontrast findings were communicated when imaging made available at 9:30 AM. 2. Evaluation for stenosis is based on NASCET criteria. The vertebral arteries are patent with dominance of the right vertebral artery. Both supply the basilar artery. There is also severe stenosis at the origin of the left innominate artery proximal to the left vertebral artery. The degree of stenosis is somewhat difficult to measure but is probably in the range of 80%. No posterior communicating arteries are present. 3. The right cervical carotid artery demonstrates some atheromatous irregularity at the origin of the right internal carotid artery without significant stenosis. 4. The left cervical carotid artery demonstrates very severe stenosis at the origin of the left internal carotid artery with an adjacent low density atheromatous plaque or thrombus as best seen on images 118 through 122. The degree of stenosis is approximately 85-90%. The internal carotid artery just above this level has a short segment of normal caliber and then becomes quite small in caliber at the level of C1 and extending into the carotid canal and carotid siphon. No definite dissection is identified. 5. The intracranial CT angiography shows very small caliber left internal carotid artery as described above. 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 There is subsequent reconstitution of the left middle cerebral artery via crossover filling from the anterior communicating artery and left A1 segment. The caliber of the left A1 and M1 segments appears normal. No thrombus is seen in these segments. No thrombus is identified in the left M2 segments. 6. The color CT perfusion imaging demonstrates abnormality in the left frontal lobe with cerebral blood flow less than 30% having a volume of 5 mL. There is a larger area of Tmax greater than 6  seconds in the left frontal lobe and extending into the mid parietal region with a volume of 17 mL. The mismatch ratio measures 3.4.    Radiation dose reduction techniques were utilized, including automated exposure control and exposure modulation based on body size.  This report was finalized on 4/18/2021 10:43 AM by Dr. Shaun Lan M.D.      CT CEREBRAL PERFUSION WITH & WITHOUT CONTRAST    Result Date: 4/18/2021  CT ANGIOGRAPHY OF THE HEAD AND NECK WITHOUT AND WITH INTRAVENOUS CONTRAST AND 3-D RECONSTRUCTIONS AND COLOR CT PERFUSION IMAGING OF THE BRAIN WITH INTRAVENOUS CONTRAST  HISTORY: Acute left MCA infarct with worsening of symptoms. Confusion and slurred speech. Team D evaluation.  TECHNIQUE: The CT scan was performed as an emergency procedure with CT angiography of the head and neck without and with intravenous contrast followed by color CT perfusion imaging of the brain with intravenous contrast.  FINDINGS: The following findings are present: 1. An initial noncontrast CT scan of the brain was performed. The ventricles are normal in size and midline. There is some mild chronic small vessel ischemic change. There are several vague areas of decreased density in the left MCA territory consistent with early evolutionary change of acute infarcts and this is similar to the CT scan performed 11 hours ago. There is no acute intracranial hemorrhage. There is some very mild mass effect with early effacement of the cortical sulci. The findings of the noncontrast CT scan were communicated to the team D physician when imaging made available at 9:15 AM. The postcontrast findings were communicated when imaging made available at 9:30 AM. 2. Evaluation for stenosis is based on NASCET criteria. The vertebral arteries are patent with dominance of the right vertebral artery. Both supply the basilar artery. There is also severe stenosis at the origin of the left innominate artery proximal to the left vertebral artery. The  degree of stenosis is somewhat difficult to measure but is probably in the range of 80%. No posterior communicating arteries are present. 3. The right cervical carotid artery demonstrates some atheromatous irregularity at the origin of the right internal carotid artery without significant stenosis. 4. The left cervical carotid artery demonstrates very severe stenosis at the origin of the left internal carotid artery with an adjacent low density atheromatous plaque or thrombus as best seen on images 118 through 122. The degree of stenosis is approximately 85-90%. The internal carotid artery just above this level has a short segment of normal caliber and then becomes quite small in caliber at the level of C1 and extending into the carotid canal and carotid siphon. No definite dissection is identified. 5. The intracranial CT angiography shows very small caliber left internal carotid artery as described above. 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 There is subsequent reconstitution of the left middle cerebral artery via crossover filling from the anterior communicating artery and left A1 segment. The caliber of the left A1 and M1 segments appears normal. No thrombus is seen in these segments. No thrombus is identified in the left M2 segments. 6. The color CT perfusion imaging demonstrates abnormality in the left frontal lobe with cerebral blood flow less than 30% having a volume of 5 mL. There is a larger area of Tmax greater than 6 seconds in the left frontal lobe and extending into the mid parietal region with a volume of 17 mL. The mismatch ratio measures 3.4.    Radiation dose reduction techniques were utilized, including automated exposure control and exposure modulation based on body size.  This report was finalized on 4/18/2021 10:43 AM by Dr. Shaun Lan M.D.      Arteriogram (Autofinalize)    Result Date: 4/18/2021  This procedure  was auto-finalized with no dictation required.      During this visit the following were done:  Labs Reviewed [x]    Labs Ordered []    Radiology Reports Reviewed [x]    Radiology Ordered []    EKG, echo, and/or stress test reviewed [x]    EEG results reviewed  []    EEG reviewed and interpreted per myself   []    Discussed case with neurointerventionalist or neuroradiologist []    Referring Provider Records Reviewed []    ER Records Reviewed []    Hospital Records Reviewed [x]    History Obtained From Family []    Radiological images view and Interpreted per myself [x]    Case Discussed with referring provider []     Decision to obtain and request outside records  []        Assessment and Plan     Acute scattered LMCA stroke 22 LICA and MCA occlusion, s/p cervical ICA angioplasty, ICA and MCA MT.   - Neurologically stable for telemetry, discussed with JEFF.   - Start DAPT.   - MRI brain.   - Carotid duplex.   - ECG.   - TTE.   - ST, PT, OT.              Electronically signed by Holland Mallory MD on 4/19/2021 at 11:26 EDT

## 2021-04-19 NOTE — CONSULTS
"Adult Nutrition  Assessment/PES    Patient Name:  Jazmyn Castaneda  YOB: 1950  MRN: [id number]  Admit Date:  4/17/2021    Assessment Date:  4/19/2021    Comments:  Nutrition Consult for possible TF.  Admitted for Acute CVA, S/P retrieval, HTN, CHF.  + dysphagia. Just passed her swallow study with SLP.  Will need modified diet. Labs, skin, meds reviewed. Will follow intake and offer nutrition supplements if needed.    Reason for Assessment     Row Name 04/19/21 1341          Reason for Assessment    Reason For Assessment  physician consult;TF/PN     Diagnosis  neurologic conditions;cardiac disease Acute CVA -- s/p retreval, cerebral edema, HTN, CHF         Nutrition/Diet History     Row Name 04/19/21 1343          Nutrition/Diet History    Factors Affecting Nutritional Intake  difficulty/impaired swallowing;chewing difficulties/inability to chew food;impaired cognitive status/motor control         Anthropometrics     Row Name 04/19/21 1343 04/19/21 1330       Anthropometrics    Height  152.4 cm (60\")  152.4 cm (60\")    Weight  --  71.7 kg (158 lb)       Admit Weight    Admit Weight  71.7 kg (158 lb)  --       Ideal Body Weight (IBW)    Ideal Body Weight (IBW) (kg)  45.86  45.86    % Ideal Body Weight  --  156.28    % of Ideal Body Weight Assessment  -- 156% IBW  --       Body Mass Index (BMI)    BMI (kg/m2)  --  30.92    BMI Assessment  BMI 30-34.9: obesity grade I BMI 30.8  --        Labs/Tests/Procedures/Meds     Row Name 04/19/21 6633          Labs/Procedures/Meds    Lab Results Reviewed  reviewed, pertinent     Lab Results Comments  Cr, h/h, wbc        Diagnostic Tests/Procedures    Diagnostic Test/Procedure Reviewed  reviewed, pertinent     Diagnostic Test/Procedures Comments  MRI, CT, SLP eval pending        Medications    Pertinent Medications Reviewed  reviewed, pertinent     Pertinent Medications Comments  asa, lipitor, plavix, heparin, IVF         Physical Findings     Row Name 04/19/21 " "1345          Physical Findings    Overall Physical Appearance  obese hemiplegia     Oral/Mouth Cavity  poor dentition;dental caries     Skin  other (see comments);surgical incision rash, puncture site         Estimated/Assessed Needs     Row Name 04/19/21 1346 04/19/21 1343       Calculation Measurements    Weight Used For Calculations  71.7 kg (158 lb 1.1 oz)  --    Height  --  152.4 cm (60\")       Estimated/Assessed Needs    Additional Documentation  KCAL/KG (Group);Protein Requirements (Group);Fluid Requirements (Group)  --       KCAL/KG    KCAL/KG  20 Kcal/Kg (kcal);25 Kcal/Kg (kcal)  --    20 Kcal/Kg (kcal)  1434  --    25 Kcal/Kg (kcal)  1792.5  --       Protein Requirements    Weight Used For Protein Calculations  71.7 kg (158 lb 1.1 oz)  --    Est Protein Requirement Amount (gms/kg)  1.2 gm protein  --    Estimated Protein Requirements (gms/day)  86.04  --       Fluid Requirements    Fluid Requirements (mL/day)  1800  --       --    Row Name 04/19/21 1330          Calculation Measurements    Height  152.4 cm (60\")         Nutrition Prescription Ordered     Row Name 04/19/21 1347          Nutrition Prescription PO    Current PO Diet  NPO         Evaluation of Received Nutrient/Fluid Intake     Row Name 04/19/21 1347          Fluid Intake Evaluation    IV Fluid (mL)  2400         Problem/Interventions:  Problem 1     Row Name 04/19/21 1347          Nutrition Diagnoses Problem 1    Problem 1  Needs Alternate Route     Etiology (related to)  Medical Diagnosis     Neurological  CVA         Intervention Goal     Row Name 04/19/21 1347          Intervention Goal    General  Maintain nutrition;Disease management/therapy;Reduce/improve symptoms;Nutrition support treatment;Improved nutrition related lab(s);Meet nutritional needs for age/condition     PO  Initiate feeding     TF/PN  Inititiate TF/PN;Tolerate TF at goal     Transition  TF to PO     Weight  No significant weight loss         Nutrition Intervention     " Row Name 04/19/21 1347          Nutrition Intervention    RD/Tech Action  Care plan reviewd;Follow Tx progress;Await begin PO;Recommend/ordered     Recommended/Ordered  EN         Nutrition Prescription     Row Name 04/19/21 1348          Nutrition Prescription EN    Enteral Prescription  Enteral begin/change;Enteral to supply     Enteral Route  NG     Product  Fibersource HN     TF Delivery Method  Continuous     Continuous TF Goal Rate (mL/hr)  60 mL/hr     Water flush (mL)   10 mL     Water Flush Frequency  Every 4 hours     New EN Prescription Ordered?  Yes if fails SLP eval        EN to Supply    Kcal/Day  1728 Kcal/Day     Protein (gm/day)  77.76 gm/day     Meet Estimated Kcal Need (%)  100 %     Meet Estimated Protein Need (%)  91 %     TF Free H2O (mL)  1166.4 mL     Total Free H2O (mL/day)  1226 mL/day         Education/Evaluation     Row Name 04/19/21 1400          Education    Education  Will Instruct as appropriate        Monitor/Evaluation    Monitor  Skin status;Per protocol;Symptoms;I&O;Pertinent labs;TF delivery/tolerance;Weight           Electronically signed by:  Ruby Parham RD  04/19/21 14:35 EDT

## 2021-04-19 NOTE — PLAN OF CARE
Goal Outcome Evaluation:  Plan of Care Reviewed With: patient     Outcome Summary: Pt is a 70 y.o female admitted to [facility] with acute L MCA, s/p mechanical thrombectomy. Per case management note, pt lives alone and independent at baseline. Today pt presents with aphasia, impaired command following, L gaze preference, R sided neglect, motor planning/apraxia deficits, RUE weakness. Pt requires max A-total A to don socks, hand over hand assist to wash face and hands with RUE with max A. X2 assist for bed mobility and transfer this date. Max tactile and demo cues throughout session with delayed initation of tasks. Recommend continued OT to increase independence with ADL and mobility, RUE coordination, strength, balance, functional cognition. Anticipate d/c to acute rehab.    Appropriate PPE worn during encounter including gloves, mask, and eye protection. Hand hygiene completed. Pt wore a mask.

## 2021-04-19 NOTE — PROGRESS NOTES
"NEUROSURGERY PROGRESS NOTE     LOS: 2 days   Patient Care Team:  Samira Yi MD as PCP - General (Family Medicine)    Chief Complaint:  F/U visit following mechanical thrombectomy for stroke    Subjective       Interval History: Currently in intensive care unit.  History taken from: patient chart    Objective        Vital Signs  Temp:  [98.1 °F (36.7 °C)-98.6 °F (37 °C)] 98.1 °F (36.7 °C)  Heart Rate:  [] 81  Resp:  [16] 16  BP: ()/() 115/51       Physical Exam:       Neurological Exam  Mental Status  Awake. Expressive aphasia and receptive aphasia present. Unable to follow commands.  The patient is aphasic.  Utters some inappropriate words such as \"someone\" and \"yes, yes, before.\"  Moving arms and legs spontaneously.  Unable to follow verbal commands or mimic commands.     .    Motor  Normal muscle bulk throughout.  Moving all four extremities spontaneusly. Does not follow commands. .    Coordination  Unable to test coordination due to aphasia..    Gait  Not tested. .      Results Review:     I reviewed the patient's new clinical results.     CT HEAD W/O CONTRAST 04/19/2021    Evolving, stable infarct left cerebral hemisphere. No evidence of acute intracranial hemorrhage.    .  Results from last 7 days   Lab Units 04/19/21  0510 04/18/21  0304 04/17/21  2150   WBC 10*3/mm3 11.98* 15.32* 12.11*   HEMOGLOBIN g/dL 10.8* 14.5 16.8*   HEMATOCRIT % 32.3* 42.3 51.3*   PLATELETS 10*3/mm3 232 284 338     .  Results from last 7 days   Lab Units 04/19/21  0510 04/18/21  0304 04/17/21  2150   SODIUM mmol/L 143 139 137   POTASSIUM mmol/L 3.9 3.6 4.8   CHLORIDE mmol/L 111* 106 100   CO2 mmol/L 22.6 21.7* 21.1*   BUN mg/dL 9 13 14   CREATININE mg/dL 0.43* 0.70 0.52*   GLUCOSE mg/dL 88 90 96   CALCIUM mg/dL 8.4* 9.5 9.8       Assessment/Plan       Acute ischemic stroke (CMS/HCC)    Allergic rhinitis    Essential hypertension    Mixed hyperlipidemia    Chronic diastolic (congestive) heart failure " (CMS/[facility])    Acute stroke secondary to left proximal cervical ICA occlusion, left ICA terminus thrombus, and left MCA thrombus.  S/P mechanical thrombectomy with successful revascularization of above listed vessels 4/18/2021  Stroke related aphasia    Remainder of patient care to be managed by Stroke Neurology.   Please call neurosurgery if we can be of any further assistance.     Nan Hernandez, SANAZ  04/19/21  10:07 EDT

## 2021-04-19 NOTE — THERAPY EVALUATION
Patient Name: Jazmyn Castaneda  : 1950    MRN: [id number]                              Today's Date: 2021       Admit Date: 2021    Visit Dx:     ICD-10-CM ICD-9-CM   1. Acute ischemic stroke (CMS/HCC)  I63.9 434.91   2. Expressive aphasia  R47.01 784.3     Patient Active Problem List   Diagnosis   • Nephrolithiasis   • Allergic rhinitis   • Diverticulosis of large intestine without hemorrhage   • Hiatal hernia   • Medicare annual wellness visit, subsequent   • Exogenous obesity   • Encounter for screening colonoscopy   • Visit for screening mammogram   • Breast mass, left   • Essential hypertension   • Erythrocytosis   • Vertigo   • Neutrophilic leukocytosis   • Mixed hyperlipidemia   • Right hydronephrosis with urinary obstruction due to ureteral calculus   • Allergy to multiple antibiotics   • Tobacco abuse   • Ureteral stone   • Colon cancer screening   • Age-related osteoporosis without current pathological fracture   • Chronic diastolic (congestive) heart failure (CMS/HCC)   • Acute ischemic stroke (CMS/HCC)     Past Medical History:   Diagnosis Date   • Acute ischemic stroke (CMS/HCC) 2021   • Age-related osteoporosis without current pathological fracture 2021   • Anesthesia     WOKE UP DURING CATARACT SURGERY   • Chronic diastolic (congestive) heart failure (CMS/HCC) 2021   • Collapse of lung 1980s    history of in past post trauma   • Diverticulosis    • Environmental allergies     Some pollens, grass, trees, flowers   • Essential hypertension 3/24/2017   • Exogenous obesity 3/24/2017   • H/O Pneumonia    • Infectious mononucleosis    • Kidney infection    • Kidney stone    • Mixed hyperlipidemia 2019   • Neuromuscular disorder (CMS/[facility])    • Sepsis secondary to UTI (CMS/HCC) 2019   • Tobacco abuse 2019   • Urinary tract infection    • Vertigo     past history of several times     Past Surgical History:   Procedure Laterality Date   • BREAST EXCISIONAL  BIOPSY Right     30 something when it was done; says it was precancerous   • CATARACT EXTRACTION, BILATERAL     •  SECTION     • CYSTOSCOPY N/A 2019    Procedure: CYSTOSCOPY AND STENT PLACEMENT;  Surgeon: Alen Lal MD;  Location: [facility];  Service: Urology   • EMBOLECTOMY N/A 2021    Procedure: EMBOLECTOMY MECHANICAL;  Surgeon: Justo Cheng MD;  Location: [facility] ;  Service: Neurosurgery;  Laterality: N/A;   • MASTOID SURGERY     • NASAL SEPTAL RECONSTRUCTION     • SINUS SURGERY      scraped and prior deviated septum   • URETEROSCOPY LASER LITHOTRIPSY WITH STENT INSERTION Right 2019    Procedure: CYSTOSCOPY, RIGHT URETEROSCOPY, LASER LITHOTRIPSY, STONE BASKET EXTRACTION, STENT PLACEMENT WITHOUT STRINGS;  Surgeon: Alfredo Gongora Jr., MD;  Location: [facility];  Service: Urology     General Information     [facility] Name 21 1418          OT Time and Intention    Document Type  evaluation  -     Mode of Treatment  occupational therapy;physical therapy;co-treatment  -     Row Name 21 1418          General Information    Patient Profile Reviewed  yes  -SM     Prior Level of Function  independent:;ADL's;all household mobility  -     Existing Precautions/Restrictions  fall  -     Row Name 21 1418          Living Environment    Lives With  alone  -[facility] Name 21 1418          Stairs Within Home, Primary    Stairs, Within Home, Primary  Case management reports, 2 story home. Pt unable to provide information.  -     Row Name 21 1418          Cognition    Orientation Status (Cognition)  unable/difficult to assess pt with aphasia, appears to have both expressive and receptive component.  -     Row Name 21 1418          Safety Issues, Functional Mobility    Safety Issues Affecting Function (Mobility)  ability to follow commands;insight into deficits/self-awareness;awareness of need for  assistance;problem-solving;sequencing abilities  -     Impairments Affecting Function (Mobility)  balance;cognition;coordination;motor planning;strength;sensation/sensory awareness;visual/perceptual  -       User Key  (r) = Recorded By, (t) = Taken By, (c) = Cosigned By    Initials Name Provider Type     Maria Fernanda Oneill OT Occupational Therapist          Mobility/ADL's     Row Name 04/19/21 1421          Bed Mobility    Supine-Sit [place] (Bed Mobility)  minimum assist (75% patient effort);2 person assist;nonverbal cues (demo/gesture);verbal cues  -     Sit-Supine [place] (Bed Mobility)  moderate assist (50% patient effort);2 person assist;nonverbal cues (demo/gesture);verbal cues  -     Assistive Device (Bed Mobility)  head of bed elevated  -SM     Row Name 04/19/21 1421          Transfers    Transfers  sit-stand transfer  -     Comment (Transfers)  HHAX2.  -     Sit-Stand [place] (Transfers)  minimum assist (75% patient effort);2 person assist;verbal cues;nonverbal cues (demo/gesture)  -SM     Row Name 04/19/21 1421          Functional Mobility    Functional Mobility- Ind. Level  minimum assist (75% patient effort);2 person assist required;nonverbal cues required (demo/gesture);verbal cues required  -     Functional Mobility- Comment  Pt able to take several steps laterally, unsteady. At times requires max verbal and tacile cues for coming to stand and initating taking steps, marching in place.  -[facility] Name 04/19/21 1421          Activities of Daily Living    BADL Assessment/Intervention  lower body dressing;grooming  -SM     Row Name 04/19/21 1421          Lower Body Dressing Assessment/Training    [place] Level (Lower Body Dressing)  lower body dressing skills;don;socks;dependent (less than 25% patient effort)  -     Position (Lower Body Dressing)  long sitting;sitting up in bed  -     Comment (Lower Body Dressing)  Max cues with demo, tacile, verbal cues with no  initiation of putting sock on foot. Only holds sock in L hand when handed to pt. Once OT starts task but putting socks over toes, pt does not attempt to finish with continued cues.  -     Row Name 04/19/21 1421          Grooming Assessment/Training    [place] Level (Grooming)  grooming skills;maximum assist (25% patient effort)  -     Assistive Devices (Grooming)  hand over hand  -     Position (Grooming)  edge of bed sitting  -     Comment (Grooming)  with RUE. Holds wash cloth to mouth only. After several minutes of cues, does use wash cloth to wash B hands.  -       User Key  (r) = Recorded By, (t) = Taken By, (c) = Cosigned By    Initials Name Provider Type     Maria Fernanda Oneill OT Occupational Therapist        Obj/Interventions     [facility] Name 04/19/21 1425          Sensory Assessment (Somatosensory)    Sensory Assessment (Somatosensory)  unable/difficult to assess  -SM     Row Name 04/19/21 1425          Vision Assessment/Intervention    Visual Motor Impairment  visual tracking, right  -     Visual Processing Deficit  celeste-inattention/neglect, right  -SM     Row Name 04/19/21 1425          Range of Motion Comprehensive    Comment, General Range of Motion  BUE AAROM WFL. AROM assessed sitting EOB reaching, R shoulder AROM 3/4 shoulder flexion/abduction. L shoulder AROM WFL  -[facility] Name 04/19/21 1425          Strength Comprehensive (MMT)    Comment, General Manual Muscle Testing (MMT) Assessment  RUE appears weaker than LUE lifting against gravity. Unable to complete MMT d/t pt aphasia limiting following commands.  strength appears equal.  -[facility] Name 04/19/21 1420          Balance    Dynamic Sitting Balance  mild impairment;sitting, edge of bed CGA  -     Static Standing Balance  mild impairment;supported Min AX2, HHA  -     Balance Interventions  sitting;dynamic reaching;occupation based/functional task  -       User Key  (r) = Recorded By, (t) = Taken By, (c) = Cosigned By     Initials Name Provider Type    SM Maria Fernanda Oneill, OT Occupational Therapist        Goals/Plan     Row Name 04/19/21 1437          Transfer Goal 1 (OT)    Activity/Assistive Device (Transfer Goal 1, OT)  transfers, all;toilet  -SM     [place] Level/Cues Needed (Transfer Goal 1, OT)  contact guard assist  -SM     Time Frame (Transfer Goal 1, OT)  short term goal (STG);2 weeks  -SM     Progress/Outcome (Transfer Goal 1, OT)  goal ongoing  -SM     Row Name 04/19/21 1437          Dressing Goal 1 (OT)    Activity/Device (Dressing Goal 1, OT)  dressing skills, all  -SM     [place]/Cues Needed (Dressing Goal 1, OT)  contact guard assist  -SM     Time Frame (Dressing Goal 1, OT)  short term goal (STG);2 weeks  -SM     Progress/Outcome (Dressing Goal 1, OT)  goal ongoing  -SM     Row Name 04/19/21 1437          Toileting Goal 1 (OT)    Activity/Device (Toileting Goal 1, OT)  toileting skills, all  -SM     [place] Level/Cues Needed (Toileting Goal 1, OT)  contact guard assist  -SM     Time Frame (Toileting Goal 1, OT)  short term goal (STG);2 weeks  -SM     Progress/Outcome (Toileting Goal 1, OT)  goal ongoing  -SM     Row Name 04/19/21 1437          Grooming Goal 1 (OT)    Activity/Device (Grooming Goal 1, OT)  grooming skills, all  -SM     [place] (Grooming Goal 1, OT)  standby assist  -SM     Time Frame (Grooming Goal 1, OT)  short term goal (STG);2 weeks  -SM     Progress/Outcome (Grooming Goal 1, OT)  goal ongoing  -SM     Row Name 04/19/21 1437          Strength Goal 1 (OT)    Strength Goal 1 (OT)  Pt to increase RUE strength to 3+/5 shoulder flexion to increase independence with functional tasks/ADL.  -SM     Time Frame (Strength Goal 1, OT)  short term goal (STG);2 weeks  -SM     Progress/Outcome (Strength Goal 1, OT)  goal ongoing  -SM     Row Name 04/19/21 1437          Therapy Assessment/Plan (OT)    Planned Therapy Interventions (OT)  activity tolerance training;adaptive equipment  training;BADL retraining;cognitive/visual perception retraining;functional balance retraining;IADL retraining;neuromuscular control/coordination retraining;occupation/activity based interventions;patient/caregiver education/training;ROM/therapeutic exercise;strengthening exercise;transfer/mobility retraining  -       User Key  (r) = Recorded By, (t) = Taken By, (c) = Cosigned By    Initials Name Provider Type     Maria Fernanda Oneill OT Occupational Therapist        Clinical Impression     Row Name 04/19/21 1431          Pain Assessment    Additional Documentation  Pain Scale: FACES Pre/Post-Treatment (Group)  -SM     Row Name 04/19/21 1431          Pain Scale: FACES Pre/Post-Treatment    Pain: FACES Scale, Pretreatment  0-->no hurt  -SM     Row Name 04/19/21 1431          Plan of Care Review    Plan of Care Reviewed With  patient  -     Outcome Summary  Pt is a 70 y.o female admitted to [facility] with acute L MCA, s/p mechanical thrombectomy. Per case management note, pt lives alone and independent at baseline. Today pt presents with aphasia, impaired command following, L gaze preference, R sided neglect, motor planning/apraxia deficits, RUE weakness. Pt requires max A-total A to don socks, hand over hand assist to wash face and hands with RUE with max A. X2 assist for bed mobility and transfer this date. Max tactile and demo cues throughout session with delayed initation of tasks. Recommend continued OT to increase independence with ADL and mobility, RUE coordination, strength, balance, functional cognition. Anticipate d/c to acute rehab.  -[facility] Name 04/19/21 1431          Therapy Assessment/Plan (OT)    Rehab Potential (OT)  good, to achieve stated therapy goals  -     Criteria for Skilled Therapeutic Interventions Met (OT)  yes;skilled treatment is necessary  -     Therapy Frequency (OT)  5 times/wk  -     Row Name 04/19/21 1431          Therapy Plan Review/Discharge Plan (OT)    Anticipated Discharge  Disposition (OT)  inpatient rehabilitation facility  -     Row Name 04/19/21 1431          Vital Signs    Pre Systolic BP Rehab  99  -SM     Pre Treatment Diastolic BP  57  -SM     Post Systolic BP Rehab  128  -SM     Post Treatment Diastolic BP  63  -SM     Pretreatment Heart Rate (beats/min)  106  -SM     Posttreatment Heart Rate (beats/min)  94  -SM     Pretreatment Resp Rate (breaths/min)  13  -SM     Posttreatment Resp Rate (breaths/min)  18  -SM     Pre SpO2 (%)  99  -SM     O2 Delivery Pre Treatment  supplemental O2  -SM     Post SpO2 (%)  97  -SM     O2 Delivery Post Treatment  supplemental O2  -[facility] Name 04/19/21 1431          Positioning and Restraints    Pre-Treatment Position  in bed  -SM     Post Treatment Position  bed  -SM     In Bed  side lying left;call light within reach;encouraged to call for assist;exit alarm on;notified nsg  -       User Key  (r) = Recorded By, (t) = Taken By, (c) = Cosigned By    Initials Name Provider Type     Maria Fernanda Oneill, OT Occupational Therapist        Outcome Measures     Row Name 04/19/21 1439          How much help from another is currently needed...    Putting on and taking off regular lower body clothing?  2  -SM     Bathing (including washing, rinsing, and drying)  2  -SM     Toileting (which includes using toilet bed pan or urinal)  1  -SM     Putting on and taking off regular upper body clothing  2  -SM     Taking care of personal grooming (such as brushing teeth)  2  -SM     Eating meals  2  -SM     AM-PAC 6 Clicks Score (OT)  11  -SM     Row Name 04/19/21 1439          Modified Leidy Scale    Modified Metcalfe Scale  4 - Moderately severe disability.  Unable to walk without assistance, and unable to attend to own bodily needs without assistance.  -[facility] Name 04/19/21 1439          Functional Assessment    Outcome Measure Options  AM-PAC 6 Clicks Daily Activity (OT)  -SM       User Key  (r) = Recorded By, (t) = Taken By, (c) = Cosigned By     Initials Name Provider Type    Maria Fernanda Harris OT Occupational Therapist        Occupational Therapy Education                 Title: PT OT SLP Therapies (In Progress)     Topic: Occupational Therapy (Not Started)     Point: ADL training (Not Started)     Description:   Instruct learner(s) on proper safety adaptation and remediation techniques during self care or transfers.   Instruct in proper use of assistive devices.              Learner Progress:  Not documented in this visit.          Point: Home exercise program (Not Started)     Description:   Instruct learner(s) on appropriate technique for monitoring, assisting and/or progressing therapeutic exercises/activities.              Learner Progress:  Not documented in this visit.          Point: Precautions (Not Started)     Description:   Instruct learner(s) on prescribed precautions during self-care and functional transfers.              Learner Progress:  Not documented in this visit.          Point: Body mechanics (Not Started)     Description:   Instruct learner(s) on proper positioning and spine alignment during self-care, functional mobility activities and/or exercises.              Learner Progress:  Not documented in this visit.                          OT Recommendation and Plan  Planned Therapy Interventions (OT): activity tolerance training, adaptive equipment training, BADL retraining, cognitive/visual perception retraining, functional balance retraining, IADL retraining, neuromuscular control/coordination retraining, occupation/activity based interventions, patient/caregiver education/training, ROM/therapeutic exercise, strengthening exercise, transfer/mobility retraining  Therapy Frequency (OT): 5 times/wk  Plan of Care Review  Plan of Care Reviewed With: patient  Outcome Summary: Pt is a 70 y.o female admitted to [facility] with acute L MCA, s/p mechanical thrombectomy. Per case management note, pt lives alone and independent at baseline. Today pt  presents with aphasia, impaired command following, L gaze preference, R sided neglect, motor planning/apraxia deficits, RUE weakness. Pt requires max A-total A to don socks, hand over hand assist to wash face and hands with RUE with max A. X2 assist for bed mobility and transfer this date. Max tactile and demo cues throughout session with delayed initation of tasks. Recommend continued OT to increase independence with ADL and mobility, RUE coordination, strength, balance, functional cognition. Anticipate d/c to acute rehab.     Time Calculation:   Time Calculation- OT     Row Name 04/19/21 1442             Time Calculation- OT    OT Start Time  1335  -      OT Stop Time  1403  -      OT Time Calculation (min)  28 min  -      Total Timed Code Minutes- OT  18 minute(s)  -      OT Received On  04/19/21  -      OT - Next Appointment  04/20/21  -      OT Goal Re-Cert Due Date  05/03/21  -        User Key  (r) = Recorded By, (t) = Taken By, (c) = Cosigned By    Initials Name Provider Type     Maria Fernanda Oneill OT Occupational Therapist        Therapy Charges for Today     Code Description Service Date Service Provider Modifiers Qty    [id number]  OT EVAL MOD COMPLEXITY 2 4/19/2021 Maria Fernanda Oneill OT GO 1    [id number]  OT SELF CARE/MGMT/TRAIN EA 15 MIN 4/19/2021 Maria Fernanda Oneill OT GO 1               Maria Fernanda Oneill OT  4/19/2021

## 2021-04-19 NOTE — CONSULTS
The patient's nurse thought that she couldn't answer the questions and try at another date. I will since she is a part of my units anyway.

## 2021-04-19 NOTE — THERAPY EVALUATION
Patient Name: Jazmyn Castaneda  : 1950    MRN: [id number]                              Today's Date: 2021       Admit Date: 2021    Visit Dx:     ICD-10-CM ICD-9-CM   1. Acute ischemic stroke (CMS/HCC)  I63.9 434.91   2. Expressive aphasia  R47.01 784.3     Patient Active Problem List   Diagnosis   • Nephrolithiasis   • Allergic rhinitis   • Diverticulosis of large intestine without hemorrhage   • Hiatal hernia   • Medicare annual wellness visit, subsequent   • Exogenous obesity   • Encounter for screening colonoscopy   • Visit for screening mammogram   • Breast mass, left   • Essential hypertension   • Erythrocytosis   • Vertigo   • Neutrophilic leukocytosis   • Mixed hyperlipidemia   • Right hydronephrosis with urinary obstruction due to ureteral calculus   • Allergy to multiple antibiotics   • Tobacco abuse   • Ureteral stone   • Colon cancer screening   • Age-related osteoporosis without current pathological fracture   • Chronic diastolic (congestive) heart failure (CMS/HCC)   • Acute ischemic stroke (CMS/HCC)     Past Medical History:   Diagnosis Date   • Acute ischemic stroke (CMS/HCC) 2021   • Age-related osteoporosis without current pathological fracture 2021   • Anesthesia     WOKE UP DURING CATARACT SURGERY   • Chronic diastolic (congestive) heart failure (CMS/HCC) 2021   • Collapse of lung 1980s    history of in past post trauma   • Diverticulosis    • Environmental allergies     Some pollens, grass, trees, flowers   • Essential hypertension 3/24/2017   • Exogenous obesity 3/24/2017   • H/O Pneumonia    • Infectious mononucleosis    • Kidney infection    • Kidney stone    • Mixed hyperlipidemia 2019   • Neuromuscular disorder (CMS/[facility])    • Sepsis secondary to UTI (CMS/HCC) 2019   • Tobacco abuse 2019   • Urinary tract infection    • Vertigo     past history of several times     Past Surgical History:   Procedure Laterality Date   • BREAST EXCISIONAL  BIOPSY Right     30 something when it was done; says it was precancerous   • CATARACT EXTRACTION, BILATERAL     •  SECTION     • CYSTOSCOPY N/A 2019    Procedure: CYSTOSCOPY AND STENT PLACEMENT;  Surgeon: Alen Lal MD;  Location: [facility];  Service: Urology   • EMBOLECTOMY N/A 2021    Procedure: EMBOLECTOMY MECHANICAL;  Surgeon: Justo Cheng MD;  Location: [facility] ;  Service: Neurosurgery;  Laterality: N/A;   • MASTOID SURGERY     • NASAL SEPTAL RECONSTRUCTION     • SINUS SURGERY      scraped and prior deviated septum   • URETEROSCOPY LASER LITHOTRIPSY WITH STENT INSERTION Right 2019    Procedure: CYSTOSCOPY, RIGHT URETEROSCOPY, LASER LITHOTRIPSY, STONE BASKET EXTRACTION, STENT PLACEMENT WITHOUT STRINGS;  Surgeon: Alfredo Gongora Jr., MD;  Location: [facility];  Service: Urology     General Information     Row Name 21 1413          Physical Therapy Time and Intention    Document Type  evaluation  -EM     Mode of Treatment  co-treatment;physical therapy  -EM     Row Name 21 1413          General Information    Patient Profile Reviewed  yes  -EM     Prior Level of Function  independent:  -EM     Existing Precautions/Restrictions  fall  -EM     Row Name 21 1413          Living Environment    Lives With  alone  -EM     Row Name 21 1413          Cognition    Orientation Status (Cognition)  unable/difficult to assess pt with aphasia, unable to assess orientation  -EM     Row Name 21 1413          Safety Issues, Functional Mobility    Impairments Affecting Function (Mobility)  cognition;endurance/activity tolerance;strength;motor planning  -EM       User Key  (r) = Recorded By, (t) = Taken By, (c) = Cosigned By    Initials Name Provider Type    EM Kasandra Benitez PT Physical Therapist        Mobility     Row Name 21 1417          Bed Mobility    Bed Mobility  supine-sit;sit-supine  -EM     Supine-Sit [place]  (Bed Mobility)  minimum assist (75% patient effort);2 person assist  -EM     Sit-Supine [place] (Bed Mobility)  moderate assist (50% patient effort);2 person assist  -EM     Comment (Bed Mobility)  pt able to sit up in long sitting, level of assist needed for supine to sit to supine seems more related to poor initation and decreased motor planning  -EM     Row Name 04/19/21 1417          Transfers    Comment (Transfers)  delay in initiation of sit to stand tsf, maximal tactile cueing  -EM     Row Name 04/19/21 1417          Sit-Stand Transfer    Sit-Stand [place] (Transfers)  minimum assist (75% patient effort);2 person assist  -EM     Row Name 04/19/21 1417          Gait/Stairs (Locomotion)    [place] Level (Gait)  minimum assist (75% patient effort);2 person assist  -EM     Distance in Feet (Gait)  3-4 sidesteps up to head of bed (towards right)  -EM     Deviations/Abnormal Patterns (Gait)  base of support, narrow  -EM     Comment (Gait/Stairs)  unsteady, posterior lean  -EM       User Key  (r) = Recorded By, (t) = Taken By, (c) = Cosigned By    Initials Name Provider Type    EM Kasandra Benitez, PT Physical Therapist        Obj/Interventions     Row Name 04/19/21 1418          Range of Motion Comprehensive    General Range of Motion  no range of motion deficits identified  -EM     Row Name 04/19/21 1418          Strength Comprehensive (MMT)    General Manual Muscle Testing (MMT) Assessment  other (see comments)  -EM     Comment, General Manual Muscle Testing (MMT) Assessment  unable to perform formal MMT due to confusion/inability to consistently follow commands, able to lift both legs against gravity  -EM     Row Name 04/19/21 1418          Motor Skills    Therapeutic Exercise  other (see comments) LAQ x 5 with tactile cues to initiate movement  -EM     Row Name 04/19/21 1418          Balance    Balance Assessment  sitting dynamic balance;standing static balance;standing dynamic balance  -EM      Dynamic Sitting Balance  WNL  -EM     Static Standing Balance  mild impairment  -EM     Dynamic Standing Balance  mild impairment  -EM     Row Name 04/19/21 1418          Sensory Assessment (Somatosensory)    Sensory Assessment (Somatosensory)  unable/difficult to assess  -EM       User Key  (r) = Recorded By, (t) = Taken By, (c) = Cosigned By    Initials Name Provider Type    EM Kasandra Benitez PT Physical Therapist        Goals/Plan     Row Name 04/19/21 1422          Bed Mobility Goal 1 (PT)    Activity/Assistive Device (Bed Mobility Goal 1, PT)  bed mobility activities, all  -EM     [place] Level/Cues Needed (Bed Mobility Goal 1, PT)  contact guard assist  -EM     Time Frame (Bed Mobility Goal 1, PT)  1 week  -EM     Row Name 04/19/21 1422          Transfer Goal 1 (PT)    Activity/Assistive Device (Transfer Goal 1, PT)  transfers, all  -EM     [place] Level/Cues Needed (Transfer Goal 1, PT)  contact guard assist  -EM     Time Frame (Transfer Goal 1, PT)  1 week  -EM     Row Name 04/19/21 1422          Gait Training Goal 1 (PT)    Activity/Assistive Device (Gait Training Goal 1, PT)  gait (walking locomotion)  -EM     [place] Level (Gait Training Goal 1, PT)  minimum assist (75% or more patient effort)  -EM     Distance (Gait Training Goal 1, PT)  50  -EM     Time Frame (Gait Training Goal 1, PT)  1 week  -EM       User Key  (r) = Recorded By, (t) = Taken By, (c) = Cosigned By    Initials Name Provider Type    EM Kasandra Benitez PT Physical Therapist        Clinical Impression     Row Name 04/19/21 1420          Pain    Additional Documentation  Pain Scale: Numbers Pre/Post-Treatment (Group)  -EM     [facility] Name 04/19/21 1420          Pain Scale: Numbers Pre/Post-Treatment    Pre/Posttreatment Pain Comment  patient does not appear to be in any pain  -EM     [facility] Name 04/19/21 1420          Plan of Care Review    Plan of Care Reviewed With  patient  -EM     [facility] Name 04/19/21 1423           Therapy Assessment/Plan (PT)    Patient/Family Therapy Goals Statement (PT)  pt unable to state, seems frustrated at times with word finding difficulty  -EM     Rehab Potential (PT)  good, to achieve stated therapy goals  -EM     Criteria for Skilled Interventions Met (PT)  yes;skilled treatment is necessary  -EM     Row Name 04/19/21 1420          Vital Signs    Pre Systolic BP Rehab  99  -EM     Pre Treatment Diastolic BP  57  -EM     Post Systolic BP Rehab  125  -EM     Post Treatment Diastolic BP  63  -EM     Pretreatment Heart Rate (beats/min)  106  -EM     Posttreatment Heart Rate (beats/min)  95  -EM     Pre SpO2 (%)  95  -EM     O2 Delivery Pre Treatment  supplemental O2  -EM     Post SpO2 (%)  97  -EM     O2 Delivery Post Treatment  supplemental O2  -EM     Row Name 04/19/21 1420          Positioning and Restraints    Pre-Treatment Position  in bed  -EM     Post Treatment Position  bed  -EM     In Bed  call light within reach;exit alarm on;side lying left  -EM       User Key  (r) = Recorded By, (t) = Taken By, (c) = Cosigned By    Initials Name Provider Type    EM Kasandra Benitez, PT Physical Therapist        Outcome Measures     Row Name 04/19/21 1427          How much help from another person do you currently need...    Turning from your back to your side while in flat bed without using bedrails?  3  -EM     Moving from lying on back to sitting on the side of a flat bed without bedrails?  3  -EM     Moving to and from a bed to a chair (including a wheelchair)?  3  -EM     Standing up from a chair using your arms (e.g., wheelchair, bedside chair)?  3  -EM     Climbing 3-5 steps with a railing?  1  -EM     To walk in hospital room?  2  -EM     AM-PAC 6 Clicks Score (PT)  15  -EM     Row Name 04/19/21 1429          Modified [place] Scale    Modified [place] Scale  4 - Moderately severe disability.  Unable to walk without assistance, and unable to attend to own bodily needs without assistance.  -EM     [place]  Name 04/19/21 1423          Functional Assessment    Outcome Measure Options  AM-PAC 6 Clicks Basic Mobility (PT);Modified [place]  -EM       User Key  (r) = Recorded By, (t) = Taken By, (c) = Cosigned By    Initials Name Provider Type    Kasandra Gutierrez PT Physical Therapist        Physical Therapy Education                 Title: PT OT SLP Therapies (In Progress)     Topic: Physical Therapy (In Progress)     Point: Mobility training (In Progress)     Learning Progress Summary           Patient Acceptance, E, NR by EM at 4/19/2021 1424                   Point: Home exercise program (Not Started)     Learner Progress:  Not documented in this visit.          Point: Body mechanics (Not Started)     Learner Progress:  Not documented in this visit.          Point: Precautions (Not Started)     Learner Progress:  Not documented in this visit.                      User Key     Initials Effective Dates Name Provider Type Discipline     04/03/18 -  Kasandra Benitez PT Physical Therapist PT              PT Recommendation and Plan  Planned Therapy Interventions (PT): bed mobility training, gait training, balance training, patient/family education, transfer training  Plan of Care Reviewed With: patient     Time Calculation:   PT Charges     Row Name 04/19/21 1426             Time Calculation    Start Time  1334  -EM      Stop Time  1403  -EM      Time Calculation (min)  29 min  -EM      PT Received On  04/19/21  -EM      PT - Next Appointment  04/20/21  -EM      PT Goal Re-Cert Due Date  04/26/21  -EM         Time Calculation- PT    Total Timed Code Minutes- PT  15 minute(s)  -EM        User Key  (r) = Recorded By, (t) = Taken By, (c) = Cosigned By    Initials Name Provider Type    Kasandra Gutierrez PT Physical Therapist        Therapy Charges for Today     Code Description Service Date Service Provider Modifiers Qty    13339162383 HC PT EVAL MOD COMPLEXITY 2 4/19/2021 Kasandra Benitez PT GP 1     [id number]  PT THER PROC EA 15 MIN 4/19/2021 Kasandra Benitez, PT GP 1          PT G-Codes  Outcome Measure Options: AM-PAC 6 Clicks Basic Mobility (PT), Modified [place]  AM-PAC 6 Clicks Score (PT): 15  Modified [place] Scale: 4 - Moderately severe disability.  Unable to walk without assistance, and unable to attend to own bodily needs without assistance.    Kasandra Benitez, PT  4/19/2021

## 2021-04-19 NOTE — PROGRESS NOTES
Name: Jazmyn Castaneda ADMIT: 2021   : 1950  PCP: Samira Yi MD    MRN: [id number] LOS: 2 days   AGE/SEX: 70 y.o. female  ROOM: [facility]     Subjective   Subjective    Patient seen at bedside.       Objective   Objective   Vital Signs  Temp:  [97.4 °F (36.3 °C)-98.6 °F (37 °C)] 97.7 °F (36.5 °C)  Heart Rate:  [] 92  Resp:  [16] 16  BP: ()/(40-93) 119/75  SpO2:  [91 %-100 %] 98 %  on  Flow (L/min):  [1-2] 1;   Device (Oxygen Therapy): nasal cannula  Body mass index is 30.86 kg/m².  Physical Exam   General, appears ill  Head and ENT examination normocephalic, atraumatic.  Lungs, symmetric expansion, no acute distress.  Heart, regular rate and rhythm.  Abdomen, soft and nontender.  Extremities, no clubbing, or cyanosis.  Neuro, unable to fully evaluate current mental status.  Psych, unable to fully evaluate.  Musculoskeletal, joint examination grossly normal.    Results Review     I reviewed the patient's new clinical results.  Results from last 7 days   Lab Units 21  0510 21  0304 21  2150   WBC 10*3/mm3 11.98* 15.32* 12.11*   HEMOGLOBIN g/dL 10.8* 14.5 16.8*   PLATELETS 10*3/mm3 232 284 338     Results from last 7 days   Lab Units 21  0510 21  0304 21  2150   SODIUM mmol/L 143 139 137   POTASSIUM mmol/L 3.9 3.6 4.8   CHLORIDE mmol/L 111* 106 100   CO2 mmol/L 22.6 21.7* 21.1*   BUN mg/dL 9 13 14   CREATININE mg/dL 0.43* 0.70 0.52*   GLUCOSE mg/dL 88 90 96   Estimated Creatinine Clearance: 56.2 mL/min (A) (by C-G formula based on SCr of 0.43 mg/dL (L)).  Results from last 7 days   Lab Units 21  2150   ALBUMIN g/dL 4.30   BILIRUBIN mg/dL 0.6   ALK PHOS U/L 150*   AST (SGOT) U/L 31   ALT (SGPT) U/L 21     Results from last 7 days   Lab Units 21  0510 21  0304 21  2150   CALCIUM mg/dL 8.4* 9.5 9.8   ALBUMIN g/dL  --   --  4.30     Results from last 7 days   Lab Units 21  0304 21  2150   PROCALCITONIN ng/mL  --   0.05   LACTATE mmol/L 0.8  --      COVID19   Date Value Ref Range Status   04/17/2021 Not Detected Not Detected - Ref. Range Final     Hemoglobin A1C   Date/Time Value Ref Range Status   04/18/2021 0304 5.71 (H) 4.80 - 5.60 % Final     Glucose   Date/Time Value Ref Range Status   04/19/2021 1158 87 70 - 130 mg/dL Final   04/18/2021 2317 90 70 - 130 mg/dL Final   04/18/2021 1531 114 70 - 130 mg/dL Final   04/18/2021 0839 91 70 - 130 mg/dL Final   04/18/2021 0613 95 70 - 130 mg/dL Final       Adult Transthoracic Echo Complete W/ Cont if Necessary Per Protocol (With Agitated Saline)  · Calculated left ventricular EF = 63% Estimated left ventricular EF = 63%   Estimated left ventricular EF was in agreement with the calculated left   ventricular EF. Left ventricular systolic function is normal. Normal left   ventricular cavity size noted. Left ventricular wall thickness is   consistent with borderline concentric hypertrophy. All left ventricular   wall segments contract normally. Left ventricular diastolic function was   normal.  · Left atrial volume is mildly increased. Saline test results are   negative.  · The right atrial cavity is mildly dilated.  · Mild mitral annular calcification is present. There is moderate,   bileaflet mitral valve thickening present. Mild mitral valve regurgitation   is present. No significant mitral valve stenosis is present.  · Trace tricuspid valve regurgitation is present. Estimated right   ventricular systolic pressure from tricuspid regurgitation is mildly   elevated (35-45 mmHg). Calculated right ventricular systolic pressure from   tricuspid regurgitation is 37 mmHg.     MRI Brain Without Contrast  Narrative: MRI OF THE BRAIN WITHOUT CONTRAST     HISTORY: Expressive aphasia.     COMPARISON: CT head 04/19/2021 and CT angiogram 04/18/2021.      TECHNIQUE: A MRI examination of the brain was performed utilizing  sagittal T1, axial diffusion, T1, T2, T2 FLAIR and gradient echo  T2  imaging.     FINDINGS: The diffusion sequence demonstrates cortical and to a lesser  extent subcortical white matter areas of acute infarction involving the  left frontal lobe laterally and superolaterally extending to and  involving the insular cortex, and operculum. A frontoparietal area of  infarction is noted superolaterally as well. The areas of acute  infarction correspond to areas of decreased attenuation noted on the  prior CT examinations. A small area of acute infarction is appreciated  involving the periventricular white matter of the left temporal lobe  anteriorly and medially. This measures 5 mm in size.     Mild small vessel ischemic disease is noted. There is expected flow-void  in the basilar artery and in the distal aspects of the internal carotid  arteries bilaterally on the axial T2 sequence.     The gradient-echo T2 sequence shows no evidence of signal loss to  suggest blood products.     Impression: Areas of acute infarction involving the left frontal and  frontoparietal regions consistent with a left MCA distribution infarcts,  overall similar in area as compared to the CT examination of 04/18/2021  given differences in technique. There is no evidence of hemorrhage.     This report was finalized on 4/19/2021 2:08 PM by Dr. Cecilio Augustin M.D.     CT Head Without Contrast  Narrative: CT HEAD WITHOUT CONTRAST     HISTORY: Stroke     COMPARISON: 04/18/2021     TECHNIQUE: Axial CT imaging was obtained brain. No IV contrast was  administered.     FINDINGS:  No acute intracranial hemorrhage is seen. There are somewhat amorphous  areas of decreased attenuation which are seen within the left cerebral  hemisphere. These are in keeping with patient's known history of stroke.  There is increasing effacement of sulci and gyri within the left  cerebral hemisphere compared to prior exam. However, there is no  significant mass effect upon the left lateral ventricle. There is no  midline shift. Again,  there is no evidence of hemorrhagic  transformation. Mucosal thickening is noted within the ethmoid sinuses.  Mucous retention cyst is noted within the right maxillary sinus. Left  mastoid air cells are relatively underpneumatized. There is additional  partial opacification of right sphenoid sinus.     Impression: Areas of evolving infarction again noted within the left cerebral  hemisphere. Overall, the affected area appears stable in size, although  there is increasing mass effect, with more effacement of the sulci noted  on the current study. However, there is no mass effect upon the left  lateral ventricle, and there is no significant midline shift at this  time. There is no evidence of hemorrhagic transformation.     Radiation dose reduction techniques were utilized, including automated  exposure control and exposure modulation based on body size.     This report was finalized on 4/19/2021 4:50 AM by Dr. Hillary Menendez M.D.       Scheduled Medications  aspirin, 325 mg, Oral, Daily   Or  aspirin, 300 mg, Rectal, Daily  atorvastatin, 80 mg, Oral, Nightly  [START ON 4/20/2021] clopidogrel, 75 mg, Oral, Daily  heparin (porcine), 5,000 Units, Subcutaneous, Q8H  [MAR Hold] sodium chloride, 1,000 mL, Intravenous, Once  [MAR Hold] sodium chloride, 10 mL, Intravenous, Q12H  sodium chloride, 10 mL, Intravenous, Q12H    Infusions  phenylephrine, 0.5-3 mcg/kg/min, Last Rate: Stopped (04/19/21 0426)  sodium chloride, 100 mL/hr, Last Rate: 100 mL/hr (04/19/21 1552)    Diet  Diet Dysphagia; IV - Mechanical Soft No Mixed Consistencies; Nectar / Syrup Thick       Assessment/Plan     Active Hospital Problems    Diagnosis  POA   • **Acute ischemic stroke (CMS/HCC) [I63.9]  Yes   • Chronic diastolic (congestive) heart failure (CMS/HCC) [I50.32]  Yes   • Mixed hyperlipidemia [E78.2]  Yes   • Essential hypertension [I10]  Yes   • Allergic rhinitis [J30.9]  Yes      Resolved Hospital Problems   No resolved problems to display.        70 y.o. female admitted with Acute ischemic stroke (CMS/[facility]).    Assessment and plan  1.  Acute CVA, with left proximal cervical ICA occlusion, left MCA thrombus and left ICA terminus thrombus.  Patient underwent seen by neurosurgery and revascularization of left proximal cervical carotid artery occlusion, terminus bronchus of left ICA and left M2 thrombus was achieved.  Patient transferred to ICU for further care.  Started dual antiplatelet therapy.  MRI of the brain, EKG and TTE.    2.  Hypotension, use pressor support as needed.    3.  Anemia, hemoglobin noted to be in acceptable range, continue to recheck labs.    4.  Chronic diastolic CHF, she appears euvolemic at this point of time.  Continue to reassess for volume status changes.    5.  Aphasia related to stroke, neuro deficit, continue to monitor.    6.  CODE STATUS is full code.  Further plans based on hospital course.         Moreno Durham MD  [facility]  04/19/21  17:36 EDT

## 2021-04-19 NOTE — PLAN OF CARE
Goal Outcome Evaluation:  Plan of Care Reviewed With: patient   Patient is a 70 y.o. female admitted to [facility] for CVA s/p mechanical embolectomy on 4/17/2021. PMHx includes HTN, CHF, HLD. Patient is independent at baseline and lives alone. Patient has expressive aphasia, difficulty following commands for motor movements, decreased visual tracking to right. Today, patient performed bed mobility with Azeem/modA, required minAx2 for transfers, and ambulated a few sidesteps at the bedside with minAx2.  Patient demonstrates impairments consisting of generalized weakness, decreased balance, decreased motor planning, decreased visual tracking to right. Patient may benefit from skilled PT services acutely to address functional deficits as well as improve level of independence prior to discharge. Anticipate inpt rehab upon DC.   Patient was intermittently wearing a face mask during this therapy encounter. Therapist used appropriate personal protective equipment including eye protection, mask, and gloves.  Mask used was standard procedure mask. Appropriate PPE was worn during the entire therapy session. Hand hygiene was completed before and after therapy session. Patient is not in enhanced droplet precautions.

## 2021-04-19 NOTE — PROGRESS NOTES
Discharge Planning Assessment  [facility]     Patient Name: Jazmyn Castaneda  MRN: [id number]  Today's Date: 4/19/2021    Admit Date: 4/17/2021    Discharge Needs Assessment     Row Name 04/19/21 1044       Living Environment    Lives With  alone    Current Living Arrangements  home/apartment/condo    Potentially Unsafe Housing Conditions  unable to assess    Primary Care Provided by  self;child(emiliano)    Provides Primary Care For  no one    Family Caregiver if Needed  child(emiliano), adult    Quality of Family Relationships  supportive    Able to Return to Prior Arrangements  yes       Resource/Environmental Concerns    Resource/Environmental Concerns  none    Transportation Concerns  car, none       Transition Planning    Patient/Family Anticipates Transition to  inpatient rehabilitation facility    Patient/Family Anticipated Services at Transition  none    Transportation Anticipated  family or friend will provide       Discharge Needs Assessment    Current Outpatient/Agency/Support Group  inpatient rehabilitation facility    Equipment Currently Used at Home  none    Concerns to be Addressed  discharge planning    Anticipated Changes Related to Illness  other (see comments)    Equipment Needed After Discharge  none        Discharge Plan     Row Name 04/19/21 1045       Plan    Plan  Plan is undetermined  CCP following for needs    Plan Comments  IMM noted.  CCP spoke with patient’s daughter Laine [phone] via telephone.   CCP role explained and Face sheet verified.  Discharge planning discussed.  Pt. emergency contact is her daughter.   Pt PCP is Dr. Samira Madsen.   Pt lives in a two story house alone.  She is independent with ADL’s.  She uses no DME to ambulate.  Pt has no past history of Home Health.  She has no past rehab stays.  Pt obtains her medications from [facility] inside [employer] on [address]. Pt plans for discharge are undetermined.  CCP following        Continued Care and Services -  Admitted Since 4/17/2021    Coordination has not been started for this encounter.         Demographic Summary    No documentation.       Functional Status    No documentation.       Psychosocial    No documentation.       Abuse/Neglect    No documentation.       Legal    No documentation.       Substance Abuse    No documentation.       Patient Forms    No documentation.           Chiquita Forde RN

## 2021-04-20 ENCOUNTER — APPOINTMENT (OUTPATIENT)
Dept: CARDIOLOGY | Facility: HOSPITAL | Age: 71
End: 2021-04-20

## 2021-04-20 LAB
ANION GAP SERPL CALCULATED.3IONS-SCNC: 8.3 MMOL/L (ref 5–15)
BASOPHILS # BLD AUTO: 0.07 10*3/MM3 (ref 0–0.2)
BASOPHILS NFR BLD AUTO: 0.6 % (ref 0–1.5)
BH CV XLRA MEAS LEFT DIST CCA EDV: 22.3 CM/SEC
BH CV XLRA MEAS LEFT DIST CCA PSV: 107 CM/SEC
BH CV XLRA MEAS LEFT DIST ICA EDV: -29.9 CM/SEC
BH CV XLRA MEAS LEFT DIST ICA PSV: -76.8 CM/SEC
BH CV XLRA MEAS LEFT MID ICA EDV: 10.2 CM/SEC
BH CV XLRA MEAS LEFT MID ICA PSV: 82.3 CM/SEC
BH CV XLRA MEAS LEFT PROX CCA EDV: 19.4 CM/SEC
BH CV XLRA MEAS LEFT PROX CCA PSV: 88.1 CM/SEC
BH CV XLRA MEAS LEFT PROX ECA EDV: -20.6 CM/SEC
BH CV XLRA MEAS LEFT PROX ECA PSV: -214 CM/SEC
BH CV XLRA MEAS LEFT PROX ICA EDV: -59.6 CM/SEC
BH CV XLRA MEAS LEFT PROX ICA PSV: -246 CM/SEC
BH CV XLRA MEAS LEFT PROX SCLA EDV: 22.3 CM/SEC
BH CV XLRA MEAS LEFT PROX SCLA PSV: 177 CM/SEC
BH CV XLRA MEAS RIGHT DIST CCA EDV: 21.1 CM/SEC
BH CV XLRA MEAS RIGHT DIST CCA PSV: 93.8 CM/SEC
BH CV XLRA MEAS RIGHT DIST ICA EDV: -30.2 CM/SEC
BH CV XLRA MEAS RIGHT DIST ICA PSV: -95.3 CM/SEC
BH CV XLRA MEAS RIGHT MID ICA EDV: -29.5 CM/SEC
BH CV XLRA MEAS RIGHT MID ICA PSV: -111.8 CM/SEC
BH CV XLRA MEAS RIGHT PROX CCA EDV: 22.4 CM/SEC
BH CV XLRA MEAS RIGHT PROX CCA PSV: 113.1 CM/SEC
BH CV XLRA MEAS RIGHT PROX ECA EDV: -23 CM/SEC
BH CV XLRA MEAS RIGHT PROX ECA PSV: -264.5 CM/SEC
BH CV XLRA MEAS RIGHT PROX ICA EDV: -20.9 CM/SEC
BH CV XLRA MEAS RIGHT PROX ICA PSV: -120.7 CM/SEC
BH CV XLRA MEAS RIGHT PROX SCLA PSV: 273.5 CM/SEC
BH CV XLRA MEAS RIGHT VERTEBRAL A EDV: -20.4 CM/SEC
BH CV XLRA MEAS RIGHT VERTEBRAL A PSV: -87 CM/SEC
BUN SERPL-MCNC: 7 MG/DL (ref 8–23)
BUN/CREAT SERPL: 15.2 (ref 7–25)
CALCIUM SPEC-SCNC: 8.8 MG/DL (ref 8.6–10.5)
CHLORIDE SERPL-SCNC: 107 MMOL/L (ref 98–107)
CHOLEST SERPL-MCNC: 118 MG/DL (ref 0–200)
CO2 SERPL-SCNC: 22.7 MMOL/L (ref 22–29)
CREAT SERPL-MCNC: 0.46 MG/DL (ref 0.57–1)
DEPRECATED RDW RBC AUTO: 42.4 FL (ref 37–54)
EOSINOPHIL # BLD AUTO: 0.25 10*3/MM3 (ref 0–0.4)
EOSINOPHIL NFR BLD AUTO: 2.3 % (ref 0.3–6.2)
ERYTHROCYTE [DISTWIDTH] IN BLOOD BY AUTOMATED COUNT: 12.9 % (ref 12.3–15.4)
GFR SERPL CREATININE-BSD FRML MDRD: 134 ML/MIN/1.73
GLUCOSE BLDC GLUCOMTR-MCNC: 161 MG/DL (ref 70–130)
GLUCOSE BLDC GLUCOMTR-MCNC: 215 MG/DL (ref 70–130)
GLUCOSE BLDC GLUCOMTR-MCNC: 91 MG/DL (ref 70–130)
GLUCOSE SERPL-MCNC: 93 MG/DL (ref 65–99)
HCT VFR BLD AUTO: 31 % (ref 34–46.6)
HDLC SERPL-MCNC: 28 MG/DL (ref 40–60)
HGB BLD-MCNC: 10.5 G/DL (ref 12–15.9)
IMM GRANULOCYTES # BLD AUTO: 0.08 10*3/MM3 (ref 0–0.05)
IMM GRANULOCYTES NFR BLD AUTO: 0.7 % (ref 0–0.5)
LDLC SERPL CALC-MCNC: 69 MG/DL (ref 0–100)
LDLC/HDLC SERPL: 2.39 {RATIO}
LEFT ARM BP: NORMAL MMHG
LYMPHOCYTES # BLD AUTO: 2.65 10*3/MM3 (ref 0.7–3.1)
LYMPHOCYTES NFR BLD AUTO: 24.3 % (ref 19.6–45.3)
MCH RBC QN AUTO: 30.8 PG (ref 26.6–33)
MCHC RBC AUTO-ENTMCNC: 33.9 G/DL (ref 31.5–35.7)
MCV RBC AUTO: 90.9 FL (ref 79–97)
MONOCYTES # BLD AUTO: 0.79 10*3/MM3 (ref 0.1–0.9)
MONOCYTES NFR BLD AUTO: 7.3 % (ref 5–12)
NEUTROPHILS NFR BLD AUTO: 64.8 % (ref 42.7–76)
NEUTROPHILS NFR BLD AUTO: 7.05 10*3/MM3 (ref 1.7–7)
NRBC BLD AUTO-RTO: 0 /100 WBC (ref 0–0.2)
PLATELET # BLD AUTO: 227 10*3/MM3 (ref 140–450)
PMV BLD AUTO: 11.7 FL (ref 6–12)
POTASSIUM SERPL-SCNC: 3.5 MMOL/L (ref 3.5–5.2)
QT INTERVAL: 377 MS
RBC # BLD AUTO: 3.41 10*6/MM3 (ref 3.77–5.28)
RIGHT ARM BP: NORMAL MMHG
SODIUM SERPL-SCNC: 138 MMOL/L (ref 136–145)
TRIGL SERPL-MCNC: 115 MG/DL (ref 0–150)
VLDLC SERPL-MCNC: 21 MG/DL (ref 5–40)
WBC # BLD AUTO: 10.89 10*3/MM3 (ref 3.4–10.8)

## 2021-04-20 PROCEDURE — 82962 GLUCOSE BLOOD TEST: CPT

## 2021-04-20 PROCEDURE — 97112 NEUROMUSCULAR REEDUCATION: CPT

## 2021-04-20 PROCEDURE — 85025 COMPLETE CBC W/AUTO DIFF WBC: CPT | Performed by: INTERNAL MEDICINE

## 2021-04-20 PROCEDURE — 25010000002 HEPARIN (PORCINE) PER 1000 UNITS: Performed by: NEUROLOGICAL SURGERY

## 2021-04-20 PROCEDURE — 93010 ELECTROCARDIOGRAM REPORT: CPT | Performed by: INTERNAL MEDICINE

## 2021-04-20 PROCEDURE — 99232 SBSQ HOSP IP/OBS MODERATE 35: CPT | Performed by: PSYCHIATRY & NEUROLOGY

## 2021-04-20 PROCEDURE — 80061 LIPID PANEL: CPT | Performed by: PSYCHIATRY & NEUROLOGY

## 2021-04-20 PROCEDURE — 93880 EXTRACRANIAL BILAT STUDY: CPT

## 2021-04-20 PROCEDURE — 97110 THERAPEUTIC EXERCISES: CPT

## 2021-04-20 PROCEDURE — 92523 SPEECH SOUND LANG COMPREHEN: CPT

## 2021-04-20 PROCEDURE — 97535 SELF CARE MNGMENT TRAINING: CPT

## 2021-04-20 PROCEDURE — 80048 BASIC METABOLIC PNL TOTAL CA: CPT | Performed by: INTERNAL MEDICINE

## 2021-04-20 PROCEDURE — 92526 ORAL FUNCTION THERAPY: CPT

## 2021-04-20 PROCEDURE — 93005 ELECTROCARDIOGRAM TRACING: CPT | Performed by: PSYCHIATRY & NEUROLOGY

## 2021-04-20 RX ADMIN — HEPARIN SODIUM 5000 UNITS: 5000 INJECTION INTRAVENOUS; SUBCUTANEOUS at 20:32

## 2021-04-20 RX ADMIN — HEPARIN SODIUM 5000 UNITS: 5000 INJECTION INTRAVENOUS; SUBCUTANEOUS at 18:17

## 2021-04-20 RX ADMIN — ASPIRIN 325 MG: 325 TABLET ORAL at 09:54

## 2021-04-20 RX ADMIN — HEPARIN SODIUM 5000 UNITS: 5000 INJECTION INTRAVENOUS; SUBCUTANEOUS at 06:14

## 2021-04-20 RX ADMIN — SODIUM CHLORIDE, PRESERVATIVE FREE 10 ML: 5 INJECTION INTRAVENOUS at 09:54

## 2021-04-20 RX ADMIN — ATORVASTATIN CALCIUM 80 MG: 80 TABLET, FILM COATED ORAL at 20:32

## 2021-04-20 RX ADMIN — CLOPIDOGREL 75 MG: 75 TABLET, FILM COATED ORAL at 09:54

## 2021-04-20 NOTE — PLAN OF CARE
Goal Outcome Evaluation:  Plan of Care Reviewed With: patient       Problem: Adult Inpatient Plan of Care  Goal: Plan of Care Review  Outcome: Ongoing, Progressing  Flowsheets (Taken 4/20/2021 0623)  Plan of Care Reviewed With: patient  Outcome Summary: Patient remains in ICU overnight. Neuro exam stable, some difficulty following commands. Gaining new words overnight, able to say short sentences at times. Overall still with expressive aphasia. SR-ST on the monitor. SBP without issues and remains within parameters w/o use of Mateo.

## 2021-04-20 NOTE — THERAPY EVALUATION
Acute Care - Speech Language Pathology Initial Evaluation  [facility]     Patient Name: Jazmyn Castaneda  : 1950  MRN: [id number]  Today's Date: 2021               Admit Date: 2021     Visit Dx:    ICD-10-CM ICD-9-CM   1. Acute ischemic stroke (CMS/formerly [facility])  I63.9 434.91   2. Expressive aphasia  R47.01 784.3        SLP EVALUATION (last 72 hours)      SLP SLC Evaluation     Row Name 21 1400       Communication Assessment/Intervention    Document Type  evaluation  -AB    Subjective Information  no complaints  -AB    Patient Observations  alert  -AB    Patient/Family/Caregiver Comments/Observations  Pt is awake, alert, appears to grow increasingly frustrated as session continues secondary to communication breakdowns   -AB    Care Plan Review  evaluation/treatment results reviewed;care plan/treatment goals reviewed;risks/benefits reviewed;current/potential barriers reviewed;patient/other agree to care plan with son in room   -AB    Care Plan Review, Other Participant(s)  son  -AB    Patient Effort  adequate  -AB    Symptoms Noted During/After Treatment  none  -AB       General Information    Patient Profile Reviewed  yes  -AB    Precautions/Limitations, Vision  WFL;for purposes of eval  -AB    Precautions/Limitations, Hearing  WFL;for purposes of eval  -AB    Patient Level of Education  Prior  at [employer], previously employed by [employer]  -AB    Prior Level of Function-Communication  WFL  -AB    Plans/Goals Discussed with  family  -AB    Barriers to Rehab  medically complex;cognitive status  -AB    Patient's Goals for Discharge  patient could not state  -AB       Pain    Additional Documentation  -- not appropriate-no nonverbal indicators  -AB       Comprehension Assessment/Intervention    Comprehension Assessment/Intervention  Auditory Comprehension  -AB       Auditory Comprehension Assessment/Intervention    Auditory Comprehension (Communication)  severe impairment   "-AB    Able to Identify Objects/Pictures (Communication)  severe impairment  -AB    Answers Questions (Communication)  severe impairment;yes/no  -AB    Able to Follow Commands (Communication)  severe impairment;1-step  -AB    Auditory Comprehension Communication, Comment  Attempted portions of MS Aphasia Screener-limited as pt obtains a score of 2/100. Pt can answer 1/10 established yes/no questions as part of evaluation. Unsuccessful for-naming, automatic speech, repetition, object recognition in a field of 5, following instructions, reading instructions, verbal fluency, and writing/spelling. Informal measures: 1 step commands: 0/5; yes/no biographical and tangible items: 6/8; automatics: 0/5 attempts (sing \"happy birthday\" \"twinkle twinkle\" count, alphabet, or days of week), repetition of phonemes: 0/8 (reflexive \"ah\" inconsistent); object recognition field of 3: 0/5; object recognition field of 2: 0/5. Pt does utilize melodic intonation of \"happy birthday,\" reflective of ability to imitate intonation. She utilizes gestures/pointing and facial expressions to communicate wants/needs x3 across session. Mild groping with attempted verbalizations is inconsistent, and difficult to assess further as pt unable to follow commands.   -AB       Expression Assessment/Intervention    Expression Assessment/Intervention  verbal expression  -AB       Verbal Expression Assessment/Intervention    Verbal Expression  severe impairment  -AB    Automatic Speech (Communication)  severe impairment;alphabet;counting 1-20;days of week;sing happy birthday  -AB    Repetition  severe impairment;neologisms  -AB    Confrontational Naming  severe impairment;high frequency  -AB    Spontaneous/Functional Words  severe impairment;neologisms  -AB       SLP Clinical Impressions    SLP Diagnosis  Patient presents with a severe global aphasia, recommend intensive speech language therapy and further workup for acquired apraxia of speech  -AB    Rehab " Potential/Prognosis  good  -AB    SLC Criteria for Skilled Therapy Interventions Met  yes  -AB    Functional Impact  unable to care for self;difficulty communicating wants, needs;difficulty communicating in an emergency;restrictions in personal and social life;decreased ability to respond to situations safely  -AB       Recommendations    Therapy Frequency (SLP SLC)  PRN dependent on clinical course   -AB    Predicted Duration Therapy Intervention (Days)  until discharge  -AB    Anticipated Discharge Disposition (SLP)  inpatient rehabilitation facility;anticipate therapy at next level of care  -AB       Communication Treatment Objective and Progress Goals (SLP)    Auditory Comprehension Treatment Objectives  Auditory Comprehension Treatment Objectives (Group)  -AB    Verbal Expression Treatment Objectives  Verbal Expression Treatment Objectives (Group)  -AB    Motor Speech/Apraxia Treatment Objectives  Motor Speech/Apraxia Treatment Objectives (Group)  -AB    Augmentative/Alternative Communication Objectives  Augmentative/Alternative Communication Objectives (Group)  -AB       Auditory Comprehension Treatment Objectives    Words/Phrases/Sentences Selection  words/phrases/sentences, SLP goal 1  -AB    Comprehend Questions Selection  comprehend questions, SLP goal 1  -AB    Follow Directions Selection  follow directions, SLP goal 1  -AB       Words/Phrases/Sentences Goal 1 (SLP)    Improve Ability to Comprehend Words/Phrases/Sentences Through: Goal 1 (SLP)  identify objects, field of 2  -AB    Time Frame (Identify Objects and Pictures Goal 1, SLP)  short term goal (STG)  -AB       Comprehend Questions Goal 1 (SLP)    Improve Ability to Comprehend Questions Goal 1 (SLP)  simple yes/no questions;90%;with minimal cues (75-90%)  -AB    Time Frame (Comprehend Questions Goal 1, SLP)  short term goal (STG)  -AB       Follow Directions Goal 2 (SLP)    Improve Ability to Follow Directions Goal 1 (SLP)  1 step direction with  objects;1 step direction without objects;90%;with minimal cues (75-90%)  -AB       Verbal Expression Treatment Objectives    Word Retrieval Skills Selection  word retrieval, SLP goal 1;word retrieval, SLP goal 2  -AB       Word Retrieval Skills Goal 1 (SLP)    Improve Word Retrieval Skills By Goal 1 (SLP)  completing automatic speech task, counting;completing automatic speech task, alphabet;completing automatic speech task, days of the week;completing automatic speech task, months;completing automatic speech task, Pledge of Allegiance;completing automatic speech task, sing “Happy Birthday”;with moderate cues (50-74%)  -AB    Time Frame (Word Retrieval Goal 1, SLP)  short term goal (STG)  -AB       Word Retrieval Skills Goal 2 (SLP)    Improve Word Retrieval Skills By Goal 2 (SLP)  repeating sounds;80%;with moderate cues (50-74%)  -AB    Time Frame (Word Retrieval Goal 2, SLP)  short term goal (STG)  -AB       Motor Speech/Apraxia Treatment Objectives    Motor Planning Selection  motor planning selection, SLP goal 1;motor planning selection, SLP goal 2  -AB       Motor Planning Goal 1 (SLP)    Improve Motor Planning to Reduce Apraxia by Goal 1 (SLP)  imitating mouth postures  -AB       Motor Planning Goal 2 (SLP)    Improve Motor Planning to Reduce Apraxia by Goal 2 (SLP)  imitating vowels  -AB       Augmentative/Alternative Communication Objectives    Augmentative/Alternative Communication Selection  augmentative/alternative communication, SLP goal 1  -AB       Augmentative/Alternative Communication Objectives Goal 1 (SLP    Communication (Augmentative/Alternative Communication Goal 1, SLP)  improve ability to use non-verbal communication strategies  -AB    Improve Communication by (Augmentative/Alternative Communication Goal 1, SLP)  use gestures to communicate  -AB    Time Frame (Augmentative/Alternative Communication Goal 1, SLP)  short term goal (STG)  -AB      User Key  (r) = Recorded By, (t) = Taken By, (c) =  Cosigned By    Initials Name Effective Dates    AB Urvashi Okeefe, MS CCC-SLP 10/05/20 -            SLP Recommendation and Plan  Patient presents with a severe global aphasia, recommend intensive speech language therapy and further workup for acquired apraxia of speech. Unable to follow 1 step commands, select items from field of 2, answer yes/no questions reliably-although this is most reliable of communicative methods.  Discussed strategies with son in room such as-consider music to facilitate automatic speech/intonation imitation, and continue asking yes/no questions to facilitate accuracy.     SLP GOALS     Row Name 04/20/21 1400       Words/Phrases/Sentences Goal 1 (SLP)    Improve Ability to Comprehend Words/Phrases/Sentences Through: Goal 1 (SLP)  identify objects, field of 2  -AB    Time Frame (Identify Objects and Pictures Goal 1, SLP)  short term goal (STG)  -AB       Comprehend Questions Goal 1 (SLP)    Improve Ability to Comprehend Questions Goal 1 (SLP)  simple yes/no questions;90%;with minimal cues (75-90%)  -AB    Time Frame (Comprehend Questions Goal 1, SLP)  short term goal (STG)  -AB       Follow Directions Goal 2 (SLP)    Improve Ability to Follow Directions Goal 1 (SLP)  1 step direction with objects;1 step direction without objects;90%;with minimal cues (75-90%)  -AB       Word Retrieval Skills Goal 1 (SLP)    Improve Word Retrieval Skills By Goal 1 (SLP)  completing automatic speech task, counting;completing automatic speech task, alphabet;completing automatic speech task, days of the week;completing automatic speech task, months;completing automatic speech task, Pledge of Allegiance;completing automatic speech task, sing “Happy Birthday”;with moderate cues (50-74%)  -AB    Time Frame (Word Retrieval Goal 1, SLP)  short term goal (STG)  -AB       Word Retrieval Skills Goal 2 (SLP)    Improve Word Retrieval Skills By Goal 2 (SLP)  repeating sounds;80%;with moderate cues (50-74%)  -AB    Time  Frame (Word Retrieval Goal 2, SLP)  short term goal (STG)  -AB       Motor Planning Goal 1 (SLP)    Improve Motor Planning to Reduce Apraxia by Goal 1 (SLP)  imitating mouth postures  -AB       Motor Planning Goal 2 (SLP)    Improve Motor Planning to Reduce Apraxia by Goal 2 (SLP)  imitating vowels  -AB       Augmentative/Alternative Communication Objectives Goal 1 (SLP    Communication (Augmentative/Alternative Communication Goal 1, SLP)  improve ability to use non-verbal communication strategies  -AB    Improve Communication by (Augmentative/Alternative Communication Goal 1, SLP)  use gestures to communicate  -AB    Time Frame (Augmentative/Alternative Communication Goal 1, SLP)  short term goal (STG)  -AB        Acute Care - Speech Language Pathology   Swallow Re-Evaluation [facility]     Patient Name: Jazmyn Castaneda  : 1950  MRN: [id number]  Today's Date: 2021               Admit Date: 2021    Visit Dx:     ICD-10-CM ICD-9-CM   1. Acute ischemic stroke (CMS/[facility])  I63.9 434.91   2. Expressive aphasia  R47.01 784.3          SWALLOW EVALUATION (last 72 hours)      SLP Adult Swallow Evaluation     Row Name 21 1439       Rehab Evaluation    Document Type  re-evaluation  -AB    Subjective Information  no complaints  -AB    Patient Observations  alert  -AB    Care Plan Review  evaluation/treatment results reviewed;care plan/treatment goals reviewed;risks/benefits reviewed;current/potential barriers reviewed;patient/other agree to care plan with son   -AB    Care Plan Review, Other Participant(s)  son  -AB    Patient Effort  adequate  -AB    Symptoms Noted During/After Treatment  none  -AB       EDUCATION  The patient has been educated in the following areas:   Communication Impairment Dysphagia (Swallowing Impairment) Modified Diet Instruction.    SLP Recommendation and Plan  Recommend: upgrade to regular and thin liquids, finger foods to facilitate pt self feeding per her preference.  Medications: with thin or whole in puree as tolerated. Compensations: small bites/sips, upright for all PO and 30 minutes after, distant monitor for impulsivity.         SLP GOALS     Row Name 04/20/21 1436          Oral Nutrition/Hydration Goal 1, SLP  Pt will safely tolerate the least restrictive diet with no s/s of aspiration.  -AB    Time Frame (Oral Nutrition/Hydration Goal 1, SLP)  by discharge  -AB    Barriers (Oral Nutrition/Hydration Goal 1, SLP)  RE-EVAL: Bedside re-evaluation completed with pt established PM meal and trials ffor advancement. Pt adamant for self feeding, despite RUE weakness, motor planning difficulties, and impulsivity utilizing hands versus utensils. SLP loads spoon with pt accepting 25-50% of opps. Oral phase with timely A-P transport and mastication for mechanical soft/puree solids with meal and regular solid trials. Pt inconsistent in following commands for opening oral cavity, with no significant oral residuals or buccal pocketing upon limited visualization. Intermittent requirements for cues to slow, clear oral cavity prior to accepting next bite were required x2. Palpation performed and suggests adequate laryngeal elevation. No overt s/s aspiration were noted with solids, NTL, or additional trials of thin by straw. Cup was not trialed secondary to pt impulsivity and desire to self feed.   -AB    Progress/Outcomes (Oral Nutrition/Hydration Goal 1, SLP)  good progress toward goal  -AB        SLP Outcome Measures (last 72 hours)      SLP Outcome Measures     Row Name 04/20/21 1440 04/20/21 1400          Outcome Measure Used?  Adult NOMS  -AB  Adult NOMS  -AB          FCM Chosen  Verbal Expression  -AB  Swallowing  -AB    Swallowing FCM Score  2  -AB  5  -AB      User Key  (r) = Recorded By, (t) = Taken By, (c) = Cosigned By    Initials Name Effective Dates    AB Urvashi Okeefe, MS CCC-SLP 10/05/20 -            Time Calculation:   Time Calculation- SLP     Row Name 04/20/21 1442              Time Calculation- SLP    SLP Received On  04/20/21  -AB         Untimed Charges    SLP Eval/Re-eval   ST Eval Speech and Production w/ Language - 49064  -AB      61253-GL Eval Speech and Production w/ Language Minutes  75  -AB      [id number] Treatment Swallow Minutes  45  -AB         Total Minutes    Untimed Charges Total Minutes  120  -AB       Total Minutes  120  -AB        User Key  (r) = Recorded By, (t) = Taken By, (c) = Cosigned By    Initials Name Provider Type    AB Urvashi Okeefe, MS CCC-SLP Speech and Language Pathologist          Therapy Charges for Today     Code Description Service Date Service Provider Modifiers Qty    35340874737 HC ST EVAL SPEECH AND PROD W LANG  5 4/20/2021 Urvashi Okeefe, MS CCC-SLP GN 1    [id number] HC ST TREATMENT SWALLOW 3 4/20/2021 Urvashi Okeefe, MS CCC-SLP GN 1        PPE:  Patient was not wearing a face mask during this therapy encounter. Therapist used appropriate personal protective equipment including mask, eye protection and gloves.  Mask used was standard procedure mask. Appropriate PPE was worn during the entire therapy session. The patient did not cough during this evaluation. Therapist was within 6 feet for 15 minutes or more during the evaluation. Hand hygiene was completed before and after therapy session. Patient is not in enhanced droplet precautions.            Urvashi Okeefe MS CCC-SLP  4/20/2021

## 2021-04-20 NOTE — PROGRESS NOTES
Neurology Note    Patient:  Jazymn Castaneda    YOB: 1950    REFERRING PHYSICIAN:  Neil Flores    CHIEF COMPLAINT:    stroke    HISTORY OF PRESENT ILLNESS:   The patient is more alert, using right side better, cleared for po diet, remains aphasic, -160.    Past Medical History:  Past Medical History:   Diagnosis Date   • Acute ischemic stroke (CMS/HCC) 2021   • Age-related osteoporosis without current pathological fracture 2021   • Anesthesia     WOKE UP DURING CATARACT SURGERY   • Chronic diastolic (congestive) heart failure (CMS/HCC) 2021   • Collapse of lung     history of in past post trauma   • Diverticulosis    • Environmental allergies     Some pollens, grass, trees, flowers   • Essential hypertension 3/24/2017   • Exogenous obesity 3/24/2017   • H/O Pneumonia    • Infectious mononucleosis    • Kidney infection    • Kidney stone    • Mixed hyperlipidemia 2019   • Neuromuscular disorder (CMS/HCC)    • Sepsis secondary to UTI (CMS/HCC) 2019   • Tobacco abuse 2019   • Urinary tract infection    • Vertigo     past history of several times       Past Surgical History:  Past Surgical History:   Procedure Laterality Date   • BREAST EXCISIONAL BIOPSY Right     30 something when it was done; says it was precancerous   • CATARACT EXTRACTION, BILATERAL     •  SECTION     • CYSTOSCOPY N/A 2019    Procedure: CYSTOSCOPY AND STENT PLACEMENT;  Surgeon: Alen Lal MD;  Location: [facility];  Service: Urology   • EMBOLECTOMY N/A 2021    Procedure: EMBOLECTOMY MECHANICAL;  Surgeon: Justo Cheng MD;  Location: [facility] ;  Service: Neurosurgery;  Laterality: N/A;   • MASTOID SURGERY     • NASAL SEPTAL RECONSTRUCTION     • SINUS SURGERY      scraped and prior deviated septum   • URETEROSCOPY LASER LITHOTRIPSY WITH STENT INSERTION Right 2019    Procedure: CYSTOSCOPY, RIGHT URETEROSCOPY, LASER LITHOTRIPSY,  "STONE BASKET EXTRACTION, STENT PLACEMENT WITHOUT STRINGS;  Surgeon: Alfredo Gongora Jr., MD;  Location: [facility] OR;  Service: Urology       Social History:   Social History     Socioeconomic History   • Marital status:      Spouse name: Not on file   • Number of children: 2   • Years of education: College   • Highest education level: Not on file   Tobacco Use   • Smoking status: Former Smoker     Packs/day: 1.00     Types: Cigarettes     Quit date: 2020     Years since quittin.8   • Smokeless tobacco: Never Used   Substance and Sexual Activity   • Alcohol use: Yes     Comment: \"on occasion\"   • Drug use: No        Family History:   Family History   Problem Relation Age of Onset   • Hypertension Mother    • Aneurysm Mother    • Heart failure Mother    • Heart disease Mother    • Heart disease Father    • Hypertension Father    • Kidney disease Father    • Cancer Father 82        Bladder   • Hypertension Sister    • Cancer Maternal Aunt    • Stroke Maternal Uncle    • Glaucoma Maternal Grandmother    • Cancer Maternal Grandmother    • Stroke Maternal Grandfather    • Aneurysm Maternal Grandfather    • Cancer Maternal Aunt    • Cancer Maternal Aunt    • Liver cancer Other    • Pancreatic cancer Other    • Malig Hyperthermia Neg Hx        Medications Prior to Admission:    Prior to Admission medications    Medication Sig Start Date End Date Taking? Authorizing Provider   calcium carbonate-cholecalciferol (Calcium 500+D) 500-400 MG-UNIT tablet tablet Take 1 tablet by mouth Daily.    Provider, MD Alessandra   cetirizine (zyrTEC) 5 MG tablet Take 5 mg by mouth Daily.    Provider, MD Alessandra   fluticasone (FLONASE) 50 MCG/ACT nasal spray 2 sprays into the nostril(s) as directed by provider Daily.    ProviderAlessandra MD   lisinopril (PRINIVIL,ZESTRIL) 2.5 MG tablet Take 1 tablet by mouth Daily. 21   Kd April MD KENZIE   montelukast (SINGULAIR) 10 MG tablet TAKE 1 TABLET EVERY NIGHT " 10/16/20   Sudhakar Gracia MD       Allergies:  Cephalosporins, Penicillins, Ciprofloxacin, and Sulfa antibiotics      Review of system  Review of Systems   Unable to perform ROS: Patient nonverbal       Vitals:    04/20/21 1100   BP: 108/60   Pulse: 100   Resp:    Temp: 99.1 °F (37.3 °C)   SpO2: 94%       Physical exam  Physical Exam  HENT:      Head: Normocephalic and atraumatic.   Eyes:      Pupils: Pupils are equal, round, and reactive to light.   Cardiovascular:      Rate and Rhythm: Normal rate and regular rhythm.   Pulmonary:      Effort: Pulmonary effort is normal.   Neurological:      Mental Status: She is alert.      Deep Tendon Reflexes: Babinski sign present on the right side. Babinski sign absent on the left side.      Comments: Right sided visual neglect a bit better, orients to midline, occasional words, does not follow verbal commands, moves limbs against gravity, eating using both hands, mild right facial droop.           Lab Results   Component Value Date    WBC 10.89 (H) 04/20/2021    HGB 10.5 (L) 04/20/2021    HCT 31.0 (L) 04/20/2021    MCV 90.9 04/20/2021     04/20/2021     Lab Results   Component Value Date    GLUCOSE 93 04/20/2021    BUN 7 (L) 04/20/2021    CREATININE 0.46 (L) 04/20/2021    EGFRIFNONA 134 04/20/2021    EGFRIFAFRI 100 01/19/2021    BCR 15.2 04/20/2021    CO2 22.7 04/20/2021    CALCIUM 8.8 04/20/2021    PROTENTOTREF 6.9 01/19/2021    ALBUMIN 4.30 04/17/2021    LABIL2 1.5 01/19/2021    AST 31 04/17/2021    ALT 21 04/17/2021   Contains abnormal data Lipid Panel  Order: [id number]  Status:  Final result   Visible to patient:  Yes (MyChart) Next appt:  None  Specimen Information: Blood        Component   Ref Range & Units 04:10 2 d ago 3 mo ago 9 mo ago   Total Cholesterol   0 - 200 mg/dL 118  171  205High  R  221High     Triglycerides   0 - 150 mg/dL 115  85  159High  R  144    HDL Cholesterol   40 - 60 mg/dL 28Low   30Low   39Low  R  34Low     LDL Cholesterol    0 -  100 mg/dL 69  125High   137High  R  158High     VLDL Cholesterol   5 - 40 mg/dL 21  16   28.8 R    LDL/HDL Ratio  2.39  4.13              Hemoglobin A1C   4.80 - 5.60 % 5.71High         Echo Complete w/Doppler and Color Flow  Order# [id number]  Reading physician: Heather Merchant MD Ordering physician: Shira Singleton APRN Study date: 21   Patient Information    Patient Name   Jazmyn Castaneda MRN   [id number] Legal Sex   Female  (Age)   1950 (70 y.o.)   Admission Information    Admission Date/Time Discharge Date/Time Room/Bed   21  09:06 PM  I380/1   PACS Images     Show images for Adult Transthoracic Echo Complete W/ Cont if Necessary Per Protocol (With Agitated Saline)    Sedation Narrator Report    Sedation Narrator Report      Interpretation Summary    · Calculated left ventricular EF = 63% Estimated left ventricular EF = 63% Estimated left ventricular EF was in agreement with the calculated left ventricular EF. Left ventricular systolic function is normal. Normal left ventricular cavity size noted. Left ventricular wall thickness is consistent with borderline concentric hypertrophy. All left ventricular wall segments contract normally. Left ventricular diastolic function was normal.  · Left atrial volume is mildly increased. Saline test results are negative.  · The right atrial cavity is mildly dilated.  · Mild mitral annular calcification is present. There is moderate, bileaflet mitral valve thickening present. Mild mitral valve regurgitation is present. No significant mitral valve stenosis is present.  · Trace tricuspid valve regurgitation is present. Estimated right ventricular systolic pressure from tricuspid regurgitation is mildly elevated (35-45 mmHg). Calculated right ventricular systolic pressure from tricuspid regurgitation is 37 mmHg.            Radiological Studies:   MRI OF THE BRAIN WITHOUT CONTRAST     HISTORY: Expressive aphasia.     COMPARISON: CT head 2021 and CT  angiogram 04/18/2021.      TECHNIQUE: A MRI examination of the brain was performed utilizing  sagittal T1, axial diffusion, T1, T2, T2 FLAIR and gradient echo T2  imaging.     FINDINGS: The diffusion sequence demonstrates cortical and to a lesser  extent subcortical white matter areas of acute infarction involving the  left frontal lobe laterally and superolaterally extending to and  involving the insular cortex, and operculum. A frontoparietal area of  infarction is noted superolaterally as well. The areas of acute  infarction correspond to areas of decreased attenuation noted on the  prior CT examinations. A small area of acute infarction is appreciated  involving the periventricular white matter of the left temporal lobe  anteriorly and medially. This measures 5 mm in size.     Mild small vessel ischemic disease is noted. There is expected flow-void  in the basilar artery and in the distal aspects of the internal carotid  arteries bilaterally on the axial T2 sequence.     The gradient-echo T2 sequence shows no evidence of signal loss to  suggest blood products.     IMPRESSION:  Areas of acute infarction involving the left frontal and  frontoparietal regions consistent with a left MCA distribution infarcts,  overall similar in area as compared to the CT examination of 04/18/2021  given differences in technique. There is no evidence of hemorrhage.     This report was finalized on 4/19/2021 2:08 PM by Dr. Cecilio Augustin M.D.         During this visit the following were done:  Labs Reviewed [x]    Labs Ordered []    Radiology Reports Reviewed [x]    Radiology Ordered []    EKG, echo, and/or stress test reviewed [x]    EEG results reviewed  []    EEG reviewed and interpreted per myself   []    Discussed case with neurointerventionalist or neuroradiologist [x]    Referring Provider Records Reviewed []    ER Records Reviewed []    Hospital Records Reviewed []    History Obtained From Family []    Radiological images  view and Interpreted per myself []    Case Discussed with referring provider []     Decision to obtain and request outside records  []        Assessment and Plan       Acute scattered LMCA stroke 22 LICA and MCA occlusion, s/p cervical ICA angioplasty, ICA and MCA MT. Neurologically stable and improved.   - Neurologically stable for telemetry.   - Continue DAPT, statin.   - P2Y12.   - Carotid duplex.   - ST, PT, OT.            Electronically signed by Holland Mallory MD on 4/20/2021 at 12:23 EDT

## 2021-04-20 NOTE — PLAN OF CARE
Goal Outcome Evaluation:  Plan of Care Reviewed With: patient  Progress: improving  Outcome Summary: Pt with good participation with OT today, worked on ADL at EOB with cues for sequencing grooming tasks, pt with increased incorporation of RUE into functional tasks today but having difficulty with motor planning RUE. CGA for sitting balance working on reaching BUE, crossing midline, locating objects in R visual feild. Pt with more midline gaze and attention to R side today. 1X10 GMC and neuro ther ex BUE, increased ability to follow demo cues for motor planning. Assist X2 to stand and take a couple side steps today. Continue to recommend acute reahb at d/c.    Appropriate PPE worn during encounter including gloves, mask, and eye protection. Hand hygiene completed. Pt wore a mask intermittently during encounter.

## 2021-04-20 NOTE — PLAN OF CARE
"Goal Outcome Evaluation:  Plan of Care Reviewed With: patient     Outcome Summary: Beside swallow re-evaluation and speech language evaluation completed.     SWALLOW: Bedside re-evaluation completed with pt established PM meal and trials ffor advancement. Pt adamant for self feeding, despite RUE weakness, motor planning difficulties, and impulsivity utilizing hands versus utensils. SLP loads spoon with pt accepting 25-50% of opps. Oral phase with timely A-P transport and mastication for mechanical soft/puree solids with meal and regular solid trials. Pt inconsistent in following commands for opening oral cavity, with no significant oral residuals or buccal pocketing upon limited visualization. Intermittent requirements for cues to slow, clear oral cavity prior to accepting next bite were required x2. Palpation performed and suggests adequate laryngeal elevation. No overt s/s aspiration were noted with solids, NTL, or additional trials of thin by straw. Cup was not trialed secondary to pt impulsivity and desire to self feed.     Recommend: upgrade to regular and thin liquids, finger foods to facilitate pt self feeding per her preference. Medications: with thin or whole in puree as tolerated. Compensations: small bites/sips, upright for all PO and 30 minutes after, distant monitor for impulsivity.     SPEECH: Attempted portions of MS Aphasia Screener-limited as pt obtains a score of 2/100, with additional extensive informal measures completed. See \"therapy evaluation,\" for thorough information.     Patient presents with a severe global aphasia, recommend intensive speech language therapy and further workup for acquired apraxia of speech. Unable to follow 1 step commands, select items from field of 2, answer yes/no questions reliably-although this is most reliable of communicative methods.  Discussed strategies with son in room such as-consider music to facilitate automatic speech/intonation imitation, and continue " asking yes/no questions to facilitate accuracy.

## 2021-04-20 NOTE — THERAPY TREATMENT NOTE
Patient Name: Jazmyn Castaneda  : 1950    MRN: [id number]                              Today's Date: 2021       Admit Date: 2021    Visit Dx:     ICD-10-CM ICD-9-CM   1. Acute ischemic stroke (CMS/HCC)  I63.9 434.91   2. Expressive aphasia  R47.01 784.3     Patient Active Problem List   Diagnosis   • Nephrolithiasis   • Allergic rhinitis   • Diverticulosis of large intestine without hemorrhage   • Hiatal hernia   • Medicare annual wellness visit, subsequent   • Exogenous obesity   • Encounter for screening colonoscopy   • Visit for screening mammogram   • Breast mass, left   • Essential hypertension   • Erythrocytosis   • Vertigo   • Neutrophilic leukocytosis   • Mixed hyperlipidemia   • Right hydronephrosis with urinary obstruction due to ureteral calculus   • Allergy to multiple antibiotics   • Tobacco abuse   • Ureteral stone   • Colon cancer screening   • Age-related osteoporosis without current pathological fracture   • Chronic diastolic (congestive) heart failure (CMS/HCC)   • Acute ischemic stroke (CMS/HCC)     Past Medical History:   Diagnosis Date   • Acute ischemic stroke (CMS/HCC) 2021   • Age-related osteoporosis without current pathological fracture 2021   • Anesthesia     WOKE UP DURING CATARACT SURGERY   • Chronic diastolic (congestive) heart failure (CMS/HCC) 2021   • Collapse of lung 1980s    history of in past post trauma   • Diverticulosis    • Environmental allergies     Some pollens, grass, trees, flowers   • Essential hypertension 3/24/2017   • Exogenous obesity 3/24/2017   • H/O Pneumonia    • Infectious mononucleosis    • Kidney infection    • Kidney stone    • Mixed hyperlipidemia 2019   • Neuromuscular disorder (CMS/[facility])    • Sepsis secondary to UTI (CMS/HCC) 2019   • Tobacco abuse 2019   • Urinary tract infection    • Vertigo     past history of several times     Past Surgical History:   Procedure Laterality Date   • BREAST EXCISIONAL  BIOPSY Right     30 something when it was done; says it was precancerous   • CATARACT EXTRACTION, BILATERAL     •  SECTION     • CYSTOSCOPY N/A 2019    Procedure: CYSTOSCOPY AND STENT PLACEMENT;  Surgeon: Alen Lal MD;  Location: [facility];  Service: Urology   • EMBOLECTOMY N/A 2021    Procedure: EMBOLECTOMY MECHANICAL;  Surgeon: Justo Cheng MD;  Location: [facility] ;  Service: Neurosurgery;  Laterality: N/A;   • MASTOID SURGERY     • NASAL SEPTAL RECONSTRUCTION     • SINUS SURGERY      scraped and prior deviated septum   • URETEROSCOPY LASER LITHOTRIPSY WITH STENT INSERTION Right 2019    Procedure: CYSTOSCOPY, RIGHT URETEROSCOPY, LASER LITHOTRIPSY, STONE BASKET EXTRACTION, STENT PLACEMENT WITHOUT STRINGS;  Surgeon: Alfredo Gongora Jr., MD;  Location: [facility];  Service: Urology     General Information     Row Name 21 1315          Physical Therapy Time and Intention    Document Type  therapy note (daily note)  (Pended)   -ME     Mode of Treatment  physical therapy;individual therapy  (Pended)   -ME     Row Name 21 1310          General Information    Existing Precautions/Restrictions  fall  (Pended)   -ME       User Key  (r) = Recorded By, (t) = Taken By, (c) = Cosigned By    Initials Name Provider Type    ME Jd Castro, PT Student PT Student        Mobility     Row Name 21 1313          Bed Mobility    Bed Mobility  supine-sit;sit-supine  (Pended)   -ME     Supine-Sit [place] (Bed Mobility)  contact guard;1 person assist;verbal cues  (Pended)   -ME     Sit-Supine [place] (Bed Mobility)  minimum assist (75% patient effort);2 person assist  (Pended)   -ME     Assistive Device (Bed Mobility)  draw sheet  (Pended)   -ME     Comment (Bed Mobility)  pt needed more assist sit to supine because of her inability to follow commands to take side steps towards the HOB.  (Pended)   -ME     Row Name 21 2832           Transfers    Comment (Transfers)  HHAx2, pt leans posterior once urpight in standing  (Pended)   -ME     Row Name 04/20/21 1315          Sit-Stand Transfer    Sit-Stand [place] (Transfers)  minimum assist (75% patient effort);2 person assist  (Pended)   -ME     Row Name 04/20/21 1315          Gait/Stairs (Locomotion)    Distance in Feet (Gait)  unable to follow commands to take side steps towards the HOB today  (Pended)   -ME       User Key  (r) = Recorded By, (t) = Taken By, (c) = Cosigned By    Initials Name Provider Type    ME Jd Castro, PT Student PT Student        Obj/Interventions     Row Name 04/20/21 1318          Motor Skills    Therapeutic Exercise  --  (Pended)  sit to stand and standing balance  -ME     Row Name 04/20/21 1318          Balance    Comment, Balance  pt had a posterior lean in standing, unable to steady herself in standing, struggled to follow commands  (Pended)   -ME       User Key  (r) = Recorded By, (t) = Taken By, (c) = Cosigned By    Initials Name Provider Type    ME Jd Castro, PT Student PT Student        Goals/Plan    No documentation.       Clinical Impression     Row Name 04/20/21 1319          Pain    Additional Documentation  Pain Scale: Numbers Pre/Post-Treatment (Group)  (Pended)   -ME     Row Name 04/20/21 1319          Pain Scale: Numbers Pre/Post-Treatment    Pretreatment Pain Rating  0/10 - no pain  (Pended)   -ME     Row Name 04/20/21 1319          Plan of Care Review    Plan of Care Reviewed With  patient  (Pended)   -ME     Outcome Summary  pt agreeable to PT. no c/o pain prior to PT. pt was able to sit EOB from supine CGA x1 and return to supine min Ax2. More assist was needed when laying down due to her inability to follow commands. She was unable to side step up towards the HOB due to confusion. Instead of stepping laterally, she would try to step forward. Upon standing, pt had a posterior lean and was bearing weight through her heels. pt is  slowly improving. Her bed mobility to reach sitting was significantly better compared to yesterdays PT session. PT recommends inpatient rehab upon d/c and will continue to assess.  (Pended)   -ME     [facility] Name 04/20/21 1319          Vital Signs    Pre Systolic BP Rehab  118  (Pended)   -ME     Pre Treatment Diastolic BP  71  (Pended)   -ME     Pretreatment Heart Rate (beats/min)  96  (Pended)   -ME     Posttreatment Heart Rate (beats/min)  96  (Pended)   -ME     Pre SpO2 (%)  97  (Pended)   -ME     Post SpO2 (%)  98  (Pended)   -ME     [facility] Name 04/20/21 1319          Positioning and Restraints    Pre-Treatment Position  in bed  (Pended)   -ME     Post Treatment Position  bed  (Pended)   -ME     In Bed  supine;with OT  (Pended)   -ME       User Key  (r) = Recorded By, (t) = Taken By, (c) = Cosigned By    Initials Name Provider Type    ME Jd Castro, PT Student PT Student        Outcome Measures     Row Name 04/20/21 1324          How much help from another person do you currently need...    Turning from your back to your side while in flat bed without using bedrails?  3  (Pended)   -ME     Moving from lying on back to sitting on the side of a flat bed without bedrails?  3  (Pended)   -ME     Moving to and from a bed to a chair (including a wheelchair)?  3  (Pended)   -ME     Standing up from a chair using your arms (e.g., wheelchair, bedside chair)?  3  (Pended)   -ME     Climbing 3-5 steps with a railing?  1  (Pended)   -ME     To walk in hospital room?  2  (Pended)   -ME     AM-PAC 6 Clicks Score (PT)  15  (Pended)   -ME     Row Name 04/20/21 1324          Modified Jessamine Scale    Modified Jess[place] Scale  4 - Moderately severe disability.  Unable to walk without assistance, and unable to attend to own bodily needs without assistance.  (Pended)   -ME     Row Name 04/20/21 1324          Functional Assessment    Outcome Measure Options  AM-PAC 6 Clicks Basic Mobility (PT)  (Pended)   -ME       User Key  (r) =  Recorded By, (t) = Taken By, (c) = Cosigned By    Initials Name Provider Type    ME Jd Castro, PT Student PT Student        Physical Therapy Education                 Title: PT OT SLP Therapies (In Progress)     Topic: Physical Therapy (In Progress)     Point: Mobility training (In Progress)     Learning Progress Summary           Patient Acceptance, E, NR by ME at 4/20/2021 1325    Acceptance, E, NR by EM at 4/19/2021 1424                   Point: Home exercise program (Not Started)     Learner Progress:  Not documented in this visit.          Point: Body mechanics (In Progress)     Learning Progress Summary           Patient Acceptance, E, NR by ME at 4/20/2021 1325                   Point: Precautions (Not Started)     Learner Progress:  Not documented in this visit.                      User Key     Initials Effective Dates Name Provider Type Discipline     04/03/18 -  Kasandra Benitez PT Physical Therapist PT    ME 03/12/21 -  Jd Castro, PT Student PT Student PT              PT Recommendation and Plan     Plan of Care Reviewed With: (P) patient  Outcome Summary: (P) pt agreeable to PT. no c/o pain prior to PT. pt was able to sit EOB from supine CGA x1 and return to supine min Ax2. More assist was needed when laying down due to her inability to follow commands. She was unable to side step up towards the HOB due to confusion. Instead of stepping laterally, she would try to step forward. Upon standing, pt had a posterior lean and was bearing weight through her heels. pt is slowly improving. Her bed mobility to reach sitting was significantly better compared to yesterdays PT session. PT recommends inpatient rehab upon d/c and will continue to assess.     Time Calculation:   PT Charges     Row Name 04/20/21 1326             Time Calculation    Start Time  1300  (Pended)   -ME      Stop Time  1311  (Pended)   -ME      Time Calculation (min)  11 min  (Pended)   -ME      PT Received On   04/20/21  (Pended)   -ME      PT - Next Appointment  04/21/21  (Pended)   -ME         Time Calculation- PT    Total Timed Code Minutes- PT  11 minute(s)  (Pended)   -ME        User Key  (r) = Recorded By, (t) = Taken By, (c) = Cosigned By    Initials Name Provider Type    Michell Dudley, PT Student PT Student        Therapy Charges for Today     Code Description Service Date Service Provider Modifiers Qty    [id number]  PT THER PROC EA 15 MIN 4/20/2021 Michell Castro, PT Student GP 1    [id number]  PT THER SUPP EA 15 MIN 4/20/2021 Michell Castro, PT Student GP 1          PT G-Codes  Outcome Measure Options: (P) AM-PAC 6 Clicks Basic Mobility (PT)  AM-PAC 6 Clicks Score (PT): (P) 15  AM-PAC 6 Clicks Score (OT): 11  Modified [place] Scale: (P) 4 - Moderately severe disability.  Unable to walk without assistance, and unable to attend to own bodily needs without assistance.    MICHELL CASTRO PT Student  4/20/2021

## 2021-04-20 NOTE — PROGRESS NOTES
[facility]      Mar/chart reviewed  Follow up Acute CVA s/p thrombectomy  Patient unable to provide much subjective,     Vital Sign Min/Max for last 24 hours  Temp  Min: 97.4 °F (36.3 °C)  Max: 98.5 °F (36.9 °C)   BP  Min: 100/59  Max: 142/74   Pulse  Min: 70  Max: 111   Resp  Min: 16  Max: 18   SpO2  Min: 94 %  Max: 99 %   Flow (L/min)  Min: 1  Max: 1   Weight  Min: 71.7 kg (158 lb)  Max: 71.7 kg (158 lb)   2657/3225    Appears ill, sleepy , awakens, receptive aphasia  eomi seem intact on the right, pupils slightly unequal, normal sclera  mmm, no jvd, trachea midline, neck supple,  chest cta bilaterally, no crackles, no wheezes,   rrr,   soft, nt, nd +bs,  no c/c/ e  Skin warm, dry no rashes     Labs: 4/20: reviewed:  Glucose 93  Bun 7  Cr 0.46  Bicarb 22.7  Wbc 10.9  hgb 10.5  plts 227    A/P:  1. Acute CVA -- s/p retreval --  serial neurologic evaluation, bp control as per goal determined by JEFF/neurology.  Risk factor modification  2. Hypercholesterolemia -- statin  3. Chronic diastolic chf  4. HTN --hold meds, currently hypotensive  5. Hypotension -- pressor to meet bp goal if needed, off now.   6. Anemia  7. Cerebral edema    Can transfer out of unit    Will see prn out of unit.

## 2021-04-20 NOTE — PLAN OF CARE
Goal Outcome Evaluation:  Plan of Care Reviewed With: (P) patient     Outcome Summary: (P) pt agreeable to PT. no c/o pain prior to PT. pt was able to sit EOB from supine CGA x1 and return to supine min Ax2. More assist was needed when laying down due to her inability to follow commands. She was unable to side step up towards the HOB due to confusion. Instead of stepping laterally, she would try to step forward. Upon standing, pt had a posterior lean and was bearing weight through her heels. pt is slowly improving. Her bed mobility to reach sitting was significantly better compared to yesterdays PT session. PT recommends inpatient rehab upon d/c and will continue to assess.  Patient was intermittently wearing a face mask during this therapy encounter. Therapist used appropriate personal protective equipment including eye protection, mask, and gloves.  Mask used was standard procedure mask. Appropriate PPE was worn during the entire therapy session. Hand hygiene was completed before and after therapy session. Patient is not in enhanced droplet precautions.

## 2021-04-20 NOTE — THERAPY TREATMENT NOTE
Patient Name: Jazmyn Castaneda  : 1950    MRN: [id number]                              Today's Date: 2021       Admit Date: 2021    Visit Dx:     ICD-10-CM ICD-9-CM   1. Acute ischemic stroke (CMS/HCC)  I63.9 434.91   2. Expressive aphasia  R47.01 784.3     Patient Active Problem List   Diagnosis   • Nephrolithiasis   • Allergic rhinitis   • Diverticulosis of large intestine without hemorrhage   • Hiatal hernia   • Medicare annual wellness visit, subsequent   • Exogenous obesity   • Encounter for screening colonoscopy   • Visit for screening mammogram   • Breast mass, left   • Essential hypertension   • Erythrocytosis   • Vertigo   • Neutrophilic leukocytosis   • Mixed hyperlipidemia   • Right hydronephrosis with urinary obstruction due to ureteral calculus   • Allergy to multiple antibiotics   • Tobacco abuse   • Ureteral stone   • Colon cancer screening   • Age-related osteoporosis without current pathological fracture   • Chronic diastolic (congestive) heart failure (CMS/HCC)   • Acute ischemic stroke (CMS/HCC)     Past Medical History:   Diagnosis Date   • Acute ischemic stroke (CMS/HCC) 2021   • Age-related osteoporosis without current pathological fracture 2021   • Anesthesia     WOKE UP DURING CATARACT SURGERY   • Chronic diastolic (congestive) heart failure (CMS/HCC) 2021   • Collapse of lung 1980s    history of in past post trauma   • Diverticulosis    • Environmental allergies     Some pollens, grass, trees, flowers   • Essential hypertension 3/24/2017   • Exogenous obesity 3/24/2017   • H/O Pneumonia    • Infectious mononucleosis    • Kidney infection    • Kidney stone    • Mixed hyperlipidemia 2019   • Neuromuscular disorder (CMS/[facility])    • Sepsis secondary to UTI (CMS/HCC) 2019   • Tobacco abuse 2019   • Urinary tract infection    • Vertigo     past history of several times     Past Surgical History:   Procedure Laterality Date   • BREAST EXCISIONAL  BIOPSY Right     30 something when it was done; says it was precancerous   • CATARACT EXTRACTION, BILATERAL     •  SECTION     • CYSTOSCOPY N/A 2019    Procedure: CYSTOSCOPY AND STENT PLACEMENT;  Surgeon: Alen Lal MD;  Location: [facility];  Service: Urology   • EMBOLECTOMY N/A 2021    Procedure: EMBOLECTOMY MECHANICAL;  Surgeon: Justo Cheng MD;  Location: [facility] ;  Service: Neurosurgery;  Laterality: N/A;   • MASTOID SURGERY     • NASAL SEPTAL RECONSTRUCTION     • SINUS SURGERY      scraped and prior deviated septum   • URETEROSCOPY LASER LITHOTRIPSY WITH STENT INSERTION Right 2019    Procedure: CYSTOSCOPY, RIGHT URETEROSCOPY, LASER LITHOTRIPSY, STONE BASKET EXTRACTION, STENT PLACEMENT WITHOUT STRINGS;  Surgeon: Alfredo Gongora Jr., MD;  Location: [facility];  Service: Urology     General Information     Row Name 21 1448          OT Time and Intention    Document Type  therapy note (daily note)  -     Mode of Treatment  occupational therapy;individual therapy  -     Row Name 21 1448          General Information    Patient Profile Reviewed  yes  -SM     Prior Level of Function  independent:;ADL's;all household mobility  -     Existing Precautions/Restrictions  fall  -     Row Name 21 1448          Cognition    Orientation Status (Cognition)  unable/difficult to assess aphasia, word salad. Pt becoming frustrated today when attempting to verbalize.  -     Row Name 21 1448          Safety Issues, Functional Mobility    Safety Issues Affecting Function (Mobility)  ability to follow commands;insight into deficits/self-awareness;impulsivity  -     Impairments Affecting Function (Mobility)  balance;cognition;coordination;endurance/activity tolerance;strength;motor planning;sensation/sensory awareness;visual/perceptual  -       User Key  (r) = Recorded By, (t) = Taken By, (c) = Cosigned By    Initials Name Provider Type      Maria Fernanda Oneill OT Occupational Therapist          Mobility/ADL's     Row Name 04/20/21 1449          Bed Mobility    Supine-Sit [place] (Bed Mobility)  minimum assist (75% patient effort);nonverbal cues (demo/gesture);verbal cues  -     Sit-Supine [place] (Bed Mobility)  minimum assist (75% patient effort);2 person assist;nonverbal cues (demo/gesture);verbal cues  -     Row Name 04/20/21 1449          Transfers    Transfers  sit-stand transfer  -     Sit-Stand [place] (Transfers)  minimum assist (75% patient effort);2 person assist;verbal cues;nonverbal cues (demo/gesture)  -     Row Name 04/20/21 1449          Functional Mobility    Functional Mobility- Ind. Level  minimum assist (75% patient effort);2 person assist required;nonverbal cues required (demo/gesture);verbal cues required  -     Functional Mobility- Comment  Pt able to take several steps up EOB, pt unsteady and needs cues for sequencing steps.  -     Row Name 04/20/21 1449          Activities of Daily Living    BADL Assessment/Intervention  grooming  -     Row Name 04/20/21 1449          Grooming Assessment/Training    [place] Level (Grooming)  grooming skills;oral care regimen;wash face, hands;hair care, combing/brushing;minimum assist (75% patient effort);verbal cues;nonverbal cues (demo/gesture)  -     Position (Grooming)  edge of bed sitting  -     Comment (Grooming)  Pt with cues for sequencing steps for task, demo cues for appropriate use of utensils, apraxia. Does attempt to incorportate use of RUE during task to assist opening containers and use for brushing hair.  -       User Key  (r) = Recorded By, (t) = Taken By, (c) = Cosigned By    Initials Name Provider Type     Maria Fernanda Oneill OT Occupational Therapist        Obj/Interventions     Row Name 04/20/21 1451          Motor Skills    Motor Skills  coordination;motor control/coordination interventions  -     Motor Control/Coordination  Interventions  gross motor coordination activities;occupation/activity based treatment  -     Gross Motor Skill, Impairments Detail  Worked on clapping overhead, various neuro exercises following motor movements BUE 1X10.  -     Row Name 04/20/21 1126          Balance    Balance Assessment  sitting static balance  -     Static Sitting Balance  mild impairment;sitting, edge of bed CGA  -     Dynamic Sitting Balance  mild impairment  -     Static Standing Balance  moderate impairment  -     Balance Interventions  sitting;dynamic reaching;occupation based/functional task  -     Comment, Balance  Able to cross midline today reaching BUE and locating items in R visual feild with min cues.  -       User Key  (r) = Recorded By, (t) = Taken By, (c) = Cosigned By    Initials Name Provider Type     Maria Fernanda Oneill OT Occupational Therapist        Goals/Plan    No documentation.       Clinical Impression     Row Name 04/20/21 2710          Pain Scale: FACES Pre/Post-Treatment    Pain: FACES Scale, Pretreatment  0-->no hurt  -     Row Name 04/20/21 0897          Plan of Care Review    Plan of Care Reviewed With  patient  -     Progress  improving  -     Outcome Summary  Pt with good participation with OT today, worked on ADL at EOB with cues for sequencing grooming tasks, pt with increased incorporation of RUE into functional tasks today but having difficulty with motor planning RUE. CGA for sitting balance working on reaching BUE, crossing midline, locating objects in R visual feild. Pt with more midline gaze and attention to R side today. 1X10 GMC and neuro ther ex BUE, increased ability to follow demo cues for motor planning. Assist X2 to stand and take a couple side steps today. Continue to recommend acute reahb at d/c.  -     Row Name 04/20/21 7848          Therapy Assessment/Plan (OT)    Rehab Potential (OT)  good, to achieve stated therapy goals  -     Criteria for Skilled Therapeutic  Interventions Met (OT)  yes;skilled treatment is necessary  -     Therapy Frequency (OT)  5 times/wk  -     Row Name 04/20/21 1454          Therapy Plan Review/Discharge Plan (OT)    Anticipated Discharge Disposition (OT)  inpatient rehabilitation facility  -     Row Name 04/20/21 1454          Positioning and Restraints    Pre-Treatment Position  in bed  -SM     Post Treatment Position  bed  -SM     In Bed  fowlers;call light within reach;encouraged to call for assist;exit alarm on;notified nsg  -       User Key  (r) = Recorded By, (t) = Taken By, (c) = Cosigned By    Initials Name Provider Type    Maria Fernanda Harris OT Occupational Therapist        Outcome Measures     Row Name 04/20/21 1459          How much help from another is currently needed...    Putting on and taking off regular lower body clothing?  2  -SM     Bathing (including washing, rinsing, and drying)  2  -SM     Toileting (which includes using toilet bed pan or urinal)  2  -SM     Putting on and taking off regular upper body clothing  2  -SM     Taking care of personal grooming (such as brushing teeth)  2  -SM     Eating meals  3  -SM     AM-PAC 6 Clicks Score (OT)  13  -SM     Row Name 04/20/21 1458          Functional Assessment    Outcome Measure Options  AM-PAC 6 Clicks Daily Activity (OT)  -SM       User Key  (r) = Recorded By, (t) = Taken By, (c) = Cosigned By    Initials Name Provider Type    Maria Fernanda Harris OT Occupational Therapist        Occupational Therapy Education                 Title: PT OT SLP Therapies (In Progress)     Topic: Occupational Therapy (Not Started)     Point: ADL training (Not Started)     Description:   Instruct learner(s) on proper safety adaptation and remediation techniques during self care or transfers.   Instruct in proper use of assistive devices.              Learner Progress:  Not documented in this visit.          Point: Home exercise program (Not Started)     Description:   Instruct  learner(s) on appropriate technique for monitoring, assisting and/or progressing therapeutic exercises/activities.              Learner Progress:  Not documented in this visit.          Point: Precautions (Not Started)     Description:   Instruct learner(s) on prescribed precautions during self-care and functional transfers.              Learner Progress:  Not documented in this visit.          Point: Body mechanics (Not Started)     Description:   Instruct learner(s) on proper positioning and spine alignment during self-care, functional mobility activities and/or exercises.              Learner Progress:  Not documented in this visit.                          OT Recommendation and Plan  Planned Therapy Interventions (OT): activity tolerance training, adaptive equipment training, BADL retraining, cognitive/visual perception retraining, functional balance retraining, IADL retraining, neuromuscular control/coordination retraining, occupation/activity based interventions, patient/caregiver education/training, ROM/therapeutic exercise, strengthening exercise, transfer/mobility retraining  Therapy Frequency (OT): 5 times/wk  Plan of Care Review  Plan of Care Reviewed With: patient  Progress: improving  Outcome Summary: Pt with good participation with OT today, worked on ADL at EOB with cues for sequencing grooming tasks, pt with increased incorporation of RUE into functional tasks today but having difficulty with motor planning RUE. CGA for sitting balance working on reaching BUE, crossing midline, locating objects in R visual feild. Pt with more midline gaze and attention to R side today. 1X10 GMC and neuro ther ex BUE, increased ability to follow demo cues for motor planning. Assist X2 to stand and take a couple side steps today. Continue to recommend acute reahb at d/c.     Time Calculation:   Time Calculation- OT     Row Name 04/20/21 8085             Time Calculation- OT    OT Start Time  1312  -      OT Stop Time   1336  -      OT Time Calculation (min)  24 min  -      Total Timed Code Minutes- OT  24 minute(s)  -SM      OT Received On  04/20/21  -      OT - Next Appointment  04/21/21  -         Timed Charges    41010 -  OT Neuromuscular Reeducation Minutes  10  -SM      78450 - OT Self Care/Mgmt Minutes  14  -SM         Total Minutes    Timed Charges Total Minutes  24  -SM       Total Minutes  24  -SM        User Key  (r) = Recorded By, (t) = Taken By, (c) = Cosigned By    Initials Name Provider Type     Maria Fernanda Oneill OT Occupational Therapist        Therapy Charges for Today     Code Description Service Date Service Provider Modifiers Qty    76575736849 HC OT EVAL MOD COMPLEXITY 2 4/19/2021 Maria Fernanda Oneill OT GO 1    08530752607 HC OT SELF CARE/MGMT/TRAIN EA 15 MIN 4/19/2021 Maria Fernanda Oneill OT GO 1    18094973786 HC OT SELF CARE/MGMT/TRAIN EA 15 MIN 4/20/2021 Maria Fernanda Oneill OT GO 1    32231783519 HC OT NEUROMUSC RE EDUCATION EA 15 MIN 4/20/2021 Maria Fernanda Oneill OT GO 1               Maria Fernanda Oneill OT  4/20/2021

## 2021-04-20 NOTE — PROGRESS NOTES
Name: Jazmyn Castaneda ADMIT: 2021   : 1950  PCP: Samira Yi MD    MRN: [id number] LOS: 3 days   AGE/SEX: 70 y.o. female  ROOM: [facility]     Subjective   Subjective   Patient seen at bedside.       Objective   Objective   Vital Signs  Temp:  [98 °F (36.7 °C)-99.1 °F (37.3 °C)] 99.1 °F (37.3 °C)  Heart Rate:  [] 84  Resp:  [16-18] 16  BP: (100-165)/(56-99) 128/56  SpO2:  [94 %-99 %] 95 %  on  Flow (L/min):  [1] 1;   Device (Oxygen Therapy): room air  Body mass index is 30.86 kg/m².  Physical Exam   General, appears ill  Head and ENT examination normocephalic, atraumatic.  Lungs, symmetric expansion, no acute distress.  Heart, regular rate and rhythm.  Abdomen, soft and nontender.  Extremities, no clubbing, or cyanosis.  Neuro, unable to fully evaluate with current mental status.  Psych, unable to fully evaluate.  Musculoskeletal, joint examination grossly normal.       Results Review     I reviewed the patient's new clinical results.  Results from last 7 days   Lab Units 21  0510 04/18/21  03021  2150   WBC 10*3/mm3 10.89* 11.98* 15.32* 12.11*   HEMOGLOBIN g/dL 10.5* 10.8* 14.5 16.8*   PLATELETS 10*3/mm3 227 232 284 338     Results from last 7 days   Lab Units 21  0510 21  0304 21  2150   SODIUM mmol/L 138 143 139 137   POTASSIUM mmol/L 3.5 3.9 3.6 4.8   CHLORIDE mmol/L 107 111* 106 100   CO2 mmol/L 22.7 22.6 21.7* 21.1*   BUN mg/dL 7* 9 13 14   CREATININE mg/dL 0.46* 0.43* 0.70 0.52*   GLUCOSE mg/dL 93 88 90 96   Estimated Creatinine Clearance: 56.2 mL/min (A) (by C-G formula based on SCr of 0.46 mg/dL (L)).  Results from last 7 days   Lab Units 21  2150   ALBUMIN g/dL 4.30   BILIRUBIN mg/dL 0.6   ALK PHOS U/L 150*   AST (SGOT) U/L 31   ALT (SGPT) U/L 21     Results from last 7 days   Lab Units 21  0410 21  0510 21  0304 21  2150   CALCIUM mg/dL 8.8 8.4* 9.5 9.8   ALBUMIN g/dL  --   --   --   4.30     Results from last 7 days   Lab Units 04/18/21  0304 04/17/21  2150   PROCALCITONIN ng/mL  --  0.05   LACTATE mmol/L 0.8  --      COVID19   Date Value Ref Range Status   04/17/2021 Not Detected Not Detected - Ref. Range Final     Hemoglobin A1C   Date/Time Value Ref Range Status   04/18/2021 0304 5.71 (H) 4.80 - 5.60 % Final     Glucose   Date/Time Value Ref Range Status   04/20/2021 1130 215 (H) 70 - 130 mg/dL Final   04/20/2021 0020 91 70 - 130 mg/dL Final   04/19/2021 1751 136 (H) 70 - 130 mg/dL Final   04/19/2021 1158 87 70 - 130 mg/dL Final   04/18/2021 2317 90 70 - 130 mg/dL Final   04/18/2021 1531 114 70 - 130 mg/dL Final   04/18/2021 0839 91 70 - 130 mg/dL Final       Duplex Carotid Ultrasound CAR  · Proximal right internal carotid artery mild stenosis.  · Proximal left internal carotid artery moderate stenosis.  · Bidirectional left vertebral artery flow.  · 32 mm systolic brachial blood pressure gradient, lower on the left.       Scheduled Medications  aspirin, 325 mg, Oral, Daily   Or  aspirin, 300 mg, Rectal, Daily  atorvastatin, 80 mg, Oral, Nightly  clopidogrel, 75 mg, Oral, Daily  heparin (porcine), 5,000 Units, Subcutaneous, Q8H  sodium chloride, 10 mL, Intravenous, Q12H    Infusions   Diet  Diet Regular; Thin; Finger Foods       Assessment/Plan     Active Hospital Problems    Diagnosis  POA   • **Acute ischemic stroke (CMS/[facility]) [I63.9]  Yes   • Chronic diastolic (congestive) heart failure (CMS/[facility]) [I50.32]  Yes   • Mixed hyperlipidemia [E78.2]  Yes   • Essential hypertension [I10]  Yes   • Allergic rhinitis [J30.9]  Yes      Resolved Hospital Problems   No resolved problems to display.       70 y.o. female admitted with Acute ischemic stroke (CMS/[facility]).    Assessment and plan  1.  Acute CVA, with left proximal cervical ICA occlusion, left MCA thrombus and left ICA terminus thrombus.  Patient underwent seen by neurosurgery and revascularization of left proximal cervical carotid artery  occlusion, terminus bronchus of left ICA and left M2 thrombus was achieved.  Continue dual antiplatelet therapy.  MRI of the brain showed Areas of acute infarction involving the left frontal and frontoparietal regions consistent with a left MCA distribution infarcts.     2.  Hypotension, use pressor support as needed.     3.  Anemia, hemoglobin noted to be in acceptable range, continue to recheck labs.     4.  Chronic diastolic CHF, she appears euvolemic at this point of time.  Continue to reassess for volume status changes.     5.  Aphasia related to stroke, neuro deficit, continue to monitor.     6.  CODE STATUS is full code.  Further plans based on hospital course.  Discussed with family member present bedside.  Patient can transfer out of ICU.    Moreno Durham MD  [facility]  04/20/21  17:37 EDT

## 2021-04-21 LAB
ANION GAP SERPL CALCULATED.3IONS-SCNC: 10.2 MMOL/L (ref 5–15)
BASOPHILS # BLD AUTO: 0.06 10*3/MM3 (ref 0–0.2)
BASOPHILS NFR BLD AUTO: 0.5 % (ref 0–1.5)
BUN SERPL-MCNC: 10 MG/DL (ref 8–23)
BUN/CREAT SERPL: 20.4 (ref 7–25)
CALCIUM SPEC-SCNC: 9.3 MG/DL (ref 8.6–10.5)
CHLORIDE SERPL-SCNC: 108 MMOL/L (ref 98–107)
CO2 SERPL-SCNC: 22.8 MMOL/L (ref 22–29)
CREAT SERPL-MCNC: 0.49 MG/DL (ref 0.57–1)
DEPRECATED RDW RBC AUTO: 43 FL (ref 37–54)
EOSINOPHIL # BLD AUTO: 0.32 10*3/MM3 (ref 0–0.4)
EOSINOPHIL NFR BLD AUTO: 2.7 % (ref 0.3–6.2)
ERYTHROCYTE [DISTWIDTH] IN BLOOD BY AUTOMATED COUNT: 12.8 % (ref 12.3–15.4)
GFR SERPL CREATININE-BSD FRML MDRD: 124 ML/MIN/1.73
GLUCOSE SERPL-MCNC: 102 MG/DL (ref 65–99)
HCT VFR BLD AUTO: 33.8 % (ref 34–46.6)
HGB BLD-MCNC: 11.3 G/DL (ref 12–15.9)
IMM GRANULOCYTES # BLD AUTO: 0.06 10*3/MM3 (ref 0–0.05)
IMM GRANULOCYTES NFR BLD AUTO: 0.5 % (ref 0–0.5)
LYMPHOCYTES # BLD AUTO: 2.75 10*3/MM3 (ref 0.7–3.1)
LYMPHOCYTES NFR BLD AUTO: 23.2 % (ref 19.6–45.3)
MCH RBC QN AUTO: 31 PG (ref 26.6–33)
MCHC RBC AUTO-ENTMCNC: 33.4 G/DL (ref 31.5–35.7)
MCV RBC AUTO: 92.6 FL (ref 79–97)
MONOCYTES # BLD AUTO: 0.81 10*3/MM3 (ref 0.1–0.9)
MONOCYTES NFR BLD AUTO: 6.8 % (ref 5–12)
NEUTROPHILS NFR BLD AUTO: 66.3 % (ref 42.7–76)
NEUTROPHILS NFR BLD AUTO: 7.87 10*3/MM3 (ref 1.7–7)
NRBC BLD AUTO-RTO: 0 /100 WBC (ref 0–0.2)
PA ADP PRP-ACNC: 39 PRU (ref 194–418)
PLATELET # BLD AUTO: 253 10*3/MM3 (ref 140–450)
PMV BLD AUTO: 12.3 FL (ref 6–12)
POTASSIUM SERPL-SCNC: 3.5 MMOL/L (ref 3.5–5.2)
RBC # BLD AUTO: 3.65 10*6/MM3 (ref 3.77–5.28)
SODIUM SERPL-SCNC: 141 MMOL/L (ref 136–145)
WBC # BLD AUTO: 11.87 10*3/MM3 (ref 3.4–10.8)

## 2021-04-21 PROCEDURE — 25010000002 ONDANSETRON PER 1 MG: Performed by: NURSE ANESTHETIST, CERTIFIED REGISTERED

## 2021-04-21 PROCEDURE — 85576 BLOOD PLATELET AGGREGATION: CPT | Performed by: PSYCHIATRY & NEUROLOGY

## 2021-04-21 PROCEDURE — 97112 NEUROMUSCULAR REEDUCATION: CPT

## 2021-04-21 PROCEDURE — 85025 COMPLETE CBC W/AUTO DIFF WBC: CPT | Performed by: INTERNAL MEDICINE

## 2021-04-21 PROCEDURE — 25010000002 HEPARIN (PORCINE) PER 1000 UNITS: Performed by: NEUROLOGICAL SURGERY

## 2021-04-21 PROCEDURE — 99232 SBSQ HOSP IP/OBS MODERATE 35: CPT | Performed by: PSYCHIATRY & NEUROLOGY

## 2021-04-21 PROCEDURE — 97110 THERAPEUTIC EXERCISES: CPT

## 2021-04-21 PROCEDURE — 80048 BASIC METABOLIC PNL TOTAL CA: CPT | Performed by: INTERNAL MEDICINE

## 2021-04-21 PROCEDURE — 92507 TX SP LANG VOICE COMM INDIV: CPT | Performed by: SPEECH-LANGUAGE PATHOLOGIST

## 2021-04-21 PROCEDURE — 97535 SELF CARE MNGMENT TRAINING: CPT

## 2021-04-21 RX ORDER — ACETAMINOPHEN 325 MG/1
650 TABLET ORAL ONCE
Status: COMPLETED | OUTPATIENT
Start: 2021-04-21 | End: 2021-04-21

## 2021-04-21 RX ADMIN — SODIUM CHLORIDE, PRESERVATIVE FREE 10 ML: 5 INJECTION INTRAVENOUS at 08:01

## 2021-04-21 RX ADMIN — ONDANSETRON 4 MG: 2 INJECTION INTRAMUSCULAR; INTRAVENOUS at 18:20

## 2021-04-21 RX ADMIN — ATORVASTATIN CALCIUM 80 MG: 80 TABLET, FILM COATED ORAL at 21:20

## 2021-04-21 RX ADMIN — HEPARIN SODIUM 5000 UNITS: 5000 INJECTION INTRAVENOUS; SUBCUTANEOUS at 05:40

## 2021-04-21 RX ADMIN — ACETAMINOPHEN 650 MG: 325 TABLET ORAL at 19:01

## 2021-04-21 RX ADMIN — HEPARIN SODIUM 5000 UNITS: 5000 INJECTION INTRAVENOUS; SUBCUTANEOUS at 14:22

## 2021-04-21 RX ADMIN — HEPARIN SODIUM 5000 UNITS: 5000 INJECTION INTRAVENOUS; SUBCUTANEOUS at 21:20

## 2021-04-21 RX ADMIN — CLOPIDOGREL 75 MG: 75 TABLET, FILM COATED ORAL at 08:01

## 2021-04-21 RX ADMIN — SODIUM CHLORIDE, PRESERVATIVE FREE 10 ML: 5 INJECTION INTRAVENOUS at 21:19

## 2021-04-21 RX ADMIN — ASPIRIN 325 MG: 325 TABLET ORAL at 08:01

## 2021-04-21 NOTE — THERAPY TREATMENT NOTE
Patient Name: Jazmyn Castaneda  : 1950    MRN: [id number]                              Today's Date: 2021       Admit Date: 2021    Visit Dx:     ICD-10-CM ICD-9-CM   1. Acute ischemic stroke (CMS/HCC)  I63.9 434.91   2. Expressive aphasia  R47.01 784.3     Patient Active Problem List   Diagnosis   • Nephrolithiasis   • Allergic rhinitis   • Diverticulosis of large intestine without hemorrhage   • Hiatal hernia   • Medicare annual wellness visit, subsequent   • Exogenous obesity   • Encounter for screening colonoscopy   • Visit for screening mammogram   • Breast mass, left   • Essential hypertension   • Erythrocytosis   • Vertigo   • Neutrophilic leukocytosis   • Mixed hyperlipidemia   • Right hydronephrosis with urinary obstruction due to ureteral calculus   • Allergy to multiple antibiotics   • Tobacco abuse   • Ureteral stone   • Colon cancer screening   • Age-related osteoporosis without current pathological fracture   • Chronic diastolic (congestive) heart failure (CMS/HCC)   • Acute ischemic stroke (CMS/HCC)     Past Medical History:   Diagnosis Date   • Acute ischemic stroke (CMS/HCC) 2021   • Age-related osteoporosis without current pathological fracture 2021   • Anesthesia     WOKE UP DURING CATARACT SURGERY   • Chronic diastolic (congestive) heart failure (CMS/HCC) 2021   • Collapse of lung 1980s    history of in past post trauma   • Diverticulosis    • Environmental allergies     Some pollens, grass, trees, flowers   • Essential hypertension 3/24/2017   • Exogenous obesity 3/24/2017   • H/O Pneumonia    • Infectious mononucleosis    • Kidney infection    • Kidney stone    • Mixed hyperlipidemia 2019   • Neuromuscular disorder (CMS/[facility])    • Sepsis secondary to UTI (CMS/HCC) 2019   • Tobacco abuse 2019   • Urinary tract infection    • Vertigo     past history of several times     Past Surgical History:   Procedure Laterality Date   • BREAST EXCISIONAL  BIOPSY Right     30 something when it was done; says it was precancerous   • CATARACT EXTRACTION, BILATERAL     •  SECTION     • CYSTOSCOPY N/A 2019    Procedure: CYSTOSCOPY AND STENT PLACEMENT;  Surgeon: Alen Lal MD;  Location: [facility];  Service: Urology   • EMBOLECTOMY N/A 2021    Procedure: EMBOLECTOMY MECHANICAL;  Surgeon: Justo Cheng MD;  Location: [facility] ;  Service: Neurosurgery;  Laterality: N/A;   • MASTOID SURGERY     • NASAL SEPTAL RECONSTRUCTION     • SINUS SURGERY      scraped and prior deviated septum   • URETEROSCOPY LASER LITHOTRIPSY WITH STENT INSERTION Right 2019    Procedure: CYSTOSCOPY, RIGHT URETEROSCOPY, LASER LITHOTRIPSY, STONE BASKET EXTRACTION, STENT PLACEMENT WITHOUT STRINGS;  Surgeon: Alfredo Gongora Jr., MD;  Location: [facility];  Service: Urology     General Information     Row Name 21 141          OT Time and Intention    Document Type  therapy note (daily note)  -     Mode of Treatment  occupational therapy  -     Row Name 21 141          General Information    Prior Level of Function  independent:  -     Existing Precautions/Restrictions  fall  -     Row Name 21 141          Cognition    Orientation Status (Cognition)  unable/difficult to assess aphaisa  -     Row Name 21 141          Safety Issues, Functional Mobility    Safety Issues Affecting Function (Mobility)  insight into deficits/self-awareness;awareness of need for assistance;impulsivity;ability to follow commands  -     Impairments Affecting Function (Mobility)  balance;cognition;motor control;strength;visual/perceptual  -       User Key  (r) = Recorded By, (t) = Taken By, (c) = Cosigned By    Initials Name Provider Type    SM Maria Fernanda Oneill OT Occupational Therapist          Mobility/ADL's     Row Name 21 1424          Bed Mobility    Supine-Sit [place] (Bed Mobility)  contact guard;nonverbal cues  (demo/gesture);verbal cues  -[facility] Name 04/21/21 1424          Transfers    Transfers  sit-stand transfer  -     Comment (Transfers)  X2 assist for safety as pt can be impulsive.  -     Sit-Stand [place] (Transfers)  contact guard;2 person assist  -[facility] Name 04/21/21 1424          Functional Mobility    Functional Mobility- Ind. Level  2 person assist required;minimum assist (75% patient effort)  -     Functional Mobility-Distance (Feet)  10  -     Functional Mobility- Comment  Pt able to take several unsteady steps this date away from EOB towards sink side.  -[facility] Name 04/21/21 1424          Grooming Assessment/Training    [place] Level (Grooming)  grooming skills;oral care regimen;standby assist;nonverbal cues (demo/gesture);verbal cues  -     Position (Grooming)  sink side;edge of bed sitting  -       User Key  (r) = Recorded By, (t) = Taken By, (c) = Cosigned By    Initials Name Provider Type     Maria Fernanda Oneill OT Occupational Therapist        Obj/Interventions     [facility] Name 04/21/21 1431          Vision Assessment/Intervention    Visual Impairment/Limitations  -- Cues for scanning to R during EOB activity reaching for items.  -[facility] Name 04/21/21 1431          Motor Skills    Motor Control/Coordination Interventions  gross motor coordination activities;occupation/activity based treatment  -     Gross Motor Skill, Impairments Detail  Pt with mild-mod difficulty with following motor skills RUE this date, able to follow more complex motor movements this date with BUE engagement- clapping, crossing midline, alternating BUE cross body shoulder tapping. Crumbling up paper towel and tossing into trash with RUE. Pt having difficulty with release of objects from R hand.  -[facility] Name 04/21/21 1431          Balance    Balance Assessment  sitting static balance  -     Static Sitting Balance  WFL;sitting, edge of bed close SBA for impulsivity.  -     Dynamic  Sitting Balance  sitting, edge of bed;mild impairment  -     Static Standing Balance  mild impairment;supported  -     Balance Interventions  standing;occupation based/functional task;dynamic reaching;UE activity with balance activity  -     Comment, Balance  Pt able to reach out of base of support this date with BUE with SBA for dynamic sitting balance.  -       User Key  (r) = Recorded By, (t) = Taken By, (c) = Cosigned By    Initials Name Provider Type    Maria Fernanda Harris OT Occupational Therapist        Goals/Plan    No documentation.       Clinical Impression     Row Name 04/21/21 1438          Pain Scale: FACES Pre/Post-Treatment    Pain: FACES Scale, Pretreatment  0-->no hurt  -     Row Name 04/21/21 1438          Plan of Care Review    Plan of Care Reviewed With  patient  -     Progress  improving  -     Outcome Summary  Pt with good participation with OT this date, pt continues to be aphasic but is following demo/gesture cues. Pt completed bed mobility CGA, SBA for grooming skills with cues for sequencing, pt able to engage in motor planning and gross motor coordination exercises this date with bilateral UE, continues to have difficulty with motor planning RUE. Worked on reaching with RUE, some cues for visual scanning/attention to R side. Able to take several unsteady steps this date with Min AX2, but able to complete sit to stand with CGAX2. Recommend acute rehab at d/c.  -     Row Name 04/21/21 1438          Therapy Plan Review/Discharge Plan (OT)    Anticipated Discharge Disposition (OT)  inpatient rehabilitation facility  -     Row Name 04/21/21 1438          Positioning and Restraints    Pre-Treatment Position  in bed  -     Post Treatment Position  bed  -     In Bed  fowlers;call light within reach;encouraged to call for assist;exit alarm on;notified nsg  -       User Key  (r) = Recorded By, (t) = Taken By, (c) = Cosigned By    Initials Name Provider Type    HUGO  Maria Fernanda Oneill OT Occupational Therapist        Outcome Measures     Row Name 04/21/21 1446          How much help from another is currently needed...    Putting on and taking off regular lower body clothing?  2  -SM     Bathing (including washing, rinsing, and drying)  2  -SM     Toileting (which includes using toilet bed pan or urinal)  2  -SM     Putting on and taking off regular upper body clothing  2  -SM     Taking care of personal grooming (such as brushing teeth)  3  -SM     Eating meals  3  -SM     AM-PAC 6 Clicks Score (OT)  14  -SM     Row Name 04/21/21 1446          Functional Assessment    Outcome Measure Options  AM-PAC 6 Clicks Daily Activity (OT)  -SM       User Key  (r) = Recorded By, (t) = Taken By, (c) = Cosigned By    Initials Name Provider Type    Maria Fernanda Harris OT Occupational Therapist        Occupational Therapy Education                 Title: PT OT SLP Therapies (In Progress)     Topic: Occupational Therapy (Not Started)     Point: ADL training (Not Started)     Description:   Instruct learner(s) on proper safety adaptation and remediation techniques during self care or transfers.   Instruct in proper use of assistive devices.              Learner Progress:  Not documented in this visit.          Point: Home exercise program (Not Started)     Description:   Instruct learner(s) on appropriate technique for monitoring, assisting and/or progressing therapeutic exercises/activities.              Learner Progress:  Not documented in this visit.          Point: Precautions (Not Started)     Description:   Instruct learner(s) on prescribed precautions during self-care and functional transfers.              Learner Progress:  Not documented in this visit.          Point: Body mechanics (Not Started)     Description:   Instruct learner(s) on proper positioning and spine alignment during self-care, functional mobility activities and/or exercises.              Learner Progress:  Not  documented in this visit.                          OT Recommendation and Plan  Planned Therapy Interventions (OT): activity tolerance training, adaptive equipment training, BADL retraining, cognitive/visual perception retraining, functional balance retraining, IADL retraining, neuromuscular control/coordination retraining, occupation/activity based interventions, patient/caregiver education/training, ROM/therapeutic exercise, strengthening exercise, transfer/mobility retraining  Therapy Frequency (OT): 5 times/wk  Plan of Care Review  Plan of Care Reviewed With: patient  Progress: improving  Outcome Summary: Pt with good participation with OT this date, pt continues to be aphasic but is following demo/gesture cues. Pt completed bed mobility CGA, SBA for grooming skills with cues for sequencing, pt able to engage in motor planning and gross motor coordination exercises this date with bilateral UE, continues to have difficulty with motor planning RUE. Worked on reaching with RUE, some cues for visual scanning/attention to R side. Able to take several unsteady steps this date with Min AX2, but able to complete sit to stand with CGAX2. Recommend acute rehab at d/c.     Time Calculation:   Time Calculation- OT     Row Name 04/21/21 1447             Time Calculation- OT    OT Start Time  1245  -      OT Stop Time  1314  -SM      OT Time Calculation (min)  29 min  -SM      Total Timed Code Minutes- OT  29 minute(s)  -SM      OT Received On  04/21/21  -      OT - Next Appointment  04/22/21  -      OT Goal Re-Cert Due Date  05/05/21  -         Timed Charges    16151 -  OT Neuromuscular Reeducation Minutes  15  -SM      76305 - OT Self Care/Mgmt Minutes  14  -SM         Total Minutes    Timed Charges Total Minutes  29  -SM       Total Minutes  29  -SM        User Key  (r) = Recorded By, (t) = Taken By, (c) = Cosigned By    Initials Name Provider Type    Maria Fernanda Harris OT Occupational Therapist        Therapy  Charges for Today     Code Description Service Date Service Provider Modifiers Qty    73388851306 HC OT SELF CARE/MGMT/TRAIN EA 15 MIN 4/20/2021 Maria Fernanda Oneill OT GO 1    20278515235 HC OT NEUROMUSC RE EDUCATION EA 15 MIN 4/20/2021 Maria Fernanda Oneill OT GO 1    13833148518 HC OT SELF CARE/MGMT/TRAIN EA 15 MIN 4/21/2021 Maria Fernanda Oneill OT GO 1    64425450045 HC OT NEUROMUSC RE EDUCATION EA 15 MIN 4/21/2021 Maria Fernanda Oneill OT GO 1               Maria Fernanda Oneill OT  4/21/2021

## 2021-04-21 NOTE — PROGRESS NOTES
[facility]-  Saw pt in room. Aphasic- both receptive and expressive aphasia. She was unable to follow any of my commands. Called dgt Laine Garza ([phone]). Acute vs subacute discussed. Pt lived alone and both dgt and son work full time. Anticipate pt needing 24/7 after dc. Laine states planning to stop by and see pt tonight and will discuss dc plans with pt's son.   I will discuss with Dr. Fontaine.   Called [facility] to request to leave card and [facility] booklet in room for dgt- msg left.     Angie Marsh RN  Acute Rehab Admission Nurse

## 2021-04-21 NOTE — PLAN OF CARE
Goal Outcome Evaluation:  Plan of Care Reviewed With: patient  Progress: no change  Outcome Summary: Neuro unchanged, still very aphasic, VSS, BP within parameters. No c/o pain.

## 2021-04-21 NOTE — THERAPY TREATMENT NOTE
Acute Care - Speech Language Pathology Treatment Note  [facility]     Patient Name: Jazmyn Castaneda  : 1950  MRN: [id number]  Today's Date: 2021               Admit Date: 2021     Visit Dx:    ICD-10-CM ICD-9-CM   1. Acute ischemic stroke (CMS/HCC)  I63.9 434.91   2. Expressive aphasia  R47.01 784.3     Patient Active Problem List   Diagnosis   • Nephrolithiasis   • Allergic rhinitis   • Diverticulosis of large intestine without hemorrhage   • Hiatal hernia   • Medicare annual wellness visit, subsequent   • Exogenous obesity   • Encounter for screening colonoscopy   • Visit for screening mammogram   • Breast mass, left   • Essential hypertension   • Erythrocytosis   • Vertigo   • Neutrophilic leukocytosis   • Mixed hyperlipidemia   • Right hydronephrosis with urinary obstruction due to ureteral calculus   • Allergy to multiple antibiotics   • Tobacco abuse   • Ureteral stone   • Colon cancer screening   • Age-related osteoporosis without current pathological fracture   • Chronic diastolic (congestive) heart failure (CMS/HCC)   • Acute ischemic stroke (CMS/HCC)     Past Medical History:   Diagnosis Date   • Acute ischemic stroke (CMS/HCC) 2021   • Age-related osteoporosis without current pathological fracture 2021   • Anesthesia     WOKE UP DURING CATARACT SURGERY   • Chronic diastolic (congestive) heart failure (CMS/HCC) 2021   • Collapse of lung 1980s    history of in past post trauma   • Diverticulosis    • Environmental allergies     Some pollens, grass, trees, flowers   • Essential hypertension 3/24/2017   • Exogenous obesity 3/24/2017   • H/O Pneumonia    • Infectious mononucleosis    • Kidney infection    • Kidney stone    • Mixed hyperlipidemia 2019   • Neuromuscular disorder (CMS/HCC)    • Sepsis secondary to UTI (CMS/HCC) 2019   • Tobacco abuse 2019   • Urinary tract infection    • Vertigo     past history of several times     Past Surgical History:    Procedure Laterality Date   • BREAST EXCISIONAL BIOPSY Right     30 something when it was done; says it was precancerous   • CATARACT EXTRACTION, BILATERAL     •  SECTION     • CYSTOSCOPY N/A 2019    Procedure: CYSTOSCOPY AND STENT PLACEMENT;  Surgeon: Alen Lal MD;  Location: [facility];  Service: Urology   • EMBOLECTOMY N/A 2021    Procedure: EMBOLECTOMY MECHANICAL;  Surgeon: Justo Cheng MD;  Location: [facility] ;  Service: Neurosurgery;  Laterality: N/A;   • MASTOID SURGERY     • NASAL SEPTAL RECONSTRUCTION     • SINUS SURGERY      scraped and prior deviated septum   • URETEROSCOPY LASER LITHOTRIPSY WITH STENT INSERTION Right 2019    Procedure: CYSTOSCOPY, RIGHT URETEROSCOPY, LASER LITHOTRIPSY, STONE BASKET EXTRACTION, STENT PLACEMENT WITHOUT STRINGS;  Surgeon: Alfredo Gongora Jr., MD;  Location: [facility];  Service: Urology        SLP EVALUATION (last 72 hours)      SLP SLC Evaluation     Row Name 21 1000 21 1400                Communication Assessment/Intervention    Document Type  therapy note (daily note)  -KA  evaluation  -AB       Subjective Information  no complaints  -KA  no complaints  -AB       Patient Observations  alert;cooperative  -KA  alert  -AB       Patient/Family/Caregiver Comments/Observations  --  Pt is awake, alert, appears to grow increasingly frustrated as session continues secondary to communication breakdowns   -AB       Care Plan Review  evaluation/treatment results reviewed  -KA  evaluation/treatment results reviewed;care plan/treatment goals reviewed;risks/benefits reviewed;current/potential barriers reviewed;patient/other agree to care plan with son in room   -AB       Care Plan Review, Other Participant(s)  --  son  -AB       Patient Effort  adequate  -KA  adequate  -AB       Comment  No family present, SLP left therapy materials at the bedside  -KA  --       Symptoms Noted During/After Treatment  none  -KA  " none  -AB          General Information    Patient Profile Reviewed  yes  -KA  yes  -AB       Precautions/Limitations, Vision  --  WFL;for purposes of eval  -AB       Precautions/Limitations, Hearing  --  WFL;for purposes of eval  -AB       Patient Level of Education  --  Prior  at [employer], previously employed by [employer]  -AB       Prior Level of Function-Communication  --  WFL  -AB       Plans/Goals Discussed with  --  family  -AB       Barriers to Rehab  --  medically complex;cognitive status  -AB       Patient's Goals for Discharge  --  patient could not state  -AB          Pain    Additional Documentation  --  -- not appropriate-no nonverbal indicators  -AB          Comprehension Assessment/Intervention    Comprehension Assessment/Intervention  --  Auditory Comprehension  -AB          Auditory Comprehension Assessment/Intervention    Auditory Comprehension (Communication)  --  severe impairment  -AB       Able to Identify Objects/Pictures (Communication)  --  severe impairment  -AB       Answers Questions (Communication)  --  severe impairment;yes/no  -AB       Able to Follow Commands (Communication)  --  severe impairment;1-step  -AB       Auditory Comprehension Communication, Comment  --  Attempted portions of MS Aphasia Screener-limited as pt obtains a score of 2/100. Pt can answer 1/10 established yes/no questions as part of evaluation. Unsuccessful for-naming, automatic speech, repetition, object recognition in a field of 5, following instructions, reading instructions, verbal fluency, and writing/spelling. Informal measures: 1 step commands: 0/5; yes/no biographical and tangible items: 6/8; automatics: 0/5 attempts (sing \"happy birthday\" \"twinkle twinkle\" count, alphabet, or days of week), repetition of phonemes: 0/8 (reflexive \"ah\" inconsistent); object recognition field of 3: 0/5; object recognition field of 2: 0/5. Pt does utilize melodic intonation of \"happy birthday,\" " reflective of ability to imitate intonation. She utilizes gestures/pointing and facial expressions to communicate wants/needs x3 across session. Mild groping with attempted verbalizations is inconsistent, and difficult to assess further as pt unable to follow commands.   -AB          Expression Assessment/Intervention    Expression Assessment/Intervention  --  verbal expression  -AB          Verbal Expression Assessment/Intervention    Verbal Expression  --  severe impairment  -AB       Automatic Speech (Communication)  --  severe impairment;alphabet;counting 1-20;days of week;sing happy birthday  -AB       Repetition  --  severe impairment;neologisms  -AB       Confrontational Naming  --  severe impairment;high frequency  -AB       Spontaneous/Functional Words  --  severe impairment;neologisms  -AB          SLP Clinical Impressions    SLP Diagnosis  --  Patient presents with a severe global aphasia, recommend intensive speech language therapy and further workup for acquired apraxia of speech  -AB       Rehab Potential/Prognosis  --  good  -AB       SLC Criteria for Skilled Therapy Interventions Met  --  yes  -AB       Functional Impact  --  unable to care for self;difficulty communicating wants, needs;difficulty communicating in an emergency;restrictions in personal and social life;decreased ability to respond to situations safely  -AB          Recommendations    Therapy Frequency (SLP SLC)  --  PRN dependent on clinical course   -AB       Predicted Duration Therapy Intervention (Days)  --  until discharge  -AB       Anticipated Discharge Disposition (SLP)  --  inpatient rehabilitation facility;anticipate therapy at next level of care  -AB          Communication Treatment Objective and Progress Goals (SLP)    Auditory Comprehension Treatment Objectives  --  Auditory Comprehension Treatment Objectives (Group)  -AB       Verbal Expression Treatment Objectives  --  Verbal Expression Treatment Objectives (Group)  -AB        Motor Speech/Apraxia Treatment Objectives  --  Motor Speech/Apraxia Treatment Objectives (Group)  -AB       Augmentative/Alternative Communication Objectives  --  Augmentative/Alternative Communication Objectives (Group)  -AB          Auditory Comprehension Treatment Objectives    Words/Phrases/Sentences Selection  --  words/phrases/sentences, SLP goal 1  -AB       Comprehend Questions Selection  --  comprehend questions, SLP goal 1  -AB       Follow Directions Selection  --  follow directions, SLP goal 1  -AB          Words/Phrases/Sentences Goal 1 (SLP)    Improve Ability to Comprehend Words/Phrases/Sentences Through: Goal 1 (SLP)  identify objects, field of  -KA  identify objects, field of 2  -AB       Time Frame (Identify Objects and Pictures Goal 1, SLP)  short term goal (STG)  -KA  short term goal (STG)  -AB       Comment (Words/Phrases/Sentences Goal 1, SLP)  Identification on words and objects in F:2, 0% with mod to max cueing, pt unable to follow directions to point to spoken word/picture outloud.  -KA  --          Comprehend Questions Goal 1 (SLP)    Improve Ability to Comprehend Questions Goal 1 (SLP)  simple yes/no questions;90%;with minimal cues (75-90%)  -KA  simple yes/no questions;90%;with minimal cues (75-90%)  -AB       Time Frame (Comprehend Questions Goal 1, SLP)  short term goal (STG)  -KA  short term goal (STG)  -AB       Comment (Comprehend Questions Goal 1, SLP)  50% accuracy with simple y/n questions  -KA  --          Follow Directions Goal 2 (SLP)    Improve Ability to Follow Directions Goal 1 (SLP)  1 step direction with objects;1 step direction without objects;90%;with minimal cues (75-90%)  -KA  1 step direction with objects;1 step direction without objects;90%;with minimal cues (75-90%)  -AB       Time Frame (Follow Directions Goal 1, SLP)  short term goal (STG);by discharge  -KA  --       Comment (Follow Directions Goal 1, SLP)  Followed one step commands with 20% accuracy with mod  "to max visual cues   -KA  --          Verbal Expression Treatment Objectives    Word Retrieval Skills Selection  --  word retrieval, SLP goal 1;word retrieval, SLP goal 2  -AB          Word Retrieval Skills Goal 1 (SLP)    Improve Word Retrieval Skills By Goal 1 (SLP)  completing automatic speech task, counting;completing automatic speech task, alphabet;completing automatic speech task, days of the week;completing automatic speech task, months;completing automatic speech task, Pledge of Allegiance;completing automatic speech task, sing “Happy Birthday”;with moderate cues (50-74%)  -KA  completing automatic speech task, counting;completing automatic speech task, alphabet;completing automatic speech task, days of the week;completing automatic speech task, months;completing automatic speech task, Pledge of Allegiance;completing automatic speech task, sing “Happy Birthday”;with moderate cues (50-74%)  -AB       Time Frame (Word Retrieval Goal 1, SLP)  short term goal (STG)  -KA  short term goal (STG)  -AB       Comment (Word Retrieval Goal 1, SLP)  On third trial of counting=patient able to vocalize 5 numbers  with SLP 50% accuracy and with days of the week able to vocalize \"wednesday\"=15% accuracy  -KA  --          Word Retrieval Skills Goal 2 (SLP)    Improve Word Retrieval Skills By Goal 2 (SLP)  repeating sounds;80%;with moderate cues (50-74%)  -KA  repeating sounds;80%;with moderate cues (50-74%)  -AB       Time Frame (Word Retrieval Goal 2, SLP)  short term goal (STG)  -KA  short term goal (STG)  -AB       Comment (Word Retrieval Goal 2, SLP)  0% with repeating single sounds or monosyllabic words, pt perseverative with \"yes please,\"  -KA  --          Motor Speech/Apraxia Treatment Objectives    Motor Planning Selection  --  motor planning selection, SLP goal 1;motor planning selection, SLP goal 2  -AB          Motor Planning Goal 1 (SLP)    Improve Motor Planning to Reduce Apraxia by Goal 1 (SLP)  imitating mouth " postures  -KA  imitating mouth postures  -AB       Time Frame (Motor Planning Goal 1, SLP)  short term goal (STG);by discharge  -KA  --       Comment (Motor Planning Goal 1, SLP)  0% with imitation of oral motor postures   -KA  --          Motor Planning Goal 2 (SLP)    Improve Motor Planning to Reduce Apraxia by Goal 2 (SLP)  --  imitating vowels  -AB          Augmentative/Alternative Communication Objectives    Augmentative/Alternative Communication Selection  --  augmentative/alternative communication, SLP goal 1  -AB          Augmentative/Alternative Communication Objectives Goal 1 (SLP    Communication (Augmentative/Alternative Communication Goal 1, SLP)  --  improve ability to use non-verbal communication strategies  -AB       Improve Communication by (Augmentative/Alternative Communication Goal 1, SLP)  --  use gestures to communicate  -AB       Time Frame (Augmentative/Alternative Communication Goal 1, SLP)  --  short term goal (STG)  -AB         User Key  (r) = Recorded By, (t) = Taken By, (c) = Cosigned By    Initials Name Effective Dates    Mahamed Lima MA,[facility]-SLP 06/08/18 -     AB Urvashi Okeefe, MS CCC-SLP 10/05/20 -              EDUCATION  The patient has been educated in the following areas:     Communication Impairment.    SLP Recommendation and Plan                                                  SLP GOALS     Row Name 04/21/21 1000 04/20/21 1439 04/20/21 1400       Oral Nutrition/Hydration Goal 1 (SLP)    Oral Nutrition/Hydration Goal 1, SLP  --  Pt will safely tolerate the least restrictive diet with no s/s of aspiration.  -AB  --    Time Frame (Oral Nutrition/Hydration Goal 1, SLP)  --  by discharge  -AB  --    Barriers (Oral Nutrition/Hydration Goal 1, SLP)  --  RE-EVAL: Bedside re-evaluation completed with pt established PM meal and trials ffor advancement. Pt adamant for self feeding, despite RUE weakness, motor planning difficulties, and impulsivity utilizing hands versus utensils.  SLP loads spoon with pt accepting 25-50% of opps. Oral phase with timely A-P transport and mastication for mechanical soft/puree solids with meal and regular solid trials. Pt inconsistent in following commands for opening oral cavity, with no significant oral residuals or buccal pocketing upon limited visualization. Intermittent requirements for cues to slow, clear oral cavity prior to accepting next bite were required x2. Palpation performed and suggests adequate laryngeal elevation. No overt s/s aspiration were noted with solids, NTL, or additional trials of thin by straw. Cup was not trialed secondary to pt impulsivity and desire to self feed.   -AB  --    Progress/Outcomes (Oral Nutrition/Hydration Goal 1, SLP)  --  good progress toward goal  -AB  --       Words/Phrases/Sentences Goal 1 (SLP)    Improve Ability to Comprehend Words/Phrases/Sentences Through: Goal 1 (SLP)  identify objects, field of  -KA  --  identify objects, field of 2  -AB    Time Frame (Identify Objects and Pictures Goal 1, SLP)  short term goal (STG)  -KA  --  short term goal (STG)  -AB    Comment (Words/Phrases/Sentences Goal 1, SLP)  Identification on words and objects in F:2, 0% with mod to max cueing, pt unable to follow directions to point to spoken word/picture outloud.  -KA  --  --       Comprehend Questions Goal 1 (SLP)    Improve Ability to Comprehend Questions Goal 1 (SLP)  simple yes/no questions;90%;with minimal cues (75-90%)  -KA  --  simple yes/no questions;90%;with minimal cues (75-90%)  -AB    Time Frame (Comprehend Questions Goal 1, SLP)  short term goal (STG)  -KA  --  short term goal (STG)  -AB    Comment (Comprehend Questions Goal 1, SLP)  50% accuracy with simple y/n questions  -KA  --  --       Follow Directions Goal 2 (SLP)    Improve Ability to Follow Directions Goal 1 (SLP)  1 step direction with objects;1 step direction without objects;90%;with minimal cues (75-90%)  -KA  --  1 step direction with objects;1 step  "direction without objects;90%;with minimal cues (75-90%)  -AB    Time Frame (Follow Directions Goal 1, SLP)  short term goal (STG);by discharge  -KA  --  --    Comment (Follow Directions Goal 1, SLP)  Followed one step commands with 20% accuracy with mod to max visual cues   -KA  --  --       Word Retrieval Skills Goal 1 (SLP)    Improve Word Retrieval Skills By Goal 1 (SLP)  completing automatic speech task, counting;completing automatic speech task, alphabet;completing automatic speech task, days of the week;completing automatic speech task, months;completing automatic speech task, Pledge of Allegiance;completing automatic speech task, sing “Happy Birthday”;with moderate cues (50-74%)  -KA  --  completing automatic speech task, counting;completing automatic speech task, alphabet;completing automatic speech task, days of the week;completing automatic speech task, months;completing automatic speech task, Pledge of Allegiance;completing automatic speech task, sing “Happy Birthday”;with moderate cues (50-74%)  -AB    Time Frame (Word Retrieval Goal 1, SLP)  short term goal (STG)  -KA  --  short term goal (STG)  -AB    Comment (Word Retrieval Goal 1, SLP)  On third trial of counting=patient able to vocalize 5 numbers  with SLP 50% accuracy and with days of the week able to vocalize \"wednesday\"=15% accuracy  -KA  --  --       Word Retrieval Skills Goal 2 (SLP)    Improve Word Retrieval Skills By Goal 2 (SLP)  repeating sounds;80%;with moderate cues (50-74%)  -KA  --  repeating sounds;80%;with moderate cues (50-74%)  -AB    Time Frame (Word Retrieval Goal 2, SLP)  short term goal (STG)  -KA  --  short term goal (STG)  -AB    Comment (Word Retrieval Goal 2, SLP)  0% with repeating single sounds or monosyllabic words, pt perseverative with \"yes please,\"  -KA  --  --       Motor Planning Goal 1 (SLP)    Improve Motor Planning to Reduce Apraxia by Goal 1 (SLP)  imitating mouth postures  -KA  --  imitating mouth postures  -AB "    Time Frame (Motor Planning Goal 1, SLP)  short term goal (STG);by discharge  -KA  --  --    Comment (Motor Planning Goal 1, SLP)  0% with imitation of oral motor postures   -KA  --  --       Motor Planning Goal 2 (SLP)    Improve Motor Planning to Reduce Apraxia by Goal 2 (SLP)  --  --  imitating vowels  -AB       Augmentative/Alternative Communication Objectives Goal 1 (SLP    Communication (Augmentative/Alternative Communication Goal 1, SLP)  --  --  improve ability to use non-verbal communication strategies  -AB    Improve Communication by (Augmentative/Alternative Communication Goal 1, SLP)  --  --  use gestures to communicate  -AB    Time Frame (Augmentative/Alternative Communication Goal 1, SLP)  --  --  short term goal (STG)  -AB    Row Name 04/19/21 1400             Oral Nutrition/Hydration Goal 1 (SLP)    Oral Nutrition/Hydration Goal 1, SLP  Pt will safely tolerate the least restrictive diet with no s/s of aspiration.  -AW      Time Frame (Oral Nutrition/Hydration Goal 1, SLP)  by discharge  -AW        User Key  (r) = Recorded By, (t) = Taken By, (c) = Cosigned By    Initials Name Provider Type    Mahamed Lima MA,CCC-SLP Speech and Language Pathologist    Suni Chaidez, MS CCC-SLP Speech and Language Pathologist    Urvashi Salinas, MS CCC-SLP Speech and Language Pathologist             SLP Outcome Measures (last 72 hours)      SLP Outcome Measures     Row Name 04/20/21 1440 04/20/21 1400 04/19/21 1500       SLP Outcome Measures    Outcome Measure Used?  Adult NOMS  -AB  Adult NOMS  -AB  Adult NOMS  -AW       Adult FCM Scores    FCM Chosen  Verbal Expression  -AB  Swallowing  -AB  Swallowing  -AW    Swallowing FCM Score  2  -AB  5  -AB  4  -AW      User Key  (r) = Recorded By, (t) = Taken By, (c) = Cosigned By    Initials Name Effective Dates    Suni Chaidez, MS CCC-SLP 06/08/18 -     Urvashi Salinas, MS CCC-SLP 10/05/20 -         Patient was not wearing a face mask during this therapy  encounter. Therapist used appropriate personal protective equipment including mask, eye protection and gloves.  Mask used was standard procedure mask. Appropriate PPE was worn during the entire therapy session. Hand hygiene was completed before and after therapy session. Patient is not in enhanced droplet precautions.                 Time Calculation:     Time Calculation- SLP     Row Name 04/21/21 1049             Time Calculation- SLP    SLP Start Time  0945  -KA      SLP Received On  04/21/21  -KA         Untimed Charges    00950-SF Modification Prosth Aug Alter   45  -KA         Total Minutes    Untimed Charges Total Minutes  45  -KA       Total Minutes  45  -KA        User Key  (r) = Recorded By, (t) = Taken By, (c) = Cosigned By    Initials Name Provider Type    KA Mahamed Cifuentes MA,CCC-SLP Speech and Language Pathologist          Therapy Charges for Today     Code Description Service Date Service Provider Modifiers Qty    [id number] [facility] TREATMENT SPEECH 3 4/21/2021 Mahamed Cifuentes MA,CCC-SLP GN 1             ADULT NOMS (last 72 hours)      Adult NOMS     Row Name 04/20/21 1440 04/20/21 1400 04/19/21 1500             Adult FCM Scores    FCM Chosen  Verbal Expression  -AB  Swallowing  -AB  Swallowing  -AW      Swallowing FCM Score  2  -AB  5  -AB  4  -AW        User Key  (r) = Recorded By, (t) = Taken By, (c) = Cosigned By    Initials Name Effective Dates    Suni Chaidez, MS CCC-SLP 06/08/18 -     Urvashi Salinas, MS CCC-SLP 10/05/20 -                  Mahamed Cifuentes MA, CCC-SLP  4/21/2021

## 2021-04-21 NOTE — THERAPY TREATMENT NOTE
Patient Name: Jazmyn Castaneda  : 1950    MRN: [id number]                              Today's Date: 2021       Admit Date: 2021    Visit Dx:     ICD-10-CM ICD-9-CM   1. Acute ischemic stroke (CMS/HCC)  I63.9 434.91   2. Expressive aphasia  R47.01 784.3     Patient Active Problem List   Diagnosis   • Nephrolithiasis   • Allergic rhinitis   • Diverticulosis of large intestine without hemorrhage   • Hiatal hernia   • Medicare annual wellness visit, subsequent   • Exogenous obesity   • Encounter for screening colonoscopy   • Visit for screening mammogram   • Breast mass, left   • Essential hypertension   • Erythrocytosis   • Vertigo   • Neutrophilic leukocytosis   • Mixed hyperlipidemia   • Right hydronephrosis with urinary obstruction due to ureteral calculus   • Allergy to multiple antibiotics   • Tobacco abuse   • Ureteral stone   • Colon cancer screening   • Age-related osteoporosis without current pathological fracture   • Chronic diastolic (congestive) heart failure (CMS/HCC)   • Acute ischemic stroke (CMS/HCC)     Past Medical History:   Diagnosis Date   • Acute ischemic stroke (CMS/HCC) 2021   • Age-related osteoporosis without current pathological fracture 2021   • Anesthesia     WOKE UP DURING CATARACT SURGERY   • Chronic diastolic (congestive) heart failure (CMS/HCC) 2021   • Collapse of lung 1980s    history of in past post trauma   • Diverticulosis    • Environmental allergies     Some pollens, grass, trees, flowers   • Essential hypertension 3/24/2017   • Exogenous obesity 3/24/2017   • H/O Pneumonia    • Infectious mononucleosis    • Kidney infection    • Kidney stone    • Mixed hyperlipidemia 2019   • Neuromuscular disorder (CMS/[facility])    • Sepsis secondary to UTI (CMS/HCC) 2019   • Tobacco abuse 2019   • Urinary tract infection    • Vertigo     past history of several times     Past Surgical History:   Procedure Laterality Date   • BREAST EXCISIONAL  BIOPSY Right     30 something when it was done; says it was precancerous   • CATARACT EXTRACTION, BILATERAL     •  SECTION     • CYSTOSCOPY N/A 2019    Procedure: CYSTOSCOPY AND STENT PLACEMENT;  Surgeon: Alen Lal MD;  Location: [facility];  Service: Urology   • EMBOLECTOMY N/A 2021    Procedure: EMBOLECTOMY MECHANICAL;  Surgeon: Justo Cheng MD;  Location: [facility] ;  Service: Neurosurgery;  Laterality: N/A;   • MASTOID SURGERY     • NASAL SEPTAL RECONSTRUCTION     • SINUS SURGERY      scraped and prior deviated septum   • URETEROSCOPY LASER LITHOTRIPSY WITH STENT INSERTION Right 2019    Procedure: CYSTOSCOPY, RIGHT URETEROSCOPY, LASER LITHOTRIPSY, STONE BASKET EXTRACTION, STENT PLACEMENT WITHOUT STRINGS;  Surgeon: Alfredo Gongora Jr., MD;  Location: [facility];  Service: Urology     General Information     Row Name 21 1555          Physical Therapy Time and Intention    Document Type  therapy note (daily note)  -EM     Mode of Treatment  individual therapy;physical therapy  -EM     Row Name 21 1555          General Information    Existing Precautions/Restrictions  fall  -EM       User Key  (r) = Recorded By, (t) = Taken By, (c) = Cosigned By    Initials Name Provider Type    EM Kasandra Benitez, PT Physical Therapist        Mobility     Row Name 21 1558          Bed Mobility    Supine-Sit Stanford (Bed Mobility)  contact guard  -EM     Sit-Supine Stanford (Bed Mobility)  contact guard  -EM     Comment (Bed Mobility)  pt with global aphasia, difficulty following commands to initiate bed mobility but strong enough to complete task, needs verbal and tactile cues to initiate movement  -EM     Row Name 21 1552          Sit-Stand Transfer    Sit-Stand Stanford (Transfers)  minimum assist (75% patient effort)  -EM     Row Name 21 1555          Gait/Stairs (Locomotion)    Stanford Level (Gait)  minimum assist  (75% patient effort);2 person assist  -EM     Assistive Device (Gait)  walker, front-wheeled  -EM     Distance in Feet (Gait)  10 feet without AD, 25 feet with rwx on 2nd attempt  -EM     Deviations/Abnormal Patterns (Gait)  base of support, narrow  -EM     Right Sided Gait Deviations  knee buckling, right side  -EM     Comment (Gait/Stairs)  unsteady, impulsive, difficulty following commands  -EM       User Key  (r) = Recorded By, (t) = Taken By, (c) = Cosigned By    Initials Name Provider Type    EM Kasandra Benitez, PT Physical Therapist        Obj/Interventions    No documentation.       Goals/Plan    No documentation.       Clinical Impression     Row Name 04/21/21 1537          Pain Scale: Numbers Pre/Post-Treatment    Pretreatment Pain Rating  0/10 - no pain  -EM     Pre/Posttreatment Pain Comment  patient does not appear to be in any pain  -EM     Row Name 04/21/21 3157          Plan of Care Review    Plan of Care Reviewed With  patient  -EM     Progress  improving  -EM     Outcome Summary  patient awake and alert, continues to be aphasic and has difficulty following commands consistently although improving. Patient ambulated in room, 10 feet without rwx with assist x2, R knee buckling and narrow KATHY, then ambulated 25 feet with rwx and minAx2, assist to guide rwx but improved stability with use of assistive device. Patient will need further rehab services at d/c in order to maximize functional mobility.  -EM     Row Name 04/21/21 1557          Positioning and Restraints    Pre-Treatment Position  in bed  -EM     Post Treatment Position  bed  -EM     In Bed  supine;call light within reach;exit alarm on  -EM       User Key  (r) = Recorded By, (t) = Taken By, (c) = Cosigned By    Initials Name Provider Type    EM Kasandra Benitez, PT Physical Therapist        Outcome Measures     Row Name 04/21/21 1600          How much help from another person do you currently need...    Turning from your back to your  side while in flat bed without using bedrails?  3  -EM     Moving from lying on back to sitting on the side of a flat bed without bedrails?  3  -EM     Moving to and from a bed to a chair (including a wheelchair)?  3  -EM     Standing up from a chair using your arms (e.g., wheelchair, bedside chair)?  3  -EM     Climbing 3-5 steps with a railing?  1  -EM     To walk in hospital room?  2  -EM     AM-PAC 6 Clicks Score (PT)  15  -EM       User Key  (r) = Recorded By, (t) = Taken By, (c) = Cosigned By    Initials Name Provider Type    EM Kasandra Benitez, PT Physical Therapist        Physical Therapy Education                 Title: PT OT SLP Therapies (In Progress)     Topic: Physical Therapy (In Progress)     Point: Mobility training (In Progress)     Learning Progress Summary           Patient Acceptance, E, NR by EM at 4/21/2021 1600    Acceptance, E, NR by ME at 4/20/2021 1325    Acceptance, E, NR by EM at 4/19/2021 1424                   Point: Home exercise program (Not Started)     Learner Progress:  Not documented in this visit.          Point: Body mechanics (In Progress)     Learning Progress Summary           Patient Acceptance, E, NR by ME at 4/20/2021 1325                   Point: Precautions (Not Started)     Learner Progress:  Not documented in this visit.                      User Key     Initials Effective Dates Name Provider Type Discipline     04/03/18 -  Kasandra Benitez, PT Physical Therapist PT    ME 03/12/21 -  Jd Castro PT Student PT Student PT              PT Recommendation and Plan  Planned Therapy Interventions (PT): bed mobility training, gait training, balance training, patient/family education, transfer training  Plan of Care Reviewed With: patient  Progress: improving  Outcome Summary: patient awake and alert, continues to be aphasic and has difficulty following commands consistently although improving. Patient ambulated in room, 10 feet without rwx with assist x2, R  knee buckling and narrow KATHY, then ambulated 25 feet with rwx and minAx2, assist to guide rwx but improved stability with use of assistive device. Patient will need further rehab services at d/c in order to maximize functional mobility.     Time Calculation:   PT Charges     Row Name 04/21/21 1601             Time Calculation    Start Time  1540  -EM      Stop Time  1552  -EM      Time Calculation (min)  12 min  -EM      PT Received On  04/21/21  -EM      PT - Next Appointment  04/22/21  -EM         Time Calculation- PT    Total Timed Code Minutes- PT  12 minute(s)  -EM        User Key  (r) = Recorded By, (t) = Taken By, (c) = Cosigned By    Initials Name Provider Type    EM Kasandra Benitez PT Physical Therapist        Therapy Charges for Today     Code Description Service Date Service Provider Modifiers Qty    02991017797 HC PT THER PROC EA 15 MIN 4/21/2021 Kasandra Benitez, PT GP 1    81756834080 HC PT THER SUPP EA 15 MIN 4/21/2021 Kasandra Benitez, PT GP 1          PT G-Codes  Outcome Measure Options: AM-PAC 6 Clicks Daily Activity (OT)  AM-PAC 6 Clicks Score (PT): 15  AM-PAC 6 Clicks Score (OT): 14  Modified Lagrange Scale: 4 - Moderately severe disability.  Unable to walk without assistance, and unable to attend to own bodily needs without assistance.    Kasandra Benitez PT  4/21/2021

## 2021-04-21 NOTE — PLAN OF CARE
Goal Outcome Evaluation:  Plan of Care Reviewed With: patient  Progress: improving  Outcome Summary: Pt with good participation with OT this date, pt continues to be aphasic but is following demo/gesture cues. Pt completed bed mobility CGA, SBA for grooming skills with cues for sequencing, pt able to engage in motor planning and gross motor coordination exercises this date with bilateral UE, continues to have difficulty with motor planning RUE. Worked on reaching with RUE, some cues for visual scanning/attention to R side. Able to take several unsteady steps this date with Min AX2, but able to complete sit to stand with CGAX2. Recommend acute rehab at d/c.    Appropriate PPE worn during encounter including gloves, mask, and eye protection. Hand hygiene completed. Pt wore a mask.

## 2021-04-21 NOTE — PROGRESS NOTES
Name: Jazmyn Castaneda ADMIT: 2021   : 1950  PCP: Samira Yi MD    MRN: [id number] LOS: 4 days   AGE/SEX: 70 y.o. female  ROOM: [facility]     Subjective   Subjective   Patient seen at bedside.       Objective   Objective   Vital Signs  Temp:  [97.4 °F (36.3 °C)-99.1 °F (37.3 °C)] 98.2 °F (36.8 °C)  Heart Rate:  [] 95  Resp:  [16-18] 18  BP: ()/() 144/84  SpO2:  [90 %-98 %] 97 %  on   ;   Device (Oxygen Therapy): room air  Body mass index is 29.67 kg/m².  Physical Exam   General, appears ill  Head and ENT examination normocephalic, atraumatic.  Lungs, symmetric expansion, no acute distress.  Heart, regular rate and rhythm.  Abdomen, soft and nontender.  Extremities, no clubbing, or cyanosis.  Neuro, unable to fully evaluate with current mental status.  Psych, unable to fully evaluate.  Musculoskeletal, joint examination grossly normal.       Results Review     I reviewed the patient's new clinical results.  Results from last 7 days   Lab Units 21  0540 21  0410 21  0510 21  0304   WBC 10*3/mm3 11.87* 10.89* 11.98* 15.32*   HEMOGLOBIN g/dL 11.3* 10.5* 10.8* 14.5   PLATELETS 10*3/mm3 253 227 232 284     Results from last 7 days   Lab Units 21  0540 21  0410 21  0510 21  0304   SODIUM mmol/L 141 138 143 139   POTASSIUM mmol/L 3.5 3.5 3.9 3.6   CHLORIDE mmol/L 108* 107 111* 106   CO2 mmol/L 22.8 22.7 22.6 21.7*   BUN mg/dL 10 7* 9 13   CREATININE mg/dL 0.49* 0.46* 0.43* 0.70   GLUCOSE mg/dL 102* 93 88 90   Estimated Creatinine Clearance: 55.1 mL/min (A) (by C-G formula based on SCr of 0.49 mg/dL (L)).  Results from last 7 days   Lab Units 21  2150   ALBUMIN g/dL 4.30   BILIRUBIN mg/dL 0.6   ALK PHOS U/L 150*   AST (SGOT) U/L 31   ALT (SGPT) U/L 21     Results from last 7 days   Lab Units 21  0540 21  0410 21  0510 21  0304 21  2150   CALCIUM mg/dL 9.3 8.8 8.4* 9.5 9.8   ALBUMIN g/dL  --   --   --    --  4.30     Results from last 7 days   Lab Units 04/18/21  0304 04/17/21  2150   PROCALCITONIN ng/mL  --  0.05   LACTATE mmol/L 0.8  --      COVID19   Date Value Ref Range Status   04/17/2021 Not Detected Not Detected - Ref. Range Final     Glucose   Date/Time Value Ref Range Status   04/20/2021 2033 161 (H) 70 - 130 mg/dL Final   04/20/2021 1130 215 (H) 70 - 130 mg/dL Final   04/20/2021 0020 91 70 - 130 mg/dL Final   04/19/2021 1751 136 (H) 70 - 130 mg/dL Final   04/19/2021 1158 87 70 - 130 mg/dL Final   04/18/2021 2317 90 70 - 130 mg/dL Final       Duplex Carotid Ultrasound CAR  · Proximal right internal carotid artery mild stenosis.  · Proximal left internal carotid artery moderate stenosis.  · Bidirectional left vertebral artery flow.  · 32 mm systolic brachial blood pressure gradient, lower on the left.       Scheduled Medications  aspirin, 325 mg, Oral, Daily   Or  aspirin, 300 mg, Rectal, Daily  atorvastatin, 80 mg, Oral, Nightly  clopidogrel, 75 mg, Oral, Daily  heparin (porcine), 5,000 Units, Subcutaneous, Q8H  sodium chloride, 10 mL, Intravenous, Q12H    Infusions   Diet  Diet Regular; Thin; Finger Foods       Assessment/Plan     Active Hospital Problems    Diagnosis  POA   • **Acute ischemic stroke (CMS/[facility]) [I63.9]  Yes   • Chronic diastolic (congestive) heart failure (CMS/[facility]) [I50.32]  Yes   • Mixed hyperlipidemia [E78.2]  Yes   • Essential hypertension [I10]  Yes   • Allergic rhinitis [J30.9]  Yes      Resolved Hospital Problems   No resolved problems to display.       70 y.o. female admitted with Acute ischemic stroke (CMS/HCC).    Assessment and plan  1.  Acute CVA, with left proximal cervical ICA occlusion, left MCA thrombus and left ICA terminus thrombus.  Patient underwent seen by neurosurgery and revascularization of left proximal cervical carotid artery occlusion, terminus bronchus of left ICA and left M2 thrombus was achieved.  Continue dual antiplatelet therapy.  MRI of the brain showed  Areas of acute infarction involving the left frontal and frontoparietal regions consistent with a left MCA distribution infarcts.     2.  Hypotension, BP currently stable.      3.  Anemia, hemoglobin noted to be stable.      4.  Chronic diastolic CHF, she appears euvolemic at this point of time.  Continue to reassess for volume status changes.     5.  Aphasia related to stroke, neuro deficit, continue to monitor.     6.  CODE STATUS is full code.  Further plans based on hospital course.        Moreno Durham MD  [facility]  04/21/21  16:27 EDT

## 2021-04-21 NOTE — PLAN OF CARE
Goal Outcome Evaluation:  Plan of Care Reviewed With: patient     Outcome Summary: SLP completed speech therapy with patient this AM, please see note for detail. Will continue to follow patient for therapy, recommend inpatient rehabiliation for next level of care.

## 2021-04-21 NOTE — PLAN OF CARE
Goal Outcome Evaluation:  Plan of Care Reviewed With: patient  Progress: improving  Outcome Summary: patient awake and alert, continues to be aphasic and has difficulty following commands consistently although improving. Patient ambulated in room, 10 feet without rwx with assist x2, R knee buckling and narrow KATHY, then ambulated 25 feet with rwx and minAx2, assist to guide rwx but improved stability with use of assistive device. Patient will need further rehab services at d/c in order to maximize functional mobility.  Patient was intermittently wearing a face mask during this therapy encounter. Therapist used appropriate personal protective equipment including eye protection, mask, and gloves.  Mask used was standard procedure mask. Appropriate PPE was worn during the entire therapy session. Hand hygiene was completed before and after therapy session. Patient is not in enhanced droplet precautions.

## 2021-04-21 NOTE — PROGRESS NOTES
Continued Stay Note  [facility]     Patient Name: Jazmyn Castaneda  MRN: [id number]  Today's Date: 4/21/2021    Admit Date: 4/17/2021    Discharge Plan     Row Name 04/21/21 1129       Plan    Plan  [facility] acute rehab eval pending    Patient/Family in Agreement with Plan  yes    Plan Comments  [facility] acute rehab eval pending per PT/OT/ST recommendations. CCP to follow for eval. Silva Siegel LCSW        Discharge Codes    No documentation.             Agata Siegel LCSW

## 2021-04-21 NOTE — PROGRESS NOTES
Neurology Note    Patient:  Jazmyn Castaneda    YOB: 1950    REFERRING PHYSICIAN:  Neil Flores    CHIEF COMPLAINT:    stroke    HISTORY OF PRESENT ILLNESS:   The patient is a 70 y.o. female     Past Medical History:  Past Medical History:   Diagnosis Date   • Acute ischemic stroke (CMS/HCC) 2021   • Age-related osteoporosis without current pathological fracture 2021   • Anesthesia     WOKE UP DURING CATARACT SURGERY   • Chronic diastolic (congestive) heart failure (CMS/HCC) 2021   • Collapse of lung     history of in past post trauma   • Diverticulosis    • Environmental allergies     Some pollens, grass, trees, flowers   • Essential hypertension 3/24/2017   • Exogenous obesity 3/24/2017   • H/O Pneumonia    • Infectious mononucleosis    • Kidney infection    • Kidney stone    • Mixed hyperlipidemia 2019   • Neuromuscular disorder (CMS/HCC)    • Sepsis secondary to UTI (CMS/HCC) 2019   • Tobacco abuse 2019   • Urinary tract infection    • Vertigo     past history of several times       Past Surgical History:  Past Surgical History:   Procedure Laterality Date   • BREAST EXCISIONAL BIOPSY Right     30 something when it was done; says it was precancerous   • CATARACT EXTRACTION, BILATERAL     •  SECTION     • CYSTOSCOPY N/A 2019    Procedure: CYSTOSCOPY AND STENT PLACEMENT;  Surgeon: Alen Lal MD;  Location: [facility];  Service: Urology   • EMBOLECTOMY N/A 2021    Procedure: EMBOLECTOMY MECHANICAL;  Surgeon: Justo Cheng MD;  Location: [facility] ;  Service: Neurosurgery;  Laterality: N/A;   • MASTOID SURGERY     • NASAL SEPTAL RECONSTRUCTION     • SINUS SURGERY      scraped and prior deviated septum   • URETEROSCOPY LASER LITHOTRIPSY WITH STENT INSERTION Right 2019    Procedure: CYSTOSCOPY, RIGHT URETEROSCOPY, LASER LITHOTRIPSY, STONE BASKET EXTRACTION, STENT PLACEMENT WITHOUT STRINGS;  Surgeon:  "Alfredo Gongora Jr., MD;  Location: [facility] OR;  Service: Urology       Social History:   Social History     Socioeconomic History   • Marital status:      Spouse name: Not on file   • Number of children: 2   • Years of education: College   • Highest education level: Not on file   Tobacco Use   • Smoking status: Former Smoker     Packs/day: 1.00     Types: Cigarettes     Quit date: 2020     Years since quittin.8   • Smokeless tobacco: Never Used   Substance and Sexual Activity   • Alcohol use: Yes     Comment: \"on occasion\"   • Drug use: No        Family History:   Family History   Problem Relation Age of Onset   • Hypertension Mother    • Aneurysm Mother    • Heart failure Mother    • Heart disease Mother    • Heart disease Father    • Hypertension Father    • Kidney disease Father    • Cancer Father 82        Bladder   • Hypertension Sister    • Cancer Maternal Aunt    • Stroke Maternal Uncle    • Glaucoma Maternal Grandmother    • Cancer Maternal Grandmother    • Stroke Maternal Grandfather    • Aneurysm Maternal Grandfather    • Cancer Maternal Aunt    • Cancer Maternal Aunt    • Liver cancer Other    • Pancreatic cancer Other    • Malig Hyperthermia Neg Hx        Medications Prior to Admission:    Prior to Admission medications    Medication Sig Start Date End Date Taking? Authorizing Provider   calcium carbonate-cholecalciferol (Calcium 500+D) 500-400 MG-UNIT tablet tablet Take 1 tablet by mouth Daily.    Provider, MD Alessandra   cetirizine (zyrTEC) 5 MG tablet Take 5 mg by mouth Daily.    Provider, MD Alessandra   fluticasone (FLONASE) 50 MCG/ACT nasal spray 2 sprays into the nostril(s) as directed by provider Daily.    Provider, MD Alessandra   lisinopril (PRINIVIL,ZESTRIL) 2.5 MG tablet Take 1 tablet by mouth Daily. 21   Samira Yi MD   montelukast (SINGULAIR) 10 MG tablet TAKE 1 TABLET EVERY NIGHT 10/16/20   Sudhakar Gracia MD       Allergies:  " Cephalosporins, Penicillins, Ciprofloxacin, and Sulfa antibiotics      Review of system  Review of Systems   Unable to perform ROS: Patient nonverbal       Vitals:    21 1118   BP: 96/63   Pulse: 83   Resp:    Temp:    SpO2: 98%       Physical exam  Physical Exam  HENT:      Head: Normocephalic and atraumatic.   Eyes:      Extraocular Movements: Extraocular movements intact.      Pupils: Pupils are equal, round, and reactive to light.   Cardiovascular:      Rate and Rhythm: Normal rate and regular rhythm.   Pulmonary:      Effort: Pulmonary effort is normal.   Neurological:      Mental Status: She is alert.      Comments: Gaze midline, decreased tracking to the right, speech a bit more fluent, not able to tell ,e her name but following some verbal commands, raised both arms and moved feet, no definite facial droop.           Lab Results   Component Value Date    WBC 11.87 (H) 2021    HGB 11.3 (L) 2021    HCT 33.8 (L) 2021    MCV 92.6 2021     2021     Lab Results   Component Value Date    GLUCOSE 102 (H) 2021    BUN 10 2021    CREATININE 0.49 (L) 2021    EGFRIFNONA 124 2021    EGFRIFAFRI 100 2021    BCR 20.4 2021    CO2 22.8 2021    CALCIUM 9.3 2021    PROTENTOTREF 6.9 2021    ALBUMIN 4.30 2021    LABIL2 1.5 2021    AST 31 2021    ALT 21 2021         Radiological Studies:  Duplex Carotid Ultrasound CAR  Order# [id number]  Reading physician: Sung Jorgensen MD Ordering physician: Holland Mallory MD Study date: 21   Patient Information    Patient Name   Jazmyn Castaneda MRN   [id number] Legal Sex   Female  (Age)   1950 (70 y.o.)   Clinical Indication    CVA (stroke); Carotid; left   Admission Information    Admission Date/Time Discharge Date/Time Room/Bed   21  09:06 PM  P599/1   Interpretation Summary    · Proximal right internal carotid artery mild  stenosis.  · Proximal left internal carotid artery moderate stenosis.  · Bidirectional left vertebral artery flow.  · 32 mm systolic brachial blood pressure gradient, lower on the left.      Patient Hx Of Height, Weight, and Vitals    Height Weight BSA (Calculated - sq m) BMI (kg/m2) Pulse BP       77 121/65   Study Findings    •     Right CCA Prox:  Plaque present.    •     Right CCA Dist:  Plaque present.   •     Right Carotid Bulb:  Plaque present.   •     Right ICA Prox:  Plaque present.   •     Right ECA:  Plaque present.   •     Right Vertebral:  Antegrade flow noted.       •     Left CCA Prox:  Plaque present.   •     Left CCA Dist:  Plaque present.   •     Left Carotid Bulb:  Plaque present.   •     Left ICA Prox:  Plaque present.   •     Left ECA:  Plaque present.   •     Left Vertebral:  Retrograde flow noted.   Study Impression    •    Right ICA Prox:  Imaging indicates 16%-49% stenosis.    •    Right ECA:  Imaging indicates atherosclerotic plaque.   •    Right Vertebral:  Antegrade flow is present.     •    Left ICA Prox:  Imaging indicates 60-69% stenosis.   •    Left ECA:  Imaging indicates stenosis and atherosclerotic plaque.   •    Left Vertebral:  Bidirectional flow is present.             During this visit the following were done:  Labs Reviewed [x]    Labs Ordered [x]    Radiology Reports Reviewed [x]    Radiology Ordered []    EKG, echo, and/or stress test reviewed [x]    EEG results reviewed  []    EEG reviewed and interpreted per myself   []    Discussed case with neurointerventionalist or neuroradiologist []    Referring Provider Records Reviewed []    ER Records Reviewed []    Hospital Records Reviewed []    History Obtained From Family []    Radiological images view and Interpreted per myself [x]    Case Discussed with referring provider []     Decision to obtain and request outside records  []        Assessment and Plan     Acute scattered LMCA stroke 22 LICA and MCA occlusion, s/p cervical  ICA angioplasty, ICA and MCA MT. Residual LICA stenosis. Neurologically stable and improved.   - Neurologically stable for telemetry.   - Continue DAPT, statin long term.   - P2Y12.   - ST, PT, OT. Will need inpatient rehab.   - Carotid duplex in 6 weeks and F/U with Dr. Cheng.              Electronically signed by Holland Mallory MD on 4/21/2021 at 13:22 EDT

## 2021-04-22 ENCOUNTER — APPOINTMENT (OUTPATIENT)
Dept: GENERAL RADIOLOGY | Facility: HOSPITAL | Age: 71
End: 2021-04-22

## 2021-04-22 DIAGNOSIS — I63.132 CEREBRAL INFARCTION DUE TO EMBOLISM OF LEFT CAROTID ARTERY (HCC): ICD-10-CM

## 2021-04-22 DIAGNOSIS — I63.239 CAROTID ARTERY STENOSIS WITH CEREBRAL INFARCTION (HCC): ICD-10-CM

## 2021-04-22 DIAGNOSIS — I63.9 ACUTE ISCHEMIC STROKE (HCC): Primary | ICD-10-CM

## 2021-04-22 LAB
ANION GAP SERPL CALCULATED.3IONS-SCNC: 13.3 MMOL/L (ref 5–15)
BASOPHILS # BLD AUTO: 0.07 10*3/MM3 (ref 0–0.2)
BASOPHILS NFR BLD AUTO: 0.5 % (ref 0–1.5)
BUN SERPL-MCNC: 17 MG/DL (ref 8–23)
BUN/CREAT SERPL: 29.8 (ref 7–25)
CALCIUM SPEC-SCNC: 9.6 MG/DL (ref 8.6–10.5)
CHLORIDE SERPL-SCNC: 110 MMOL/L (ref 98–107)
CO2 SERPL-SCNC: 17.7 MMOL/L (ref 22–29)
CREAT SERPL-MCNC: 0.57 MG/DL (ref 0.57–1)
DEPRECATED RDW RBC AUTO: 43.7 FL (ref 37–54)
EOSINOPHIL # BLD AUTO: 0.37 10*3/MM3 (ref 0–0.4)
EOSINOPHIL NFR BLD AUTO: 2.7 % (ref 0.3–6.2)
ERYTHROCYTE [DISTWIDTH] IN BLOOD BY AUTOMATED COUNT: 12.8 % (ref 12.3–15.4)
GFR SERPL CREATININE-BSD FRML MDRD: 104 ML/MIN/1.73
GLUCOSE BLDC GLUCOMTR-MCNC: 111 MG/DL (ref 70–130)
GLUCOSE SERPL-MCNC: 86 MG/DL (ref 65–99)
HCT VFR BLD AUTO: 33.7 % (ref 34–46.6)
HGB BLD-MCNC: 11.3 G/DL (ref 12–15.9)
IMM GRANULOCYTES # BLD AUTO: 0.09 10*3/MM3 (ref 0–0.05)
IMM GRANULOCYTES NFR BLD AUTO: 0.7 % (ref 0–0.5)
LYMPHOCYTES # BLD AUTO: 2.8 10*3/MM3 (ref 0.7–3.1)
LYMPHOCYTES NFR BLD AUTO: 20.6 % (ref 19.6–45.3)
MCH RBC QN AUTO: 31.4 PG (ref 26.6–33)
MCHC RBC AUTO-ENTMCNC: 33.5 G/DL (ref 31.5–35.7)
MCV RBC AUTO: 93.6 FL (ref 79–97)
MONOCYTES # BLD AUTO: 0.91 10*3/MM3 (ref 0.1–0.9)
MONOCYTES NFR BLD AUTO: 6.7 % (ref 5–12)
NEUTROPHILS NFR BLD AUTO: 68.8 % (ref 42.7–76)
NEUTROPHILS NFR BLD AUTO: 9.33 10*3/MM3 (ref 1.7–7)
NRBC BLD AUTO-RTO: 0 /100 WBC (ref 0–0.2)
PLATELET # BLD AUTO: 247 10*3/MM3 (ref 140–450)
PMV BLD AUTO: 12.6 FL (ref 6–12)
POTASSIUM SERPL-SCNC: 4 MMOL/L (ref 3.5–5.2)
RBC # BLD AUTO: 3.6 10*6/MM3 (ref 3.77–5.28)
SODIUM SERPL-SCNC: 141 MMOL/L (ref 136–145)
WBC # BLD AUTO: 13.57 10*3/MM3 (ref 3.4–10.8)

## 2021-04-22 PROCEDURE — 80048 BASIC METABOLIC PNL TOTAL CA: CPT | Performed by: INTERNAL MEDICINE

## 2021-04-22 PROCEDURE — 82962 GLUCOSE BLOOD TEST: CPT

## 2021-04-22 PROCEDURE — 97110 THERAPEUTIC EXERCISES: CPT

## 2021-04-22 PROCEDURE — 85025 COMPLETE CBC W/AUTO DIFF WBC: CPT | Performed by: INTERNAL MEDICINE

## 2021-04-22 PROCEDURE — 74018 RADEX ABDOMEN 1 VIEW: CPT

## 2021-04-22 PROCEDURE — 99233 SBSQ HOSP IP/OBS HIGH 50: CPT | Performed by: NURSE PRACTITIONER

## 2021-04-22 PROCEDURE — 25010000002 HEPARIN (PORCINE) PER 1000 UNITS: Performed by: NEUROLOGICAL SURGERY

## 2021-04-22 PROCEDURE — 97535 SELF CARE MNGMENT TRAINING: CPT

## 2021-04-22 RX ADMIN — HEPARIN SODIUM 5000 UNITS: 5000 INJECTION INTRAVENOUS; SUBCUTANEOUS at 22:09

## 2021-04-22 RX ADMIN — ASPIRIN 325 MG: 325 TABLET ORAL at 09:47

## 2021-04-22 RX ADMIN — HEPARIN SODIUM 5000 UNITS: 5000 INJECTION INTRAVENOUS; SUBCUTANEOUS at 16:06

## 2021-04-22 RX ADMIN — HEPARIN SODIUM 5000 UNITS: 5000 INJECTION INTRAVENOUS; SUBCUTANEOUS at 07:34

## 2021-04-22 RX ADMIN — SODIUM CHLORIDE, PRESERVATIVE FREE 10 ML: 5 INJECTION INTRAVENOUS at 09:47

## 2021-04-22 RX ADMIN — ATORVASTATIN CALCIUM 80 MG: 80 TABLET, FILM COATED ORAL at 22:09

## 2021-04-22 RX ADMIN — CLOPIDOGREL 75 MG: 75 TABLET, FILM COATED ORAL at 09:47

## 2021-04-22 NOTE — PROGRESS NOTES
Continued Stay Note  [facility]     Patient Name: Jazmyn Castaneda  MRN: [id number]  Today's Date: 4/22/2021    Admit Date: 4/17/2021    Discharge Plan     Row Name 04/22/21 1109       Plan    Plan  [facility] acute rehab eval pending    Patient/Family in Agreement with Plan  yes    Plan Comments  CCP followed up with pt's daughter (Laine Garza, [phone]) at bedside to discuss post hospital planning. Daughter prefers pt to d/c to [facility] acute rehab (eval pending), but agrees to review SNF options from CMS list provided and discuss with her brother/pt's son. CCP to follow for BAR eval and SNF selections to make referrals. Silva Siegel LCSW        Discharge Codes    No documentation.             Agata Siegel LCSW

## 2021-04-22 NOTE — PLAN OF CARE
Goal Outcome Evaluation:  Plan of Care Reviewed With: patient  Progress: improving  Outcome Summary: Pt showing improvements today with less assist needed for ADL. Pt able to mobilize to bathroom CGA with rwx but needs cues for safety and difficulty with maintaing  onto rwx with RUE. Min A for toileting and LBD, assist to pull up over R hip. CGA standing at sink for grooming, need sequencing cues, cues for visually scanning to R side of sink to locate grooming items, increased use of RUE but continued to have motor planning deficits. Has X1 epsiode of R knee buckling/LOB in bathroom and requires Min aX2 to safely correct balance, CGAX2 for safety back to chair/bed.    Appropriate PPE worn during encounter including gloves, mask, and eye protection. Hand hygiene completed. Pt wore a mask.

## 2021-04-22 NOTE — PLAN OF CARE
Goal Outcome Evaluation:  Plan of Care Reviewed With: patient  Progress: improving  Outcome Summary: Pt tolerated treatment with no complaints. Pt can be impulsive and requires assist of 2 for safety. Pt at times does have difficulty following commands/cues due to impulsivity. Pt is CGA with supine to sit and CGAX2 with sit<->stand transfers. Pt ambulated 20ft with rwx, MinAX2. Pt is very unsteady with gait. Will continue to progress pt as tolerated.  ..Patient was wearing a face mask during this therapy encounter. Therapist used appropriate personal protective equipment including eye protection, mask, and gloves.  Mask used was standard procedure mask. Appropriate PPE was worn during the entire therapy session. Hand hygiene was completed before and after therapy session. Patient is not in enhanced droplet precautions.

## 2021-04-22 NOTE — PLAN OF CARE
"Goal Outcome Evaluation:        Outcome Summary: Expressive and receptive aphasia noted. Slight right facial droop. Still slow to track to the right and sometimes still doesn't answer \"yes\" or \"no\" questions appropriately. Patient gets frustrated with the aphasia but speech seems somewhat improved.  VSS overnight.  "

## 2021-04-22 NOTE — PROGRESS NOTES
Continued Stay Note  [facility]     Patient Name: Jazmyn Castaneda  MRN: [id number]  Today's Date: 4/22/2021    Admit Date: 4/17/2021    Discharge Plan     Row Name 04/22/21 1503       Plan    Plan  SNF referrals pending    Patient/Family in Agreement with Plan  yes    Plan Comments  [facility] acute rehab declines pt. CCP followed up with pt's son (Nando Castaneda, [phone]) per request to further discuss SNF options. Son agrees to referrals to highly rated CMS options near [facility] where both he and his sister reside. Referrals pending to [facility], [facility] and [facility]. CCP to follow for facility evals. Silva Siegel LCSW        Discharge Codes    No documentation.             Agata Siegel LCSW

## 2021-04-22 NOTE — THERAPY TREATMENT NOTE
Patient Name: Jazmyn Castaneda  : 1950    MRN: [id number]                              Today's Date: 2021       Admit Date: 2021    Visit Dx:     ICD-10-CM ICD-9-CM   1. Acute ischemic stroke (CMS/HCC)  I63.9 434.91   2. Expressive aphasia  R47.01 784.3     Patient Active Problem List   Diagnosis   • Nephrolithiasis   • Allergic rhinitis   • Diverticulosis of large intestine without hemorrhage   • Hiatal hernia   • Medicare annual wellness visit, subsequent   • Exogenous obesity   • Encounter for screening colonoscopy   • Visit for screening mammogram   • Breast mass, left   • Essential hypertension   • Erythrocytosis   • Vertigo   • Neutrophilic leukocytosis   • Mixed hyperlipidemia   • Right hydronephrosis with urinary obstruction due to ureteral calculus   • Allergy to multiple antibiotics   • Tobacco abuse   • Ureteral stone   • Colon cancer screening   • Age-related osteoporosis without current pathological fracture   • Chronic diastolic (congestive) heart failure (CMS/HCC)   • Acute ischemic stroke (CMS/HCC)     Past Medical History:   Diagnosis Date   • Acute ischemic stroke (CMS/HCC) 2021   • Age-related osteoporosis without current pathological fracture 2021   • Anesthesia     WOKE UP DURING CATARACT SURGERY   • Chronic diastolic (congestive) heart failure (CMS/HCC) 2021   • Collapse of lung 1980s    history of in past post trauma   • Diverticulosis    • Environmental allergies     Some pollens, grass, trees, flowers   • Essential hypertension 3/24/2017   • Exogenous obesity 3/24/2017   • H/O Pneumonia    • Infectious mononucleosis    • Kidney infection    • Kidney stone    • Mixed hyperlipidemia 2019   • Neuromuscular disorder (CMS/[facility])    • Sepsis secondary to UTI (CMS/HCC) 2019   • Tobacco abuse 2019   • Urinary tract infection    • Vertigo     past history of several times     Past Surgical History:   Procedure Laterality Date   • BREAST EXCISIONAL  BIOPSY Right     30 something when it was done; says it was precancerous   • CATARACT EXTRACTION, BILATERAL     •  SECTION     • CYSTOSCOPY N/A 2019    Procedure: CYSTOSCOPY AND STENT PLACEMENT;  Surgeon: Alen Lal MD;  Location: [facility];  Service: Urology   • EMBOLECTOMY N/A 2021    Procedure: EMBOLECTOMY MECHANICAL;  Surgeon: Justo Cheng MD;  Location: [facility] ;  Service: Neurosurgery;  Laterality: N/A;   • MASTOID SURGERY     • NASAL SEPTAL RECONSTRUCTION     • SINUS SURGERY      scraped and prior deviated septum   • URETEROSCOPY LASER LITHOTRIPSY WITH STENT INSERTION Right 2019    Procedure: CYSTOSCOPY, RIGHT URETEROSCOPY, LASER LITHOTRIPSY, STONE BASKET EXTRACTION, STENT PLACEMENT WITHOUT STRINGS;  Surgeon: Alfredo Gongora Jr., MD;  Location: [facility];  Service: Urology     General Information     Row Name 21          Physical Therapy Time and Intention    Document Type  therapy note (daily note)  -     Mode of Treatment  individual therapy;physical therapy  -EB     Row Name 21          General Information    Existing Precautions/Restrictions  fall  -EB     Row Name 21          Cognition    Orientation Status (Cognition)  unable/difficult to assess pt with aphasia, follows cues and some commands  -EB     Row Name 21          Safety Issues, Functional Mobility    Safety Issues Affecting Function (Mobility)  ability to follow commands;awareness of need for assistance;impulsivity;insight into deficits/self-awareness  -     Impairments Affecting Function (Mobility)  balance;cognition;coordination;endurance/activity tolerance;strength;postural/trunk control  -       User Key  (r) = Recorded By, (t) = Taken By, (c) = Cosigned By    Initials Name Provider Type    EB Mary Wong PTA Physical Therapy Assistant        Mobility     Row Name 21          Bed Mobility    Supine-Sit  Stewart (Bed Mobility)  contact guard  -EB     Sit-Supine Stewart (Bed Mobility)  not tested  -EB     Assistive Device (Bed Mobility)  bed rails;head of bed elevated  -EB     Row Name 04/22/21 1646          Transfers    Comment (Transfers)  CGAX2 due to safety as pt is impulsive  -EB     Row Name 04/22/21 1646          Sit-Stand Transfer    Sit-Stand Stewart (Transfers)  contact guard;2 person assist  -EB     Assistive Device (Sit-Stand Transfers)  walker, front-wheeled  -EB     Row Name 04/22/21 1646          Gait/Stairs (Locomotion)    Stewart Level (Gait)  minimum assist (75% patient effort);2 person assist  -EB     Assistive Device (Gait)  walker, front-wheeled  -EB     Distance in Feet (Gait)  20  -EB     Deviations/Abnormal Patterns (Gait)  base of support, narrow;stride length decreased;shonda decreased  -EB     Bilateral Gait Deviations  forward flexed posture  -EB     Comment (Gait/Stairs)  unsteady gait and difficulty following commands/cues due to pt being impulsive  -EB       User Key  (r) = Recorded By, (t) = Taken By, (c) = Cosigned By    Initials Name Provider Type    Mary Morales PTA Physical Therapy Assistant        Obj/Interventions     Row Name 04/22/21 1647          Motor Skills    Therapeutic Exercise  -- BLE: LAQs, SLRs, HS (X10), lots of verbal and tactile cueing with HS  -EB       User Key  (r) = Recorded By, (t) = Taken By, (c) = Cosigned By    Initials Name Provider Type    Mary Morales PTA Physical Therapy Assistant        Goals/Plan    No documentation.       Clinical Impression     Row Name 04/22/21 1647          Plan of Care Review    Plan of Care Reviewed With  patient  -EB     Progress  improving  -EB     Outcome Summary  Pt tolerated treatment with no complaints. Pt can be impulsive and requires assist of 2 for safety. Pt at times does have difficulty following commands/cues due to impulsivity. Pt is CGA with supine to sit and CGAX2 with sit<->stand  transfers. Pt ambulated 20ft with rwx, MinAX2. Pt is very unsteady with gait. Will continue to progress pt as tolerated.  -EB     Row Name 04/22/21 1647          Positioning and Restraints    Pre-Treatment Position  in bed  -EB     Post Treatment Position  chair  -EB     In Chair  reclined;call light within reach;encouraged to call for assist;exit alarm on  -EB       User Key  (r) = Recorded By, (t) = Taken By, (c) = Cosigned By    Initials Name Provider Type    Mary Morales PTA Physical Therapy Assistant        Outcome Measures     Row Name 04/22/21 1650          How much help from another person do you currently need...    Turning from your back to your side while in flat bed without using bedrails?  3  -EB     Moving from lying on back to sitting on the side of a flat bed without bedrails?  3  -EB     Moving to and from a bed to a chair (including a wheelchair)?  3  -EB     Standing up from a chair using your arms (e.g., wheelchair, bedside chair)?  3  -EB     Climbing 3-5 steps with a railing?  1  -EB     To walk in hospital room?  2  -EB     AM-PAC 6 Clicks Score (PT)  15  -EB     Row Name 04/22/21 1650          Modified Leidy Scale    Modified Leidy Scale  4 - Moderately severe disability.  Unable to walk without assistance, and unable to attend to own bodily needs without assistance.  -EB       User Key  (r) = Recorded By, (t) = Taken By, (c) = Cosigned By    Initials Name Provider Type    Mary Morales PTA Physical Therapy Assistant        Physical Therapy Education                 Title: PT OT SLP Therapies (In Progress)     Topic: Physical Therapy (In Progress)     Point: Mobility training (In Progress)     Learning Progress Summary           Patient Acceptance, E,D, NR by EB at 4/22/2021 1650    Acceptance, E, NR by EM at 4/21/2021 1600    Acceptance, E, NR by ME at 4/20/2021 1325    Acceptance, E, NR by EM at 4/19/2021 1424                   Point: Home exercise program (In Progress)      Learning Progress Summary           Patient Acceptance, E,D, NR by EB at 4/22/2021 1650                   Point: Body mechanics (In Progress)     Learning Progress Summary           Patient Acceptance, E,D, NR by EB at 4/22/2021 1650    Acceptance, E, NR by ME at 4/20/2021 1325                   Point: Precautions (In Progress)     Learning Progress Summary           Patient Acceptance, E,D, NR by EB at 4/22/2021 1650                               User Key     Initials Effective Dates Name Provider Type Discipline    EM 04/03/18 -  Kasandra Benitez, PT Physical Therapist PT     03/10/21 -  Mary Wong PTA Physical Therapy Assistant PT    ME 03/12/21 -  Jd Castro PT Student PT Student PT              PT Recommendation and Plan     Plan of Care Reviewed With: patient  Progress: improving  Outcome Summary: Pt tolerated treatment with no complaints. Pt can be impulsive and requires assist of 2 for safety. Pt at times does have difficulty following commands/cues due to impulsivity. Pt is CGA with supine to sit and CGAX2 with sit<->stand transfers. Pt ambulated 20ft with rwx, MinAX2. Pt is very unsteady with gait. Will continue to progress pt as tolerated.     Time Calculation:   PT Charges     Row Name 04/22/21 1644             Time Calculation    Start Time  1413  -EB      Stop Time  1428  -EB      Time Calculation (min)  15 min  -EB      PT Received On  04/22/21  -EB      PT - Next Appointment  04/23/21  -EB         Time Calculation- PT    Total Timed Code Minutes- PT  15 minute(s)  -EB         Timed Charges    36925 - PT Therapeutic Exercise Minutes  8  -EB      73896 - Gait Training Minutes   7  -EB         Total Minutes    Timed Charges Total Minutes  15  -EB       Total Minutes  15  -EB        User Key  (r) = Recorded By, (t) = Taken By, (c) = Cosigned By    Initials Name Provider Type    EB Mary Wong PTA Physical Therapy Assistant        Therapy Charges for Today     Code Description  Service Date Service Provider Modifiers Qty    [id number] HC PT THER PROC EA 15 MIN 4/22/2021 Mary Wong, PTA GP 1          PT G-Codes  Outcome Measure Options: AM-PAC 6 Clicks Daily Activity (OT)  AM-PAC 6 Clicks Score (PT): 15  AM-PAC 6 Clicks Score (OT): 18  Modified Leidy Scale: 4 - Moderately severe disability.  Unable to walk without assistance, and unable to attend to own bodily needs without assistance.    Mary Wong PTA  4/22/2021

## 2021-04-22 NOTE — PROGRESS NOTES
Call placed to dgt Laine Rodriguezox. She and brother are still discussing options as they are not able to provide 24/7 asst as anticipated after dc. They are looking at options (other family members) for asst. Discussed Acute vs Subacute. Also continuing to follow ability to participate in or tolerate 3 hours of therapy a day.   Dgt will call back to inform and we will continue to follow progress    Angie Marsh RN  Acute Rehab Admission Nurse      [id number]- Spoke with son who called to discuss dc plan. Both son and dgt unable to provide asst They feel that subacute for a longer stay would be better. CCP 5P called to inform of plan and to call son to discuss subacute rehab options

## 2021-04-22 NOTE — DISCHARGE PLACEMENT REQUEST
"Laura Castaneda T (70 y.o. Female)     Date of Birth Social Security Number Address Home Phone MRN    1950  [id number] [place] [address] [phone] [id number]    Gnosticism Marital Status          Taoist        Admission Date Admission Type Admitting Provider Attending Provider Department, Room/Bed    4/17/21 Emergency Moreno Durham MD Kapila, Abhishek, MD [address], P599/1    Discharge Date Discharge Disposition Discharge Destination                       Attending Provider: Moreno Durham MD    Allergies: Cephalosporins, Penicillins, Ciprofloxacin, Sulfa Antibiotics    Isolation: None   Infection: None   Code Status: CPR    Ht: 152.4 cm (60\")   Wt: 68.9 kg (151 lb 14.4 oz)    Admission Cmt: None   Principal Problem: Acute ischemic stroke (CMS/[facility]) [I63.9]                 Active Insurance as of 4/17/2021     Primary Coverage     Payor Plan Insurance Group Employer/Plan Group    MEDICARE MEDICARE A & B      Payor Plan Address Payor Plan Phone Number Payor Plan Fax Number Effective Dates    [address] [phone]  12/1/2014 - None Entered    [facility] [id number]       Subscriber Name Subscriber Birth Date Member ID       LAURA CASTANEDA 1950 [id number]           Secondary Coverage     Payor Plan Insurance Group Employer/Plan Group    [facility] SUPP [id number]     Payor Plan Address Payor Plan Phone Number Payor Plan Fax Number Effective Dates    [address]   12/1/2016 - None Entered    [facility] [id number]       Subscriber Name Subscriber Birth Date Member ID       LAURA CASTANEDA T 1950 [id number]                 Emergency Contacts      (Rel.) Home Phone Work Phone Mobile Phone    Laine Garza (Daughter) [phone] -- [phone]    TAVO CASTANEDA (Son) [phone] -- [phone]              "

## 2021-04-22 NOTE — THERAPY TREATMENT NOTE
Patient Name: Jazmyn Castaneda  : 1950    MRN: [id number]                              Today's Date: 2021       Admit Date: 2021    Visit Dx:     ICD-10-CM ICD-9-CM   1. Acute ischemic stroke (CMS/HCC)  I63.9 434.91   2. Expressive aphasia  R47.01 784.3     Patient Active Problem List   Diagnosis   • Nephrolithiasis   • Allergic rhinitis   • Diverticulosis of large intestine without hemorrhage   • Hiatal hernia   • Medicare annual wellness visit, subsequent   • Exogenous obesity   • Encounter for screening colonoscopy   • Visit for screening mammogram   • Breast mass, left   • Essential hypertension   • Erythrocytosis   • Vertigo   • Neutrophilic leukocytosis   • Mixed hyperlipidemia   • Right hydronephrosis with urinary obstruction due to ureteral calculus   • Allergy to multiple antibiotics   • Tobacco abuse   • Ureteral stone   • Colon cancer screening   • Age-related osteoporosis without current pathological fracture   • Chronic diastolic (congestive) heart failure (CMS/HCC)   • Acute ischemic stroke (CMS/HCC)     Past Medical History:   Diagnosis Date   • Acute ischemic stroke (CMS/HCC) 2021   • Age-related osteoporosis without current pathological fracture 2021   • Anesthesia     WOKE UP DURING CATARACT SURGERY   • Chronic diastolic (congestive) heart failure (CMS/HCC) 2021   • Collapse of lung 1980s    history of in past post trauma   • Diverticulosis    • Environmental allergies     Some pollens, grass, trees, flowers   • Essential hypertension 3/24/2017   • Exogenous obesity 3/24/2017   • H/O Pneumonia    • Infectious mononucleosis    • Kidney infection    • Kidney stone    • Mixed hyperlipidemia 2019   • Neuromuscular disorder (CMS/[facility])    • Sepsis secondary to UTI (CMS/HCC) 2019   • Tobacco abuse 2019   • Urinary tract infection    • Vertigo     past history of several times     Past Surgical History:   Procedure Laterality Date   • BREAST EXCISIONAL  BIOPSY Right     30 something when it was done; says it was precancerous   • CATARACT EXTRACTION, BILATERAL     •  SECTION     • CYSTOSCOPY N/A 2019    Procedure: CYSTOSCOPY AND STENT PLACEMENT;  Surgeon: Alen Lal MD;  Location: [facility];  Service: Urology   • EMBOLECTOMY N/A 2021    Procedure: EMBOLECTOMY MECHANICAL;  Surgeon: Justo Cheng MD;  Location: [facility] ;  Service: Neurosurgery;  Laterality: N/A;   • MASTOID SURGERY     • NASAL SEPTAL RECONSTRUCTION     • SINUS SURGERY      scraped and prior deviated septum   • URETEROSCOPY LASER LITHOTRIPSY WITH STENT INSERTION Right 2019    Procedure: CYSTOSCOPY, RIGHT URETEROSCOPY, LASER LITHOTRIPSY, STONE BASKET EXTRACTION, STENT PLACEMENT WITHOUT STRINGS;  Surgeon: Alfredo Gongora Jr., MD;  Location: [facility];  Service: Urology     General Information     Row Name 21 1538          OT Time and Intention    Document Type  therapy note (daily note)  -     Mode of Treatment  occupational therapy;individual therapy  -     Row Name 21 1538          General Information    Patient Profile Reviewed  yes  -SM     Existing Precautions/Restrictions  fall  -     Row Name 21 1538          Cognition    Orientation Status (Cognition)  unable/difficult to assess Pt with aphasia, follows demo cues/tactile cues  -     Row Name 21 1538          Safety Issues, Functional Mobility    Safety Issues Affecting Function (Mobility)  impulsivity;insight into deficits/self-awareness;ability to follow commands  -     Impairments Affecting Function (Mobility)  balance;coordination;endurance/activity tolerance;strength;motor planning;visual/perceptual;cognition  -SM       User Key  (r) = Recorded By, (t) = Taken By, (c) = Cosigned By    Initials Name Provider Type    SM Maria Fernanda Oneill OT Occupational Therapist          Mobility/ADL's     Row Name 21 1532          Bed Mobility     Sit-Supine Coal (Bed Mobility)  contact guard  -SM     Row Name 04/22/21 1539          Transfers    Transfers  sit-stand transfer;toilet transfer  -     Sit-Stand Coal (Transfers)  contact guard  -     Coal Level (Toilet Transfer)  contact guard  -     Assistive Device (Toilet Transfer)  walker, front-wheeled  -SM     Row Name 04/22/21 1539          Sit-Stand Transfer    Assistive Device (Sit-Stand Transfers)  walker, front-wheeled  -SM     Row Name 04/22/21 1539          Functional Mobility    Functional Mobility- Ind. Level  contact guard assist;1 person;2 person assist required  -     Functional Mobility- Device  rolling walker  -     Functional Mobility- Comment  to bathroom, use of rwx, pt having difficulty safely managing rwx and maintaining grasp with R hand. Pt does have X1 epsiode of R knee buckling in bathroom and tech assist needed for safely maintaining balance with Min AX2. CGAX2 to mobilize back from bathroom for safety.  -SM     Row Name 04/22/21 1539          Activities of Daily Living    BADL Assessment/Intervention  toileting  -SM     Row Name 04/22/21 1539          Lower Body Dressing Assessment/Training    Coal Level (Lower Body Dressing)  lower body dressing skills;don;minimum assist (75% patient effort) pull up brief.  -     Comment (Lower Body Dressing)  assist to pull up over R hip.  -SM     Row Name 04/22/21 1539          Grooming Assessment/Training    Coal Level (Grooming)  grooming skills;oral care regimen;contact guard assist;wash face, hands;hair care, combing/brushing  -     Position (Grooming)  sink side  -     Comment (Grooming)  Difficulty with use of RUE, cues for scanning R side of sink for grooming items, cues for sequencing steps.  -SM     Row Name 04/22/21 1539          Toileting Assessment/Training    Coal Level (Toileting)  toileting skills;adjust/manage clothing;perform perineal hygiene;minimum assist (75%  "patient effort)  -     Assistive Devices (Toileting)  grab bar/safety frame  -       User Key  (r) = Recorded By, (t) = Taken By, (c) = Cosigned By    Initials Name Provider Type    Maria Fernanda Harris OT Occupational Therapist        Obj/Interventions     Row Name 04/22/21 1544          Vision Assessment/Intervention    Visual Processing Deficit  celeste-inattention/neglect, right;visual attention, right  -     Row Name 04/22/21 1544          Motor Skills    Motor Control/Coordination Interventions  fine motor manipulation/dexterity activities  -     Gross Motor Skill, Impairments Detail  Pt used R hand to make scribbles on on paper today but nothing functional noted in writting or shapes.  -     Row Name 04/22/21 1544          Balance    Static Standing Balance  mild impairment  -     Dynamic Standing Balance  moderate impairment;mild impairment  -     Balance Interventions  standing;occupation based/functional task  -     Comment, Balance  CGA for safety with standing ADL, UE support on counter top during standing ADL.  -       User Key  (r) = Recorded By, (t) = Taken By, (c) = Cosigned By    Initials Name Provider Type    Maria Fernanda Harris OT Occupational Therapist        Goals/Plan    No documentation.       Clinical Impression     [facility] Name 04/22/21 1545          Pain Scale: Numbers Pre/Post-Treatment    Pre/Posttreatment Pain Comment  When returning from bathroom, pt holds abdomin and grimices, reports \"yes\" to pain. RN notified and left with nsg.  -     Row Name 04/22/21 1540          Pain Scale: FACES Pre/Post-Treatment    Pain: FACES Scale, Pretreatment  0-->no hurt  -     Posttreatment Pain Rating  4-->hurts little more  -     Pain Location  abdomen  -     Row Name 04/22/21 1547          Plan of Care Review    Plan of Care Reviewed With  patient  -     Progress  improving  -     Outcome Summary  Pt showing improvements today with less assist needed for ADL. Pt able to " mobilize to bathroom CGA with rwx but needs cues for safety and difficulty with maintaing  onto rwx with RUE. Min A for toileting and LBD, assist to pull up over R hip. CGA standing at sink for grooming, need sequencing cues, cues for visually scanning to R side of sink to locate grooming items, increased use of RUE but continued to have motor planning deficits. Has X1 epsiode of R knee buckling/LOB in bathroom and requires Min aX2 to safely correct balance, CGAX2 for safety back to chair/bed.  -     Row Name 04/22/21 1549          Therapy Assessment/Plan (OT)    Rehab Potential (OT)  good, to achieve stated therapy goals  -     Therapy Frequency (OT)  5 times/wk  -     Row Name 04/22/21 2992          Therapy Plan Review/Discharge Plan (OT)    Anticipated Discharge Disposition (OT)  inpatient rehabilitation facility  -     Row Name 04/22/21 4652          Positioning and Restraints    Pre-Treatment Position  in bed  -SM     Post Treatment Position  bed  -SM     In Bed  fowlers;with nsg;call light within reach;encouraged to call for assist;exit alarm on  -       User Key  (r) = Recorded By, (t) = Taken By, (c) = Cosigned By    Initials Name Provider Type     Maria Fernanda Oneill, OT Occupational Therapist        Outcome Measures     Row Name 04/22/21 2751          How much help from another is currently needed...    Putting on and taking off regular lower body clothing?  3  -SM     Bathing (including washing, rinsing, and drying)  3  -SM     Toileting (which includes using toilet bed pan or urinal)  3  -SM     Putting on and taking off regular upper body clothing  3  -SM     Taking care of personal grooming (such as brushing teeth)  3  -SM     Eating meals  3  -SM     AM-PAC 6 Clicks Score (OT)  18  -SM     Row Name 04/22/21 5068          Functional Assessment    Outcome Measure Options  AM-PAC 6 Clicks Daily Activity (OT)  -       User Key  (r) = Recorded By, (t) = Taken By, (c) = Cosigned By     Initials Name Provider Type    Maria Fernanda Harris OT Occupational Therapist        Occupational Therapy Education                 Title: PT OT SLP Therapies (In Progress)     Topic: Occupational Therapy (Not Started)     Point: ADL training (Not Started)     Description:   Instruct learner(s) on proper safety adaptation and remediation techniques during self care or transfers.   Instruct in proper use of assistive devices.              Learner Progress:  Not documented in this visit.          Point: Home exercise program (Not Started)     Description:   Instruct learner(s) on appropriate technique for monitoring, assisting and/or progressing therapeutic exercises/activities.              Learner Progress:  Not documented in this visit.          Point: Precautions (Not Started)     Description:   Instruct learner(s) on prescribed precautions during self-care and functional transfers.              Learner Progress:  Not documented in this visit.          Point: Body mechanics (Not Started)     Description:   Instruct learner(s) on proper positioning and spine alignment during self-care, functional mobility activities and/or exercises.              Learner Progress:  Not documented in this visit.                          OT Recommendation and Plan  Planned Therapy Interventions (OT): activity tolerance training, adaptive equipment training, BADL retraining, cognitive/visual perception retraining, functional balance retraining, IADL retraining, neuromuscular control/coordination retraining, occupation/activity based interventions, patient/caregiver education/training, ROM/therapeutic exercise, strengthening exercise, transfer/mobility retraining  Therapy Frequency (OT): 5 times/wk  Plan of Care Review  Plan of Care Reviewed With: patient  Progress: improving  Outcome Summary: Pt showing improvements today with less assist needed for ADL. Pt able to mobilize to bathroom CGA with rwx but needs cues for safety and  difficulty with maintaing  onto rwx with RUE. Min A for toileting and LBD, assist to pull up over R hip. CGA standing at sink for grooming, need sequencing cues, cues for visually scanning to R side of sink to locate grooming items, increased use of RUE but continued to have motor planning deficits. Has X1 epsiode of R knee buckling/LOB in bathroom and requires Min aX2 to safely correct balance, CGAX2 for safety back to chair/bed.     Time Calculation:   Time Calculation- OT     Row Name 04/22/21 1553             Time Calculation- OT    OT Start Time  1442  -SM      OT Stop Time  1506  -SM      OT Time Calculation (min)  24 min  -SM      Total Timed Code Minutes- OT  24 minute(s)  -SM      OT Received On  04/22/21  -      OT - Next Appointment  04/23/21  -         Timed Charges    68715 - OT Self Care/Mgmt Minutes  24  -SM         Total Minutes    Timed Charges Total Minutes  24  -SM       Total Minutes  24  -SM        User Key  (r) = Recorded By, (t) = Taken By, (c) = Cosigned By    Initials Name Provider Type     Maria Fernanda Oneill OT Occupational Therapist        Therapy Charges for Today     Code Description Service Date Service Provider Modifiers Qty    14592623883 HC OT SELF CARE/MGMT/TRAIN EA 15 MIN 4/21/2021 Maria Fernanda Oneill OT GO 1    26912505076 HC OT NEUROMUSC RE EDUCATION EA 15 MIN 4/21/2021 Maria Fernanda Oneill OT GO 1    62220581212 HC OT SELF CARE/MGMT/TRAIN EA 15 MIN 4/22/2021 Maria Fernanda Oneill OT GO 2               Maria Fernanda Oneill OT  4/22/2021

## 2021-04-22 NOTE — PROGRESS NOTES
"Adult Nutrition  Assessment/PES    Patient Name:  Jazmyn Castaneda  YOB: 1950  MRN: [id number]  Admit Date:  4/17/2021    Assessment Date:  4/22/2021    Comments:  Nutrition Follow-Up:  SLP eval completed 4/20 and started on a regular diet, finger foods (to facilitate self feeding per her preference) and thin liquids.  Spoke with RN who reports patient did well this AM at breakfast but only had a few bites of a cookie last night at dinner. Inconsistent PO intake at meals. Will order Boost Plus BID to increase nutrient intake at meals.  RD to continue to monitor.      Reason for Assessment     Row Name 04/22/21 1025          Reason for Assessment    Reason For Assessment  follow-up protocol         Nutrition/Diet History     Row Name 04/22/21 1025          Nutrition/Diet History    Typical Food/Fluid Intake  Tolerating her regular diet with thin liquids, finger foods at this time. Spoke with RN who reports patient ate well this AM at breakfast but only ate part of a cookie last night. Assisted with feedings. Sometimes still doesn't answer \"yes\" or \"no\" questions appropriately, noted.     Factors Affecting Nutritional Intake  impaired cognitive status/motor control         Anthropometrics     Row Name 04/22/21 1028          Anthropometrics    Height  152.4 cm (60\")        Admit Weight    Admit Weight  68.9 kg (151 lb 14.4 oz) 4/21        Ideal Body Weight (IBW)    Ideal Body Weight (IBW) (kg)  45.86        Body Mass Index (BMI)    BMI Assessment  BMI 25-29.9: overweight         Labs/Tests/Procedures/Meds     Row Name 04/22/21 1028          Labs/Procedures/Meds    Lab Results Reviewed  reviewed, pertinent     Lab Results Comments  Cl, Gluc, Cr, BUN: 7 (4/20), now 10 (4/21)        Diagnostic Tests/Procedures    Diagnostic Test/Procedure Reviewed  reviewed, pertinent     Diagnostic Test/Procedures Comments  SLP eval 4/20        Medications    Pertinent Medications Reviewed  reviewed, pertinent     " "Pertinent Medications Comments  Lipitor, Plavix, Heparin         Physical Findings     Row Name 04/22/21 1030          Physical Findings    Overall Physical Appearance  overweight     Oral/Mouth Cavity  poor dentition;dental caries     Skin  other (see comments) Right groin puncture,  Rash, B:17         Estimated/Assessed Needs     Row Name 04/22/21 1028          Calculation Measurements    Height  152.4 cm (60\")         Nutrition Prescription Ordered     Row Name 04/22/21 1030          Nutrition Prescription PO    Current PO Diet  Regular     Fluid Consistency  Thin     Other Modifiers  Finger Foods         Evaluation of Received Nutrient/Fluid Intake     Row Name 04/22/21 1031          PO Evaluation    % PO Intake  --               Problem/Interventions:  Problem 1     Row Name 04/22/21 1033          Nutrition Diagnoses Problem 1    Problem 1  Inadequate Nutrient Intake     Etiology (related to)  Medical Diagnosis     Neurological  CVA     Signs/Symptoms (evidenced by)  Report of Mnimal PO Intake               Intervention Goal     Row Name 04/22/21 1035          Intervention Goal    General  Maintain nutrition;Reduce/improve symptoms;Meet nutritional needs for age/condition     PO  Increase intake     Weight  No significant weight loss         Nutrition Intervention     Row Name 04/22/21 1035          Nutrition Intervention    RD/Tech Action  Follow Tx progress;Care plan reviewd     Recommended/Ordered  Supplement         Nutrition Prescription     Row Name 04/22/21 1035          Nutrition Prescription PO    PO Prescription  Begin/change supplement     Supplement  Boost     Supplement Frequency  2 times a day     New PO Prescription Ordered?  Yes         Education/Evaluation     Row Name 04/22/21 1036          Education    Education  Will Instruct as appropriate        Monitor/Evaluation    Monitor  Per protocol;PO intake;Supplement intake;Pertinent labs;Weight     Education Follow-up  Reinforce PRN     "       Electronically signed by:  Jo Herndon RD  04/22/21 10:36 EDT

## 2021-04-22 NOTE — PROGRESS NOTES
"DOS: 2021  NAME: Jazmyn Castaneda   : 1950  PCP: Samira Yi MD  Chief Complaint   Patient presents with   • Altered Mental Status     CC: Stroke    Subjective: No new events overnight per RN. Patient just out of shower, daughter at bedside who assisted her. Patient currently denies headache or any new stroke/TIA symptoms but she continues with aphasia, \"is worn out from her shower\" per daughter. Patient and problem are new to examiner. History is provided by daughter and review of records that are summarized below.     Interval History  History taken from: patient chart family RN    Objective:  Vital signs: /71 (BP Location: Right arm, Patient Position: Lying)   Pulse 85   Temp 96.9 °F (36.1 °C) (Oral)   Resp 18   Ht 152.4 cm (60\")   Wt 68.9 kg (151 lb 14.4 oz)   SpO2 97%   BMI 29.67 kg/m²       Physical Exam:  GENERAL: NAD  HEENT: Normocephalic, atraumatic   COR: RRR  Resp: Even and unlabored  Extremities: No signs of distal embolization. TEDs and SCDs in place.    Neurological:   MS: Eyes open to verbal. Attends to voice. Does not appropriately answer orientation questions secondary to expressive aphasia. Rare, brief episodes of fluency with 3-4 word sentences. Able to count. Right-side neglect. Follows some simple commands with mimicking. Difficult with complex instructions.   CN: II-XII grossly normal except for anisocoria, left pupil irregular shape  Motor: JAMES purposefully, does not fully participate in strength testing  Sensory: Intact to noxious in arms and legs  Station and Gait: Deferred for safety  Coordination: Not following complex commands.     Current Medications:  Scheduled Medications:aspirin, 325 mg, Oral, Daily   Or  aspirin, 300 mg, Rectal, Daily  atorvastatin, 80 mg, Oral, Nightly  clopidogrel, 75 mg, Oral, Daily  heparin (porcine), 5,000 Units, Subcutaneous, Q8H  sodium chloride, 10 mL, Intravenous, Q12H      Infusions:    PRN Medications:  •  [MAR Hold] " acetaminophen **OR** [MAR Hold] acetaminophen **OR** [MAR Hold] acetaminophen  •  HYDROcodone-acetaminophen  •  naloxone  •  oxyCODONE-acetaminophen  •  promethazine **OR** promethazine  •  [COMPLETED] Insert peripheral IV **AND** [MAR Hold] sodium chloride  •  sodium chloride    Medications Reviewed:     Laboratory results:  Lab Results   Component Value Date    GLUCOSE 86 04/22/2021    CALCIUM 9.6 04/22/2021     04/22/2021    K 4.0 04/22/2021    CO2 17.7 (L) 04/22/2021     (H) 04/22/2021    BUN 17 04/22/2021    CREATININE 0.57 04/22/2021    EGFRIFAFRI 100 01/19/2021    EGFRIFNONA 104 04/22/2021    BCR 29.8 (H) 04/22/2021    ANIONGAP 13.3 04/22/2021     Lab Results   Component Value Date    WBC 13.57 (H) 04/22/2021    HGB 11.3 (L) 04/22/2021    HCT 33.7 (L) 04/22/2021    MCV 93.6 04/22/2021     04/22/2021      Results from last 7 days   Lab Units 04/20/21  0410 04/18/21  0304   CHOLESTEROL mg/dL 118 171     Lab Results   Component Value Date    INR 1.05 04/18/2021    PROTIME 13.5 04/18/2021     Lab Results   Component Value Date    TSH 4.250 (H) 04/18/2021     Lab Results   Component Value Date    HGBA1C 5.71 (H) 04/18/2021     Lab Results   Component Value Date    CHOL 118 04/20/2021    CHLPL 205 (H) 01/19/2021    TRIG 115 04/20/2021    HDL 28 (L) 04/20/2021    LDL 69 04/20/2021      No results found for: [id number]    Results Review:     I reviewed the patient's new clinical results.  I reviewed the patient's new imaging results and agree with the interpretation.  CT, CTA, CTP 4/18/2021:FINDINGS: The following findings are present:  1. An initial noncontrast CT scan of the brain was performed. The  ventricles are normal in size and midline. There is some mild chronic  small vessel ischemic change. There are several vague areas of decreased  density in the left MCA territory consistent with early evolutionary  change of acute infarcts and this is similar to the CT scan performed 11  hours ago.  There is no acute intracranial hemorrhage. There is some very  mild mass effect with early effacement of the cortical sulci. The  findings of the noncontrast CT scan were communicated to the team D  physician when imaging made available at 9:15 AM. The postcontrast  findings were communicated when imaging made available at 9:30 AM.  2. Evaluation for stenosis is based on NASCET criteria. The vertebral  arteries are patent with dominance of the right vertebral artery. Both  supply the basilar artery. There is also severe stenosis at the origin  of the left innominate artery proximal to the left vertebral artery. The  degree of stenosis is somewhat difficult to measure but is probably in  the range of 80%. No posterior communicating arteries are present.  3. The right cervical carotid artery demonstrates some atheromatous  irregularity at the origin of the right internal carotid artery without  significant stenosis.  4. The left cervical carotid artery demonstrates very severe stenosis at  the origin of the left internal carotid artery with an adjacent low  density atheromatous plaque or thrombus as best seen on images 118  through 122. The degree of stenosis is approximately 85-90%. The  internal carotid artery just above this level has a short segment of  normal caliber and then becomes quite small in caliber at the level of  C1 and extending into the carotid canal and carotid siphon. No definite  dissection is identified.  5. The intracranial CT angiography shows very small caliber left  internal carotid artery as described above. 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 There is subsequent reconstitution of the left middle  cerebral artery via crossover filling from the anterior communicating  artery and left A1 segment. The caliber of the left A1 and M1 segments  appears normal. No thrombus is seen in these segments. No thrombus  is  identified in the left M2 segments.  6. The color CT perfusion imaging demonstrates abnormality in the left  frontal lobe with cerebral blood flow less than 30% having a volume of 5  mL. There is a larger area of Tmax greater than 6 seconds in the left  frontal lobe and extending into the mid parietal region with a volume of  17 mL. The mismatch ratio measures 3.4.  CT head 4/19/2021:FINDINGS:  No acute intracranial hemorrhage is seen. There are somewhat amorphous  areas of decreased attenuation which are seen within the left cerebral  hemisphere. These are in keeping with patient's known history of stroke.  There is increasing effacement of sulci and gyri within the left  cerebral hemisphere compared to prior exam. However, there is no  significant mass effect upon the left lateral ventricle. There is no  midline shift. Again, there is no evidence of hemorrhagic  transformation. Mucosal thickening is noted within the ethmoid sinuses.  Mucous retention cyst is noted within the right maxillary sinus. Left  mastoid air cells are relatively underpneumatized. There is additional  partial opacification of right sphenoid sinus.  IMPRESSION:  Areas of evolving infarction again noted within the left cerebral  hemisphere. Overall, the affected area appears stable in size, although  there is increasing mass effect, with more effacement of the sulci noted  on the current study. However, there is no mass effect upon the left  lateral ventricle, and there is no significant midline shift at this  time. There is no evidence of hemorrhagic transformation.  MRI brain 4/19/2021:FINDINGS: The diffusion sequence demonstrates cortical and to a lesser  extent subcortical white matter areas of acute infarction involving the  left frontal lobe laterally and superolaterally extending to and  involving the insular cortex, and operculum. A frontoparietal area of  infarction is noted superolaterally as well. The areas of acute  infarction  correspond to areas of decreased attenuation noted on the  prior CT examinations. A small area of acute infarction is appreciated  involving the periventricular white matter of the left temporal lobe  anteriorly and medially. This measures 5 mm in size.  Mild small vessel ischemic disease is noted. There is expected flow-void  in the basilar artery and in the distal aspects of the internal carotid  arteries bilaterally on the axial T2 sequence.  The gradient-echo T2 sequence shows no evidence of signal loss to  suggest blood products.  IMPRESSION:  Areas of acute infarction involving the left frontal and  frontoparietal regions consistent with a left MCA distribution infarcts,  overall similar in area as compared to the CT examination of 04/18/2021  given differences in technique. There is no evidence of hemorrhage.  Carotid ultrasound 4/20/2021:Study Impression  •    Right ICA Prox:  Imaging indicates 16%-49% stenosis.    •    Right ECA:  Imaging indicates atherosclerotic plaque.   •    Right Vertebral:  Antegrade flow is present.   •    Left ICA Prox:  Imaging indicates 60-69% stenosis.   •    Left ECA:  Imaging indicates stenosis and atherosclerotic plaque.   •    Left Vertebral:  Bidirectional flow is present.    •     Carotid Duplex Conclusions:  32 mm systolic brachial blood pressure gradient, lower on the left.   EKG 4/20/21: SR, LVH  TTE 4/19/21: Interpretation Summary  · Calculated left ventricular EF = 63% Estimated left ventricular EF = 63% Estimated left ventricular EF was in agreement with the calculated left ventricular EF. Left ventricular systolic function is normal. Normal left ventricular cavity size noted. Left ventricular wall thickness is consistent with borderline concentric hypertrophy. All left ventricular wall segments contract normally. Left ventricular diastolic function was normal.  · Left atrial volume is mildly increased. Saline test results are negative.  · The right atrial cavity is  mildly dilated.  · Mild mitral annular calcification is present. There is moderate, bileaflet mitral valve thickening present. Mild mitral valve regurgitation is present. No significant mitral valve stenosis is present.  · Trace tricuspid valve regurgitation is present. Estimated right ventricular systolic pressure from tricuspid regurgitation is mildly elevated (35-45 mmHg). Calculated right ventricular systolic pressure from tricuspid regurgitation is 37 mmHg.        Impression:Ms. Castaneda is a 69 yo with HTN, HLD, CHF who presented on 4/17 with speech difficulty, apparently NIH in ED was 1. She had worsening symptoms the following morning and RRT was called, stroke imaging acute stroke with left proximal cervical ICA occlusion, left ICA terminus thrombus, and left MCA thrombus for which she underwent emergent mechanical revascularization.     Sofd-ee-tk-date:  -CT, CTP, CTA: increased density of left MCA, consistent with early acute infarct changes; severe stenosis left innominate artery proximal to the left vert's, proximal 80%; right cervical carotid artery with atheromatous irregularity at origin with no stenosis; left cervical carotid artery with severe stenosis at origin left ICA proximal stenosis of 85 to 90%; complete occlusion of the left internal carotid artery at the level of the clinoid and carotid terminus with subsequent refilling of the LMCA via AMPARO and left A1  -CT 4/19: Evolving scattered hyperintensities in left hemisphere without ICH or mass-effect  -Cerebral Angiogram/[facility] embolectomy 4/18: Revascularization of left proximal cervical carotid artery occlusion, left ICA terminus thrombus, left M2 thrombus  -MRI brain: Acute scattered left MCA infarcts involving left frontal and frontoparietal regions.  No evidence of hemorrhage  -TTE: V EF 63%, LVH; LA mildly increased, saline test negative, RA mildly dilated  -Carotid ultrasound: Proximal right ICA 16 to 49%; proximal left ICA 60 to 69%  -Labs:  Covid negative; TSH 4.25; A1c 5.71; total cholesterol 118, HDL 28, LDL 69, : P2Y12 39 (good response)    Diagnoses:   Acute scattered LMCA infarcts d/t LICA and MCA occlusion, s/p cervical LICA angioplasty with ICA and MCA MT  Residual LICA stenosis    Plan:  Continue ASA, Plavix long-term  Lipitor 80 mg  Hydrate -- avoid hypotension  TEDs/SCDs  EKG Tele  PT/OT/ST -- will need inpatient rehab  Stroke Education  CCP for discharge planning   Carotid US in 6 weeks and f/u with Dr. Cheng. -- have tasked office to arrange  We will see again upon request, please call with questions or concerns.     I have discussed the above with patient, RN, and Dr. Mallory who agrees with plan.   Nan Saldana, APRN  04/22/21  14:48 EDT        Acute ischemic stroke (CMS/HCC)    Allergic rhinitis    Essential hypertension    Mixed hyperlipidemia    Chronic diastolic (congestive) heart failure (CMS/HCC)

## 2021-04-22 NOTE — PLAN OF CARE
"Goal Outcome Evaluation:        Outcome Summary: Awake and alert. Expressive and receptive aphasia noted. Right facial droop noted intermittently. Slow to track to the right. Answers \"yes\" and \"no\" type questions, answers aren't always appropriate. Restless this afternoon, dry heaving noted and rubbing frontal aspect of head. Denied headache or need for pain meds. Medicated with Zofran. After medicated with Zofran, patient turned to left side and was calmer. Notified neurology, new orders received.  "

## 2021-04-23 VITALS
HEIGHT: 60 IN | HEART RATE: 79 BPM | DIASTOLIC BLOOD PRESSURE: 66 MMHG | TEMPERATURE: 97.3 F | BODY MASS INDEX: 29.82 KG/M2 | RESPIRATION RATE: 18 BRPM | OXYGEN SATURATION: 97 % | SYSTOLIC BLOOD PRESSURE: 152 MMHG | WEIGHT: 151.9 LBS

## 2021-04-23 LAB
ANION GAP SERPL CALCULATED.3IONS-SCNC: 7.5 MMOL/L (ref 5–15)
BASOPHILS # BLD AUTO: 0.08 10*3/MM3 (ref 0–0.2)
BASOPHILS NFR BLD AUTO: 0.6 % (ref 0–1.5)
BUN SERPL-MCNC: 19 MG/DL (ref 8–23)
BUN/CREAT SERPL: 37.3 (ref 7–25)
CALCIUM SPEC-SCNC: 9.2 MG/DL (ref 8.6–10.5)
CHLORIDE SERPL-SCNC: 111 MMOL/L (ref 98–107)
CO2 SERPL-SCNC: 23.5 MMOL/L (ref 22–29)
CREAT SERPL-MCNC: 0.51 MG/DL (ref 0.57–1)
DEPRECATED RDW RBC AUTO: 43.2 FL (ref 37–54)
EOSINOPHIL # BLD AUTO: 0.29 10*3/MM3 (ref 0–0.4)
EOSINOPHIL NFR BLD AUTO: 2 % (ref 0.3–6.2)
ERYTHROCYTE [DISTWIDTH] IN BLOOD BY AUTOMATED COUNT: 12.8 % (ref 12.3–15.4)
GFR SERPL CREATININE-BSD FRML MDRD: 119 ML/MIN/1.73
GLUCOSE SERPL-MCNC: 96 MG/DL (ref 65–99)
HCT VFR BLD AUTO: 31.4 % (ref 34–46.6)
HGB BLD-MCNC: 10.7 G/DL (ref 12–15.9)
IMM GRANULOCYTES # BLD AUTO: 0.1 10*3/MM3 (ref 0–0.05)
IMM GRANULOCYTES NFR BLD AUTO: 0.7 % (ref 0–0.5)
LYMPHOCYTES # BLD AUTO: 3.33 10*3/MM3 (ref 0.7–3.1)
LYMPHOCYTES NFR BLD AUTO: 23.1 % (ref 19.6–45.3)
MAGNESIUM SERPL-MCNC: 2.1 MG/DL (ref 1.6–2.4)
MCH RBC QN AUTO: 31.6 PG (ref 26.6–33)
MCHC RBC AUTO-ENTMCNC: 34.1 G/DL (ref 31.5–35.7)
MCV RBC AUTO: 92.6 FL (ref 79–97)
MONOCYTES # BLD AUTO: 0.98 10*3/MM3 (ref 0.1–0.9)
MONOCYTES NFR BLD AUTO: 6.8 % (ref 5–12)
NEUTROPHILS NFR BLD AUTO: 66.8 % (ref 42.7–76)
NEUTROPHILS NFR BLD AUTO: 9.64 10*3/MM3 (ref 1.7–7)
NRBC BLD AUTO-RTO: 0 /100 WBC (ref 0–0.2)
PLATELET # BLD AUTO: 262 10*3/MM3 (ref 140–450)
PMV BLD AUTO: 12.8 FL (ref 6–12)
POTASSIUM SERPL-SCNC: 3.8 MMOL/L (ref 3.5–5.2)
RBC # BLD AUTO: 3.39 10*6/MM3 (ref 3.77–5.28)
SODIUM SERPL-SCNC: 142 MMOL/L (ref 136–145)
WBC # BLD AUTO: 14.42 10*3/MM3 (ref 3.4–10.8)

## 2021-04-23 PROCEDURE — 25010000002 HEPARIN (PORCINE) PER 1000 UNITS: Performed by: NEUROLOGICAL SURGERY

## 2021-04-23 PROCEDURE — 85025 COMPLETE CBC W/AUTO DIFF WBC: CPT | Performed by: INTERNAL MEDICINE

## 2021-04-23 PROCEDURE — 83735 ASSAY OF MAGNESIUM: CPT | Performed by: INTERNAL MEDICINE

## 2021-04-23 PROCEDURE — 80048 BASIC METABOLIC PNL TOTAL CA: CPT | Performed by: INTERNAL MEDICINE

## 2021-04-23 RX ORDER — PROMETHAZINE HYDROCHLORIDE 25 MG/1
25 SUPPOSITORY RECTAL ONCE AS NEEDED
Status: DISCONTINUED | OUTPATIENT
Start: 2021-04-23 | End: 2021-04-23 | Stop reason: HOSPADM

## 2021-04-23 RX ORDER — PROMETHAZINE HYDROCHLORIDE 25 MG/1
25 TABLET ORAL ONCE AS NEEDED
Status: DISCONTINUED | OUTPATIENT
Start: 2021-04-23 | End: 2021-04-23 | Stop reason: HOSPADM

## 2021-04-23 RX ORDER — HYDROCODONE BITARTRATE AND ACETAMINOPHEN 7.5; 325 MG/1; MG/1
1 TABLET ORAL ONCE AS NEEDED
Status: DISCONTINUED | OUTPATIENT
Start: 2021-04-23 | End: 2021-04-23 | Stop reason: HOSPADM

## 2021-04-23 RX ORDER — ACETAMINOPHEN 650 MG/1
650 SUPPOSITORY RECTAL EVERY 4 HOURS PRN
Status: DISCONTINUED | OUTPATIENT
Start: 2021-04-23 | End: 2021-04-23 | Stop reason: HOSPADM

## 2021-04-23 RX ORDER — ACETAMINOPHEN 325 MG/1
650 TABLET ORAL EVERY 4 HOURS PRN
Status: DISCONTINUED | OUTPATIENT
Start: 2021-04-23 | End: 2021-04-23 | Stop reason: HOSPADM

## 2021-04-23 RX ORDER — ATORVASTATIN CALCIUM 80 MG/1
80 TABLET, FILM COATED ORAL NIGHTLY
Status: DISCONTINUED | OUTPATIENT
Start: 2021-04-23 | End: 2021-04-23 | Stop reason: HOSPADM

## 2021-04-23 RX ORDER — ACETAMINOPHEN 160 MG/5ML
650 SOLUTION ORAL EVERY 4 HOURS PRN
Status: DISCONTINUED | OUTPATIENT
Start: 2021-04-23 | End: 2021-04-23 | Stop reason: HOSPADM

## 2021-04-23 RX ORDER — OXYCODONE AND ACETAMINOPHEN 7.5; 325 MG/1; MG/1
1 TABLET ORAL ONCE AS NEEDED
Status: DISCONTINUED | OUTPATIENT
Start: 2021-04-23 | End: 2021-04-23 | Stop reason: HOSPADM

## 2021-04-23 RX ORDER — ASPIRIN 300 MG/1
300 SUPPOSITORY RECTAL DAILY
Status: DISCONTINUED | OUTPATIENT
Start: 2021-04-24 | End: 2021-04-23 | Stop reason: HOSPADM

## 2021-04-23 RX ORDER — ASPIRIN 325 MG
325 TABLET ORAL DAILY
Status: DISCONTINUED | OUTPATIENT
Start: 2021-04-24 | End: 2021-04-23 | Stop reason: HOSPADM

## 2021-04-23 RX ORDER — POTASSIUM CHLORIDE 750 MG/1
40 TABLET, FILM COATED, EXTENDED RELEASE ORAL ONCE
Status: COMPLETED | OUTPATIENT
Start: 2021-04-23 | End: 2021-04-23

## 2021-04-23 RX ORDER — ASPIRIN 325 MG
325 TABLET ORAL DAILY
Qty: 30 TABLET | Refills: 0 | Status: SHIPPED | OUTPATIENT
Start: 2021-04-24 | End: 2022-01-25 | Stop reason: SDUPTHER

## 2021-04-23 RX ORDER — CLOPIDOGREL BISULFATE 75 MG/1
75 TABLET ORAL DAILY
Qty: 30 TABLET | Refills: 0 | Status: SHIPPED | OUTPATIENT
Start: 2021-04-24 | End: 2022-01-25 | Stop reason: SDUPTHER

## 2021-04-23 RX ORDER — ATORVASTATIN CALCIUM 80 MG/1
80 TABLET, FILM COATED ORAL NIGHTLY
Qty: 30 TABLET | Refills: 0 | Status: SHIPPED | OUTPATIENT
Start: 2021-04-23 | End: 2022-01-25 | Stop reason: SDUPTHER

## 2021-04-23 RX ORDER — CLOPIDOGREL BISULFATE 75 MG/1
75 TABLET ORAL DAILY
Status: DISCONTINUED | OUTPATIENT
Start: 2021-04-24 | End: 2021-04-23 | Stop reason: HOSPADM

## 2021-04-23 RX ADMIN — HEPARIN SODIUM 5000 UNITS: 5000 INJECTION INTRAVENOUS; SUBCUTANEOUS at 14:04

## 2021-04-23 RX ADMIN — SODIUM CHLORIDE, PRESERVATIVE FREE 10 ML: 5 INJECTION INTRAVENOUS at 09:30

## 2021-04-23 RX ADMIN — CLOPIDOGREL 75 MG: 75 TABLET, FILM COATED ORAL at 09:30

## 2021-04-23 RX ADMIN — SODIUM CHLORIDE, PRESERVATIVE FREE 10 ML: 5 INJECTION INTRAVENOUS at 04:11

## 2021-04-23 RX ADMIN — POTASSIUM CHLORIDE 40 MEQ: 750 TABLET, EXTENDED RELEASE ORAL at 17:23

## 2021-04-23 RX ADMIN — ASPIRIN 325 MG: 325 TABLET ORAL at 09:30

## 2021-04-23 RX ADMIN — HEPARIN SODIUM 5000 UNITS: 5000 INJECTION INTRAVENOUS; SUBCUTANEOUS at 07:01

## 2021-04-23 NOTE — DISCHARGE SUMMARY
[facility]    Date of Discharge:  4/23/2021    PCP: Samira Yi MD    Discharge Diagnosis:   Active Hospital Problems    Diagnosis  POA   • **Acute ischemic stroke (CMS/HCC) [I63.9]  Yes   • Chronic diastolic (congestive) heart failure (CMS/HCC) [I50.32]  Yes   • Mixed hyperlipidemia [E78.2]  Yes   • Essential hypertension [I10]  Yes   • Allergic rhinitis [J30.9]  Yes      Resolved Hospital Problems   No resolved problems to display.     Procedure(s):  EMBOLECTOMY MECHANICAL     Consults     Date and Time Order Name Status Description    4/18/2021 12:08 AM Inpatient Neurology Consult Stroke Completed     4/17/2021 11:14 PM [facility] (on-call MD unless specified) Details Completed         Hospital Course  70-year-old female, with history of chronic diastolic CHF, presented to the hospital, she was diagnosed to have Acute CVA, with left proximal cervical ICA occlusion, left MCA thrombus and left ICA terminus thrombus.  Code neuro was called on her, neurology evaluated the patient.  Patient underwent seen by neurosurgery and revascularization of left proximal cervical carotid artery occlusion, terminus bronchus of left ICA and left M2 thrombus was achieved.  Continue dual antiplatelet therapy.  We will be on aspirin, Plavix and statin therapy.  MRI of the brain showed Areas of acute infarction involving the left frontal and frontoparietal regions consistent with a left MCA distribution infarcts.  Patient would benefit from follow-up with neurology closely upon discharge.  Her blood pressure currently is in acceptable range.  She does have aphasia, related to stroke, neuro deficit, would be monitored, patient will also follow-up with primary care physician on outpatient basis.  I have seen and examined her at bedside, total time spent on discharge management is more than 30 minutes.      Condition on Discharge: Improved.     Temp:  [96.9 °F (36.1 °C)-99.5 °F  (37.5 °C)] 97.5 °F (36.4 °C)  Heart Rate:  [79-92] 81  Resp:  [18] 18  BP: ()/(51-71) 134/64  Body mass index is 29.67 kg/m².    Physical Exam   General, appears ill  Head and ENT examination normocephalic, atraumatic.  Lungs, symmetric expansion, no acute distress.  Heart, regular rate and rhythm.  Abdomen, soft and nontender.  Extremities, no clubbing, or cyanosis.  Neuro, unable to fully evaluate with current mental status.  Psych, unable to fully evaluate.  Musculoskeletal, joint examination grossly normal.    Disposition: Skilled Nursing Facility (DC - External)       Discharge Medications      New Medications      Instructions Start Date   aspirin 325 MG tablet   325 mg, Per G Tube, Daily   Start Date: April 24, 2021     atorvastatin 80 MG tablet  Commonly known as: LIPITOR   80 mg, Per G Tube, Nightly      clopidogrel 75 MG tablet  Commonly known as: PLAVIX   75 mg, Per G Tube, Daily   Start Date: April 24, 2021        Continue These Medications      Instructions Start Date   Calcium 500+D 500-400 MG-UNIT tablet tablet  Generic drug: calcium carbonate-cholecalciferol   1 tablet, Oral, Daily      cetirizine 5 MG tablet  Commonly known as: zyrTEC   5 mg, Oral, Daily      fluticasone 50 MCG/ACT nasal spray  Commonly known as: FLONASE   2 sprays, Nasal, Daily      lisinopril 2.5 MG tablet  Commonly known as: PRINIVIL,ZESTRIL   2.5 mg, Oral, Daily      montelukast 10 MG tablet  Commonly known as: SINGULAIR   TAKE 1 TABLET EVERY NIGHT              Additional Instructions for the Follow-ups that You Need to Schedule     Discharge Follow-up with PCP   As directed       Currently Documented PCP:    Samira Yi MD    PCP Phone Number:    [phone]     Follow Up Details: Follow up with PCP in 7 days.         Discharge Follow-up with Specialty: Follow-up with neurology in 2 weeks.   As directed      Specialty: Follow-up with neurology in 2 weeks.            Contact information for follow-up providers      Samira Yi MD .    Specialty: Family Medicine  Why: Follow up with PCP in 7 days.  Contact information:  [address] [address] [address]  [phone]                   Contact information for after-discharge care     Destination     [facility] .    Service: Skilled Nursing  Contact information:  [address] [facility] [address]  [phone]                               Moreno Durham MD  04/23/21  11:41 EDT    Discharge time spent greater than 30 minutes.

## 2021-04-23 NOTE — PROGRESS NOTES
Name: Jazmyn Castaneda ADMIT: 2021   : 1950  PCP: Samira Yi MD    MRN: [id number] LOS: 5 days   AGE/SEX: 70 y.o. female  ROOM: [facility]     Subjective   Subjective   Patient seen at bedside.       Objective   Objective   Vital Signs  Temp:  [96.9 °F (36.1 °C)-97.5 °F (36.4 °C)] 97.3 °F (36.3 °C)  Heart Rate:  [80-88] 80  Resp:  [18] 18  BP: (117-148)/(65-71) 117/65  SpO2:  [97 %-98 %] 97 %  on   ;   Device (Oxygen Therapy): room air  Body mass index is 29.67 kg/m².  Physical Exam   General, appears ill  Head and ENT examination normocephalic, atraumatic.  Lungs, symmetric expansion, no acute distress.  Heart, regular rate and rhythm.  Abdomen, soft and nontender.  Extremities, no clubbing, or cyanosis.  Neuro, unable to fully evaluate with current mental status.  Psych, unable to fully evaluate.  Musculoskeletal, joint examination grossly normal.    Results Review     I reviewed the patient's new clinical results.  Results from last 7 days   Lab Units 21  0655 21  0540 21  0510   WBC 10*3/mm3 13.57* 11.87* 10.89* 11.98*   HEMOGLOBIN g/dL 11.3* 11.3* 10.5* 10.8*   PLATELETS 10*3/mm3 247 253 227 232     Results from last 7 days   Lab Units 21  0655 21  0540 210 21  0510   SODIUM mmol/L 141 141 138 143   POTASSIUM mmol/L 4.0 3.5 3.5 3.9   CHLORIDE mmol/L 110* 108* 107 111*   CO2 mmol/L 17.7* 22.8 22.7 22.6   BUN mg/dL 17 10 7* 9   CREATININE mg/dL 0.57 0.49* 0.46* 0.43*   GLUCOSE mg/dL 86 102* 93 88   Estimated Creatinine Clearance: 55.1 mL/min (by C-G formula based on SCr of 0.57 mg/dL).  Results from last 7 days   Lab Units 21  2150   ALBUMIN g/dL 4.30   BILIRUBIN mg/dL 0.6   ALK PHOS U/L 150*   AST (SGOT) U/L 31   ALT (SGPT) U/L 21     Results from last 7 days   Lab Units 21  0655 21  0540 21  0410 21  0510 21  2150   CALCIUM mg/dL 9.6 9.3 8.8 8.4* 9.8   ALBUMIN g/dL  --   --   --   --  4.30         Results from last 7 days   Lab Units 04/18/21  0304 04/17/21  2150   PROCALCITONIN ng/mL  --  0.05   LACTATE mmol/L 0.8  --      COVID19   Date Value Ref Range Status   04/17/2021 Not Detected Not Detected - Ref. Range Final     Glucose   Date/Time Value Ref Range Status   04/22/2021 0036 111 70 - 130 mg/dL Final   04/20/2021 2033 161 (H) 70 - 130 mg/dL Final   04/20/2021 1130 215 (H) 70 - 130 mg/dL Final   04/20/2021 0020 91 70 - 130 mg/dL Final       XR Abdomen 1 View  Narrative: XR ABDOMEN 1 VW-     INDICATIONS: Abdominal pain     TECHNIQUE: Supine views of the abdomen     COMPARISON: 07/18/2019     FINDINGS:      The bowel gas pattern is nonobstructive. No supine evidence for free  intraperitoneal gas. Moderate colonic fecal retention. If there is  further clinical concern, CT can be obtained for further evaluation.     Impression:    As described.     This report was finalized on 4/22/2021 6:02 PM by Dr. Live Adrian M.D.       Scheduled Medications  aspirin, 325 mg, Oral, Daily   Or  aspirin, 300 mg, Rectal, Daily  atorvastatin, 80 mg, Oral, Nightly  clopidogrel, 75 mg, Oral, Daily  heparin (porcine), 5,000 Units, Subcutaneous, Q8H  sodium chloride, 10 mL, Intravenous, Q12H    Infusions   Diet  Diet Regular; Thin; Finger Foods       Assessment/Plan     Active Hospital Problems    Diagnosis  POA   • **Acute ischemic stroke (CMS/HCC) [I63.9]  Yes   • Chronic diastolic (congestive) heart failure (CMS/HCC) [I50.32]  Yes   • Mixed hyperlipidemia [E78.2]  Yes   • Essential hypertension [I10]  Yes   • Allergic rhinitis [J30.9]  Yes      Resolved Hospital Problems   No resolved problems to display.       70 y.o. female admitted with Acute ischemic stroke (CMS/HCC).    Assessment and plan  1.  Acute CVA, with left proximal cervical ICA occlusion, left MCA thrombus and left ICA terminus thrombus.  Patient underwent seen by neurosurgery and revascularization of left proximal cervical carotid artery occlusion,  terminus bronchus of left ICA and left M2 thrombus was achieved.  Continue dual antiplatelet therapy.  MRI of the brain showed Areas of acute infarction involving the left frontal and frontoparietal regions consistent with a left MCA distribution infarcts.  Patient would benefit from rehab placement upon discharge.     2.  Hypotension, BP currently stable.      3.  Anemia, hemoglobin noted to be stable.      4.  Chronic diastolic CHF, she appears euvolemic at this point of time.  Continue to reassess for volume status changes.     5.  Aphasia related to stroke, neuro deficit, continue to monitor.     6.  CODE STATUS is full code.  Further plans based on hospital course.         Moreno Durham MD  [facility]  04/22/21  20:47 EDT

## 2021-04-23 NOTE — PROGRESS NOTES
Continued Stay Note  [facility]     Patient Name: Jazmyn Castaneda  MRN: [id number]  Today's Date: 4/23/2021    Admit Date: 4/17/2021    Discharge Plan     Row Name 04/23/21 1059       Plan    Plan  Manuel Escamilla scheduled at 6:00 PM    Patient/Family in Agreement with Plan  yes    Plan Comments  Pt accepted at [facility], [facility] and [place]. [facility] updated pt's son (Nando Castaneda, [phone]) who selects [facility]. Per Dunia, [facility] can accept pt today. [facility] updated MD. Manuel killian/felicia hauser scheduled at 6:00 PM (first available time, ID: [id number]) per family request and son instructed how to provide payment. Packet in CCP office. Silva Siegel LCSW        Discharge Codes    No documentation.       Expected Discharge Date and Time     Expected Discharge Date Expected Discharge Time    Apr 23, 2021             Agata Siegel LCSW

## 2021-04-23 NOTE — PROGRESS NOTES
Case Management Discharge Note      Final Note: [facility] skilled, family now opts to transport pt (Manuel w/felicia [employer] reservation cancelled). RN updated and packet provided. Silva Siegel LCSW         Selected Continued Care - Admitted Since 4/17/2021     Destination Coordination complete    Service Provider Selected Services Address Phone Fax Patient Preferred    [facility]  Skilled Nursing [address] [facility] [id number] [phone] --          Durable Medical Equipment    No services have been selected for the patient.              Dialysis/Infusion    No services have been selected for the patient.              Home Medical Care    No services have been selected for the patient.              Therapy    No services have been selected for the patient.              Community Resources    No services have been selected for the patient.                  Transportation Services  Private: Car    Final Discharge Disposition Code: 03 - skilled nursing facility (SNF)

## 2021-04-23 NOTE — PLAN OF CARE
Goal Outcome Evaluation:        Outcome Summary: Up to shower and to chair today. Complaints of abd pain while in the chair, assisted back to bed. No further complaints. After napping, complaints of abd pain again. Abd rounded, soft with bowel sounds present. Tenderness noted to all 4 quads. Recent BM noted. New order received.

## 2021-04-26 ENCOUNTER — EPISODE CHANGES (OUTPATIENT)
Dept: CASE MANAGEMENT | Facility: OTHER | Age: 71
End: 2021-04-26

## 2021-04-27 ENCOUNTER — TELEPHONE (OUTPATIENT)
Dept: NEUROLOGY | Facility: CLINIC | Age: 71
End: 2021-04-27

## 2021-04-27 NOTE — TELEPHONE ENCOUNTER
This pt was actually seen in the hosp by LQ. Not sure she needs an appt in 2 weeks but, would you mind to review.?      Thank you

## 2021-04-27 NOTE — TELEPHONE ENCOUNTER
Caller: DEMIAN    Relationship to patient: WITH ARIN RUFF    Best call back number: [phone]    New or established patient?  [x] New  [] Established    Date of discharge: 4/23/21    Facility discharged from: [facility]    Diagnosis/Symptoms: ACUTE ISCHEMIC STROKE    Length of stay (If applicable): 6 DAYS    Specialty Only: Did you see a [facility] provider?    [x] Yes  [] No  If so, who? LOUISE BREAUX MD    DISCHARGE NOTE ADVISED FOR PT TO F/U IN 2 WEEKS.    PLEASE REVIEW AND ADVISE ON SCHEDULING HOSP F/U.   THANK YOU!

## 2021-04-29 NOTE — TELEPHONE ENCOUNTER
INGAM for pt to call office. I scheduled hosp fu for after she see's Dr. Cheng. Appointment reminder and NP packet has been mailed. She will need to see Dr. Cheng first.

## 2021-05-03 ENCOUNTER — PATIENT MESSAGE (OUTPATIENT)
Dept: ADMINISTRATIVE | Facility: OTHER | Age: 71
End: 2021-05-03

## 2021-06-02 ENCOUNTER — HOSPITAL ENCOUNTER (OUTPATIENT)
Dept: CARDIOLOGY | Facility: HOSPITAL | Age: 71
Discharge: HOME | End: 2021-06-02
Admitting: NURSE PRACTITIONER

## 2021-06-02 DIAGNOSIS — I63.132 CEREBRAL INFARCTION DUE TO EMBOLISM OF LEFT CAROTID ARTERY (HCC): ICD-10-CM

## 2021-06-02 DIAGNOSIS — I63.9 ACUTE ISCHEMIC STROKE (HCC): ICD-10-CM

## 2021-06-02 DIAGNOSIS — I63.239 CAROTID ARTERY STENOSIS WITH CEREBRAL INFARCTION (HCC): ICD-10-CM

## 2021-06-02 LAB
BH CV XLRA MEAS LEFT DIST CCA EDV: 22.4 CM/SEC
BH CV XLRA MEAS LEFT DIST CCA PSV: 94.4 CM/SEC
BH CV XLRA MEAS LEFT DIST ICA EDV: -26.8 CM/SEC
BH CV XLRA MEAS LEFT DIST ICA PSV: -100.3 CM/SEC
BH CV XLRA MEAS LEFT MID ICA EDV: -16.5 CM/SEC
BH CV XLRA MEAS LEFT MID ICA PSV: -76.3 CM/SEC
BH CV XLRA MEAS LEFT PROX CCA EDV: 20.3 CM/SEC
BH CV XLRA MEAS LEFT PROX CCA PSV: 100.2 CM/SEC
BH CV XLRA MEAS LEFT PROX ECA EDV: -18.4 CM/SEC
BH CV XLRA MEAS LEFT PROX ECA PSV: -165.6 CM/SEC
BH CV XLRA MEAS LEFT PROX ICA EDV: 56 CM/SEC
BH CV XLRA MEAS LEFT PROX ICA PSV: 205 CM/SEC
BH CV XLRA MEAS LEFT PROX SCLA PSV: 113.7 CM/SEC
BH CV XLRA MEAS RIGHT DIST CCA EDV: 22 CM/SEC
BH CV XLRA MEAS RIGHT DIST CCA PSV: 88.9 CM/SEC
BH CV XLRA MEAS RIGHT DIST ICA EDV: -22.1 CM/SEC
BH CV XLRA MEAS RIGHT DIST ICA PSV: -69.8 CM/SEC
BH CV XLRA MEAS RIGHT ICA/CCA RATIO: 0.95
BH CV XLRA MEAS RIGHT MID ICA EDV: -19.2 CM/SEC
BH CV XLRA MEAS RIGHT MID ICA PSV: -84 CM/SEC
BH CV XLRA MEAS RIGHT PROX CCA EDV: 15.9 CM/SEC
BH CV XLRA MEAS RIGHT PROX CCA PSV: 86.2 CM/SEC
BH CV XLRA MEAS RIGHT PROX ECA EDV: -26.8 CM/SEC
BH CV XLRA MEAS RIGHT PROX ECA PSV: -158.5 CM/SEC
BH CV XLRA MEAS RIGHT PROX ICA EDV: -20.3 CM/SEC
BH CV XLRA MEAS RIGHT PROX ICA PSV: -77.8 CM/SEC
BH CV XLRA MEAS RIGHT PROX SCLA PSV: 171.9 CM/SEC
BH CV XLRA MEAS RIGHT VERTEBRAL A EDV: 22.1 CM/SEC
BH CV XLRA MEAS RIGHT VERTEBRAL A PSV: 89.4 CM/SEC
LEFT ARM BP: NORMAL MMHG
RIGHT ARM BP: NORMAL MMHG

## 2021-06-02 PROCEDURE — 93880 EXTRACRANIAL BILAT STUDY: CPT

## 2021-06-04 ENCOUNTER — TELEPHONE (OUTPATIENT)
Dept: NEUROSURGERY | Facility: CLINIC | Age: 71
End: 2021-06-04

## 2021-06-04 NOTE — TELEPHONE ENCOUNTER
LM for patient that her appointment had to be moved again from Wednesday to Thursday at 12:30 due to another emergent surgery that was added for next week.

## 2021-06-08 NOTE — PROGRESS NOTES
Subjective   History of Present Illness: Jazmyn Castaneda is a 70 y.o. female is here today for follow-up with new Carotid Doppler 6/2/21 @ [facility]. She is s/p mechanical thrombectomy 4/18/21.  She is with her son today who reports that she is still in [facility] and physical therapy.  She is able to walk using a rolling walker but has some instability.  He denies any recent falls.  She has a fairly severe mixed aphasia.  She seems to understand/comprehend some language but has severe difficulty with expression.  She became tearful and crying when I referred to her is disabled with the aphasia.  I explained that I think she has made a great recovery to this point as she has very good use of her upper and lower right extremities.  She also appeared to comprehend this.  Overall he reports that she has had a gradual improvement with no new strokelike episodes.     History of Present Illness    The following portions of the patient's history were reviewed and updated as appropriate: allergies, current medications, past medical history, past social history, past surgical history and problem list.    Past Medical History:   Diagnosis Date   • Acute ischemic stroke (CMS/HCC) 4/17/2021   • Age-related osteoporosis without current pathological fracture 1/19/2021   • Anesthesia     WOKE UP DURING CATARACT SURGERY   • Chronic diastolic (congestive) heart failure (CMS/HCC) 2/5/2021   • Collapse of lung 1980s    history of in past post trauma   • Diverticulosis    • Environmental allergies     Some pollens, grass, trees, flowers   • Essential hypertension 3/24/2017   • Exogenous obesity 3/24/2017   • H/O Pneumonia 1980s   • Infectious mononucleosis    • Kidney infection    • Kidney stone    • Mixed hyperlipidemia 4/17/2019   • Neuromuscular disorder (CMS/HCC)    • Sepsis secondary to UTI (CMS/HCC) 07/2019   • Tobacco abuse 7/6/2019   • Urinary tract infection    • Vertigo     past history of several times        Past Surgical  History:   Procedure Laterality Date   • BREAST EXCISIONAL BIOPSY Right     30 something when it was done; says it was precancerous   • CATARACT EXTRACTION, BILATERAL     •  SECTION     • CYSTOSCOPY N/A 2019    Procedure: CYSTOSCOPY AND STENT PLACEMENT;  Surgeon: Alen Lal MD;  Location: [facility];  Service: Urology   • EMBOLECTOMY N/A 2021    Procedure: EMBOLECTOMY MECHANICAL;  Surgeon: Justo Cheng MD;  Location: [facility] [place] ;  Service: Neurosurgery;  Laterality: N/A;   • MASTOID SURGERY     • NASAL SEPTAL RECONSTRUCTION     • SINUS SURGERY      scraped and prior deviated septum   • URETEROSCOPY LASER LITHOTRIPSY WITH STENT INSERTION Right 2019    Procedure: CYSTOSCOPY, RIGHT URETEROSCOPY, LASER LITHOTRIPSY, STONE BASKET EXTRACTION, STENT PLACEMENT WITHOUT STRINGS;  Surgeon: Alfredo Gongora Jr., MD;  Location: [facility];  Service: Urology          Current Outpatient Medications:   •  acetaminophen (TYLENOL) 500 MG tablet, Take 500 mg by mouth Every 4 (Four) Hours As Needed for Mild Pain  or Fever., Disp: , Rfl:   •  aspirin 325 MG tablet, 1 tablet by Per G Tube route Daily. (Patient taking differently: Take 325 mg by mouth Daily.), Disp: 30 tablet, Rfl: 0  •  atorvastatin (LIPITOR) 80 MG tablet, 1 tablet by Per G Tube route Every Night. (Patient taking differently: Take 80 mg by mouth Every Night.), Disp: 30 tablet, Rfl: 0  •  calcium carbonate-cholecalciferol (Calcium 500+D) 500-400 MG-UNIT tablet tablet, Take 1 tablet by mouth Daily., Disp: , Rfl:   •  cetirizine (zyrTEC) 5 MG tablet, Take 5 mg by mouth Daily., Disp: , Rfl:   •  clopidogrel (PLAVIX) 75 MG tablet, 1 tablet by Per G Tube route Daily. (Patient taking differently: Take 75 mg by mouth Daily.), Disp: 30 tablet, Rfl: 0  •  fluticasone (FLONASE) 50 MCG/ACT nasal spray, 1 spray into the nostril(s) as directed by provider Daily., Disp: , Rfl:   •  lactulose (CHRONULAC) 10 GM/15ML solution, Take  "30 mL by mouth Daily., Disp: , Rfl:   •  lisinopril (PRINIVIL,ZESTRIL) 2.5 MG tablet, Take 1 tablet by mouth Daily., Disp: 90 tablet, Rfl: 1  •  magnesium oxide (MAGnesium-Oxide) 400 (241.3 Mg) MG tablet tablet, Take 200 mg by mouth Daily., Disp: , Rfl:   •  montelukast (SINGULAIR) 10 MG tablet, TAKE 1 TABLET EVERY NIGHT, Disp: 90 tablet, Rfl: 3  •  Polyethylene Glycol 3350 powder, Take 17 g by mouth Daily., Disp: , Rfl:   •  sennosides-docusate (Senexon-S) 8.6-50 MG per tablet, Take 1 tablet by mouth 2 (Two) Times a Day As Needed for Constipation., Disp: , Rfl:   •  traMADol (ULTRAM) 50 MG tablet, Take 1 tablet by mouth Every 8 (Eight) Hours As Needed for Pain., Disp: , Rfl:      Social History     Socioeconomic History   • Marital status:      Spouse name: Not on file   • Number of children: 2   • Years of education: College   • Highest education level: Not on file   Tobacco Use   • Smoking status: Former Smoker     Packs/day: 1.00     Types: Cigarettes     Quit date: 2020     Years since quittin.9   • Smokeless tobacco: Never Used   Vaping Use   • Vaping Use: Never used   Substance and Sexual Activity   • Alcohol use: Yes     Comment: \"on occasion\"   • Drug use: No   • Sexual activity: Defer        Family History   Problem Relation Age of Onset   • Hypertension Mother    • Aneurysm Mother    • Heart failure Mother    • Heart disease Mother    • Heart disease Father    • Hypertension Father    • Kidney disease Father    • Cancer Father 82        Bladder   • Hypertension Sister    • Cancer Maternal Aunt    • Stroke Maternal Uncle    • Glaucoma Maternal Grandmother    • Cancer Maternal Grandmother    • Stroke Maternal Grandfather    • Aneurysm Maternal Grandfather    • Cancer Maternal Aunt    • Cancer Maternal Aunt    • Liver cancer Other    • Pancreatic cancer Other    • Malig Hyperthermia Neg Hx         Review of Systems   Musculoskeletal: Positive for gait problem (uses walker).   Neurological: " "Positive for speech difficulty and headaches. Negative for dizziness.   Psychiatric/Behavioral: Positive for confusion and decreased concentration.       Objective     Vitals:    06/10/21 1239   BP: 142/78   Pulse: 102   SpO2: 96%   Weight: 67.1 kg (148 lb)   Height: 160 cm (63\")     Body mass index is 26.22 kg/m².      Physical Exam  Neurologic Exam  Awake and alert, with a mixed aphasia.  Severe expressive aphasia  Surgical pupils  Extraocular muscles intact  Face symmetric  Severe mixed aphasia occasionally will speak in [place]ian.  Unable to accurately name colors or objects  No pronator drift  Motor exam  Bilateral deltoids 5/5, bilateral biceps 5/5, bilateral triceps 5/5, bilateral wrist extension 5/5 bilateral hand  5/5  Bilateral hip flexion 5/5, bilateral knee extension 5/5, bilateral DF/PF 5/5  No clonus  No Cuco's reflex  Slightly unsteady gait, but able to walk with rolling walker   able to detect  light touch in all 4 extremities  Heart-regular rate and rhythm  Lungs-clear to auscultation bilaterally, no wheezing  No carotid bruit      Assessment/Plan   Independent Review of Radiographic Studies:   No new imaging studies    Medical Decision Makin-year-old female s/p emergent endovascular stroke intervention with successful revascularization of occluded left proximal cervical carotid artery, retrieval of left ICA terminus thrombus, and left MCA thrombus  -She is continuing to recover from the procedure without any severe right-sided hemiparesis however she does have a fairly severe mixed aphasia.  She is able to follow some commands intermittently and appears to understand some of what I am saying.  -At the time for stroke intervention he was found to have a proximal cervical ICA occlusion with severe atherosclerotic disease.  An angioplasty was performed without placement of a stent.  The follow-up Doppler/ultrasound appears stable overall without any severe restenosis.  I would recommend " that she continue aspirin and Plavix for 3 months.  I would like to follow-up in 3 months with a CTA of her neck to evaluate for any worsening of her cervical stenosis.  If there is any severe stenosis on the follow-up CTA would consider repeat angioplasty and possible stent placement.   -Recommend follow-up with neurology as planned for medical management of stroke and stroke prevention.  Continue PT and speech therapy      Diagnoses and all orders for this visit:    1. Acute ischemic stroke (CMS/HCC) (Primary)  -     CT Angiogram Neck With & Without Contrast; Future  -     CT Angiogram Head With Contrast; Future    2. Carotid stenosis, left  -     CT Angiogram Neck With & Without Contrast; Future  -     CT Angiogram Head With Contrast; Future      Return in about 3 months (around 9/10/2021).

## 2021-06-10 ENCOUNTER — OFFICE VISIT (OUTPATIENT)
Dept: NEUROSURGERY | Facility: CLINIC | Age: 71
End: 2021-06-10

## 2021-06-10 ENCOUNTER — APPOINTMENT (OUTPATIENT)
Dept: CARDIOLOGY | Facility: HOSPITAL | Age: 71
End: 2021-06-10

## 2021-06-10 VITALS
BODY MASS INDEX: 26.22 KG/M2 | HEART RATE: 102 BPM | DIASTOLIC BLOOD PRESSURE: 78 MMHG | SYSTOLIC BLOOD PRESSURE: 142 MMHG | OXYGEN SATURATION: 96 % | HEIGHT: 63 IN | WEIGHT: 148 LBS

## 2021-06-10 DIAGNOSIS — I63.9 ACUTE ISCHEMIC STROKE (HCC): Primary | ICD-10-CM

## 2021-06-10 DIAGNOSIS — I65.22 CAROTID STENOSIS, LEFT: ICD-10-CM

## 2021-06-10 PROCEDURE — 99024 POSTOP FOLLOW-UP VISIT: CPT | Performed by: NEUROLOGICAL SURGERY

## 2021-06-10 RX ORDER — POLYETHYLENE GLYCOL 3350
17 POWDER (GRAM) MISCELLANEOUS DAILY
COMMUNITY
End: 2022-01-05 | Stop reason: SDUPTHER

## 2021-06-10 RX ORDER — LACTULOSE 10 G/15ML
30 SOLUTION ORAL DAILY
COMMUNITY
Start: 2021-05-27 | End: 2022-01-05 | Stop reason: SDUPTHER

## 2021-06-10 RX ORDER — AMOXICILLIN 250 MG
1 CAPSULE ORAL 2 TIMES DAILY PRN
COMMUNITY
End: 2022-01-05 | Stop reason: SDUPTHER

## 2021-06-10 RX ORDER — ACETAMINOPHEN 500 MG
500 TABLET ORAL EVERY 4 HOURS PRN
COMMUNITY

## 2021-06-10 RX ORDER — TRAMADOL HYDROCHLORIDE 50 MG/1
1 TABLET ORAL EVERY 8 HOURS PRN
COMMUNITY
Start: 2021-06-09 | End: 2022-06-15

## 2021-06-18 ENCOUNTER — PATIENT OUTREACH (OUTPATIENT)
Dept: CASE MANAGEMENT | Facility: OTHER | Age: 71
End: 2021-06-18

## 2021-06-18 NOTE — OUTREACH NOTE
SNF Follow-up    Call to pt to FU recent SNF DC. Per pt's daughter RONI she is not at [facility] Memory care unit.

## 2021-06-30 ENCOUNTER — TELEPHONE (OUTPATIENT)
Dept: FAMILY MEDICINE | Facility: CLINIC | Age: 71
End: 2021-06-30

## 2021-07-06 ENCOUNTER — IMMUNIZATION (OUTPATIENT)
Dept: FAMILY MEDICINE | Facility: CLINIC | Age: 71
End: 2021-07-06
Payer: MEDICARE

## 2021-07-06 ENCOUNTER — OFFICE VISIT (OUTPATIENT)
Dept: FAMILY MEDICINE | Facility: CLINIC | Age: 71
End: 2021-07-06
Payer: MEDICARE

## 2021-07-06 ENCOUNTER — LAB VISIT (OUTPATIENT)
Dept: LAB | Facility: HOSPITAL | Age: 71
End: 2021-07-06
Attending: EMERGENCY MEDICINE
Payer: MEDICARE

## 2021-07-06 VITALS
HEIGHT: 60 IN | OXYGEN SATURATION: 97 % | SYSTOLIC BLOOD PRESSURE: 118 MMHG | BODY MASS INDEX: 44.41 KG/M2 | DIASTOLIC BLOOD PRESSURE: 72 MMHG | TEMPERATURE: 98 F | WEIGHT: 226.19 LBS | HEART RATE: 79 BPM

## 2021-07-06 DIAGNOSIS — E03.4 HYPOTHYROIDISM DUE TO ACQUIRED ATROPHY OF THYROID: Chronic | ICD-10-CM

## 2021-07-06 DIAGNOSIS — Z86.010 PERSONAL HISTORY OF COLONIC POLYPS: Chronic | ICD-10-CM

## 2021-07-06 DIAGNOSIS — I10 ESSENTIAL HYPERTENSION: Primary | ICD-10-CM

## 2021-07-06 DIAGNOSIS — Z23 NEED FOR VACCINATION: Primary | ICD-10-CM

## 2021-07-06 DIAGNOSIS — E78.2 MIXED HYPERLIPIDEMIA: Chronic | ICD-10-CM

## 2021-07-06 LAB — TSH SERPL DL<=0.005 MIU/L-ACNC: 1.64 UIU/ML (ref 0.4–4)

## 2021-07-06 PROCEDURE — 0031A COVID-19,VECTOR-NR,RS-AD26,PF,0.5 ML DOSE VACCINE (JANSSEN): CPT | Mod: PBBFAC | Performed by: FAMILY MEDICINE

## 2021-07-06 PROCEDURE — 3288F FALL RISK ASSESSMENT DOCD: CPT | Mod: S$GLB,,, | Performed by: EMERGENCY MEDICINE

## 2021-07-06 PROCEDURE — 3008F PR BODY MASS INDEX (BMI) DOCUMENTED: ICD-10-PCS | Mod: S$GLB,,, | Performed by: EMERGENCY MEDICINE

## 2021-07-06 PROCEDURE — 3074F PR MOST RECENT SYSTOLIC BLOOD PRESSURE < 130 MM HG: ICD-10-PCS | Mod: S$GLB,,, | Performed by: EMERGENCY MEDICINE

## 2021-07-06 PROCEDURE — 1126F PR PAIN SEVERITY QUANTIFIED, NO PAIN PRESENT: ICD-10-PCS | Mod: S$GLB,,, | Performed by: EMERGENCY MEDICINE

## 2021-07-06 PROCEDURE — 1159F PR MEDICATION LIST DOCUMENTED IN MEDICAL RECORD: ICD-10-PCS | Mod: S$GLB,,, | Performed by: EMERGENCY MEDICINE

## 2021-07-06 PROCEDURE — 3288F PR FALLS RISK ASSESSMENT DOCUMENTED: ICD-10-PCS | Mod: S$GLB,,, | Performed by: EMERGENCY MEDICINE

## 2021-07-06 PROCEDURE — 84443 ASSAY THYROID STIM HORMONE: CPT | Performed by: EMERGENCY MEDICINE

## 2021-07-06 PROCEDURE — 99214 OFFICE O/P EST MOD 30 MIN: CPT | Mod: S$GLB,,, | Performed by: EMERGENCY MEDICINE

## 2021-07-06 PROCEDURE — 99999 PR PBB SHADOW E&M-EST. PATIENT-LVL V: CPT | Mod: PBBFAC,,, | Performed by: EMERGENCY MEDICINE

## 2021-07-06 PROCEDURE — 1101F PR PT FALLS ASSESS DOC 0-1 FALLS W/OUT INJ PAST YR: ICD-10-PCS | Mod: S$GLB,,, | Performed by: EMERGENCY MEDICINE

## 2021-07-06 PROCEDURE — 3078F DIAST BP <80 MM HG: CPT | Mod: S$GLB,,, | Performed by: EMERGENCY MEDICINE

## 2021-07-06 PROCEDURE — 1159F MED LIST DOCD IN RCRD: CPT | Mod: S$GLB,,, | Performed by: EMERGENCY MEDICINE

## 2021-07-06 PROCEDURE — 99214 PR OFFICE/OUTPT VISIT, EST, LEVL IV, 30-39 MIN: ICD-10-PCS | Mod: S$GLB,,, | Performed by: EMERGENCY MEDICINE

## 2021-07-06 PROCEDURE — 1101F PT FALLS ASSESS-DOCD LE1/YR: CPT | Mod: S$GLB,,, | Performed by: EMERGENCY MEDICINE

## 2021-07-06 PROCEDURE — 3078F PR MOST RECENT DIASTOLIC BLOOD PRESSURE < 80 MM HG: ICD-10-PCS | Mod: S$GLB,,, | Performed by: EMERGENCY MEDICINE

## 2021-07-06 PROCEDURE — 36415 COLL VENOUS BLD VENIPUNCTURE: CPT | Mod: PO | Performed by: EMERGENCY MEDICINE

## 2021-07-06 PROCEDURE — 1126F AMNT PAIN NOTED NONE PRSNT: CPT | Mod: S$GLB,,, | Performed by: EMERGENCY MEDICINE

## 2021-07-06 PROCEDURE — 3008F BODY MASS INDEX DOCD: CPT | Mod: S$GLB,,, | Performed by: EMERGENCY MEDICINE

## 2021-07-06 PROCEDURE — 99999 PR PBB SHADOW E&M-EST. PATIENT-LVL V: ICD-10-PCS | Mod: PBBFAC,,, | Performed by: EMERGENCY MEDICINE

## 2021-07-06 PROCEDURE — 3074F SYST BP LT 130 MM HG: CPT | Mod: S$GLB,,, | Performed by: EMERGENCY MEDICINE

## 2021-07-07 ENCOUNTER — PATIENT MESSAGE (OUTPATIENT)
Dept: GASTROENTEROLOGY | Facility: CLINIC | Age: 71
End: 2021-07-07

## 2021-07-10 DIAGNOSIS — E03.4 HYPOTHYROIDISM DUE TO ACQUIRED ATROPHY OF THYROID: ICD-10-CM

## 2021-07-12 RX ORDER — LEVOTHYROXINE SODIUM 112 UG/1
TABLET ORAL
Qty: 90 TABLET | Refills: 3 | Status: SHIPPED | OUTPATIENT
Start: 2021-07-12 | End: 2022-07-05

## 2021-07-13 ENCOUNTER — PATIENT MESSAGE (OUTPATIENT)
Dept: AUDIOLOGY | Facility: CLINIC | Age: 71
End: 2021-07-13

## 2021-07-14 ENCOUNTER — LAB VISIT (OUTPATIENT)
Dept: LAB | Facility: HOSPITAL | Age: 71
End: 2021-07-14
Attending: EMERGENCY MEDICINE
Payer: MEDICARE

## 2021-07-14 DIAGNOSIS — E78.5 HYPERLIPIDEMIA: ICD-10-CM

## 2021-07-14 DIAGNOSIS — E78.5 HYPERLIPIDEMIA, UNSPECIFIED HYPERLIPIDEMIA TYPE: ICD-10-CM

## 2021-07-14 LAB
ALBUMIN SERPL BCP-MCNC: 3.8 G/DL (ref 3.5–5.2)
ALP SERPL-CCNC: 88 U/L (ref 55–135)
ALT SERPL W/O P-5'-P-CCNC: 16 U/L (ref 10–44)
ANION GAP SERPL CALC-SCNC: 7 MMOL/L (ref 8–16)
AST SERPL-CCNC: 20 U/L (ref 10–40)
BILIRUB SERPL-MCNC: 0.6 MG/DL (ref 0.1–1)
BUN SERPL-MCNC: 12 MG/DL (ref 8–23)
CALCIUM SERPL-MCNC: 9.7 MG/DL (ref 8.7–10.5)
CHLORIDE SERPL-SCNC: 104 MMOL/L (ref 95–110)
CHOLEST SERPL-MCNC: 168 MG/DL (ref 120–199)
CHOLEST/HDLC SERPL: 2.9 {RATIO} (ref 2–5)
CO2 SERPL-SCNC: 32 MMOL/L (ref 23–29)
CREAT SERPL-MCNC: 0.7 MG/DL (ref 0.5–1.4)
EST. GFR  (AFRICAN AMERICAN): >60 ML/MIN/1.73 M^2
EST. GFR  (NON AFRICAN AMERICAN): >60 ML/MIN/1.73 M^2
GLUCOSE SERPL-MCNC: 102 MG/DL (ref 70–110)
HDLC SERPL-MCNC: 57 MG/DL (ref 40–75)
HDLC SERPL: 33.9 % (ref 20–50)
LDLC SERPL CALC-MCNC: 80.6 MG/DL (ref 63–159)
NONHDLC SERPL-MCNC: 111 MG/DL
POTASSIUM SERPL-SCNC: 4.1 MMOL/L (ref 3.5–5.1)
PROT SERPL-MCNC: 6.8 G/DL (ref 6–8.4)
SODIUM SERPL-SCNC: 143 MMOL/L (ref 136–145)
TRIGL SERPL-MCNC: 152 MG/DL (ref 30–150)

## 2021-07-14 PROCEDURE — 36415 COLL VENOUS BLD VENIPUNCTURE: CPT | Mod: PO | Performed by: EMERGENCY MEDICINE

## 2021-07-14 PROCEDURE — 80053 COMPREHEN METABOLIC PANEL: CPT | Performed by: EMERGENCY MEDICINE

## 2021-07-14 PROCEDURE — 80061 LIPID PANEL: CPT | Performed by: EMERGENCY MEDICINE

## 2021-07-16 DIAGNOSIS — I10 ESSENTIAL HYPERTENSION: ICD-10-CM

## 2021-07-16 RX ORDER — ATORVASTATIN CALCIUM 20 MG/1
TABLET, FILM COATED ORAL
Qty: 90 TABLET | Refills: 3 | Status: SHIPPED | OUTPATIENT
Start: 2021-07-16 | End: 2022-04-14 | Stop reason: SDUPTHER

## 2021-07-20 DIAGNOSIS — K21.9 GASTROESOPHAGEAL REFLUX DISEASE WITHOUT ESOPHAGITIS: ICD-10-CM

## 2021-07-20 DIAGNOSIS — I10 ESSENTIAL HYPERTENSION: ICD-10-CM

## 2021-07-20 RX ORDER — CHLORTHALIDONE 25 MG/1
25 TABLET ORAL DAILY
Qty: 90 TABLET | Refills: 3 | Status: SHIPPED | OUTPATIENT
Start: 2021-07-20 | End: 2022-04-14 | Stop reason: SDUPTHER

## 2021-07-22 RX ORDER — VALSARTAN 80 MG/1
TABLET ORAL
Qty: 90 TABLET | Refills: 3 | Status: SHIPPED | OUTPATIENT
Start: 2021-07-22 | End: 2022-10-01

## 2021-07-22 RX ORDER — OMEPRAZOLE 20 MG/1
CAPSULE, DELAYED RELEASE ORAL
Qty: 90 CAPSULE | Refills: 3 | Status: SHIPPED | OUTPATIENT
Start: 2021-07-22 | End: 2021-07-30

## 2021-07-28 ENCOUNTER — PATIENT MESSAGE (OUTPATIENT)
Dept: FAMILY MEDICINE | Facility: CLINIC | Age: 71
End: 2021-07-28

## 2021-07-30 ENCOUNTER — OFFICE VISIT (OUTPATIENT)
Dept: FAMILY MEDICINE | Facility: CLINIC | Age: 71
End: 2021-07-30
Payer: MEDICARE

## 2021-07-30 DIAGNOSIS — B34.9 VIRAL SYNDROME: Primary | ICD-10-CM

## 2021-07-30 PROCEDURE — 1160F RVW MEDS BY RX/DR IN RCRD: CPT | Mod: ,,, | Performed by: PHYSICIAN ASSISTANT

## 2021-07-30 PROCEDURE — 99213 OFFICE O/P EST LOW 20 MIN: CPT | Mod: 95,,, | Performed by: PHYSICIAN ASSISTANT

## 2021-07-30 PROCEDURE — 99213 PR OFFICE/OUTPT VISIT, EST, LEVL III, 20-29 MIN: ICD-10-PCS | Mod: 95,,, | Performed by: PHYSICIAN ASSISTANT

## 2021-07-30 PROCEDURE — 1159F MED LIST DOCD IN RCRD: CPT | Mod: ,,, | Performed by: PHYSICIAN ASSISTANT

## 2021-07-30 PROCEDURE — 1160F PR REVIEW ALL MEDS BY PRESCRIBER/CLIN PHARMACIST DOCUMENTED: ICD-10-PCS | Mod: ,,, | Performed by: PHYSICIAN ASSISTANT

## 2021-07-30 PROCEDURE — 1159F PR MEDICATION LIST DOCUMENTED IN MEDICAL RECORD: ICD-10-PCS | Mod: ,,, | Performed by: PHYSICIAN ASSISTANT

## 2021-08-13 DIAGNOSIS — I10 ESSENTIAL HYPERTENSION: ICD-10-CM

## 2021-08-14 RX ORDER — VALSARTAN 80 MG/1
80 TABLET ORAL DAILY
Qty: 90 TABLET | Refills: 3 | OUTPATIENT
Start: 2021-08-14

## 2021-08-18 ENCOUNTER — TELEPHONE (OUTPATIENT)
Dept: FAMILY MEDICINE | Facility: CLINIC | Age: 71
End: 2021-08-18

## 2021-08-19 ENCOUNTER — TELEPHONE (OUTPATIENT)
Dept: GASTROENTEROLOGY | Facility: CLINIC | Age: 71
End: 2021-08-19

## 2021-08-19 DIAGNOSIS — Z01.818 PRE-OP TESTING: ICD-10-CM

## 2021-09-03 ENCOUNTER — APPOINTMENT (OUTPATIENT)
Dept: CT IMAGING | Facility: HOSPITAL | Age: 71
End: 2021-09-03

## 2021-09-04 ENCOUNTER — HOSPITAL ENCOUNTER (OUTPATIENT)
Dept: CT IMAGING | Facility: HOSPITAL | Age: 71
Discharge: HOME | End: 2021-09-04

## 2021-09-04 DIAGNOSIS — I63.9 ACUTE ISCHEMIC STROKE (HCC): ICD-10-CM

## 2021-09-04 DIAGNOSIS — I65.22 CAROTID STENOSIS, LEFT: ICD-10-CM

## 2021-09-19 ENCOUNTER — PATIENT MESSAGE (OUTPATIENT)
Dept: AUDIOLOGY | Facility: CLINIC | Age: 71
End: 2021-09-19

## 2021-10-24 NOTE — THERAPY EVALUATION
Acute Care - Speech Language Pathology   Swallow Initial Evaluation [facility]     Patient Name: Jazmyn Castaneda  : 1950  MRN: [id number]  Today's Date: 2021               Admit Date: 2021    Visit Dx:     ICD-10-CM ICD-9-CM   1. Acute ischemic stroke (CMS/HCC)  I63.9 434.91   2. Expressive aphasia  R47.01 784.3     Patient Active Problem List   Diagnosis   • Nephrolithiasis   • Allergic rhinitis   • Diverticulosis of large intestine without hemorrhage   • Hiatal hernia   • Medicare annual wellness visit, subsequent   • Exogenous obesity   • Encounter for screening colonoscopy   • Visit for screening mammogram   • Breast mass, left   • Essential hypertension   • Erythrocytosis   • Vertigo   • Neutrophilic leukocytosis   • Mixed hyperlipidemia   • Right hydronephrosis with urinary obstruction due to ureteral calculus   • Allergy to multiple antibiotics   • Tobacco abuse   • Ureteral stone   • Colon cancer screening   • Age-related osteoporosis without current pathological fracture   • Chronic diastolic (congestive) heart failure (CMS/HCC)   • Acute ischemic stroke (CMS/HCC)     Past Medical History:   Diagnosis Date   • Acute ischemic stroke (CMS/HCC) 2021   • Age-related osteoporosis without current pathological fracture 2021   • Anesthesia     WOKE UP DURING CATARACT SURGERY   • Chronic diastolic (congestive) heart failure (CMS/HCC) 2021   • Collapse of lung 1980s    history of in past post trauma   • Diverticulosis    • Environmental allergies     Some pollens, grass, trees, flowers   • Essential hypertension 3/24/2017   • Exogenous obesity 3/24/2017   • H/O Pneumonia    • Infectious mononucleosis    • Kidney infection    • Kidney stone    • Mixed hyperlipidemia 2019   • Neuromuscular disorder (CMS/HCC)    • Sepsis secondary to UTI (CMS/HCC) 2019   • Tobacco abuse 2019   • Urinary tract infection    • Vertigo     past history of several times     Past Surgical  "History:   Procedure Laterality Date   • BREAST EXCISIONAL BIOPSY Right     30 something when it was done; says it was precancerous   • CATARACT EXTRACTION, BILATERAL     •  SECTION     • CYSTOSCOPY N/A 2019    Procedure: CYSTOSCOPY AND STENT PLACEMENT;  Surgeon: Alen Lal MD;  Location: [facility];  Service: Urology   • EMBOLECTOMY N/A 2021    Procedure: EMBOLECTOMY MECHANICAL;  Surgeon: Justo Cheng MD;  Location: [facility] ;  Service: Neurosurgery;  Laterality: N/A;   • MASTOID SURGERY     • NASAL SEPTAL RECONSTRUCTION     • SINUS SURGERY      scraped and prior deviated septum   • URETEROSCOPY LASER LITHOTRIPSY WITH STENT INSERTION Right 2019    Procedure: CYSTOSCOPY, RIGHT URETEROSCOPY, LASER LITHOTRIPSY, STONE BASKET EXTRACTION, STENT PLACEMENT WITHOUT STRINGS;  Surgeon: Alfredo Gongora Jr., MD;  Location: [facility];  Service: Urology     Patient was not wearing a face mask during this therapy encounter. Therapist used appropriate personal protective equipment including mask, eye protection and gloves.  Mask used was standard procedure mask. Appropriate PPE was worn during the entire therapy session. Hand hygiene was completed before and after therapy session. Patient is not in enhanced droplet precautions.        SWALLOW EVALUATION (last 72 hours)      SLP Adult Swallow Evaluation     Row Name [facility]                   Rehab Evaluation    Document Type  evaluation  -AW        Subjective Information  no complaints  -AW        Patient Observations  alert;cooperative;agree to therapy  -AW        Patient/Family/Caregiver Comments/Observations  Pt aphasic. Pt frequently only said, \"yes.\" Attempts at connected speech was non-sensical.  -AW        Care Plan Review  evaluation/treatment results reviewed;patient/other agree to care plan  -AW        Patient Effort  good  -AW        Symptoms Noted During/After Treatment  none  -AW           General " Information    Patient Profile Reviewed  yes  -AW        Pertinent History Of Current Problem  Acute CVA s/p thrombectomy  -AW        Current Method of Nutrition  NPO  -AW        Precautions/Limitations, Vision  WFL;for purposes of eval  -AW        Precautions/Limitations, Hearing  WFL;for purposes of eval  -AW        Prior Level of Function-Communication  WFL  -AW        Prior Level of Function-Swallowing  no diet consistency restrictions  -AW        Plans/Goals Discussed with  patient;agreed upon  -AW        Barriers to Rehab  medically complex;cognitive status  -AW        Patient's Goals for Discharge  patient did not state  -AW           Pain    Additional Documentation  Pain Scale: Numbers Pre/Post-Treatment (Group)  -AW           Pain Scale: Numbers Pre/Post-Treatment    Pre/Posttreatment Pain Comment  No evident pain w/ repositioning  -AW           Oral Motor Structure and Function    Dentition Assessment  natural, present and adequate  -AW        Secretion Management  WNL/WFL  -AW        Mucosal Quality  moist, healthy  -AW        Volitional Swallow  unable to elicit  -AW        Volitional Cough  unable to elicit  -AW           Oral Musculature and Cranial Nerve Assessment    Oral Motor General Assessment  unable to assess;other (see comments) difficulty following commands  -AW           General Eating/Swallowing Observations    Respiratory Support Currently in Use  nasal cannula  -AW        Eating/Swallowing Skills  fed by SLP  -AW        Positioning During Eating  upright in bed  -AW        Utensils Used  spoon;cup;straw  -AW        Consistencies Trialed  regular textures;soft textures;pureed;ice chips;thin liquids;nectar/syrup-thick liquids  -AW        Pre SpO2 (%)  98  -AW        Post SpO2 (%)  98  -AW           Clinical Swallow Eval    Pharyngeal Phase  suspected pharyngeal impairment  -AW        Clinical Swallow Evaluation Summary  Clinical swallow eval completed. Pt initially tolerated thins via cup  and straw, however, toward end of eval, coughing noted after straw sip of thin. No s/s with NTL via cup/straw. Oral manipulation and mastication functional for pureed and soft solids. Lingual residue noted with regular solids. Laryngeal elevation appeared adequate with palpation, question mistiming. Recommend mech soft no mixed and NTL; meds w/ pureed or NTL; upright for meals and 30 min after; slow rate; small bites/sips. ST will follow.  -AW           Clinical Impression    SLP Swallowing Diagnosis  R/O pharyngeal dysphagia  -AW        Functional Impact  risk of aspiration/pneumonia  -AW        Rehab Potential/Prognosis, Swallowing  good, to achieve stated therapy goals  -AW        Swallow Criteria for Skilled Therapeutic Interventions Met  demonstrates skilled criteria  -AW           Recommendations    Therapy Frequency (Swallow)  PRN  -AW        Predicted Duration Therapy Intervention (Days)  until discharge  -AW        SLP Diet Recommendation  mechanical soft with no mixed consistencies;nectar thick liquids  -AW        Recommended Diagnostics  SLE/Cog/Motor Speech Evaluation  -AW        Recommended Precautions and Strategies  upright posture during/after eating;small bites of food and sips of liquid  -AW        Oral Care Recommendations  Oral Care before breakfast, after meals and PRN  -AW        SLP Rec. for Method of Medication Administration  meds whole;with thick liquids;with pudding or applesauce;as tolerated  -AW        Monitor for Signs of Aspiration  yes;notify SLP if any concerns  -AW        Anticipated Discharge Disposition (SLP)  unknown;anticipate therapy at next level of care  -AW           Swallow Goals (SLP)    Oral Nutrition/Hydration Goal Selection (SLP)  oral nutrition/hydration, SLP goal 1  -AW           Oral Nutrition/Hydration Goal 1 (SLP)    Oral Nutrition/Hydration Goal 1, SLP  Pt will safely tolerate the least restrictive diet with no s/s of aspiration.  -AW        Time Frame (Oral  Nutrition/Hydration Goal 1, SLP)  by discharge  -ROBERTO          User Key  (r) = Recorded By, (t) = Taken By, (c) = Cosigned By    Initials Name Effective Dates    ROBERTO Rodrigo, Suni, MS CCC-SLP 06/08/18 -           EDUCATION  The patient has been educated in the following areas:   Dysphagia (Swallowing Impairment) Oral Care/Hydration Modified Diet Instruction.    SLP Recommendation and Plan  SLP Swallowing Diagnosis: R/O pharyngeal dysphagia  SLP Diet Recommendation: mechanical soft with no mixed consistencies, nectar thick liquids  Recommended Precautions and Strategies: upright posture during/after eating, small bites of food and sips of liquid  SLP Rec. for Method of Medication Administration: meds whole, with thick liquids, with pudding or applesauce, as tolerated     Monitor for Signs of Aspiration: yes, notify SLP if any concerns  Recommended Diagnostics: SLE/Cog/Motor Speech Evaluation  Swallow Criteria for Skilled Therapeutic Interventions Met: demonstrates skilled criteria  Anticipated Discharge Disposition (SLP): unknown, anticipate therapy at next level of care  Rehab Potential/Prognosis, Swallowing: good, to achieve stated therapy goals  Therapy Frequency (Swallow): PRN  Predicted Duration Therapy Intervention (Days): until discharge                         Plan of Care Reviewed With: patient  Outcome Summary: Clinical swallow eval completed. Pt initially tolerated thins via cup and straw, however, toward end of eval, coughing noted after straw sip of thin. No s/s with NTL via cup/straw. Oral manipulation and mastication functional for pureed and soft solids. Lingual residue noted with regular solids. Laryngeal elevation appeared adequate with palpation, question mistiming. Recommend mech soft no mixed and NTL; meds w/ pureed or NTL; upright for meals and 30 min after; slow rate; small bites/sips. ST will follow.    SLP GOALS     Row Name 04/19/21 1400             Oral Nutrition/Hydration Goal 1 (SLP)    Oral  Nutrition/Hydration Goal 1, SLP  Pt will safely tolerate the least restrictive diet with no s/s of aspiration.  -AW      Time Frame (Oral Nutrition/Hydration Goal 1, SLP)  by discharge  -AW        User Key  (r) = Recorded By, (t) = Taken By, (c) = Cosigned By    Initials Name Provider Type    Suni Chaidez MS CCC-SLP Speech and Language Pathologist           SLP Outcome Measures (last 72 hours)      SLP Outcome Measures     Row Name 04/19/21 1500             SLP Outcome Measures    Outcome Measure Used?  Adult NOMS  -AW         Adult FCM Scores    FCM Chosen  Swallowing  -AW      Swallowing FCM Score  4  -AW        User Key  (r) = Recorded By, (t) = Taken By, (c) = Cosigned By    Initials Name Effective Dates    Suni Chaidez MS CCC-SLP 06/08/18 -            Time Calculation:   Time Calculation- SLP     Row Name 04/19/21 1503             Time Calculation- SLP    SLP Start Time  1330  -AW      SLP Received On  04/19/21  -AW        User Key  (r) = Recorded By, (t) = Taken By, (c) = Cosigned By    Initials Name Provider Type    Suni Chaidez MS CCC-SLP Speech and Language Pathologist          Therapy Charges for Today     Code Description Service Date Service Provider Modifiers Qty    [id number] HC ST EVAL ORAL PHARYNG SWALLOW 4 4/19/2021 Suni Wallace MS CCC-SLP GN 1               Suni Wallace MS CCC-DESHAWN  4/19/2021   decreased ability to use arms for pushing/pulling/decreased ability to use legs for bridging/pushing

## 2021-10-27 ENCOUNTER — TELEPHONE (OUTPATIENT)
Dept: AUDIOLOGY | Facility: CLINIC | Age: 71
End: 2021-10-27
Payer: MEDICARE

## 2021-11-04 ENCOUNTER — TELEPHONE (OUTPATIENT)
Dept: GASTROENTEROLOGY | Facility: CLINIC | Age: 71
End: 2021-11-04
Payer: MEDICARE

## 2021-11-10 ENCOUNTER — ANESTHESIA (OUTPATIENT)
Dept: ENDOSCOPY | Facility: HOSPITAL | Age: 71
End: 2021-11-10
Payer: MEDICARE

## 2021-11-10 ENCOUNTER — HOSPITAL ENCOUNTER (OUTPATIENT)
Facility: HOSPITAL | Age: 71
Discharge: HOME | End: 2021-11-10
Attending: INTERNAL MEDICINE | Admitting: INTERNAL MEDICINE
Payer: MEDICARE

## 2021-11-10 ENCOUNTER — ANESTHESIA EVENT (OUTPATIENT)
Dept: ENDOSCOPY | Facility: HOSPITAL | Age: 71
End: 2021-11-10
Payer: MEDICARE

## 2021-11-10 VITALS
BODY MASS INDEX: 43.19 KG/M2 | TEMPERATURE: 98 F | WEIGHT: 220 LBS | RESPIRATION RATE: 17 BRPM | DIASTOLIC BLOOD PRESSURE: 71 MMHG | OXYGEN SATURATION: 98 % | HEIGHT: 60 IN | HEART RATE: 84 BPM | SYSTOLIC BLOOD PRESSURE: 141 MMHG

## 2021-11-10 DIAGNOSIS — Z86.010 HX OF COLONIC POLYPS: ICD-10-CM

## 2021-11-10 PROCEDURE — G0105 COLORECTAL SCRN; HI RISK IND: HCPCS | Mod: PO | Performed by: INTERNAL MEDICINE

## 2021-11-10 PROCEDURE — 25000003 PHARM REV CODE 250: Mod: PO | Performed by: NURSE ANESTHETIST, CERTIFIED REGISTERED

## 2021-11-10 PROCEDURE — 63600175 PHARM REV CODE 636 W HCPCS: Mod: PO | Performed by: INTERNAL MEDICINE

## 2021-11-10 PROCEDURE — D9220A PRA ANESTHESIA: ICD-10-PCS | Mod: ANES,,, | Performed by: ANESTHESIOLOGY

## 2021-11-10 PROCEDURE — 37000009 HC ANESTHESIA EA ADD 15 MINS: Mod: PO | Performed by: INTERNAL MEDICINE

## 2021-11-10 PROCEDURE — 37000008 HC ANESTHESIA 1ST 15 MINUTES: Mod: PO | Performed by: INTERNAL MEDICINE

## 2021-11-10 PROCEDURE — 63600175 PHARM REV CODE 636 W HCPCS: Mod: PO | Performed by: NURSE ANESTHETIST, CERTIFIED REGISTERED

## 2021-11-10 PROCEDURE — G0105 COLORECTAL SCRN; HI RISK IND: HCPCS | Mod: ,,, | Performed by: INTERNAL MEDICINE

## 2021-11-10 PROCEDURE — D9220A PRA ANESTHESIA: Mod: CRNA,,, | Performed by: NURSE ANESTHETIST, CERTIFIED REGISTERED

## 2021-11-10 PROCEDURE — G0105 COLORECTAL SCRN; HI RISK IND: ICD-10-PCS | Mod: ,,, | Performed by: INTERNAL MEDICINE

## 2021-11-10 PROCEDURE — D9220A PRA ANESTHESIA: Mod: ANES,,, | Performed by: ANESTHESIOLOGY

## 2021-11-10 PROCEDURE — D9220A PRA ANESTHESIA: ICD-10-PCS | Mod: CRNA,,, | Performed by: NURSE ANESTHETIST, CERTIFIED REGISTERED

## 2021-11-10 RX ORDER — LIDOCAINE HCL/PF 100 MG/5ML
SYRINGE (ML) INTRAVENOUS
Status: DISCONTINUED | OUTPATIENT
Start: 2021-11-10 | End: 2021-11-10

## 2021-11-10 RX ORDER — SODIUM CHLORIDE, SODIUM LACTATE, POTASSIUM CHLORIDE, CALCIUM CHLORIDE 600; 310; 30; 20 MG/100ML; MG/100ML; MG/100ML; MG/100ML
INJECTION, SOLUTION INTRAVENOUS CONTINUOUS
Status: DISCONTINUED | OUTPATIENT
Start: 2021-11-10 | End: 2021-11-10 | Stop reason: HOSPADM

## 2021-11-10 RX ORDER — PROPOFOL 10 MG/ML
VIAL (ML) INTRAVENOUS
Status: DISCONTINUED | OUTPATIENT
Start: 2021-11-10 | End: 2021-11-10

## 2021-11-10 RX ORDER — SODIUM CHLORIDE 0.9 % (FLUSH) 0.9 %
10 SYRINGE (ML) INJECTION
Status: DISCONTINUED | OUTPATIENT
Start: 2021-11-10 | End: 2021-11-10 | Stop reason: HOSPADM

## 2021-11-10 RX ADMIN — PROPOFOL 100 MG: 10 INJECTION, EMULSION INTRAVENOUS at 10:11

## 2021-11-10 RX ADMIN — SODIUM CHLORIDE, SODIUM LACTATE, POTASSIUM CHLORIDE, AND CALCIUM CHLORIDE: .6; .31; .03; .02 INJECTION, SOLUTION INTRAVENOUS at 10:11

## 2021-11-10 RX ADMIN — PROPOFOL 50 MG: 10 INJECTION, EMULSION INTRAVENOUS at 10:11

## 2021-11-10 RX ADMIN — LIDOCAINE HYDROCHLORIDE 100 MG: 20 INJECTION, SOLUTION INTRAVENOUS at 10:11

## 2021-11-11 DIAGNOSIS — H91.90 HEARING DIFFICULTY, UNSPECIFIED LATERALITY: Primary | ICD-10-CM

## 2021-11-16 ENCOUNTER — OFFICE VISIT (OUTPATIENT)
Dept: OTOLARYNGOLOGY | Facility: CLINIC | Age: 71
End: 2021-11-16
Payer: MEDICARE

## 2021-11-16 ENCOUNTER — CLINICAL SUPPORT (OUTPATIENT)
Dept: AUDIOLOGY | Facility: CLINIC | Age: 71
End: 2021-11-16
Payer: MEDICARE

## 2021-11-16 VITALS — WEIGHT: 220 LBS | HEIGHT: 60 IN | BODY MASS INDEX: 43.19 KG/M2

## 2021-11-16 DIAGNOSIS — Z97.4 WEARS HEARING AID IN BOTH EARS: ICD-10-CM

## 2021-11-16 DIAGNOSIS — H90.3 BILATERAL SENSORINEURAL HEARING LOSS: Primary | ICD-10-CM

## 2021-11-16 DIAGNOSIS — H61.23 BILATERAL IMPACTED CERUMEN: ICD-10-CM

## 2021-11-16 DIAGNOSIS — H91.90 HEARING DIFFICULTY, UNSPECIFIED LATERALITY: ICD-10-CM

## 2021-11-16 DIAGNOSIS — R68.89 SMALL EAR CANAL: ICD-10-CM

## 2021-11-16 DIAGNOSIS — Z97.4 WEARS HEARING AID IN BOTH EARS: Primary | ICD-10-CM

## 2021-11-16 PROCEDURE — 99213 PR OFFICE/OUTPT VISIT, EST, LEVL III, 20-29 MIN: ICD-10-PCS | Mod: 25,S$GLB,, | Performed by: NURSE PRACTITIONER

## 2021-11-16 PROCEDURE — 1160F RVW MEDS BY RX/DR IN RCRD: CPT | Mod: S$GLB,,, | Performed by: NURSE PRACTITIONER

## 2021-11-16 PROCEDURE — 92552 PR PURE TONE AUDIOMETRY, AIR: ICD-10-PCS | Mod: S$GLB,,, | Performed by: AUDIOLOGIST

## 2021-11-16 PROCEDURE — 3288F FALL RISK ASSESSMENT DOCD: CPT | Mod: S$GLB,,, | Performed by: NURSE PRACTITIONER

## 2021-11-16 PROCEDURE — G0268 PR REMOVAL OF IMPACTED WAX MD: ICD-10-PCS | Mod: S$GLB,,, | Performed by: NURSE PRACTITIONER

## 2021-11-16 PROCEDURE — 1126F PR PAIN SEVERITY QUANTIFIED, NO PAIN PRESENT: ICD-10-PCS | Mod: S$GLB,,, | Performed by: NURSE PRACTITIONER

## 2021-11-16 PROCEDURE — 92552 PURE TONE AUDIOMETRY AIR: CPT | Mod: S$GLB,,, | Performed by: AUDIOLOGIST

## 2021-11-16 PROCEDURE — 1159F MED LIST DOCD IN RCRD: CPT | Mod: S$GLB,,, | Performed by: NURSE PRACTITIONER

## 2021-11-16 PROCEDURE — 1160F PR REVIEW ALL MEDS BY PRESCRIBER/CLIN PHARMACIST DOCUMENTED: ICD-10-PCS | Mod: S$GLB,,, | Performed by: NURSE PRACTITIONER

## 2021-11-16 PROCEDURE — 3008F BODY MASS INDEX DOCD: CPT | Mod: S$GLB,,, | Performed by: NURSE PRACTITIONER

## 2021-11-16 PROCEDURE — 3288F PR FALLS RISK ASSESSMENT DOCUMENTED: ICD-10-PCS | Mod: S$GLB,,, | Performed by: NURSE PRACTITIONER

## 2021-11-16 PROCEDURE — 4010F PR ACE/ARB THEARPY RXD/TAKEN: ICD-10-PCS | Mod: S$GLB,,, | Performed by: NURSE PRACTITIONER

## 2021-11-16 PROCEDURE — 92556 PR SPEECH AUDIOMETRY, COMPLETE: ICD-10-PCS | Mod: S$GLB,,, | Performed by: AUDIOLOGIST

## 2021-11-16 PROCEDURE — 99999 PR PBB SHADOW E&M-EST. PATIENT-LVL II: CPT | Mod: PBBFAC,,,

## 2021-11-16 PROCEDURE — 3008F PR BODY MASS INDEX (BMI) DOCUMENTED: ICD-10-PCS | Mod: S$GLB,,, | Performed by: NURSE PRACTITIONER

## 2021-11-16 PROCEDURE — 4010F ACE/ARB THERAPY RXD/TAKEN: CPT | Mod: S$GLB,,, | Performed by: NURSE PRACTITIONER

## 2021-11-16 PROCEDURE — 1101F PT FALLS ASSESS-DOCD LE1/YR: CPT | Mod: S$GLB,,, | Performed by: NURSE PRACTITIONER

## 2021-11-16 PROCEDURE — 92556 SPEECH AUDIOMETRY COMPLETE: CPT | Mod: S$GLB,,, | Performed by: AUDIOLOGIST

## 2021-11-16 PROCEDURE — 99499 NO LOS: ICD-10-PCS | Mod: PBBFAC,,, | Performed by: AUDIOLOGIST-HEARING AID FITTER

## 2021-11-16 PROCEDURE — 1126F AMNT PAIN NOTED NONE PRSNT: CPT | Mod: S$GLB,,, | Performed by: NURSE PRACTITIONER

## 2021-11-16 PROCEDURE — 99499 UNLISTED E&M SERVICE: CPT | Mod: PBBFAC,,, | Performed by: AUDIOLOGIST-HEARING AID FITTER

## 2021-11-16 PROCEDURE — 99999 PR PBB SHADOW E&M-EST. PATIENT-LVL III: CPT | Mod: PBBFAC,,, | Performed by: NURSE PRACTITIONER

## 2021-11-16 PROCEDURE — G0268 REMOVAL OF IMPACTED WAX MD: HCPCS | Mod: S$GLB,,, | Performed by: NURSE PRACTITIONER

## 2021-11-16 PROCEDURE — 1159F PR MEDICATION LIST DOCUMENTED IN MEDICAL RECORD: ICD-10-PCS | Mod: S$GLB,,, | Performed by: NURSE PRACTITIONER

## 2021-11-16 PROCEDURE — 99999 PR PBB SHADOW E&M-EST. PATIENT-LVL II: ICD-10-PCS | Mod: PBBFAC,,,

## 2021-11-16 PROCEDURE — 99213 OFFICE O/P EST LOW 20 MIN: CPT | Mod: 25,S$GLB,, | Performed by: NURSE PRACTITIONER

## 2021-11-16 PROCEDURE — 1101F PR PT FALLS ASSESS DOC 0-1 FALLS W/OUT INJ PAST YR: ICD-10-PCS | Mod: S$GLB,,, | Performed by: NURSE PRACTITIONER

## 2021-11-16 PROCEDURE — 99999 PR PBB SHADOW E&M-EST. PATIENT-LVL III: ICD-10-PCS | Mod: PBBFAC,,, | Performed by: NURSE PRACTITIONER

## 2021-12-09 ENCOUNTER — TELEPHONE (OUTPATIENT)
Dept: NEUROLOGY | Facility: CLINIC | Age: 71
End: 2021-12-09

## 2021-12-09 NOTE — TELEPHONE ENCOUNTER
Hub staff attempted to follow warm transfer process and was unsuccessful- PER MARCELA ARAUJO NOT AVAILABLE AT THE TIME OF PT'S DAUGHTER'S CALL BACK.    Caller: Laine Garza    Relationship: Emergency Contact; DAUGHTER;POA    Best call back number: [phone]    What was the call regarding: PT'S DAUGHTER RETURNING PHONE CALL TO [facility] REGARDING RESCHEDULING UPCOMING APPT.    PLEASE REVIEW AND ADVISE.

## 2021-12-09 NOTE — TELEPHONE ENCOUNTER
Called daughter back and was able to reschedule the appointment for next Wednesday for 60 minutes.

## 2021-12-09 NOTE — TELEPHONE ENCOUNTER
LVM for daughter or pt to call office. Her appointment on 12/22 is scheduled incorrectly. She is a hosp fu and needs 60 minutes with Nan. We have an opening next Wednesday at 4:00 that we would like to offer her. If scheduled it will need to be for 60 minutes.

## 2021-12-15 ENCOUNTER — OFFICE VISIT (OUTPATIENT)
Dept: NEUROLOGY | Facility: CLINIC | Age: 71
End: 2021-12-15

## 2021-12-15 VITALS
HEART RATE: 98 BPM | HEIGHT: 63 IN | OXYGEN SATURATION: 99 % | WEIGHT: 143 LBS | BODY MASS INDEX: 25.34 KG/M2 | DIASTOLIC BLOOD PRESSURE: 72 MMHG | SYSTOLIC BLOOD PRESSURE: 128 MMHG

## 2021-12-15 DIAGNOSIS — I63.239 CAROTID ARTERY STENOSIS WITH CEREBRAL INFARCTION (HCC): ICD-10-CM

## 2021-12-15 DIAGNOSIS — E78.2 MIXED HYPERLIPIDEMIA: ICD-10-CM

## 2021-12-15 DIAGNOSIS — Z86.73 RECENT CEREBROVASCULAR ACCIDENT (CVA): Primary | ICD-10-CM

## 2021-12-15 DIAGNOSIS — R47.01 EXPRESSIVE APHASIA: ICD-10-CM

## 2021-12-15 PROCEDURE — 99214 OFFICE O/P EST MOD 30 MIN: CPT | Performed by: NURSE PRACTITIONER

## 2021-12-15 NOTE — PROGRESS NOTES
DOS: 2021  NAME: Jazmyn Castaneda   : 1950  PCP: Samira Yi MD    Chief Complaint   Patient presents with   • Stroke      SUBJECTIVE  Neurological Problem:  71 y.o.female with left MCA stroke (2021), HTN, HLD, CHF who presents for follow-up of stroke.  She is accompanied by her daughter. Patient and problem are new to examiner. History is provided by patient and review of records that are summarized below.     Interval History:   Ms. Castaneda presented to [facility] in 2021 with speech difficulty, apparently NIH in ED was 1. She had worsening symptoms the following morning and RRT was called, stroke imaging acute stroke with left proximal cervical ICA occlusion, left ICA terminus thrombus, and left MCA thrombus for which she underwent emergent mechanical revascularization.   Subsequent MRI brain showed acute scattered left MCA infarcts involving the left frontal and frontoparietal regions.  2D echocardiogram showed an LVEF of 63%, LVH, LA mildly increased, saline test negative.  Carotid ultrasound showed a proximal right ICA 16 to 49% stenosis in proximal left ICA 60 to 69%.  She was discharged on ASA plus Plavix along with Lipitor 80 mg.    She presents today with her daughter, had rehab at [facility] but is now in [facility] memory care, getting speech and physical therapy.  She has some significant residual aphasia, patient states she is about 50% back to her baseline, daughter says she is about 70%.  She does also continue to use a walker.  She denies any new stroke/TIA symptoms.  States she is tolerating medications well.  She denies any signs or symptoms of bleeding.  Plans are for her to move in with her daughter when rehab completed.  Follow-up carotid ultrasound done in 2021 showed a left ICA with 60 to 69% stenosis, right ICA with plaque but no significant stenosis noted; Left subclavian stenosis was noted as well.  She followed up with Dr. Cheng, neurosurgery, in ,  plans were for repeat CTA head and neck in 3 months; although patient tells me today they had some difficulty placing IV and she will be needing to go to the ER for her imaging, this is being arranged by neurosurgery.  She states her BP is well controlled, she has not had any recent lab work.    Review of Systems:Review of Systems   Constitutional: Positive for fatigue.   HENT: Positive for hearing loss and tinnitus. Negative for trouble swallowing.    Eyes: Positive for photophobia and visual disturbance. Negative for pain.   Cardiovascular: Negative for chest pain, palpitations and leg swelling.   Musculoskeletal: Positive for gait problem. Negative for back pain and neck pain.   Neurological: Positive for dizziness, speech difficulty and weakness (right side). Negative for tremors, seizures, syncope, light-headedness, numbness and headaches.   Hematological: Bruises/bleeds easily.   Psychiatric/Behavioral: Negative for agitation, confusion, decreased concentration and sleep disturbance. The patient is not nervous/anxious.     Above ROS reviewed    The following portions of the patient's history were reviewed and updated as appropriate: allergies, current medications, past family history, past medical history, past social history, past surgical history and problem list.    Current Medications:   Current Outpatient Medications:   •  acetaminophen (TYLENOL) 500 MG tablet, Take 500 mg by mouth Every 4 (Four) Hours As Needed for Mild Pain  or Fever., Disp: , Rfl:   •  aspirin 325 MG tablet, 1 tablet by Per G Tube route Daily. (Patient taking differently: Take 325 mg by mouth Daily.), Disp: 30 tablet, Rfl: 0  •  atorvastatin (LIPITOR) 80 MG tablet, 1 tablet by Per G Tube route Every Night. (Patient taking differently: Take 80 mg by mouth Every Night.), Disp: 30 tablet, Rfl: 0  •  calcium carbonate-cholecalciferol (Calcium 500+D) 500-400 MG-UNIT tablet tablet, Take 1 tablet by mouth Daily., Disp: , Rfl:   •  cetirizine  (zyrTEC) 5 MG tablet, Take 5 mg by mouth Daily., Disp: , Rfl:   •  clopidogrel (PLAVIX) 75 MG tablet, 1 tablet by Per G Tube route Daily. (Patient taking differently: Take 75 mg by mouth Daily.), Disp: 30 tablet, Rfl: 0  •  fluticasone (FLONASE) 50 MCG/ACT nasal spray, 1 spray into the nostril(s) as directed by provider Daily., Disp: , Rfl:   •  lactulose (CHRONULAC) 10 GM/15ML solution, Take 30 mL by mouth Daily., Disp: , Rfl:   •  lisinopril (PRINIVIL,ZESTRIL) 2.5 MG tablet, Take 1 tablet by mouth Daily., Disp: 90 tablet, Rfl: 1  •  magnesium oxide (MAGnesium-Oxide) 400 (241.3 Mg) MG tablet tablet, Take 200 mg by mouth Daily., Disp: , Rfl:   •  montelukast (SINGULAIR) 10 MG tablet, TAKE 1 TABLET EVERY NIGHT, Disp: 90 tablet, Rfl: 3  •  Polyethylene Glycol 3350 powder, Take 17 g by mouth Daily., Disp: , Rfl:   •  sennosides-docusate (Senexon-S) 8.6-50 MG per tablet, Take 1 tablet by mouth 2 (Two) Times a Day As Needed for Constipation., Disp: , Rfl:   •  traMADol (ULTRAM) 50 MG tablet, Take 1 tablet by mouth Every 8 (Eight) Hours As Needed for Pain., Disp: , Rfl:   **I did not stop or change the above medications.  Patient's medication list was updated to reflect medications they have reported as currently taking, including medication changes made by other providers.    OBJECTIVE  Vitals:    12/15/21 1538   BP: 128/72   Pulse: 98   SpO2: 99%     Body mass index is 25.33 kg/m².    Diagnostics:  Imaging reviewed above.    Laboratory Results:         Lab Results   Component Value Date    WBC 14.42 (H) 04/23/2021    HGB 10.7 (L) 04/23/2021    HCT 31.4 (L) 04/23/2021    MCV 92.6 04/23/2021     04/23/2021     Lab Results   Component Value Date    GLUCOSE 96 04/23/2021    BUN 19 04/23/2021    CREATININE 0.51 (L) 04/23/2021    EGFRIFNONA 119 04/23/2021    EGFRIFAFRI 100 01/19/2021    BCR 37.3 (H) 04/23/2021    K 3.8 04/23/2021    CO2 23.5 04/23/2021    CALCIUM 9.2 04/23/2021    PROTENTOTREF 6.9 01/19/2021    ALBUMIN  4.30 04/17/2021    LABIL2 1.5 01/19/2021    AST 31 04/17/2021    ALT 21 04/17/2021     Lab Results   Component Value Date    HGBA1C 5.71 (H) 04/18/2021     Lab Results   Component Value Date    CHOL 118 04/20/2021    CHOL 171 04/18/2021     Lab Results   Component Value Date    HDL 28 (L) 04/20/2021    HDL 30 (L) 04/18/2021    HDL 39 (L) 01/19/2021     Lab Results   Component Value Date    LDL 69 04/20/2021     (H) 04/18/2021     (H) 01/19/2021     Lab Results   Component Value Date    TRIG 115 04/20/2021    TRIG 85 04/18/2021    TRIG 159 (H) 01/19/2021     No results found for: RPR  Lab Results   Component Value Date    TSH 4.250 (H) 04/18/2021     No results found for: [id number]    Physical Exam:  GENERAL: NAD, overweight  HEENT: Normocephalic, atraumatic   COR: RRR  Resp: Even and unlabored  Extremities: No signs of distal embolization.   Skin: No rashes, lesions or ulcers.  Psychiatric: Normal mood and affect.    Neurological:   MS: AO.Expressive aphasia but much improved. Naming intact with effort. No neglect. Follows all commands.  CN: II-XII grossly normal except for anisocoria d/t surgical   Motor: Normal strength and tone throughout.  Sensory: Intact to light touch in arms and legs  Station and Gait: Mildly unsteady gait, utilizing walker  Coordination: Normal finger to nose bilaterally    Impression/Plan:  Ms. Castaneda presents for f/u of LMCA infarct d/t left MCA thrombus s/p endovascular angioplasty who is doing well on DAPT +statin with residual expressive aphasia but no significant hemiparesis. She is followed by Dr. Cheng and is scheduled to have f/u CTA neck to re-evaluate for any worsening stenosis although there has been some delay for this d/t IV access issues. . I have recommended she continue her current medication regimen until getting the scan and follow-up with Dr. Cheng. We reviewed the importance of vascular risk factor including BP, cholesterol, blood sugar; however she needs  to avoid hypotension and keep well-hydrated as able. She will f/u here in 6 months.    Secondary stroke prevention: Ideal targets for stroke prevention would be Blood pressure < 130/80; B12 > 500 TSH in normal range and LDL < 70; HbA1c < 6.5 and smoking cessation if applicable.Call 911 for stroke symptoms      I spent a total of 30 minutes today in reviewing records, prior diagnostics, examination of patient including, counseling and educating patient regarding signs/symptoms of stroke and reviewing diagnostics, stroke prevention recommendations and discussion and documenting plan of care.        There are no diagnoses linked to this encounter.    Coding      Dictated using Dragon

## 2022-01-05 ENCOUNTER — OFFICE VISIT (OUTPATIENT)
Dept: FAMILY MEDICINE CLINIC | Facility: CLINIC | Age: 72
End: 2022-01-05

## 2022-01-05 VITALS
TEMPERATURE: 97.8 F | BODY MASS INDEX: 25.34 KG/M2 | WEIGHT: 143 LBS | SYSTOLIC BLOOD PRESSURE: 134 MMHG | DIASTOLIC BLOOD PRESSURE: 58 MMHG | OXYGEN SATURATION: 99 % | HEIGHT: 63 IN | HEART RATE: 101 BPM

## 2022-01-05 DIAGNOSIS — I63.312 CEREBROVASCULAR ACCIDENT (CVA) DUE TO THROMBOSIS OF LEFT MIDDLE CEREBRAL ARTERY: Primary | ICD-10-CM

## 2022-01-05 DIAGNOSIS — K59.00 CONSTIPATION, UNSPECIFIED CONSTIPATION TYPE: ICD-10-CM

## 2022-01-05 DIAGNOSIS — I10 ESSENTIAL HYPERTENSION: ICD-10-CM

## 2022-01-05 DIAGNOSIS — J30.1 NON-SEASONAL ALLERGIC RHINITIS DUE TO POLLEN: ICD-10-CM

## 2022-01-05 DIAGNOSIS — I50.32 CHRONIC DIASTOLIC (CONGESTIVE) HEART FAILURE: ICD-10-CM

## 2022-01-05 DIAGNOSIS — R47.01 EXPRESSIVE APHASIA: ICD-10-CM

## 2022-01-05 DIAGNOSIS — Z23 COVID-19 VACCINE ADMINISTERED: ICD-10-CM

## 2022-01-05 PROCEDURE — 91300 COVID-19 (PFIZER): CPT | Performed by: NURSE PRACTITIONER

## 2022-01-05 PROCEDURE — 99215 OFFICE O/P EST HI 40 MIN: CPT | Performed by: NURSE PRACTITIONER

## 2022-01-05 PROCEDURE — 0002A COVID-19 (PFIZER): CPT | Performed by: NURSE PRACTITIONER

## 2022-01-05 RX ORDER — POLYETHYLENE GLYCOL 3350
17 POWDER (GRAM) MISCELLANEOUS DAILY
Qty: 20000 G | Refills: 5 | Status: SHIPPED | OUTPATIENT
Start: 2022-01-05

## 2022-01-05 RX ORDER — LACTULOSE 10 G/15ML
30 SOLUTION ORAL DAILY
Qty: 1892 ML | Refills: 5 | Status: SHIPPED | OUTPATIENT
Start: 2022-01-05

## 2022-01-05 RX ORDER — MONTELUKAST SODIUM 10 MG/1
10 TABLET ORAL NIGHTLY
Qty: 30 TABLET | Refills: 2 | Status: SHIPPED | OUTPATIENT
Start: 2022-01-05 | End: 2022-03-30

## 2022-01-05 RX ORDER — AMOXICILLIN 250 MG
1 CAPSULE ORAL 2 TIMES DAILY PRN
Qty: 180 TABLET | Refills: 0 | Status: SHIPPED | OUTPATIENT
Start: 2022-01-05 | End: 2022-04-05

## 2022-01-05 RX ORDER — LISINOPRIL 2.5 MG/1
2.5 TABLET ORAL DAILY
Qty: 90 TABLET | Refills: 1 | Status: SHIPPED | OUTPATIENT
Start: 2022-01-05 | End: 2022-06-30 | Stop reason: SDUPTHER

## 2022-01-05 RX ORDER — FLUTICASONE PROPIONATE 50 MCG
1 SPRAY, SUSPENSION (ML) NASAL DAILY
Qty: 16 G | Refills: 2 | Status: SHIPPED | OUTPATIENT
Start: 2022-01-05 | End: 2022-03-30

## 2022-01-05 RX ORDER — CETIRIZINE HYDROCHLORIDE 5 MG/1
5 TABLET ORAL DAILY
Qty: 30 TABLET | Refills: 2 | Status: SHIPPED | OUTPATIENT
Start: 2022-01-05 | End: 2022-03-30

## 2022-01-05 NOTE — PROGRESS NOTES
"Chief Complaint  Cerebrovascular Accident (4/21  d/c from rehab 12/26) and Med Refill    Subjective          Jazmyn Csataneda presents to [facility]  This is my first time seeing this patient. Daughter was present during the history-taking and subsequent discussion; and for the physical exam with this patient.  Patient agrees to the presence of the individual during this visit.    Patient had a CVA on 4/17/2, she stayed for about 1 week in the hospital and then was discharged to [facility] for rehab. Patient's daughter reports that she was told that she had to leave after about 3 days because insurance would no longer cover her staying in [facility]. Patient was then relocated to [facility] on the memory care unit. Patient had been in the care of [facility] until 12/29/21 in which she was able to come home to live with her daughter. Patient's daughter states that the only medications that the patient was discharged with were Atorvastatin and Plavix. Patient in need of other medications to be refilled and discussed on whether or not she needs to be taking these medications. Currently she has not been taking the lisinopril, blood pressures have been steady at home and while she was living in [facility]. Patient states that she needs the medications that help with constipation and allergies. Patient has been reporting that she has had back pain since coming home. She usually will feel the pain when she is sitting for a long time, the pain will get better when she is up and moving around. Patient has not been taking the tramadol or tylenol. Patient's speech is slightly slowed but understandable, and not slurred. She does not have much trouble understanding speech but has more trouble with expressing some speech.        Objective   Vital Signs:   /58   Pulse 101   Temp 97.8 °F (36.6 °C) (Infrared)   Ht 160 cm (63\")   Wt 64.9 kg (143 lb)   SpO2 99%   BMI " 25.33 kg/m²     Physical Exam  Vitals reviewed.   Constitutional:       General: She is awake. She is not in acute distress.     Appearance: Normal appearance.   HENT:      Head: Normocephalic.      Right Ear: Hearing, tympanic membrane, ear canal and external ear normal.      Left Ear: Hearing, tympanic membrane, ear canal and external ear normal.   Eyes:      General: Lids are normal. Vision grossly intact.      Pupils: Pupils are equal, round, and reactive to light.   Neck:      Thyroid: No thyroid mass or thyroid tenderness.      Vascular: No carotid bruit or JVD.   Cardiovascular:      Rate and Rhythm: Normal rate and regular rhythm.      Pulses: Normal pulses.           Radial pulses are 2+ on the right side and 2+ on the left side.        Dorsalis pedis pulses are 2+ on the right side and 2+ on the left side.        Posterior tibial pulses are 2+ on the right side and 2+ on the left side.      Heart sounds: Normal heart sounds.   Pulmonary:      Effort: Pulmonary effort is normal.      Breath sounds: Normal breath sounds.   Musculoskeletal:      Cervical back: Neck supple.   Lymphadenopathy:      Cervical: No cervical adenopathy.   Skin:     General: Skin is warm and dry.      Capillary Refill: Capillary refill takes less than 2 seconds.   Neurological:      Mental Status: She is alert.      Motor: Weakness (right sided) present.      Gait: Gait abnormal.   Psychiatric:         Attention and Perception: Attention normal.         Mood and Affect: Mood normal.         Behavior: Behavior is cooperative.      Comments: Expressive aphasia present        Result Review :   The following data was reviewed by: SANAZ Muniz on 01/05/2022:  CMP    CMP 4/21/21 4/22/21 4/23/21   Glucose 102 (A) 86 96   BUN 10 17 19   Creatinine 0.49 (A) 0.57 0.51 (A)   eGFR Non African Am 124 104 119   Sodium 141 141 142   Potassium 3.5 4.0 3.8   Chloride 108 (A) 110 (A) 111 (A)   Calcium 9.3 9.6 9.2   (A) Abnormal value        Comments are available for some flowsheets but are not being displayed.           CBC w/diff    CBC w/Diff 4/21/21 4/22/21 4/23/21   WBC 11.87 (A) 13.57 (A) 14.42 (A)   RBC 3.65 (A) 3.60 (A) 3.39 (A)   Hemoglobin 11.3 (A) 11.3 (A) 10.7 (A)   Hematocrit 33.8 (A) 33.7 (A) 31.4 (A)   MCV 92.6 93.6 92.6   MCH 31.0 31.4 31.6   MCHC 33.4 33.5 34.1   RDW 12.8 12.8 12.8   Platelets 253 247 262   Neutrophil Rel % 66.3 68.8 66.8   Immature Granulocyte Rel % 0.5 0.7 (A) 0.7 (A)   Lymphocyte Rel % 23.2 20.6 23.1   Monocyte Rel % 6.8 6.7 6.8   Eosinophil Rel % 2.7 2.7 2.0   Basophil Rel % 0.5 0.5 0.6   (A) Abnormal value            Lipid Panel    Lipid Panel 1/19/21 4/18/21 4/20/21   Total Cholesterol  171 118   Total Cholesterol 205 (A)     Triglycerides 159 (A) 85 115   HDL Cholesterol 39 (A) 30 (A) 28 (A)   VLDL Cholesterol 29 16 21   LDL Cholesterol  137 (A) 125 (A) 69   LDL/HDL Ratio  4.13 2.39   (A) Abnormal value            Assessment and Plan    Diagnoses and all orders for this visit:    1. Cerebrovascular accident (CVA) due to thrombosis of left middle cerebral artery (HCC) (Primary)    2. Expressive aphasia    3. Chronic diastolic (congestive) heart failure (HCC)  -     lisinopril (PRINIVIL,ZESTRIL) 2.5 MG tablet; Take 1 tablet by mouth Daily.  Dispense: 90 tablet; Refill: 1    4. Essential hypertension  -     lisinopril (PRINIVIL,ZESTRIL) 2.5 MG tablet; Take 1 tablet by mouth Daily.  Dispense: 90 tablet; Refill: 1    5. Non-seasonal allergic rhinitis due to pollen  -     cetirizine (zyrTEC) 5 MG tablet; Take 1 tablet by mouth Daily for 90 days.  Dispense: 30 tablet; Refill: 2  -     fluticasone (FLONASE) 50 MCG/ACT nasal spray; 1 spray into the nostril(s) as directed by provider Daily.  Dispense: 16 g; Refill: 2  -     montelukast (SINGULAIR) 10 MG tablet; Take 1 tablet by mouth Every Night for 90 days.  Dispense: 30 tablet; Refill: 2    6. Constipation, unspecified constipation type  -     lactulose (CHRONULAC) 10  GM/15ML solution; Take 30 mL by mouth Daily.  Dispense: 1892 mL; Refill: 5  -     Polyethylene Glycol 3350 powder; Take 17 g by mouth Daily.  Dispense: 76630 g; Refill: 5  -     sennosides-docusate (Senexon-S) 8.6-50 MG per tablet; Take 1 tablet by mouth 2 (Two) Times a Day As Needed for Constipation for up to 90 days.  Dispense: 180 tablet; Refill: 0    7. COVID-19 vaccine administered  -     COVID-19 Vaccine (Pfizer)    Refilled prescriptions for allergic rhinitis and constipation.   Patient asked about getting third Covid-19 vaccination. Placed order and patient was able to receive third Covid-19 vaccine at this visit.   Discussed with patient that she can take OTC tylenol for back pain. Educated patient and her daughter that patient should not exceed 5g of tylenol in 24 hours in order to protect patient's liver.   Discussed with patient that while her blood pressure is stable today, she should still be on her lisinopril for heart failure. Given that she is on a low dose, this should not greatly affect her blood pressure and would be more for the prevention of patient going into further symptoms of heart failure.   Discussed with patient that she should make follow up appointment with Dr. Yi to discuss obtaining a handicap parking voucher/sticker for her daughter who will be primarily driving the patient.   Discussed with patient and her daughter that she needs to keep following up with neurology and neurosurgery to check on the status of her CVA progression and make sure that she is not deteriorating.     I spent 40 minutes caring for Jazmyn on this date of service. This time includes time spent by me in the following activities:reviewing tests, obtaining and/or reviewing a separately obtained history, performing a medically appropriate examination and/or evaluation , counseling and educating the patient/family/caregiver, ordering medications, tests, or procedures and documenting information in the medical  record     Follow Up   Return in about 3 months (around 4/5/2022) for Recheck.  Patient was given instructions and counseling regarding her condition or for health maintenance advice. Please see specific information pulled into the AVS if appropriate.

## 2022-01-06 PROBLEM — R47.01 EXPRESSIVE APHASIA: Status: ACTIVE | Noted: 2022-01-06

## 2022-01-06 PROBLEM — I63.312 CEREBROVASCULAR ACCIDENT (CVA) DUE TO THROMBOSIS OF LEFT MIDDLE CEREBRAL ARTERY: Status: ACTIVE | Noted: 2022-01-06

## 2022-01-18 ENCOUNTER — CLINICAL SUPPORT (OUTPATIENT)
Dept: AUDIOLOGY | Facility: CLINIC | Age: 72
End: 2022-01-18
Payer: MEDICARE

## 2022-01-18 DIAGNOSIS — Z97.4 WEARS HEARING AID IN BOTH EARS: Primary | ICD-10-CM

## 2022-01-18 PROCEDURE — 99499 UNLISTED E&M SERVICE: CPT | Mod: S$GLB,,, | Performed by: AUDIOLOGIST-HEARING AID FITTER

## 2022-01-18 PROCEDURE — 99499 NO LOS: ICD-10-PCS | Mod: S$GLB,,, | Performed by: AUDIOLOGIST-HEARING AID FITTER

## 2022-01-18 NOTE — PROGRESS NOTES
Kenna Schmitt came in on 01/18/2022 for a hearing aid follow up. Pt stated the right aid was not working well and seemed muffled and neither were paired with her phone. Cleaned both aids and changed the wax filters and domes. Listening check revealed aids to sound appropriate for her hearing loss. Paired both aids with her phone in the elvia and in BT. She said she tried pairing many times but was unsuccessful. Pt will call the clinic PRN.

## 2022-01-25 NOTE — TELEPHONE ENCOUNTER
Caller: Laine Garza    Relationship: Emergency Contact    Best call back number: [phone]  Requested Prescriptions:   Requested Prescriptions     Pending Prescriptions Disp Refills   • aspirin 325 MG tablet 30 tablet 0     Sig: Administer 1 tablet per G tube Daily.   • atorvastatin (LIPITOR) 80 MG tablet 30 tablet 0     Sig: Administer 1 tablet per G tube Every Night.   • clopidogrel (PLAVIX) 75 MG tablet 30 tablet 0     Sig: Administer 1 tablet per G tube Daily.        Pharmacy where request should be sent: [id number] [address]  AT [facility] [phone] [facility] [phone] FX     Additional details provided by patient: 4 DAYS LEFT    Does the patient have less than a 3 day supply:  [] Yes  [x] No    Nunu Orozco Rep   01/25/22 13:36 EST

## 2022-01-26 RX ORDER — ASPIRIN 325 MG
325 TABLET ORAL DAILY
Qty: 90 TABLET | Refills: 1 | Status: SHIPPED | OUTPATIENT
Start: 2022-01-26 | End: 2022-07-25 | Stop reason: SDUPTHER

## 2022-01-26 RX ORDER — CLOPIDOGREL BISULFATE 75 MG/1
75 TABLET ORAL DAILY
Qty: 90 TABLET | Refills: 1 | Status: SHIPPED | OUTPATIENT
Start: 2022-01-26 | End: 2022-07-25 | Stop reason: SDUPTHER

## 2022-01-26 RX ORDER — ATORVASTATIN CALCIUM 80 MG/1
80 TABLET, FILM COATED ORAL NIGHTLY
Qty: 90 TABLET | Refills: 1 | Status: SHIPPED | OUTPATIENT
Start: 2022-01-26 | End: 2022-07-25 | Stop reason: SDUPTHER

## 2022-03-30 DIAGNOSIS — J30.1 NON-SEASONAL ALLERGIC RHINITIS DUE TO POLLEN: ICD-10-CM

## 2022-03-30 RX ORDER — FLUTICASONE PROPIONATE 50 MCG
SPRAY, SUSPENSION (ML) NASAL
Qty: 16 G | Refills: 2 | Status: SHIPPED | OUTPATIENT
Start: 2022-03-30

## 2022-03-30 RX ORDER — MONTELUKAST SODIUM 10 MG/1
TABLET ORAL
Qty: 30 TABLET | Refills: 2 | Status: SHIPPED | OUTPATIENT
Start: 2022-03-30 | End: 2022-06-30 | Stop reason: SDUPTHER

## 2022-03-30 RX ORDER — CETIRIZINE HYDROCHLORIDE 5 MG/1
TABLET ORAL
Qty: 30 TABLET | Refills: 2 | Status: SHIPPED | OUTPATIENT
Start: 2022-03-30 | End: 2022-06-24 | Stop reason: SDUPTHER

## 2022-03-30 NOTE — TELEPHONE ENCOUNTER
Rx Refill Note  Requested Prescriptions     Pending Prescriptions Disp Refills   • montelukast (SINGULAIR) 10 MG tablet [Pharmacy Med Name: MONTELUKAST 10MG TABLETS] 30 tablet 2     Sig: TAKE 1 TABLET BY MOUTH EVERY NIGHT   • cetirizine (zyrTEC) 5 MG tablet [Pharmacy Med Name: CETIRIZINE 5MG TABLETS] 30 tablet 2     Sig: TAKE 1 TABLET BY MOUTH DAILY   • fluticasone (FLONASE) 50 MCG/ACT nasal spray [Pharmacy Med Name: FLUTICASONE 50MCG NASAL SP (120) RX] 16 g 2     Sig: INSTILL 1 SPRAY IN EACH NOSTRIL DAILY AS DIRECTED      Last office visit with prescribing clinician: 1/5/2022      Next office visit with prescribing clinician: Visit date not found            Ezequiel Franklin MA  03/30/22, 08:10 EDT

## 2022-04-14 DIAGNOSIS — E78.5 HYPERLIPIDEMIA: ICD-10-CM

## 2022-04-14 DIAGNOSIS — I10 ESSENTIAL HYPERTENSION: ICD-10-CM

## 2022-04-14 NOTE — TELEPHONE ENCOUNTER
Care Due:                  Date            Visit Type   Department     Provider  --------------------------------------------------------------------------------                                EP -                              PRIMARY      [facility] FAMILY  Last Visit: 07-      CARE ([facility])   MEDICINE       [facility]  Next Visit: None Scheduled  None         None Found                                                            Last  Test          Frequency    Reason                     Performed    Due Date  --------------------------------------------------------------------------------    Office Visit  12 months..  atorvastatin,              07- 07-                             chlorthalidone, valsartan    CMP.........  12 months..  atorvastatin,              07-   07-                             chlorthalidone, valsartan    Lipid Panel.  12 months..  atorvastatin.............  07-   07-    Powered by [employer] 2000. Reference number: [id number].   4/14/2022 5:22:14 PM CDT

## 2022-04-15 RX ORDER — CHLORTHALIDONE 25 MG/1
25 TABLET ORAL DAILY
Qty: 90 TABLET | Refills: 3 | Status: SHIPPED | OUTPATIENT
Start: 2022-04-15 | End: 2023-03-01 | Stop reason: SDUPTHER

## 2022-04-15 RX ORDER — ATORVASTATIN CALCIUM 20 MG/1
20 TABLET, FILM COATED ORAL DAILY
Qty: 90 TABLET | Refills: 3 | Status: SHIPPED | OUTPATIENT
Start: 2022-04-15 | End: 2023-03-01 | Stop reason: SDUPTHER

## 2022-04-15 NOTE — TELEPHONE ENCOUNTER
Refill Routing Note   Medication(s) are not appropriate for processing by [facility] for the following reason(s):      - Required vitals are abnormal  - Patient has been seen in the ED/Hospital since the last PCP visit    [facility] action(s):  Defer Medication-related problems identified:   Requires labs  Requires appointment        Medication reconciliation completed: No     Appointments  past 12m or future 3m with PCP    Date Provider   Last Visit   7/6/2021 Anthony Varner Jr., MD   Next Visit   Visit date not found Anthony Varner Jr., MD   ED visits in past 90 days: 0        Note composed:7:15 PM 04/14/2022

## 2022-05-10 ENCOUNTER — CLINICAL SUPPORT (OUTPATIENT)
Dept: AUDIOLOGY | Facility: CLINIC | Age: 72
End: 2022-05-10
Payer: MEDICARE

## 2022-05-10 DIAGNOSIS — Z97.4 WEARS HEARING AID IN BOTH EARS: Primary | ICD-10-CM

## 2022-05-10 PROCEDURE — 99499 UNLISTED E&M SERVICE: CPT | Mod: S$GLB,,, | Performed by: AUDIOLOGIST-HEARING AID FITTER

## 2022-05-10 PROCEDURE — 99499 NO LOS: ICD-10-PCS | Mod: S$GLB,,, | Performed by: AUDIOLOGIST-HEARING AID FITTER

## 2022-05-10 NOTE — PROGRESS NOTES
Kenna Schmitt came in on 05/10/2022 for a hearing aid follow up. Pt stated she hears the background noise very loudly in the last few days and she hears FB in the left aid. Cleaned both aids and changed the wax filters and domes. Listening check revealed aids to sound appropriate for her hearing loss. Recalculated thresholds from new hearing test, increased noiseblock and sound relax from 8-11 and performed a new FB test AS. She will use the hearing aids in the setting she was having difficulty in and call the clinic if she needs further adjusting.   .

## 2022-05-31 ENCOUNTER — PATIENT MESSAGE (OUTPATIENT)
Dept: ADMINISTRATIVE | Facility: HOSPITAL | Age: 72
End: 2022-05-31
Payer: MEDICARE

## 2022-06-01 ENCOUNTER — HOSPITAL ENCOUNTER (OUTPATIENT)
Dept: RADIOLOGY | Facility: HOSPITAL | Age: 72
Discharge: HOME | End: 2022-06-01
Attending: EMERGENCY MEDICINE
Payer: MEDICARE

## 2022-06-01 ENCOUNTER — OFFICE VISIT (OUTPATIENT)
Dept: FAMILY MEDICINE | Facility: CLINIC | Age: 72
End: 2022-06-01
Payer: MEDICARE

## 2022-06-01 ENCOUNTER — TELEPHONE (OUTPATIENT)
Dept: FAMILY MEDICINE | Facility: CLINIC | Age: 72
End: 2022-06-01
Payer: MEDICARE

## 2022-06-01 VITALS
HEART RATE: 89 BPM | OXYGEN SATURATION: 97 % | HEIGHT: 60 IN | SYSTOLIC BLOOD PRESSURE: 154 MMHG | BODY MASS INDEX: 44.88 KG/M2 | DIASTOLIC BLOOD PRESSURE: 68 MMHG | WEIGHT: 228.63 LBS

## 2022-06-01 DIAGNOSIS — I10 ESSENTIAL HYPERTENSION: ICD-10-CM

## 2022-06-01 DIAGNOSIS — Z12.31 ENCOUNTER FOR SCREENING MAMMOGRAM FOR MALIGNANT NEOPLASM OF BREAST: ICD-10-CM

## 2022-06-01 DIAGNOSIS — E03.4 HYPOTHYROIDISM DUE TO ACQUIRED ATROPHY OF THYROID: Primary | ICD-10-CM

## 2022-06-01 PROCEDURE — 1159F MED LIST DOCD IN RCRD: CPT | Mod: CPTII,S$GLB,, | Performed by: PHYSICIAN ASSISTANT

## 2022-06-01 PROCEDURE — 77063 MAMMO DIGITAL SCREENING BILAT WITH TOMO: ICD-10-PCS | Mod: 26,,, | Performed by: RADIOLOGY

## 2022-06-01 PROCEDURE — 3288F FALL RISK ASSESSMENT DOCD: CPT | Mod: CPTII,S$GLB,, | Performed by: PHYSICIAN ASSISTANT

## 2022-06-01 PROCEDURE — 99999 PR PBB SHADOW E&M-EST. PATIENT-LVL IV: CPT | Mod: PBBFAC,,, | Performed by: PHYSICIAN ASSISTANT

## 2022-06-01 PROCEDURE — 3288F PR FALLS RISK ASSESSMENT DOCUMENTED: ICD-10-PCS | Mod: CPTII,S$GLB,, | Performed by: PHYSICIAN ASSISTANT

## 2022-06-01 PROCEDURE — 77067 SCR MAMMO BI INCL CAD: CPT | Mod: 26,,, | Performed by: RADIOLOGY

## 2022-06-01 PROCEDURE — 3078F DIAST BP <80 MM HG: CPT | Mod: CPTII,S$GLB,, | Performed by: PHYSICIAN ASSISTANT

## 2022-06-01 PROCEDURE — 3077F PR MOST RECENT SYSTOLIC BLOOD PRESSURE >= 140 MM HG: ICD-10-PCS | Mod: CPTII,S$GLB,, | Performed by: PHYSICIAN ASSISTANT

## 2022-06-01 PROCEDURE — 3008F BODY MASS INDEX DOCD: CPT | Mod: CPTII,S$GLB,, | Performed by: PHYSICIAN ASSISTANT

## 2022-06-01 PROCEDURE — 1159F PR MEDICATION LIST DOCUMENTED IN MEDICAL RECORD: ICD-10-PCS | Mod: CPTII,S$GLB,, | Performed by: PHYSICIAN ASSISTANT

## 2022-06-01 PROCEDURE — 1101F PT FALLS ASSESS-DOCD LE1/YR: CPT | Mod: CPTII,S$GLB,, | Performed by: PHYSICIAN ASSISTANT

## 2022-06-01 PROCEDURE — 77067 SCR MAMMO BI INCL CAD: CPT | Mod: TC,PO

## 2022-06-01 PROCEDURE — 77067 MAMMO DIGITAL SCREENING BILAT WITH TOMO: ICD-10-PCS | Mod: 26,,, | Performed by: RADIOLOGY

## 2022-06-01 PROCEDURE — 99214 OFFICE O/P EST MOD 30 MIN: CPT | Mod: S$GLB,,, | Performed by: PHYSICIAN ASSISTANT

## 2022-06-01 PROCEDURE — 77063 BREAST TOMOSYNTHESIS BI: CPT | Mod: 26,,, | Performed by: RADIOLOGY

## 2022-06-01 PROCEDURE — 4010F ACE/ARB THERAPY RXD/TAKEN: CPT | Mod: CPTII,S$GLB,, | Performed by: PHYSICIAN ASSISTANT

## 2022-06-01 PROCEDURE — 77063 BREAST TOMOSYNTHESIS BI: CPT | Mod: TC,PO

## 2022-06-01 PROCEDURE — 3077F SYST BP >= 140 MM HG: CPT | Mod: CPTII,S$GLB,, | Performed by: PHYSICIAN ASSISTANT

## 2022-06-01 PROCEDURE — 99214 PR OFFICE/OUTPT VISIT, EST, LEVL IV, 30-39 MIN: ICD-10-PCS | Mod: S$GLB,,, | Performed by: PHYSICIAN ASSISTANT

## 2022-06-01 PROCEDURE — 4010F PR ACE/ARB THEARPY RXD/TAKEN: ICD-10-PCS | Mod: CPTII,S$GLB,, | Performed by: PHYSICIAN ASSISTANT

## 2022-06-01 PROCEDURE — 1126F PR PAIN SEVERITY QUANTIFIED, NO PAIN PRESENT: ICD-10-PCS | Mod: CPTII,S$GLB,, | Performed by: PHYSICIAN ASSISTANT

## 2022-06-01 PROCEDURE — 3008F PR BODY MASS INDEX (BMI) DOCUMENTED: ICD-10-PCS | Mod: CPTII,S$GLB,, | Performed by: PHYSICIAN ASSISTANT

## 2022-06-01 PROCEDURE — 1101F PR PT FALLS ASSESS DOC 0-1 FALLS W/OUT INJ PAST YR: ICD-10-PCS | Mod: CPTII,S$GLB,, | Performed by: PHYSICIAN ASSISTANT

## 2022-06-01 PROCEDURE — 1126F AMNT PAIN NOTED NONE PRSNT: CPT | Mod: CPTII,S$GLB,, | Performed by: PHYSICIAN ASSISTANT

## 2022-06-01 PROCEDURE — 3078F PR MOST RECENT DIASTOLIC BLOOD PRESSURE < 80 MM HG: ICD-10-PCS | Mod: CPTII,S$GLB,, | Performed by: PHYSICIAN ASSISTANT

## 2022-06-01 PROCEDURE — 99999 PR PBB SHADOW E&M-EST. PATIENT-LVL IV: ICD-10-PCS | Mod: PBBFAC,,, | Performed by: PHYSICIAN ASSISTANT

## 2022-06-01 RX ORDER — ZINC GLUCONATE 50 MG
50 TABLET ORAL DAILY
COMMUNITY

## 2022-06-01 RX ORDER — IBUPROFEN 100 MG/5ML
1000 SUSPENSION, ORAL (FINAL DOSE FORM) ORAL DAILY
COMMUNITY

## 2022-06-01 NOTE — PROGRESS NOTES
Subjective:       Patient ID: Kenna Schmitt is a 71 y.o. female       Chief Complaint: Allergies and Annual Exam    HPI  Patient's  PCP: Anthony Varner Jr, MD.  The patient is new to me  Patient's past medical history includes:  Hypertension, hyperlipidemia, hypothyroidism, GERD    HTN:  Taking Valstartan daily, she checks her BP at home and it is always good (<140/90)  Floaters and poor near vision:  She will follow up with her optometrist    Review of Systems   Constitutional: Negative for activity change, chills and fever.   HENT: Negative for congestion and sore throat.    Eyes: Negative for visual disturbance.   Respiratory: Negative for cough and shortness of breath.    Cardiovascular: Negative for chest pain and palpitations.   Gastrointestinal: Negative for abdominal pain, diarrhea, nausea and vomiting.   Endocrine: Negative for polydipsia and polyuria.   Musculoskeletal: Negative for myalgias.   Skin: Negative for rash.   Neurological: Negative for headaches.   Psychiatric/Behavioral: The patient is not nervous/anxious.         Objective:   Physical Exam  Constitutional:       General: She is not in acute distress.     Appearance: She is well-developed. She is not diaphoretic.   HENT:      Head: Normocephalic and atraumatic.   Eyes:      Pupils: Pupils are equal, round, and reactive to light.   Cardiovascular:      Rate and Rhythm: Normal rate and regular rhythm.      Heart sounds: Normal heart sounds. No murmur heard.    No friction rub. No gallop.   Pulmonary:      Effort: Pulmonary effort is normal. No respiratory distress.      Breath sounds: Normal breath sounds. No wheezing or rales.   Chest:      Chest wall: No tenderness.   Musculoskeletal:         General: Normal range of motion.      Cervical back: Normal range of motion and neck supple.   Skin:     General: Skin is warm and dry.      Findings: No rash.   Neurological:      Mental Status: She is alert and oriented to person, place, and time.    Psychiatric:         Behavior: Behavior normal.         Thought Content: Thought content normal.         Judgment: Judgment normal.          Assessment:       1. Hypothyroidism due to acquired atrophy of thyroid  TSH   2. Essential hypertension  Comprehensive Metabolic Panel    Lipid Panel        Plan:       Hypothyroidism due to acquired atrophy of thyroid  -     TSH; Future; Expected date: 06/01/2022    Essential hypertension  -     Comprehensive Metabolic Panel; Future; Expected date: 06/01/2022  -     Lipid Panel; Future; Expected date: 06/01/2022  - continue current medication  - keep a home log and bring to annual with Dr Varner       Follow up in about 6 weeks (around 7/13/2022) for annual physical with Dr Varner.       Florinda Barajas PA-C   06/01/2022   8:52 AM

## 2022-06-01 NOTE — TELEPHONE ENCOUNTER
----- Message from Taina Dan MA sent at 6/1/2022  9:11 AM CDT -----  Good morning,     Florinda Barajas wanted pt to f/u with Dr. Varner for July 4th, could e please add pt at 8:20. Thank you, If that is not available could we please call pt and make that appt.        Thank you

## 2022-06-02 ENCOUNTER — LAB VISIT (OUTPATIENT)
Dept: LAB | Facility: HOSPITAL | Age: 72
End: 2022-06-02
Attending: PHYSICIAN ASSISTANT
Payer: MEDICARE

## 2022-06-02 DIAGNOSIS — I10 ESSENTIAL HYPERTENSION: ICD-10-CM

## 2022-06-02 DIAGNOSIS — E03.4 HYPOTHYROIDISM DUE TO ACQUIRED ATROPHY OF THYROID: ICD-10-CM

## 2022-06-02 LAB
ALBUMIN SERPL BCP-MCNC: 3.7 G/DL (ref 3.5–5.2)
ALP SERPL-CCNC: 81 U/L (ref 55–135)
ALT SERPL W/O P-5'-P-CCNC: 17 U/L (ref 10–44)
ANION GAP SERPL CALC-SCNC: 9 MMOL/L (ref 8–16)
AST SERPL-CCNC: 21 U/L (ref 10–40)
BILIRUB SERPL-MCNC: 0.6 MG/DL (ref 0.1–1)
BUN SERPL-MCNC: 11 MG/DL (ref 8–23)
CALCIUM SERPL-MCNC: 9.6 MG/DL (ref 8.7–10.5)
CHLORIDE SERPL-SCNC: 100 MMOL/L (ref 95–110)
CHOLEST SERPL-MCNC: 169 MG/DL (ref 120–199)
CHOLEST/HDLC SERPL: 3.5 {RATIO} (ref 2–5)
CO2 SERPL-SCNC: 30 MMOL/L (ref 23–29)
CREAT SERPL-MCNC: 0.6 MG/DL (ref 0.5–1.4)
EST. GFR  (AFRICAN AMERICAN): >60 ML/MIN/1.73 M^2
EST. GFR  (NON AFRICAN AMERICAN): >60 ML/MIN/1.73 M^2
GLUCOSE SERPL-MCNC: 92 MG/DL (ref 70–110)
HDLC SERPL-MCNC: 48 MG/DL (ref 40–75)
HDLC SERPL: 28.4 % (ref 20–50)
LDLC SERPL CALC-MCNC: 89.6 MG/DL (ref 63–159)
NONHDLC SERPL-MCNC: 121 MG/DL
POTASSIUM SERPL-SCNC: 3.6 MMOL/L (ref 3.5–5.1)
PROT SERPL-MCNC: 6.7 G/DL (ref 6–8.4)
SODIUM SERPL-SCNC: 139 MMOL/L (ref 136–145)
TRIGL SERPL-MCNC: 157 MG/DL (ref 30–150)
TSH SERPL DL<=0.005 MIU/L-ACNC: 2.51 UIU/ML (ref 0.4–4)

## 2022-06-02 PROCEDURE — 36415 COLL VENOUS BLD VENIPUNCTURE: CPT | Mod: PN | Performed by: PHYSICIAN ASSISTANT

## 2022-06-02 PROCEDURE — 80053 COMPREHEN METABOLIC PANEL: CPT | Performed by: PHYSICIAN ASSISTANT

## 2022-06-02 PROCEDURE — 84443 ASSAY THYROID STIM HORMONE: CPT | Performed by: PHYSICIAN ASSISTANT

## 2022-06-02 PROCEDURE — 80061 LIPID PANEL: CPT | Performed by: PHYSICIAN ASSISTANT

## 2022-06-15 ENCOUNTER — OFFICE VISIT (OUTPATIENT)
Dept: NEUROLOGY | Facility: CLINIC | Age: 72
End: 2022-06-15

## 2022-06-15 VITALS
SYSTOLIC BLOOD PRESSURE: 114 MMHG | DIASTOLIC BLOOD PRESSURE: 62 MMHG | BODY MASS INDEX: 27.29 KG/M2 | HEART RATE: 70 BPM | WEIGHT: 154 LBS | OXYGEN SATURATION: 98 % | HEIGHT: 63 IN

## 2022-06-15 DIAGNOSIS — E78.2 MIXED HYPERLIPIDEMIA: ICD-10-CM

## 2022-06-15 DIAGNOSIS — R47.01 EXPRESSIVE APHASIA: ICD-10-CM

## 2022-06-15 DIAGNOSIS — I63.132 CEREBRAL INFARCTION DUE TO EMBOLISM OF LEFT CAROTID ARTERY: ICD-10-CM

## 2022-06-15 PROBLEM — I67.9 CEREBROVASCULAR DISEASE: Status: ACTIVE | Noted: 2022-06-15

## 2022-06-15 PROCEDURE — 99215 OFFICE O/P EST HI 40 MIN: CPT | Performed by: NURSE PRACTITIONER

## 2022-06-15 NOTE — PROGRESS NOTES
"DOS: 6/15/2022  NAME: Jazmyn Castaneda   : 1950  PCP: Samira Yi MD    Chief Complaint   Patient presents with   • Cerebrovascular Accident      SUBJECTIVE  Neurological Problem:  71 y.o. female with left MCA stroke (2021), HTN, HLD, CHF who presents for follow-up of stroke.  She is accompanied by her daughter.     Interval History:   **For previous detailed history, please see progress note dated 12/15/2021.    Ms. Castaneda presents has a h/o LMCA infarct d/t left MCA thrombus s/p endovascular angioplasty(2021) subsequently treated with DAPT + statin. Last seen by me in office in Dec. 2021 with residual expressive aphasia but no significant hemiparesis. She was seen outpatient by Dr. Cheng and was scheduled to have f/u CTA neck to re-evaluate for any worsening stenosis; however this had not been completed at that time.     Patient presents today, along with her daughter with whom she now resides. She has completed rehab, continues with residual aphasia but no significant hemiparesis. Uses a walker, no falls. She denies any new stroke/TIA symptoms. She is adjusting to living with her daughter and DARIO, has recently gotten a dog that is training to be a service animal for her, named \"Jory\".  She does continue on DAPT + statin at this time, does not look like she has followed up with her primary care for any repeat lab work.  Denies any signs or symptoms of bleeding.  Denies any adverse effects from statin treatment.   She has not yet had a repeat CTA head and neck that was ordered by neurosurgery, patient states she has not heard anything about getting this done.   She states her BP is well controlled.  She does not smoke.  No recent falls.    Review of Systems:Review of Systems   Constitutional: Negative for chills, fatigue and fever.   HENT: Positive for hearing loss and tinnitus. Negative for trouble swallowing.    Eyes: Positive for visual disturbance. Negative for photophobia and " redness.   Respiratory: Positive for shortness of breath and wheezing. Negative for cough.    Cardiovascular: Negative for chest pain, palpitations and leg swelling.   Gastrointestinal: Negative for diarrhea, nausea and vomiting.   Endocrine: Negative for cold intolerance, heat intolerance and polydipsia.   Genitourinary: Negative for decreased urine volume, difficulty urinating and urgency.   Musculoskeletal: Positive for gait problem. Negative for back pain and neck pain.   Skin: Negative for color change, rash and wound.   Allergic/Immunologic: Negative for environmental allergies, food allergies and immunocompromised state.   Neurological: Positive for speech difficulty, weakness (right side) and numbness (right side). Negative for dizziness, tremors, seizures, syncope, facial asymmetry, light-headedness and headaches.   Hematological: Negative for adenopathy. Bruises/bleeds easily.   Psychiatric/Behavioral: Negative for confusion, decreased concentration and sleep disturbance. The patient is not nervous/anxious.     Above ROS reviewed    The following portions of the patient's history were reviewed and updated as appropriate: allergies, current medications, past family history, past medical history, past social history, past surgical history and problem list.    Current Medications:   Current Outpatient Medications:   •  acetaminophen (TYLENOL) 500 MG tablet, Take 500 mg by mouth Every 4 (Four) Hours As Needed for Mild Pain  or Fever., Disp: , Rfl:   •  aspirin 325 MG tablet, Administer 1 tablet per G tube Daily., Disp: 90 tablet, Rfl: 1  •  atorvastatin (LIPITOR) 80 MG tablet, Take 1 tablet by mouth Every Night., Disp: 90 tablet, Rfl: 1  •  calcium carbonate-cholecalciferol (Calcium 500+D) 500-400 MG-UNIT tablet tablet, Take 1 tablet by mouth Daily., Disp: , Rfl:   •  cetirizine (zyrTEC) 5 MG tablet, TAKE 1 TABLET BY MOUTH DAILY, Disp: 30 tablet, Rfl: 2  •  Cholecalciferol (D3 2000 PO), Take  by mouth Daily.,  Disp: , Rfl:   •  clopidogrel (PLAVIX) 75 MG tablet, Take 1 tablet by mouth Daily., Disp: 90 tablet, Rfl: 1  •  docusate sodium (COLACE) 50 MG capsule, Take  by mouth Daily., Disp: , Rfl:   •  fluticasone (FLONASE) 50 MCG/ACT nasal spray, INSTILL 1 SPRAY IN EACH NOSTRIL DAILY AS DIRECTED, Disp: 16 g, Rfl: 2  •  lactulose (CHRONULAC) 10 GM/15ML solution, Take 30 mL by mouth Daily., Disp: 1892 mL, Rfl: 5  •  lisinopril (PRINIVIL,ZESTRIL) 2.5 MG tablet, Take 1 tablet by mouth Daily., Disp: 90 tablet, Rfl: 1  •  montelukast (SINGULAIR) 10 MG tablet, TAKE 1 TABLET BY MOUTH EVERY NIGHT, Disp: 30 tablet, Rfl: 2  •  Polyethylene Glycol 3350 powder, Take 17 g by mouth Daily., Disp: 34288 g, Rfl: 5  **I did not stop or change the above medications.  Patient's medication list was updated to reflect medications they have reported as currently taking, including medication changes made by other providers.    OBJECTIVE  Vitals:    06/15/22 1100   BP: 114/62   Pulse: 70   SpO2: 98%     Body mass index is 27.28 kg/m².    Diagnostics:    Laboratory Results:         Lab Results   Component Value Date    WBC 14.42 (H) 04/23/2021    HGB 10.7 (L) 04/23/2021    HCT 31.4 (L) 04/23/2021    MCV 92.6 04/23/2021     04/23/2021     Lab Results   Component Value Date    GLUCOSE 96 04/23/2021    BUN 19 04/23/2021    CREATININE 0.51 (L) 04/23/2021    EGFRIFNONA 119 04/23/2021    EGFRIFAFRI 100 01/19/2021    BCR 37.3 (H) 04/23/2021    K 3.8 04/23/2021    CO2 23.5 04/23/2021    CALCIUM 9.2 04/23/2021    PROTENTOTREF 6.9 01/19/2021    ALBUMIN 4.30 04/17/2021    LABIL2 1.5 01/19/2021    AST 31 04/17/2021    ALT 21 04/17/2021     Lab Results   Component Value Date    HGBA1C 5.71 (H) 04/18/2021     Lab Results   Component Value Date    CHOL 118 04/20/2021    CHOL 171 04/18/2021     Lab Results   Component Value Date    HDL 28 (L) 04/20/2021    HDL 30 (L) 04/18/2021    HDL 39 (L) 01/19/2021     Lab Results   Component Value Date    LDL 69  04/20/2021     (H) 04/18/2021     (H) 01/19/2021     Lab Results   Component Value Date    TRIG 115 04/20/2021    TRIG 85 04/18/2021    TRIG 159 (H) 01/19/2021     No results found for: RPR  Lab Results   Component Value Date    TSH 4.250 (H) 04/18/2021     No results found for: [id number]    Physical Exam:  GENERAL: NAD, overweight  HEENT: Normocephalic, atraumatic   COR: RRR  Resp: Even and unlabored  Extremities: No signs of distal embolization.   Skin: No rashes, lesions or ulcers.  Psychiatric: Normal mood and affect.    Neurological:   MS: Alert. Oriented. Expressive aphasia with some modest improvement from previous. Naming of objects intact with effort. No neglect. Follows all commands.  CN: II-XII grossly normal except for anisocoria d/t surgery  Motor: Normal strength and tone throughout.  Sensory: Intact to light touch in arms and legs  Station and Gait: Mildly cautious but ambulates well with walker.     Coordination: Normal finger to nose bilaterally    Impression/Plan: Ms. Castaneda presents for follow-up of left MCA infarct d/t left MCA thrombus s/p endovascular angioplasty and continues on dual antiplatelet therapy + statin. As she is well beyond the 3 month time frame for DAPT treatment, okay for her to D/C ASA at this point but I do want her to f/u with JEFF as she was to have a repeat CTA head/neck to determine if any further intervention was deemed necessary. She was apparently lost to f/u so I referred today. Will check CMP and lipid panel, continue statin at current dose for now. Also recommend that patient continue with therapy exercises on regular basis. Can consider referral to ST for further therapy if patient is agreeable and willing to participate. She will f/u here in 6 months, sooner if symptoms warrant.     We reviewed importance of vascular RF control as noted below:   Secondary stroke prevention: Ideal targets for stroke prevention would be Blood pressure < 130/80; B12 > 500  TSH in normal range and LDL < 70; HbA1c < 6.5 and smoking cessation if applicable.  Signs/symptoms of stroke reviewed.         I spent a total of 45 minutes today in reviewing records, prior diagnostics, examination of patient including, counseling and educating patient regarding signs/symptoms of stroke and reviewing diagnostics, stroke prevention recommendations and discussion and documenting plan of care.      There are no diagnoses linked to this encounter.    Coding      Dictated using Dragon

## 2022-06-16 ENCOUNTER — TELEPHONE (OUTPATIENT)
Dept: NEUROSURGERY | Facility: CLINIC | Age: 72
End: 2022-06-16

## 2022-06-16 NOTE — TELEPHONE ENCOUNTER
Can you re enter order for CT head and neck that were never completed. Scheduling said they fell off their list and they need new orders. Thank you!

## 2022-06-20 ENCOUNTER — PATIENT MESSAGE (OUTPATIENT)
Dept: ADMINISTRATIVE | Facility: HOSPITAL | Age: 72
End: 2022-06-20
Payer: MEDICARE

## 2022-06-20 ENCOUNTER — PATIENT OUTREACH (OUTPATIENT)
Dept: ADMINISTRATIVE | Facility: HOSPITAL | Age: 72
End: 2022-06-20
Payer: MEDICARE

## 2022-06-20 NOTE — PROGRESS NOTES
Health Maintenance Due   Topic Date Due    TETANUS VACCINE  Never done    Pneumococcal Vaccines (Age 65+) (1 - PCV) Never done    Shingles Vaccine (2 of 3) 2014    DEXA Scan  2019    COVID-19 Vaccine (2 - Booster for Darshan series) 2021       Chart review completed 2022.  Care Everywhere updates requested and reviewed.  Immunizations reconciled. Media reports reviewed.  Duplicate HM overrides and  orders removed.  Overdue HM topic chart audit and/or requested.  Overdue lab testing linked to upcoming lab appointments if applies.

## 2022-06-21 DIAGNOSIS — I65.22 CAROTID STENOSIS, LEFT: Primary | ICD-10-CM

## 2022-06-21 DIAGNOSIS — I63.9 ACUTE ISCHEMIC STROKE: ICD-10-CM

## 2022-06-24 DIAGNOSIS — J30.1 NON-SEASONAL ALLERGIC RHINITIS DUE TO POLLEN: ICD-10-CM

## 2022-06-24 RX ORDER — CETIRIZINE HYDROCHLORIDE 5 MG/1
5 TABLET ORAL DAILY
Qty: 30 TABLET | Refills: 2 | Status: SHIPPED | OUTPATIENT
Start: 2022-06-24 | End: 2022-10-04 | Stop reason: SDUPTHER

## 2022-06-24 NOTE — TELEPHONE ENCOUNTER
Rx Refill Note  Requested Prescriptions     Pending Prescriptions Disp Refills   • cetirizine (zyrTEC) 5 MG tablet 30 tablet 2     Sig: Take 1 tablet by mouth Daily.      Last office visit with prescribing clinician: 1/5/2022      Next office visit with prescribing clinician: no office visit found    Last refill 03/20/2022            Suni Oconnell MA  06/24/22, 07:38 EDT

## 2022-06-30 DIAGNOSIS — I50.32 CHRONIC DIASTOLIC (CONGESTIVE) HEART FAILURE: ICD-10-CM

## 2022-06-30 DIAGNOSIS — I10 ESSENTIAL HYPERTENSION: ICD-10-CM

## 2022-06-30 DIAGNOSIS — J30.1 NON-SEASONAL ALLERGIC RHINITIS DUE TO POLLEN: ICD-10-CM

## 2022-06-30 RX ORDER — LISINOPRIL 2.5 MG/1
2.5 TABLET ORAL DAILY
Qty: 90 TABLET | Refills: 1 | Status: SHIPPED | OUTPATIENT
Start: 2022-06-30 | End: 2022-07-03

## 2022-06-30 RX ORDER — MONTELUKAST SODIUM 10 MG/1
10 TABLET ORAL NIGHTLY
Qty: 30 TABLET | Refills: 2 | Status: SHIPPED | OUTPATIENT
Start: 2022-06-30 | End: 2022-07-03

## 2022-06-30 NOTE — TELEPHONE ENCOUNTER
Rx Refill Note  Requested Prescriptions     Pending Prescriptions Disp Refills   • lisinopril (PRINIVIL,ZESTRIL) 2.5 MG tablet 90 tablet 1     Sig: Take 1 tablet by mouth Daily.      Last office visit with prescribing clinician: 1/5/2022      Next office visit with prescribing clinician: Visit date not found     LAST REFILL 01/05/2022           Suni Oconnell MA  06/30/22, 09:42 EDT

## 2022-06-30 NOTE — TELEPHONE ENCOUNTER
Rx Refill Note  Requested Prescriptions      No prescriptions requested or ordered in this encounter      Last office visit with prescribing clinician: 1/5/2022      Next office visit with prescribing clinician: NO APPT FOUND     LAST REFILL 01/05/2022  {TIP  Encounters:23}    {TIP  Please add Last Relevant Lab Date if appropriate:23}   {TIP  Is Refill Pharmacy correct?:23}  Suni Oconnell MA  06/30/22, 09:40 EDT

## 2022-07-01 DIAGNOSIS — I10 ESSENTIAL HYPERTENSION: ICD-10-CM

## 2022-07-01 DIAGNOSIS — I50.32 CHRONIC DIASTOLIC (CONGESTIVE) HEART FAILURE: ICD-10-CM

## 2022-07-01 DIAGNOSIS — J30.1 NON-SEASONAL ALLERGIC RHINITIS DUE TO POLLEN: ICD-10-CM

## 2022-07-03 RX ORDER — LISINOPRIL 2.5 MG/1
2.5 TABLET ORAL DAILY
Qty: 90 TABLET | Refills: 1 | Status: SHIPPED | OUTPATIENT
Start: 2022-07-03 | End: 2022-12-30 | Stop reason: SDUPTHER

## 2022-07-03 RX ORDER — MONTELUKAST SODIUM 10 MG/1
10 TABLET ORAL NIGHTLY
Qty: 30 TABLET | Refills: 2 | Status: SHIPPED | OUTPATIENT
Start: 2022-07-03 | End: 2022-10-13 | Stop reason: SDUPTHER

## 2022-07-05 DIAGNOSIS — E03.4 HYPOTHYROIDISM DUE TO ACQUIRED ATROPHY OF THYROID: ICD-10-CM

## 2022-07-05 RX ORDER — LEVOTHYROXINE SODIUM 112 UG/1
TABLET ORAL
Qty: 90 TABLET | Refills: 3 | Status: SHIPPED | OUTPATIENT
Start: 2022-07-05 | End: 2023-03-01 | Stop reason: SDUPTHER

## 2022-07-05 NOTE — TELEPHONE ENCOUNTER
Care Due:                  Date            Visit Type   Department     Provider  --------------------------------------------------------------------------------                                EP -                              PRIMARY      [facility] FAMILY  Last Visit: 07-      CARE ([facility])   MEDICINE       [facility]  Next Visit: None Scheduled  None         None Found                                                            Last  Test          Frequency    Reason                     Performed    Due Date  --------------------------------------------------------------------------------    Office Visit  12 months..  atorvastatin,              07- 07-                             chlorthalidone, valsartan    Health [facility] Embedded Care Gaps. Reference number: [id number]. 7/05/2022   3:30:47 AM CDT

## 2022-07-05 NOTE — TELEPHONE ENCOUNTER
Refill Routing Note   Medication(s) are not appropriate for processing by [facility] for the following reason(s):      - Drug-Disease Interaction (levothyroxine and mixed hyperlipidemia)    [facility] action(s):  Defer Medication-related problems identified:   Requires appointment  Drug-disease interaction     Medication Therapy Plan: Drug-Disease: levothyroxine and Mixed hyperlipidemia; BMI 40.0-44.9, adult; Essential hypertension  Medication reconciliation completed: No     Appointments  past 12m or future 3m with PCP    Date Provider   Last Visit   7/6/2021 Anthony Varner Jr., MD   Next Visit   7/12/2022 Anthony Varner Jr., MD   ED visits in past 90 days: 0        Note composed:11:43 AM 07/05/2022

## 2022-07-12 ENCOUNTER — OFFICE VISIT (OUTPATIENT)
Dept: FAMILY MEDICINE | Facility: CLINIC | Age: 72
End: 2022-07-12
Payer: MEDICARE

## 2022-07-12 VITALS
SYSTOLIC BLOOD PRESSURE: 128 MMHG | DIASTOLIC BLOOD PRESSURE: 72 MMHG | HEART RATE: 70 BPM | BODY MASS INDEX: 44.32 KG/M2 | WEIGHT: 225.75 LBS | HEIGHT: 60 IN | OXYGEN SATURATION: 95 %

## 2022-07-12 DIAGNOSIS — I10 ESSENTIAL HYPERTENSION: Primary | ICD-10-CM

## 2022-07-12 DIAGNOSIS — Z86.010 PERSONAL HISTORY OF COLONIC POLYPS: Chronic | ICD-10-CM

## 2022-07-12 DIAGNOSIS — K21.9 GASTROESOPHAGEAL REFLUX DISEASE WITHOUT ESOPHAGITIS: ICD-10-CM

## 2022-07-12 DIAGNOSIS — E03.4 HYPOTHYROIDISM DUE TO ACQUIRED ATROPHY OF THYROID: Chronic | ICD-10-CM

## 2022-07-12 DIAGNOSIS — Z23 VACCINE FOR STREPTOCOCCUS PNEUMONIAE AND INFLUENZA: ICD-10-CM

## 2022-07-12 DIAGNOSIS — E78.2 MIXED HYPERLIPIDEMIA: Chronic | ICD-10-CM

## 2022-07-12 PROCEDURE — 1126F PR PAIN SEVERITY QUANTIFIED, NO PAIN PRESENT: ICD-10-PCS | Mod: CPTII,S$GLB,, | Performed by: EMERGENCY MEDICINE

## 2022-07-12 PROCEDURE — 90677 PCV20 VACCINE IM: CPT | Mod: S$GLB,,, | Performed by: EMERGENCY MEDICINE

## 2022-07-12 PROCEDURE — 99214 PR OFFICE/OUTPT VISIT, EST, LEVL IV, 30-39 MIN: ICD-10-PCS | Mod: S$GLB,,, | Performed by: EMERGENCY MEDICINE

## 2022-07-12 PROCEDURE — 4010F PR ACE/ARB THEARPY RXD/TAKEN: ICD-10-PCS | Mod: CPTII,S$GLB,, | Performed by: EMERGENCY MEDICINE

## 2022-07-12 PROCEDURE — 3288F FALL RISK ASSESSMENT DOCD: CPT | Mod: CPTII,S$GLB,, | Performed by: EMERGENCY MEDICINE

## 2022-07-12 PROCEDURE — 4010F ACE/ARB THERAPY RXD/TAKEN: CPT | Mod: CPTII,S$GLB,, | Performed by: EMERGENCY MEDICINE

## 2022-07-12 PROCEDURE — 99999 PR PBB SHADOW E&M-EST. PATIENT-LVL III: CPT | Mod: PBBFAC,,, | Performed by: EMERGENCY MEDICINE

## 2022-07-12 PROCEDURE — 1101F PR PT FALLS ASSESS DOC 0-1 FALLS W/OUT INJ PAST YR: ICD-10-PCS | Mod: CPTII,S$GLB,, | Performed by: EMERGENCY MEDICINE

## 2022-07-12 PROCEDURE — 1101F PT FALLS ASSESS-DOCD LE1/YR: CPT | Mod: CPTII,S$GLB,, | Performed by: EMERGENCY MEDICINE

## 2022-07-12 PROCEDURE — 90677 PNEUMOCOCCAL CONJUGATE VACCINE 20-VALENT: ICD-10-PCS | Mod: S$GLB,,, | Performed by: EMERGENCY MEDICINE

## 2022-07-12 PROCEDURE — G0009 PNEUMOCOCCAL CONJUGATE VACCINE 20-VALENT: ICD-10-PCS | Mod: S$GLB,,, | Performed by: EMERGENCY MEDICINE

## 2022-07-12 PROCEDURE — 1126F AMNT PAIN NOTED NONE PRSNT: CPT | Mod: CPTII,S$GLB,, | Performed by: EMERGENCY MEDICINE

## 2022-07-12 PROCEDURE — 99214 OFFICE O/P EST MOD 30 MIN: CPT | Mod: S$GLB,,, | Performed by: EMERGENCY MEDICINE

## 2022-07-12 PROCEDURE — 3074F SYST BP LT 130 MM HG: CPT | Mod: CPTII,S$GLB,, | Performed by: EMERGENCY MEDICINE

## 2022-07-12 PROCEDURE — 3078F DIAST BP <80 MM HG: CPT | Mod: CPTII,S$GLB,, | Performed by: EMERGENCY MEDICINE

## 2022-07-12 PROCEDURE — 3008F PR BODY MASS INDEX (BMI) DOCUMENTED: ICD-10-PCS | Mod: CPTII,S$GLB,, | Performed by: EMERGENCY MEDICINE

## 2022-07-12 PROCEDURE — 99999 PR PBB SHADOW E&M-EST. PATIENT-LVL III: ICD-10-PCS | Mod: PBBFAC,,, | Performed by: EMERGENCY MEDICINE

## 2022-07-12 PROCEDURE — 1159F MED LIST DOCD IN RCRD: CPT | Mod: CPTII,S$GLB,, | Performed by: EMERGENCY MEDICINE

## 2022-07-12 PROCEDURE — 3288F PR FALLS RISK ASSESSMENT DOCUMENTED: ICD-10-PCS | Mod: CPTII,S$GLB,, | Performed by: EMERGENCY MEDICINE

## 2022-07-12 PROCEDURE — 3008F BODY MASS INDEX DOCD: CPT | Mod: CPTII,S$GLB,, | Performed by: EMERGENCY MEDICINE

## 2022-07-12 PROCEDURE — 1159F PR MEDICATION LIST DOCUMENTED IN MEDICAL RECORD: ICD-10-PCS | Mod: CPTII,S$GLB,, | Performed by: EMERGENCY MEDICINE

## 2022-07-12 PROCEDURE — G0009 ADMIN PNEUMOCOCCAL VACCINE: HCPCS | Mod: S$GLB,,, | Performed by: EMERGENCY MEDICINE

## 2022-07-12 PROCEDURE — 3078F PR MOST RECENT DIASTOLIC BLOOD PRESSURE < 80 MM HG: ICD-10-PCS | Mod: CPTII,S$GLB,, | Performed by: EMERGENCY MEDICINE

## 2022-07-12 PROCEDURE — 3074F PR MOST RECENT SYSTOLIC BLOOD PRESSURE < 130 MM HG: ICD-10-PCS | Mod: CPTII,S$GLB,, | Performed by: EMERGENCY MEDICINE

## 2022-07-12 RX ORDER — HYDROCODONE BITARTRATE AND ACETAMINOPHEN 5; 325 MG/1; MG/1
1 TABLET ORAL EVERY 6 HOURS PRN
COMMUNITY
Start: 2022-03-22 | End: 2023-08-30

## 2022-07-12 RX ORDER — IBUPROFEN 800 MG/1
800 TABLET ORAL EVERY 6 HOURS PRN
COMMUNITY
Start: 2022-03-22 | End: 2023-08-30

## 2022-07-12 RX ORDER — OMEPRAZOLE 20 MG/1
20 CAPSULE, DELAYED RELEASE ORAL DAILY
Qty: 90 CAPSULE | Refills: 1 | Status: SHIPPED | OUTPATIENT
Start: 2022-07-12 | End: 2023-08-30

## 2022-07-12 NOTE — PROGRESS NOTES
A a  Subjective:   THIS NOTE IS DONE WITH VOICE RECOGNITION        Patient ID: Kenna Schmitt is a 71 y.o. female.    Chief Complaint: Hypertension, Gastroesophageal Reflux, Thyroid Problem, and Hyperlipidemia         Assessment:       1. Essential hypertension    2. BMI 40.0-44.9, adult    3. Hypothyroidism due to acquired atrophy of thyroid    4. Mixed hyperlipidemia    5. Gastroesophageal reflux disease without esophagitis    6. Personal history of colonic polyps    7. Vaccine for streptococcus pneumoniae and influenza        Plan:       1. Essential hypertension  Controlled.  Continue current medications  Continue to avoid excessive sodium  Continue home monitoring  Target blood pressure is 139/89 or less    2. BMI 40.0-44.9, adult  Patient aware comorbidities  Any weight loss would be of benefit  Increase physical activity recommended    3. Hypothyroidism due to acquired atrophy of thyroid  TSH at target  Continue current medications    4. Mixed hyperlipidemia  Lipids controlled  Continue current medications  Annual lipid profile and comprehensive metabolic panel  If complications developed such as myalgia or arthralgia, contact me.    5. Gastroesophageal reflux disease without esophagitis  Recurrent symptoms  Dosing of omeprazole reviewed  Recommended she take this consistently to help her symptoms are controlled and then try to drop down to every other day or every 3rd day.    - omeprazole (PRILOSEC) 20 MG capsule; Take 1 capsule (20 mg total) by mouth once daily.  Dispense: 90 capsule; Refill: 1    6. Personal history of colonic polyps  History of adenomatous polyps  2021 most recent colonoscopy was unremarkable.    7. Vaccine for streptococcus pneumoniae and influenza  Routine vaccine administered  23 Valent pneumococcal vaccine recommended in 1 year.    - (In Office Administered) Pneumococcal Conjugate Vaccine (20 Valent) (IM)        HPI     She is in today for routine maintenance labs review and couple of  new issues.    She is wanting to start exercising.  There is no Surekha of the time.  She is not experiencing any active chest pain or pulmonary compromise.  She does have recognized osteoarthritic changes, inappropriate exercises were reviewed with her.     She is experiencing some joint pain in left hand.  Predominantly in the interphalangeal joints.    She occasionally monitors room blood pressure with a home monitor.  She uses a wrist cuff.  She has not been able to find a more traditional cuff that she can tolerate.  It is too painful for her to take her blood pressure.  Her blood pressure today is obtained at the wrist.    BP Readings from Last 3 Encounters:   07/12/22 128/72   06/01/22 (!) 154/68   11/10/21 (!) 141/71     She is taking her levothyroxine.  No issues.  No tachycardia.  No hypothyroid symptoms.    Lab Results   Component Value Date    TSH 2.510 06/02/2022     Hyperlipidemia monitoring.  Using atorvastatin 20 milligrams a day without side effects.  Liver functions are stable.    Lab Results   Component Value Date    CHOL 169 06/02/2022    CHOL 168 07/14/2021    CHOL 173 05/25/2020     Lab Results   Component Value Date    HDL 48 06/02/2022    HDL 57 07/14/2021    HDL 62 05/25/2020     Lab Results   Component Value Date    LDLCALC 89.6 06/02/2022    LDLCALC 80.6 07/14/2021    LDLCALC 78.0 05/25/2020     Lab Results   Component Value Date    TRIG 157 (H) 06/02/2022    TRIG 152 (H) 07/14/2021    TRIG 165 (H) 05/25/2020     Lab Results   Component Value Date    CHOLHDL 28.4 06/02/2022    CHOLHDL 33.9 07/14/2021    CHOLHDL 35.8 05/25/2020     Gastroesophageal reflux symptoms is not well controlled.  Wants to restart PPI.  In the past she has used omeprazole.  Will reinstitute omeprazole at 20 milligrams a day, with potential to increase to twice a day if not controlled.  Long-term goal is to reduce her utilization of PPI to is little as possible does.    Right knee pain is increasing.  She has had  previous radiological evaluation.  This was several years ago and did not reveal significant bony involvement.  Her left knee has been replaced, and is functioning well.  She is not anxious to pursue additional interventions with her knee pain at this time.  The    Immunization History   Administered Date(s) Administered    COVID-19, vector-nr, [id number], PF ([facility] 07/06/2021    Zoster 09/19/2014     Routine vaccine reviewed and encouraged.  This includes pneumococcal vaccines, shingles vaccine, completion of her COVID vaccine, and annual flu vaccines.    Current Outpatient Medications   Medication Sig Dispense Refill    ascorbic acid, vitamin C, (VITAMIN C) 1000 MG tablet Take 1,000 mg by mouth once daily.      atorvastatin (LIPITOR) 20 MG tablet Take 1 tablet (20 mg total) by mouth once daily. 90 tablet 3    b complex vitamins capsule Take 1 capsule by mouth once daily.      biotin 10,000 mcg Cap Take by mouth.      chlorthalidone (HYGROTEN) 25 MG Tab Take 1 tablet (25 mg total) by mouth once daily. 90 tablet 3    cholecalciferol, vitamin D3, (VITAMIN D3) 50 mcg (2,000 unit) Cap Take 1 capsule by mouth once daily.      Lactobacillus rhamnosus GG (CULTURELLE) 10 billion cell capsule Take 1 capsule by mouth once daily.      levothyroxine (SYNTHROID) 112 MCG tablet TAKE 1 TABLET BY MOUTH BEFORE BREAKFAST AS DIRECTED 90 tablet 3    valacyclovir (VALTREX) 500 MG tablet Take 500 mg by mouth 2 (two) times daily. PRN fever blisters      valsartan (DIOVAN) 80 MG tablet TAKE 1 TABLET(80 MG) BY MOUTH EVERY DAY 90 tablet 3    zinc gluconate 50 mg tablet Take 50 mg by mouth once daily.       No current facility-administered medications for this visit.         Review of Systems   Constitutional: Negative for activity change (Reduced exercise tolerance, no acute symptoms), appetite change, chills, diaphoresis, fatigue, fever and unexpected weight change.   HENT: Negative for congestion, ear pain, hearing loss,  rhinorrhea, trouble swallowing and voice change.    Eyes: Negative for pain and visual disturbance.   Respiratory: Negative for cough, chest tightness and shortness of breath.    Cardiovascular: Negative for chest pain, palpitations and leg swelling.   Gastrointestinal: Negative for abdominal distention, abdominal pain and blood in stool.   Genitourinary: Negative for difficulty urinating, flank pain, frequency and urgency.   Musculoskeletal: Positive for arthralgias. Negative for back pain, joint swelling, myalgias, neck pain and neck stiffness.   Skin: Negative for pallor and rash.   Neurological: Negative for dizziness, tremors, syncope, weakness and headaches.   Hematological: Negative for adenopathy.   Psychiatric/Behavioral: Negative for dysphoric mood and sleep disturbance. The patient is not nervous/anxious.        Objective:      Physical Exam  Vitals reviewed.   Constitutional:       General: She is not in acute distress.     Appearance: She is well-developed. She is obese. She is not ill-appearing.   HENT:      Head: Normocephalic and atraumatic.      Nose: Nose normal.   Eyes:      General: No scleral icterus.     Conjunctiva/sclera: Conjunctivae normal.      Pupils: Pupils are equal, round, and reactive to light.   Neck:      Thyroid: No thyromegaly.      Vascular: No JVD.   Cardiovascular:      Rate and Rhythm: Normal rate and regular rhythm.      Heart sounds: Normal heart sounds. No murmur heard.  Pulmonary:      Effort: Pulmonary effort is normal.      Breath sounds: Normal breath sounds. No wheezing or rales.   Abdominal:      General: Bowel sounds are normal. There is no distension.      Palpations: Abdomen is soft.      Tenderness: There is no abdominal tenderness. There is no guarding.   Musculoskeletal:         General: No tenderness. Normal range of motion.      Cervical back: Normal range of motion and neck supple.      Comments: Left knee status post replacement, stable    Right knee with  minimal joint line tenderness.  No limitation range of motion.  No crepitus.  No instability appreciated.   Lymphadenopathy:      Cervical: No cervical adenopathy.   Skin:     General: Skin is warm and dry.      Capillary Refill: Capillary refill takes less than 2 seconds.      Findings: No erythema or rash.   Neurological:      General: No focal deficit present.      Mental Status: She is alert and oriented to person, place, and time.      Cranial Nerves: No cranial nerve deficit.      Coordination: Coordination normal.      Deep Tendon Reflexes: Reflexes are normal and symmetric.   Psychiatric:         Behavior: Behavior normal.

## 2022-07-19 RX ORDER — ATORVASTATIN CALCIUM 80 MG/1
80 TABLET, FILM COATED ORAL NIGHTLY
Qty: 90 TABLET | Refills: 1 | OUTPATIENT
Start: 2022-07-19

## 2022-07-25 RX ORDER — ASPIRIN 325 MG
325 TABLET ORAL DAILY
Qty: 90 TABLET | Refills: 1 | Status: SHIPPED | OUTPATIENT
Start: 2022-07-25 | End: 2022-10-13 | Stop reason: SDUPTHER

## 2022-07-25 RX ORDER — ATORVASTATIN CALCIUM 80 MG/1
80 TABLET, FILM COATED ORAL NIGHTLY
Qty: 90 TABLET | Refills: 1 | Status: SHIPPED | OUTPATIENT
Start: 2022-07-25 | End: 2023-01-23 | Stop reason: SDUPTHER

## 2022-07-25 RX ORDER — CLOPIDOGREL BISULFATE 75 MG/1
75 TABLET ORAL DAILY
Qty: 90 TABLET | Refills: 1 | Status: SHIPPED | OUTPATIENT
Start: 2022-07-25 | End: 2023-01-23 | Stop reason: SDUPTHER

## 2022-07-25 NOTE — TELEPHONE ENCOUNTER
Caller: BOSTON PRATER    Relationship: Other Relative    Best call back number: [phone]    Requested Prescriptions:   Requested Prescriptions     Pending Prescriptions Disp Refills   • clopidogrel (PLAVIX) 75 MG tablet 90 tablet 1     Sig: Take 1 tablet by mouth Daily.   • atorvastatin (LIPITOR) 80 MG tablet 90 tablet 1     Sig: Take 1 tablet by mouth Every Night.   • aspirin 325 MG tablet 90 tablet 1     Sig: Administer 1 tablet per G tube Daily.        Pharm[facility] where request should be sent: [id number] [address]  AT [facility] [phone] [facility] [phone]      Additional details provided by patient: PATIENT HAS OFFICE VISIT SCHEDULED FOR 8/4/22. WILL BE OUT OF MEDICATION ON Thursday 7/28.     Does the patient have less than a 3 day supply:  [] Yes  [x] No    Nunu SALAZAR Rep   07/25/22 11:33 EDT

## 2022-08-03 NOTE — PROGRESS NOTES
Subjective   History of Present Illness: Jazmyn Castaneda is a 72 y.o. female is here today for follow-up on left carotid artery stenosis. CTA H/N - 22.  She is doing well today.  She has significantly improved since her stroke and revascularization.  She is speaking much more clearly today.  She still has some mild expressive aphasia, but is able to communicate clearly.  She denies any recent strokelike episodes.  Denies any recent worsening of her strength or sensation.  Denies any recent changes in her vision.    History of Present Illness    The following portions of the patient's history were reviewed and updated as appropriate: allergies, past family history, past medical history, past social history, past surgical history and problem list.    Past Medical History:   Diagnosis Date   • Acute ischemic stroke (HCC) 2021   • Age-related osteoporosis without current pathological fracture 2021   • Anesthesia     WOKE UP DURING CATARACT SURGERY   • Chronic diastolic (congestive) heart failure (HCC) 2021   • Collapse of lung     history of in past post trauma   • Diverticulosis    • Environmental allergies     Some pollens, grass, trees, flowers   • Essential hypertension 3/24/2017   • Exogenous obesity 3/24/2017   • H/O Pneumonia    • Infectious mononucleosis    • Kidney infection    • Kidney stone    • Mixed hyperlipidemia 2019   • Neuromuscular disorder (HCC)    • Sepsis secondary to UTI ([facility]) 2019   • Tobacco abuse 2019   • Urinary tract infection    • Vertigo     past history of several times        Past Surgical History:   Procedure Laterality Date   • BREAST EXCISIONAL BIOPSY Right     30 something when it was done; says it was precancerous   • CATARACT EXTRACTION, BILATERAL     •  SECTION     • CYSTOSCOPY N/A 2019    Procedure: CYSTOSCOPY AND STENT PLACEMENT;  Surgeon: Alen Lal MD;  Location: [facility];  Service: Urology   • EMBOLECTOMY N/A  4/18/2021    Procedure: EMBOLECTOMY MECHANICAL;  Surgeon: Justo Cheng MD;  Location: [facility] OR 18/19;  Service: Neurosurgery;  Laterality: N/A;   • MASTOID SURGERY     • NASAL SEPTAL RECONSTRUCTION     • SINUS SURGERY      scraped and prior deviated septum   • URETEROSCOPY LASER LITHOTRIPSY WITH STENT INSERTION Right 7/18/2019    Procedure: CYSTOSCOPY, RIGHT URETEROSCOPY, LASER LITHOTRIPSY, STONE BASKET EXTRACTION, STENT PLACEMENT WITHOUT STRINGS;  Surgeon: Alfredo Gongora Jr., MD;  Location: [facility] OR;  Service: Urology          Current Outpatient Medications:   •  acetaminophen (TYLENOL) 500 MG tablet, Take 500 mg by mouth Every 4 (Four) Hours As Needed for Mild Pain  or Fever., Disp: , Rfl:   •  aspirin 325 MG tablet, Administer 1 tablet per G tube Daily., Disp: 90 tablet, Rfl: 1  •  atorvastatin (LIPITOR) 80 MG tablet, Take 1 tablet by mouth Every Night., Disp: 90 tablet, Rfl: 1  •  calcium carbonate-cholecalciferol (Calcium 500+D) 500-400 MG-UNIT tablet tablet, Take 1 tablet by mouth Daily., Disp: , Rfl:   •  cetirizine (zyrTEC) 5 MG tablet, Take 1 tablet by mouth Daily., Disp: 30 tablet, Rfl: 2  •  Cholecalciferol (D3 2000 PO), Take  by mouth Daily., Disp: , Rfl:   •  clopidogrel (PLAVIX) 75 MG tablet, Take 1 tablet by mouth Daily., Disp: 90 tablet, Rfl: 1  •  docusate sodium (COLACE) 50 MG capsule, Take  by mouth Daily., Disp: , Rfl:   •  lactulose (CHRONULAC) 10 GM/15ML solution, Take 30 mL by mouth Daily., Disp: 1892 mL, Rfl: 5  •  lisinopril (PRINIVIL,ZESTRIL) 2.5 MG tablet, TAKE 1 TABLET BY MOUTH DAILY, Disp: 90 tablet, Rfl: 1  •  montelukast (SINGULAIR) 10 MG tablet, TAKE 1 TABLET BY MOUTH EVERY NIGHT, Disp: 30 tablet, Rfl: 2  •  Polyethylene Glycol 3350 powder, Take 17 g by mouth Daily., Disp: 91639 g, Rfl: 5  •  fluticasone (FLONASE) 50 MCG/ACT nasal spray, INSTILL 1 SPRAY IN EACH NOSTRIL DAILY AS DIRECTED, Disp: 16 g, Rfl: 2     Allergies   Allergen Reactions   • Cephalosporins  "Swelling   • Penicillins Swelling   • Ciprofloxacin Rash   • Sulfa Antibiotics Unknown - Low Severity     Patient states she lost her vision temporarily when using Sulfa based eye ointment, also, broke out in rash w/oral medication.        Social History     Socioeconomic History   • Marital status:    • Number of children: 2   • Years of education: College   Tobacco Use   • Smoking status: Former Smoker     Packs/day: 1.00     Types: Cigarettes     Quit date: 2020     Years since quittin.1   • Smokeless tobacco: Never Used   Vaping Use   • Vaping Use: Never used   Substance and Sexual Activity   • Alcohol use: Never     Comment: \"on occasion\"   • Drug use: No   • Sexual activity: Defer        Family History   Problem Relation Age of Onset   • Hypertension Mother    • Aneurysm Mother    • Heart failure Mother    • Heart disease Mother    • Heart disease Father    • Hypertension Father    • Kidney disease Father    • Cancer Father 82        Bladder   • Hypertension Sister    • Cancer Maternal Aunt    • Stroke Maternal Uncle    • Glaucoma Maternal Grandmother    • Cancer Maternal Grandmother    • Stroke Maternal Grandfather    • Aneurysm Maternal Grandfather    • Cancer Maternal Aunt    • Cancer Maternal Aunt    • Liver cancer Other    • Pancreatic cancer Other    • Malig Hyperthermia Neg Hx         Review of Systems   Cardiovascular: Negative for chest pain.   Musculoskeletal: Negative for neck pain and neck stiffness.   Neurological: Positive for dizziness (Vertigo ), light-headedness and headaches.       Objective     Vitals:    22 1517   BP: 146/88   Pulse: 78   Temp: 96.8 °F (36 °C)   SpO2: 98%   Weight: 71.5 kg (157 lb 9.6 oz)   Height: 160 cm (63\")     Body mass index is 27.92 kg/m².      Physical Exam  Neurologic Exam        Assessment & Plan   Independent Review of Radiographic Studies:      I personally reviewed the images from the following studies.  CTA head and neck from "   The bilateral carotid arteries appear patent without any severe stenosis    Medical Decision Makin-year-old female s/p cerebral angiogram for a stroke intervention with successful revascularization of proximal cervical ICA retrieval of a left ICA terminus thrombus  -She has recovered very well from a stroke and is now speaking clearly and is walking with a Rollator.  Her strength has significantly improved.  -I will plan to see her back in 1 year with a repeat CTA to evaluate for any cervical ICA stenosis  -I have recommended that she continue to take her aspirin daily    Diagnoses and all orders for this visit:    1. Carotid stenosis, left (Primary)  -     CT Angiogram Carotids; Future      Return in about 1 year (around 2023).    I spent 30 minutes reviewing the medical record, reviewing the CTA images, discussing strategies for managing cervical carotid stenosis and stroke prevention

## 2022-08-04 ENCOUNTER — HOSPITAL ENCOUNTER (OUTPATIENT)
Dept: CT IMAGING | Facility: HOSPITAL | Age: 72
Discharge: HOME | End: 2022-08-04
Admitting: NEUROLOGICAL SURGERY

## 2022-08-04 DIAGNOSIS — I65.22 CAROTID STENOSIS, LEFT: ICD-10-CM

## 2022-08-04 DIAGNOSIS — I63.9 ACUTE ISCHEMIC STROKE: ICD-10-CM

## 2022-08-04 PROCEDURE — 82565 ASSAY OF CREATININE: CPT

## 2022-08-04 PROCEDURE — 0 IOPAMIDOL PER 1 ML: Performed by: NEUROLOGICAL SURGERY

## 2022-08-04 PROCEDURE — 70496 CT ANGIOGRAPHY HEAD: CPT

## 2022-08-04 PROCEDURE — 70498 CT ANGIOGRAPHY NECK: CPT

## 2022-08-04 RX ADMIN — IOPAMIDOL 95 ML: 755 INJECTION, SOLUTION INTRAVENOUS at 16:15

## 2022-08-05 LAB — CREAT BLDA-MCNC: 0.8 MG/DL (ref 0.6–1.3)

## 2022-08-08 ENCOUNTER — OFFICE VISIT (OUTPATIENT)
Dept: NEUROSURGERY | Facility: CLINIC | Age: 72
End: 2022-08-08

## 2022-08-08 VITALS
SYSTOLIC BLOOD PRESSURE: 146 MMHG | DIASTOLIC BLOOD PRESSURE: 88 MMHG | TEMPERATURE: 96.8 F | OXYGEN SATURATION: 98 % | WEIGHT: 157.6 LBS | HEIGHT: 63 IN | BODY MASS INDEX: 27.93 KG/M2 | HEART RATE: 78 BPM

## 2022-08-08 DIAGNOSIS — I65.22 CAROTID STENOSIS, LEFT: Primary | ICD-10-CM

## 2022-08-08 PROCEDURE — 99214 OFFICE O/P EST MOD 30 MIN: CPT | Performed by: NEUROLOGICAL SURGERY

## 2022-08-29 ENCOUNTER — PATIENT MESSAGE (OUTPATIENT)
Dept: ADMINISTRATIVE | Facility: HOSPITAL | Age: 72
End: 2022-08-29
Payer: MEDICARE

## 2022-08-31 DIAGNOSIS — Z78.0 MENOPAUSE: ICD-10-CM

## 2022-09-04 ENCOUNTER — PATIENT MESSAGE (OUTPATIENT)
Dept: FAMILY MEDICINE | Facility: CLINIC | Age: 72
End: 2022-09-04
Payer: MEDICARE

## 2022-09-04 ENCOUNTER — PATIENT MESSAGE (OUTPATIENT)
Dept: AUDIOLOGY | Facility: CLINIC | Age: 72
End: 2022-09-04
Payer: MEDICARE

## 2022-09-04 NOTE — TELEPHONE ENCOUNTER
Please read note patient need labs per her insurance  CBC  CMP  Lipid panel  Urine dip  EKG if indicated  FOBT x3 for patients 50-75  iFobt x 1

## 2022-09-09 ENCOUNTER — HOSPITAL ENCOUNTER (OUTPATIENT)
Dept: RADIOLOGY | Facility: HOSPITAL | Age: 72
Discharge: HOME | End: 2022-09-09
Attending: EMERGENCY MEDICINE
Payer: MEDICARE

## 2022-09-09 DIAGNOSIS — Z78.0 MENOPAUSE: ICD-10-CM

## 2022-09-09 PROCEDURE — 77080 DXA BONE DENSITY AXIAL: CPT | Mod: TC,PO

## 2022-09-09 PROCEDURE — 77080 DEXA BONE DENSITY SPINE HIP: ICD-10-PCS | Mod: 26,,, | Performed by: RADIOLOGY

## 2022-09-09 PROCEDURE — 77080 DXA BONE DENSITY AXIAL: CPT | Mod: 26,,, | Performed by: RADIOLOGY

## 2022-10-01 DIAGNOSIS — I10 ESSENTIAL HYPERTENSION: ICD-10-CM

## 2022-10-01 RX ORDER — VALSARTAN 80 MG/1
80 TABLET ORAL DAILY
Qty: 90 TABLET | Refills: 2 | Status: SHIPPED | OUTPATIENT
Start: 2022-10-01 | End: 2023-03-01 | Stop reason: SDUPTHER

## 2022-10-01 NOTE — TELEPHONE ENCOUNTER
No new care gaps identified.  [facility] Embedded Care Gaps. Reference number: [id number]. 10/01/2022   6:08:24 AM CDT

## 2022-10-01 NOTE — TELEPHONE ENCOUNTER
Refill Decision Note   Kenna Schmitt  is requesting a refill authorization.  Brief Assessment and Rationale for Refill:  Approve     Medication Therapy Plan:       Medication Reconciliation Completed: No   Comments:     No Care Gaps recommended.     Note composed:5:54 PM 10/01/2022

## 2022-10-04 DIAGNOSIS — J30.1 NON-SEASONAL ALLERGIC RHINITIS DUE TO POLLEN: ICD-10-CM

## 2022-10-04 RX ORDER — CETIRIZINE HYDROCHLORIDE 5 MG/1
5 TABLET ORAL DAILY
Qty: 30 TABLET | Refills: 2 | Status: SHIPPED | OUTPATIENT
Start: 2022-10-04 | End: 2022-10-13 | Stop reason: SDUPTHER

## 2022-10-04 NOTE — TELEPHONE ENCOUNTER
Caller: Laine Garza    Relationship: Emergency Contact    Best call back number: [phone]    Requested Prescriptions:   Requested Prescriptions     Pending Prescriptions Disp Refills   • cetirizine (zyrTEC) 5 MG tablet 30 tablet 2     Sig: Take 1 tablet by mouth Daily.        Pharmacy where request should be sent: [facility] - [facility] [address] - [phone]  - [phone] FX     Additional details provided by patient: THE PATIENT WILL BE OUT OF THIS MEDICATION BY Friday, 10/07. THE PATIENT HAS AN APPOINTMENT SET-UP WITH JAYDA KAUFMAN ON 10/13.    Does the patient have less than a 3 day supply:  [x] Yes  [] No    Nunu Wiggins Rep   10/04/22 10:30 EDT

## 2022-10-13 ENCOUNTER — OFFICE VISIT (OUTPATIENT)
Dept: FAMILY MEDICINE CLINIC | Facility: CLINIC | Age: 72
End: 2022-10-13

## 2022-10-13 VITALS
DIASTOLIC BLOOD PRESSURE: 83 MMHG | TEMPERATURE: 98 F | BODY MASS INDEX: 27.95 KG/M2 | SYSTOLIC BLOOD PRESSURE: 136 MMHG | HEART RATE: 85 BPM | WEIGHT: 157.8 LBS | OXYGEN SATURATION: 96 %

## 2022-10-13 DIAGNOSIS — J30.1 NON-SEASONAL ALLERGIC RHINITIS DUE TO POLLEN: ICD-10-CM

## 2022-10-13 DIAGNOSIS — I50.32 CHRONIC DIASTOLIC (CONGESTIVE) HEART FAILURE: ICD-10-CM

## 2022-10-13 DIAGNOSIS — D64.9 ANEMIA, UNSPECIFIED TYPE: ICD-10-CM

## 2022-10-13 DIAGNOSIS — R79.89 ELEVATED TSH: ICD-10-CM

## 2022-10-13 DIAGNOSIS — Z23 NEED FOR INFLUENZA VACCINATION: ICD-10-CM

## 2022-10-13 DIAGNOSIS — E78.2 MIXED HYPERLIPIDEMIA: ICD-10-CM

## 2022-10-13 DIAGNOSIS — I10 ESSENTIAL HYPERTENSION: ICD-10-CM

## 2022-10-13 DIAGNOSIS — Z00.00 MEDICARE ANNUAL WELLNESS VISIT, SUBSEQUENT: Primary | ICD-10-CM

## 2022-10-13 DIAGNOSIS — E34.9 ELEVATED PARATHYROID HORMONE: ICD-10-CM

## 2022-10-13 DIAGNOSIS — Z12.31 SCREENING MAMMOGRAM FOR BREAST CANCER: ICD-10-CM

## 2022-10-13 PROCEDURE — 1159F MED LIST DOCD IN RCRD: CPT | Performed by: NURSE PRACTITIONER

## 2022-10-13 PROCEDURE — G0008 ADMIN INFLUENZA VIRUS VAC: HCPCS | Performed by: NURSE PRACTITIONER

## 2022-10-13 PROCEDURE — 90662 IIV NO PRSV INCREASED AG IM: CPT | Performed by: NURSE PRACTITIONER

## 2022-10-13 PROCEDURE — G0439 PPPS, SUBSEQ VISIT: HCPCS | Performed by: NURSE PRACTITIONER

## 2022-10-13 PROCEDURE — 1170F FXNL STATUS ASSESSED: CPT | Performed by: NURSE PRACTITIONER

## 2022-10-13 RX ORDER — CETIRIZINE HYDROCHLORIDE 5 MG/1
5 TABLET ORAL DAILY
Qty: 30 TABLET | Refills: 11 | Status: SHIPPED | OUTPATIENT
Start: 2022-10-13

## 2022-10-13 RX ORDER — MONTELUKAST SODIUM 10 MG/1
10 TABLET ORAL NIGHTLY
Qty: 30 TABLET | Refills: 11 | Status: SHIPPED | OUTPATIENT
Start: 2022-10-13 | End: 2022-12-30 | Stop reason: SDUPTHER

## 2022-10-13 RX ORDER — ASPIRIN 325 MG
325 TABLET ORAL DAILY
Qty: 30 TABLET | Refills: 11 | Status: SHIPPED | OUTPATIENT
Start: 2022-10-13

## 2022-10-13 NOTE — PROGRESS NOTES
The ABCs of the Annual Wellness Visit  Subsequent Medicare Wellness Visit    Chief Complaint   Patient presents with   • Medicare Wellness-subsequent      Subjective    History of Present Illness:  Jazmyn Castaneda is a 72 y.o. female who presents for a Subsequent Medicare Wellness Visit.    The following portions of the patient's history were reviewed and   updated as appropriate: allergies, current medications, past family history, past medical history, past social history, past surgical history and problem list.    Compared to one year ago, the patient feels her physical   health is better.    Compared to one year ago, the patient feels her mental   health is the same.    Recent Hospitalizations:  She was not admitted to the hospital during the last year.       Current Medical Providers:  Patient Care Team:  Samira Yi MD as PCP - General (Family Medicine)  Nan Saldana APRN as Nurse Practitioner (Nurse Practitioner)  Justo Cheng MD as Consulting Physician (Neurosurgery)    Outpatient Medications Prior to Visit   Medication Sig Dispense Refill   • acetaminophen (TYLENOL) 500 MG tablet Take 500 mg by mouth Every 4 (Four) Hours As Needed for Mild Pain  or Fever.     • atorvastatin (LIPITOR) 80 MG tablet Take 1 tablet by mouth Every Night. 90 tablet 1   • calcium carbonate-cholecalciferol (Calcium 500+D) 500-400 MG-UNIT tablet tablet Take 1 tablet by mouth Daily.     • Cholecalciferol (D3 2000 PO) Take  by mouth Daily.     • clopidogrel (PLAVIX) 75 MG tablet Take 1 tablet by mouth Daily. 90 tablet 1   • docusate sodium (COLACE) 50 MG capsule Take  by mouth Daily.     • fluticasone (FLONASE) 50 MCG/ACT nasal spray INSTILL 1 SPRAY IN EACH NOSTRIL DAILY AS DIRECTED 16 g 2   • lactulose (CHRONULAC) 10 GM/15ML solution Take 30 mL by mouth Daily. 1892 mL 5   • lisinopril (PRINIVIL,ZESTRIL) 2.5 MG tablet TAKE 1 TABLET BY MOUTH DAILY 90 tablet 1   • Polyethylene Glycol 3350 powder Take 17 g by  mouth Daily. 66351 g 5   • aspirin 325 MG tablet Administer 1 tablet per G tube Daily. 90 tablet 1   • cetirizine (zyrTEC) 5 MG tablet Take 1 tablet by mouth Daily. 30 tablet 2   • montelukast (SINGULAIR) 10 MG tablet TAKE 1 TABLET BY MOUTH EVERY NIGHT 30 tablet 2     No facility-administered medications prior to visit.       No opioid medication identified on active medication list. I have reviewed chart for other potential  high risk medication/s and harmful drug interactions in the elderly.          Aspirin is on active medication list. Aspirin use is indicated based on review of current medical condition/s. Pros and cons of this therapy have been discussed today. Benefits of this medication outweigh potential harm.  Patient has been encouraged to continue taking this medication.  .      Patient Active Problem List   Diagnosis   • Nephrolithiasis   • Allergic rhinitis   • Diverticulosis of large intestine without hemorrhage   • Hiatal hernia   • Medicare annual wellness visit, subsequent   • Exogenous obesity   • Encounter for screening colonoscopy   • Visit for screening mammogram   • Breast mass, left   • Essential hypertension   • Erythrocytosis   • Vertigo   • Neutrophilic leukocytosis   • Mixed hyperlipidemia   • Right hydronephrosis with urinary obstruction due to ureteral calculus   • Allergy to multiple antibiotics   • Tobacco abuse   • Ureteral stone   • Colon cancer screening   • Age-related osteoporosis without current pathological fracture   • Chronic diastolic (congestive) heart failure (HCC)   • Acute ischemic stroke (HCC)   • Cerebrovascular accident (CVA) due to thrombosis of left middle cerebral artery (HCC)   • Expressive aphasia   • Cerebrovascular disease     Advance Care Planning  Advance Directive is on file.  ACP discussion was held with the patient during this visit. Patient has an advance directive in EMR which is still valid.     Review of Systems   Constitutional: Negative for fever.  "  HENT: Negative for ear pain, rhinorrhea and sore throat.    Eyes: Negative for visual disturbance.   Respiratory: Negative for cough and shortness of breath.    Cardiovascular: Negative for chest pain.   Gastrointestinal: Negative for abdominal pain, diarrhea, nausea and vomiting.   Genitourinary: Negative.    Musculoskeletal: Negative.    Skin: Negative for rash.   Neurological: Negative for dizziness and confusion.        Objective    Vitals:    10/13/22 1002   BP: 136/83   BP Location: Left arm   Patient Position: Sitting   Cuff Size: Adult   Pulse: 85   Temp: 98 °F (36.7 °C)   TempSrc: Temporal   SpO2: 96%   Weight: 71.6 kg (157 lb 12.8 oz)     Estimated body mass index is 27.95 kg/m² as calculated from the following:    Height as of 8/8/22: 160 cm (63\").    Weight as of this encounter: 71.6 kg (157 lb 12.8 oz).    BMI is >= 25 and <30. (Overweight) The following options were offered after discussion;: exercise counseling/recommendations and nutrition counseling/recommendations      Does the patient have evidence of cognitive impairment? No    Physical Exam  Vitals and nursing note reviewed.   Constitutional:       Appearance: Normal appearance.   HENT:      Head: Normocephalic and atraumatic.      Right Ear: Tympanic membrane and ear canal normal.      Left Ear: Tympanic membrane and ear canal normal.   Eyes:      Pupils: Pupils are equal, round, and reactive to light.   Cardiovascular:      Rate and Rhythm: Normal rate and regular rhythm.      Heart sounds: Normal heart sounds.   Pulmonary:      Effort: Pulmonary effort is normal.      Breath sounds: Normal breath sounds.   Abdominal:      General: Bowel sounds are normal.      Palpations: Abdomen is soft.      Tenderness: There is no abdominal tenderness.   Genitourinary:     Comments: Declined   Musculoskeletal:         General: Normal range of motion.      Cervical back: Neck supple.   Skin:     General: Skin is warm and dry.   Neurological:      Mental " "Status: She is alert and oriented to person, place, and time.   Psychiatric:         Mood and Affect: Mood normal.                 HEALTH RISK ASSESSMENT    Smoking Status:  Social History     Tobacco Use   Smoking Status Former   • Packs/day: 1.00   • Types: Cigarettes   • Quit date: 2020   • Years since quittin.3   Smokeless Tobacco Never     Alcohol Consumption:  Social History     Substance and Sexual Activity   Alcohol Use Never    Comment: \"on occasion\"     Fall Risk Screen:    JOEL Fall Risk Assessment was completed, and patient is at LOW risk for falls.Assessment completed on:10/13/2022    Depression Screening:  PHQ-2/PHQ-9 Depression Screening 10/13/2022   Retired Total Score -   Little Interest or Pleasure in Doing Things 0-->not at all   Feeling Down, Depressed or Hopeless 0-->not at all   PHQ-9: Brief Depression Severity Measure Score 0       Health Habits and Functional and Cognitive Screening:  Functional & Cognitive Status 10/13/2022   Do you have difficulty preparing food and eating? No   Do you have difficulty bathing yourself, getting dressed or grooming yourself? Yes   Do you have difficulty using the toilet? No   Do you have difficulty moving around from place to place? Yes   Do you have trouble with steps or getting out of a bed or a chair? Yes   Current Diet Well Balanced Diet   Dental Exam Not up to date        Dental Exam Comment -   Eye Exam Not up to date   Exercise (times per week) 3 times per week   Current Exercises Include Home Exercise Program (TV, Computer, Etc.)   Current Exercise Activities Include -   Do you need help using the phone?  No   Are you deaf or do you have serious difficulty hearing?  Yes   Do you need help with transportation? Yes   Do you need help shopping? Yes   Do you need help preparing meals?  Yes   Do you need help with housework?  Yes   Do you need help with laundry? Yes   Do you need help taking your medications? No   Do you need help managing " money? No   Do you ever drive or ride in a car without wearing a seat belt? No   Have you felt unusual stress, anger or loneliness in the last month? No   Who do you live with? Child   If you need help, do you have trouble finding someone available to you? No   Have you been bothered in the last four weeks by sexual problems? No   Do you have difficulty concentrating, remembering or making decisions? Yes       Age-appropriate Screening Schedule:  Refer to the list below for future screening recommendations based on patient's age, sex and/or medical conditions. Orders for these recommended tests are listed in the plan section. The patient has been provided with a written plan.    Health Maintenance   Topic Date Due   • TDAP/TD VACCINES (1 - Tdap) Never done   • ZOSTER VACCINE (1 of 2) Never done   • MAMMOGRAM  08/14/2022   • DXA SCAN  10/22/2022   • LIPID PANEL  12/22/2022   • INFLUENZA VACCINE  Completed              Assessment & Plan   CMS Preventative Services Quick Reference  Risk Factors Identified During Encounter  Immunizations Discussed/Encouraged (specific Immunizations; Tdap, Influenza, Shingrix and COVID19  The above risks/problems have been discussed with the patient.  Follow up actions/plans if indicated are seen below in the Assessment/Plan Section.  Pertinent information has been shared with the patient in the After Visit Summary.    Diagnoses and all orders for this visit:    1. Medicare annual wellness visit, subsequent (Primary)    2. Need for influenza vaccination  -     Fluzone High-Dose 65+yrs ([id number])    3. Screening mammogram for breast cancer  -     Mammo Screening Digital Tomosynthesis Bilateral With CAD; Future    4. Mixed hyperlipidemia  -     Lipid Panel With / Chol / HDL Ratio  -     Comprehensive Metabolic Panel    5. Elevated parathyroid hormone  -     PTH, Intact    6. Anemia, unspecified type  -     CBC & Differential    7. Elevated TSH  -     TSH  -     T4, Free    8. Chronic  diastolic (congestive) heart failure (HCC)  -     TSH  -     T4, Free    9. Essential hypertension  -     TSH  -     T4, Free    10. Non-seasonal allergic rhinitis due to pollen  -     cetirizine (zyrTEC) 5 MG tablet; Take 1 tablet by mouth Daily.  Dispense: 30 tablet; Refill: 11  -     montelukast (SINGULAIR) 10 MG tablet; Take 1 tablet by mouth Every Night.  Dispense: 30 tablet; Refill: 11    Other orders  -     aspirin 325 MG tablet; Take 1 tablet by mouth Daily.  Dispense: 30 tablet; Refill: 11        Follow Up:   Return in about 6 months (around 4/13/2023) for Recheck.     An After Visit Summary and PPPS were made available to the patient.

## 2022-10-18 LAB
ALBUMIN SERPL-MCNC: 4.2 G/DL (ref 3.5–5.2)
ALBUMIN/GLOB SERPL: 1.8 G/DL
ALP SERPL-CCNC: 135 U/L (ref 39–117)
ALT SERPL-CCNC: 14 U/L (ref 1–33)
AST SERPL-CCNC: 16 U/L (ref 1–32)
BASOPHILS # BLD AUTO: 0.09 10*3/MM3 (ref 0–0.2)
BASOPHILS NFR BLD AUTO: 0.8 % (ref 0–1.5)
BILIRUB SERPL-MCNC: 0.3 MG/DL (ref 0–1.2)
BUN SERPL-MCNC: 13 MG/DL (ref 8–23)
BUN/CREAT SERPL: 16.7 (ref 7–25)
CALCIUM SERPL-MCNC: 10.3 MG/DL (ref 8.6–10.5)
CHLORIDE SERPL-SCNC: 104 MMOL/L (ref 98–107)
CHOLEST SERPL-MCNC: 100 MG/DL (ref 0–200)
CHOLEST/HDLC SERPL: 2.94 {RATIO}
CO2 SERPL-SCNC: 23.1 MMOL/L (ref 22–29)
CREAT SERPL-MCNC: 0.78 MG/DL (ref 0.57–1)
EGFRCR SERPLBLD CKD-EPI 2021: 79.8 ML/MIN/1.73
EOSINOPHIL # BLD AUTO: 0.71 10*3/MM3 (ref 0–0.4)
EOSINOPHIL NFR BLD AUTO: 6.3 % (ref 0.3–6.2)
ERYTHROCYTE [DISTWIDTH] IN BLOOD BY AUTOMATED COUNT: 15.2 % (ref 12.3–15.4)
GLOBULIN SER CALC-MCNC: 2.3 GM/DL
GLUCOSE SERPL-MCNC: 92 MG/DL (ref 65–99)
HCT VFR BLD AUTO: 41 % (ref 34–46.6)
HDLC SERPL-MCNC: 34 MG/DL (ref 40–60)
HGB BLD-MCNC: 13.6 G/DL (ref 12–15.9)
IMM GRANULOCYTES # BLD AUTO: 0.03 10*3/MM3 (ref 0–0.05)
IMM GRANULOCYTES NFR BLD AUTO: 0.3 % (ref 0–0.5)
LDLC SERPL CALC-MCNC: 50 MG/DL (ref 0–100)
LYMPHOCYTES # BLD AUTO: 2.3 10*3/MM3 (ref 0.7–3.1)
LYMPHOCYTES NFR BLD AUTO: 20.5 % (ref 19.6–45.3)
MCH RBC QN AUTO: 27.9 PG (ref 26.6–33)
MCHC RBC AUTO-ENTMCNC: 33.2 G/DL (ref 31.5–35.7)
MCV RBC AUTO: 84 FL (ref 79–97)
MONOCYTES # BLD AUTO: 0.65 10*3/MM3 (ref 0.1–0.9)
MONOCYTES NFR BLD AUTO: 5.8 % (ref 5–12)
NEUTROPHILS # BLD AUTO: 7.45 10*3/MM3 (ref 1.7–7)
NEUTROPHILS NFR BLD AUTO: 66.3 % (ref 42.7–76)
NRBC BLD AUTO-RTO: 0 /100 WBC (ref 0–0.2)
PLATELET # BLD AUTO: 305 10*3/MM3 (ref 140–450)
POTASSIUM SERPL-SCNC: 4.6 MMOL/L (ref 3.5–5.2)
PROT SERPL-MCNC: 6.5 G/DL (ref 6–8.5)
PTH-INTACT SERPL-MCNC: 46 PG/ML (ref 15–65)
RBC # BLD AUTO: 4.88 10*6/MM3 (ref 3.77–5.28)
SODIUM SERPL-SCNC: 138 MMOL/L (ref 136–145)
T4 FREE SERPL-MCNC: 1.33 NG/DL (ref 0.93–1.7)
TRIGL SERPL-MCNC: 78 MG/DL (ref 0–150)
TSH SERPL DL<=0.005 MIU/L-ACNC: 2.83 UIU/ML (ref 0.27–4.2)
VLDLC SERPL CALC-MCNC: 16 MG/DL (ref 5–40)
WBC # BLD AUTO: 11.23 10*3/MM3 (ref 3.4–10.8)

## 2022-11-02 ENCOUNTER — PATIENT MESSAGE (OUTPATIENT)
Dept: FAMILY MEDICINE | Facility: CLINIC | Age: 72
End: 2022-11-02
Payer: MEDICARE

## 2022-11-08 ENCOUNTER — HOSPITAL ENCOUNTER (OUTPATIENT)
Dept: MAMMOGRAPHY | Facility: HOSPITAL | Age: 72
Discharge: HOME | End: 2022-11-08
Admitting: NURSE PRACTITIONER

## 2022-11-08 DIAGNOSIS — Z12.31 SCREENING MAMMOGRAM FOR BREAST CANCER: ICD-10-CM

## 2022-11-08 PROCEDURE — 77067 SCR MAMMO BI INCL CAD: CPT

## 2022-11-08 PROCEDURE — 77063 BREAST TOMOSYNTHESIS BI: CPT

## 2022-11-10 ENCOUNTER — TELEPHONE (OUTPATIENT)
Dept: FAMILY MEDICINE CLINIC | Facility: CLINIC | Age: 72
End: 2022-11-10

## 2022-11-10 NOTE — TELEPHONE ENCOUNTER
Caller: Laine Garza    Relationship to patient: Emergency Contact    Best call back number:[phone]    Date of exposure: GRANDSON TESTED POSITIVE ON 11/9 AND HAS BEEN QUARENTINING    Date of positive COVID19 test: NA    Date if possible COVID19 exposure: 11/9/22    COVID19 symptoms: COUGH-2 DAYS    Additional information or concerns: DAUGHTER WOULD LIKE TO KNOW IF HER MOTHER NEEDS TO TAKE A COVID TEST AND WHAT COUGH SYRUP WOULD BE GOOD TO TAKE    What is the patients preferred pharmacy:   [facility] #[id number] [facility] [address] AT [facility] - [phone] [facility] [phone]   [phone]

## 2022-11-12 ENCOUNTER — PATIENT MESSAGE (OUTPATIENT)
Dept: FAMILY MEDICINE | Facility: CLINIC | Age: 72
End: 2022-11-12
Payer: MEDICARE

## 2022-11-13 ENCOUNTER — PATIENT MESSAGE (OUTPATIENT)
Dept: FAMILY MEDICINE | Facility: CLINIC | Age: 72
End: 2022-11-13
Payer: MEDICARE

## 2022-11-24 ENCOUNTER — PATIENT MESSAGE (OUTPATIENT)
Dept: FAMILY MEDICINE | Facility: CLINIC | Age: 72
End: 2022-11-24
Payer: MEDICARE

## 2022-11-28 ENCOUNTER — TELEPHONE (OUTPATIENT)
Dept: NEUROLOGY | Facility: CLINIC | Age: 72
End: 2022-11-28

## 2022-12-30 DIAGNOSIS — I10 ESSENTIAL HYPERTENSION: ICD-10-CM

## 2022-12-30 DIAGNOSIS — J30.1 NON-SEASONAL ALLERGIC RHINITIS DUE TO POLLEN: ICD-10-CM

## 2022-12-30 DIAGNOSIS — I50.32 CHRONIC DIASTOLIC (CONGESTIVE) HEART FAILURE: ICD-10-CM

## 2022-12-30 RX ORDER — LISINOPRIL 2.5 MG/1
2.5 TABLET ORAL DAILY
Qty: 90 TABLET | Refills: 1 | Status: SHIPPED | OUTPATIENT
Start: 2022-12-30

## 2022-12-30 RX ORDER — MONTELUKAST SODIUM 10 MG/1
10 TABLET ORAL NIGHTLY
Qty: 30 TABLET | Refills: 11 | Status: SHIPPED | OUTPATIENT
Start: 2022-12-30

## 2022-12-30 NOTE — TELEPHONE ENCOUNTER
Caller: Laine Garza    Relationship: Emergency Contact    Best call back number: [phone]    Requested Prescriptions:   Requested Prescriptions     Pending Prescriptions Disp Refills   • montelukast (SINGULAIR) 10 MG tablet 30 tablet 11     Sig: Take 1 tablet by mouth Every Night.   • lisinopril (PRINIVIL,ZESTRIL) 2.5 MG tablet 90 tablet 1     Sig: Take 1 tablet by mouth Daily.        Pharmacy where request should be sent: [facility] #[id number] [facility] [address]  AT [facility] [phone] [facility] [phone]      Additional details provided by patient:  PATIENT HAS THREE DAYS LEFT OF MEDICATION, PLEASE SEND TO LOCAL PHARMACY. SHE DOES NOT WANT ANYTHING SENT TO MAIL ORDER ANYMORE.     Does the patient have less than a 3 day supply:  [x] Yes  [] No    Would you like a call back once the refill request has been completed: [x] Yes [] No    If the office needs to give you a call back, can they leave a voicemail: [x] Yes [] No    Nunu Navas Rep   12/30/22 09:22 EST

## 2022-12-30 NOTE — TELEPHONE ENCOUNTER
Rx Refill Note  Requested Prescriptions     Pending Prescriptions Disp Refills   • montelukast (SINGULAIR) 10 MG tablet 30 tablet 11     Sig: Take 1 tablet by mouth Every Night.   • lisinopril (PRINIVIL,ZESTRIL) 2.5 MG tablet 90 tablet 1     Sig: Take 1 tablet by mouth Daily.      Last office visit with prescribing clinician: 2/8/2021   Last telemedicine visit with prescribing clinician: Visit date not found   Next office visit with prescribing clinician: Visit date not found

## 2023-01-03 ENCOUNTER — PATIENT MESSAGE (OUTPATIENT)
Dept: FAMILY MEDICINE | Facility: CLINIC | Age: 73
End: 2023-01-03
Payer: MEDICARE

## 2023-01-22 ENCOUNTER — PATIENT MESSAGE (OUTPATIENT)
Dept: FAMILY MEDICINE | Facility: CLINIC | Age: 73
End: 2023-01-22
Payer: MEDICARE

## 2023-01-23 RX ORDER — ATORVASTATIN CALCIUM 80 MG/1
80 TABLET, FILM COATED ORAL NIGHTLY
Qty: 90 TABLET | Refills: 1 | Status: SHIPPED | OUTPATIENT
Start: 2023-01-23

## 2023-01-23 RX ORDER — CLOPIDOGREL BISULFATE 75 MG/1
75 TABLET ORAL DAILY
Qty: 90 TABLET | Refills: 1 | Status: SHIPPED | OUTPATIENT
Start: 2023-01-23

## 2023-01-23 NOTE — TELEPHONE ENCOUNTER
Rx Refill Note  Requested Prescriptions     Pending Prescriptions Disp Refills   • atorvastatin (LIPITOR) 80 MG tablet 90 tablet 1     Sig: Take 1 tablet by mouth Every Night.   • clopidogrel (PLAVIX) 75 MG tablet 90 tablet 1     Sig: Take 1 tablet by mouth Daily.

## 2023-01-23 NOTE — TELEPHONE ENCOUNTER
Caller: Laine Garza    Relationship: Emergency Contact    Best call back number: [phone]    Requested Prescriptions:   Requested Prescriptions     Pending Prescriptions Disp Refills   • atorvastatin (LIPITOR) 80 MG tablet 90 tablet 1     Sig: Take 1 tablet by mouth Every Night.   • clopidogrel (PLAVIX) 75 MG tablet 90 tablet 1     Sig: Take 1 tablet by mouth Daily.        Pharmacy where request should be sent: [facility] #[id number] [facility] [address]  AT [facility] [phone] [facility] [phone]      Additional details provided by patient: OUT OF MEDICATION   Does the patient have less than a 3 day supply:  [x] Yes  [] No    Would you like a call back once the refill request has been completed: [] Yes [] No    If the office needs to give you a call back, can they leave a voicemail: [] Yes [] No    Flavia Gross, [facility] Rep   01/23/23 15:59 EST

## 2023-01-30 ENCOUNTER — PATIENT MESSAGE (OUTPATIENT)
Dept: AUDIOLOGY | Facility: CLINIC | Age: 73
End: 2023-01-30
Payer: MEDICARE

## 2023-02-07 ENCOUNTER — CLINICAL SUPPORT (OUTPATIENT)
Dept: AUDIOLOGY | Facility: CLINIC | Age: 73
End: 2023-02-07
Payer: MEDICARE

## 2023-02-07 DIAGNOSIS — H90.3 BILATERAL SENSORINEURAL HEARING LOSS: Primary | ICD-10-CM

## 2023-02-07 DIAGNOSIS — Z97.4 WEARS HEARING AID IN BOTH EARS: Primary | ICD-10-CM

## 2023-02-07 DIAGNOSIS — H93.292 IMPAIRED AUDITORY DISCRIMINATION, LEFT: ICD-10-CM

## 2023-02-07 DIAGNOSIS — H91.90 HEARING DIFFICULTY, UNSPECIFIED LATERALITY: Primary | ICD-10-CM

## 2023-02-07 DIAGNOSIS — H91.90 HEARING DIFFICULTY, UNSPECIFIED LATERALITY: ICD-10-CM

## 2023-02-07 DIAGNOSIS — Z97.4 WEARS HEARING AID IN BOTH EARS: ICD-10-CM

## 2023-02-07 PROCEDURE — 92552 PURE TONE AUDIOMETRY AIR: CPT | Mod: S$GLB,,, | Performed by: AUDIOLOGIST

## 2023-02-07 PROCEDURE — 92556 SPEECH AUDIOMETRY COMPLETE: CPT | Mod: S$GLB,,, | Performed by: AUDIOLOGIST

## 2023-02-07 PROCEDURE — 99999 PR PBB SHADOW E&M-EST. PATIENT-LVL I: ICD-10-PCS | Mod: PBBFAC,,,

## 2023-02-07 PROCEDURE — 99499 NO LOS: ICD-10-PCS | Mod: S$GLB,,, | Performed by: AUDIOLOGIST-HEARING AID FITTER

## 2023-02-07 PROCEDURE — 92552 PR PURE TONE AUDIOMETRY, AIR: ICD-10-PCS | Mod: S$GLB,,, | Performed by: AUDIOLOGIST

## 2023-02-07 PROCEDURE — 99999 PR PBB SHADOW E&M-EST. PATIENT-LVL I: CPT | Mod: PBBFAC,,,

## 2023-02-07 PROCEDURE — 92556 PR SPEECH AUDIOMETRY, COMPLETE: ICD-10-PCS | Mod: S$GLB,,, | Performed by: AUDIOLOGIST

## 2023-02-07 PROCEDURE — 99499 UNLISTED E&M SERVICE: CPT | Mod: S$GLB,,, | Performed by: AUDIOLOGIST-HEARING AID FITTER

## 2023-02-07 NOTE — PROGRESS NOTES
Kenna Schmitt was seen 02/07/2023 for an audiological evaluation.     See audiogram below.      Audiogram results were reviewed in detail with patient and all questions were answered. Results will be reviewed by ENT at the completion of this note.     Recommend continued daily use of binaural amplification and annual audiogram to monitor hearing loss.

## 2023-02-07 NOTE — PROGRESS NOTES
Kenna Schmitt came in on 02/07/2023 for a hearing aid follow up. Pt stated she doesn't feel she needs any adjustments at this time. Cleaned both aids and changed the wax filters and domes. Listening check revealed aids to sound appropriate for her hearing loss. Recalculated thresholds from new hearing test. Informed pt that a notification will be mailed when it is time for her annual hearing test and that she should call the audiology clinic directly to schedule the appts to coordinate them correctly. Also informed her that if domes or wax filters are needed, call the clinic and we will leave them at the 2nd floor check in desk to be picked up at the earliest convenience. She should call the clinic PRN otherwise.

## 2023-02-14 ENCOUNTER — PATIENT MESSAGE (OUTPATIENT)
Dept: OTOLARYNGOLOGY | Facility: CLINIC | Age: 73
End: 2023-02-14
Payer: MEDICARE

## 2023-02-15 ENCOUNTER — OFFICE VISIT (OUTPATIENT)
Dept: NEUROLOGY | Facility: CLINIC | Age: 73
End: 2023-02-15
Payer: MEDICARE

## 2023-02-15 VITALS
BODY MASS INDEX: 28.35 KG/M2 | WEIGHT: 160 LBS | OXYGEN SATURATION: 96 % | HEIGHT: 63 IN | SYSTOLIC BLOOD PRESSURE: 124 MMHG | HEART RATE: 50 BPM | DIASTOLIC BLOOD PRESSURE: 68 MMHG

## 2023-02-15 DIAGNOSIS — R47.01 EXPRESSIVE APHASIA: ICD-10-CM

## 2023-02-15 DIAGNOSIS — I69.30 SEQUELAE, POST-STROKE: ICD-10-CM

## 2023-02-15 DIAGNOSIS — E78.2 MIXED HYPERLIPIDEMIA: ICD-10-CM

## 2023-02-15 DIAGNOSIS — I67.9 CEREBROVASCULAR DISEASE: ICD-10-CM

## 2023-02-15 PROCEDURE — 99214 OFFICE O/P EST MOD 30 MIN: CPT | Performed by: NURSE PRACTITIONER

## 2023-02-15 NOTE — PROGRESS NOTES
DOS: 2023  NAME: Jazmyn Castaneda   : 1950  PCP: Samira Yi MD    Chief Complaint   Patient presents with   • Cerebrovascular Accident      SUBJECTIVE  Neurological Problem:  72 y.o. female with left MCA stroke (2021), HTN, HLD, CHF who presents for follow-up of stroke.  She is accompanied by her daughter.     Interval History:   **For previous detailed history, please see progress note dated 12/15/2021.    Ms. Castaneda presents has a h/o LMCA infarct d/t left MCA thrombus s/p endovascular angioplasty(2021) subsequently treated with DAPT + statin. She iis followed by [facility], Dr. Cheng, CTA head/neck done in 2022 without any severe stenosis, recommended she follow-up in one year with repeat scan and also to continue her ASA.     Patient presents today, doing well except for significant ear issues that have been a chronic issue, needing tubes, is followed by ENT. She denies any new neurologic issues, no new stroke symptoms. Her speech has improved but she does continue with word-fnding difficulty, exacerbated when she is anxious. She also has residual right hemiparesis, has not had any PT/ST in some time. She has a f/u scan and appt with Dr. Cheng in August. She denies any other changes in her health since her lat visit.   She continues to use a rollator, denies any recent falls. She states her BP is well-controlled. Most recent labs from [facility] 2022 shows a CMP that is WNL;TSH 2.83, Lipid panel with total of 100, HDL 34, LDL 50, TG 78.  She does not smoke.     Review of Systems:Review of Systems   HENT: Positive for hearing loss and tinnitus. Negative for trouble swallowing.    Genitourinary: Negative for decreased urine volume, difficulty urinating and urgency.   Musculoskeletal: Positive for gait problem. Negative for back pain and neck pain.   Allergic/Immunologic: Negative for environmental allergies, food allergies and immunocompromised state.   Neurological: Positive for  weakness (right side) and numbness (right side). Negative for dizziness, tremors, seizures, syncope, facial asymmetry, speech difficulty, light-headedness and headaches.   Hematological: Negative for adenopathy. Bruises/bleeds easily.   Psychiatric/Behavioral: Negative for confusion, dysphoric mood and sleep disturbance. The patient is not nervous/anxious.     Above ROS reviewed    The following portions of the patient's history were reviewed and updated as appropriate: allergies, current medications, past family history, past medical history, past social history, past surgical history and problem list.    Current Medications:   Current Outpatient Medications:   •  acetaminophen (TYLENOL) 500 MG tablet, Take 500 mg by mouth Every 4 (Four) Hours As Needed for Mild Pain  or Fever., Disp: , Rfl:   •  aspirin 325 MG tablet, Take 1 tablet by mouth Daily., Disp: 30 tablet, Rfl: 11  •  atorvastatin (LIPITOR) 80 MG tablet, Take 1 tablet by mouth Every Night., Disp: 90 tablet, Rfl: 1  •  cetirizine (zyrTEC) 5 MG tablet, Take 1 tablet by mouth Daily., Disp: 30 tablet, Rfl: 11  •  clopidogrel (PLAVIX) 75 MG tablet, Take 1 tablet by mouth Daily., Disp: 90 tablet, Rfl: 1  •  docusate sodium (COLACE) 50 MG capsule, Take  by mouth Daily., Disp: , Rfl:   •  fluticasone (FLONASE) 50 MCG/ACT nasal spray, INSTILL 1 SPRAY IN EACH NOSTRIL DAILY AS DIRECTED, Disp: 16 g, Rfl: 2  •  lactulose (CHRONULAC) 10 GM/15ML solution, Take 30 mL by mouth Daily., Disp: 1892 mL, Rfl: 5  •  lisinopril (PRINIVIL,ZESTRIL) 2.5 MG tablet, Take 1 tablet by mouth Daily., Disp: 90 tablet, Rfl: 1  •  montelukast (SINGULAIR) 10 MG tablet, Take 1 tablet by mouth Every Night., Disp: 30 tablet, Rfl: 11  •  Polyethylene Glycol 3350 powder, Take 17 g by mouth Daily., Disp: 29017 g, Rfl: 5  •  calcium carbonate-cholecalciferol (Calcium 500+D) 500-400 MG-UNIT tablet tablet, Take 1 tablet by mouth Daily., Disp: , Rfl:   •  Cholecalciferol (D3 2000 PO), Take  by mouth  Daily., Disp: , Rfl:   **I did not stop or change the above medications.  Patient's medication list was updated to reflect medications they have reported as currently taking, including medication changes made by other providers.    OBJECTIVE  Vitals:    02/15/23 1429   BP: 124/68   Pulse: 50   SpO2: 96%     Body mass index is 28.34 kg/m².    Diagnostics:    Laboratory Results:         Lab Results   Component Value Date    WBC 11.23 (H) 10/17/2022    HGB 13.6 10/17/2022    HCT 41.0 10/17/2022    MCV 84.0 10/17/2022     10/17/2022     Lab Results   Component Value Date    GLUCOSE 92 10/17/2022    BUN 13 10/17/2022    CREATININE 0.78 10/17/2022    EGFRIFNONA 119 04/23/2021    EGFRIFAFRI 100 01/19/2021    BCR 16.7 10/17/2022    K 4.6 10/17/2022    CO2 23.1 10/17/2022    CALCIUM 10.3 10/17/2022    PROTENTOTREF 6.5 10/17/2022    ALBUMIN 4.20 10/17/2022    LABIL2 1.8 10/17/2022    AST 16 10/17/2022    ALT 14 10/17/2022     Lab Results   Component Value Date    HGBA1C 5.71 (H) 04/18/2021     Lab Results   Component Value Date    CHOL 118 04/20/2021    CHOL 171 04/18/2021     Lab Results   Component Value Date    HDL 34 (L) 10/17/2022    HDL 28 (L) 04/20/2021    HDL 30 (L) 04/18/2021     Lab Results   Component Value Date    LDL 50 10/17/2022    LDL 69 04/20/2021     (H) 04/18/2021     Lab Results   Component Value Date    TRIG 78 10/17/2022    TRIG 115 04/20/2021    TRIG 85 04/18/2021     No results found for: RPR  Lab Results   Component Value Date    TSH 2.830 10/17/2022     No results found for: [id number]    Physical Exam:  GENERAL: NAD, overweight  HEENT: Normocephalic, atraumatic   COR: RRR  Resp: Even and unlabored  Extremities: No signs of distal embolization.   Skin: No rashes, lesions or ulcers.  Psychiatric: Normal mood and affect.    Neurological:   MS: Alert. Oriented. Expressive aphasia. Naming of objects effortful. No neglect. . Follows most commands.  CN: II-XII grossly normal  Motor: Normal  strength and tone except for increase tone in RUE. Mild pronator drift of RUE with decreased hand  compared to left.  Sensory: Intact to light touch in arms and legs  Station and Gait: Able to arise from chair independently, mildy cautious but ambulates well with walker.   Coordination: Normal finger to nose bilaterally    Impression/Plan: Ms. Castaneda presents for f/u of left MCA infarct she suffered in April 2021, she continues on antiplatelets + statin and is doing fairly well, continues with residuals and is interested in PT/OT, will have home health evaluate as she does not drive. We reviewed importance of control of vascular risk factors as noted below.   Secondary stroke prevention: Ideal targets for stroke prevention would be Blood pressure < 130/80; B12 > 500 TSH in normal range and LDL < 70; HbA1c < 6.5 and smoking cessation if applicable.  Call 911 for stroke symptoms  Follow-up in 6 months, sooner if symptoms warrant.      I spent a total of 30 minutes today in reviewing records, prior diagnostics, examination of patient including, counseling and educating patient regarding signs/symptoms of stroke and reviewing diagnostics, stroke prevention recommendations and discussion and documenting plan of care.    Diagnoses and all orders for this visit:    1. Expressive aphasia  -     Ambulatory Referral to Home Health (Outpatient)    2. Sequelae, post-stroke  -     Ambulatory Referral to Home Health (Outpatient)    3. Cerebrovascular disease    4. Mixed hyperlipidemia        Coding      Dictated using Dragon

## 2023-03-01 ENCOUNTER — LAB VISIT (OUTPATIENT)
Dept: LAB | Facility: HOSPITAL | Age: 73
End: 2023-03-01
Attending: FAMILY MEDICINE
Payer: MEDICARE

## 2023-03-01 ENCOUNTER — OFFICE VISIT (OUTPATIENT)
Dept: FAMILY MEDICINE | Facility: CLINIC | Age: 73
End: 2023-03-01
Payer: MEDICARE

## 2023-03-01 VITALS
HEART RATE: 76 BPM | SYSTOLIC BLOOD PRESSURE: 138 MMHG | DIASTOLIC BLOOD PRESSURE: 76 MMHG | HEIGHT: 60 IN | BODY MASS INDEX: 37.53 KG/M2 | WEIGHT: 191.13 LBS | OXYGEN SATURATION: 99 %

## 2023-03-01 DIAGNOSIS — E78.5 HYPERLIPIDEMIA: ICD-10-CM

## 2023-03-01 DIAGNOSIS — E78.2 MIXED HYPERLIPIDEMIA: ICD-10-CM

## 2023-03-01 DIAGNOSIS — E03.4 HYPOTHYROIDISM DUE TO ACQUIRED ATROPHY OF THYROID: ICD-10-CM

## 2023-03-01 DIAGNOSIS — I10 ESSENTIAL HYPERTENSION: ICD-10-CM

## 2023-03-01 DIAGNOSIS — E66.01 CLASS 2 SEVERE OBESITY WITH SERIOUS COMORBIDITY AND BODY MASS INDEX (BMI) OF 37.0 TO 37.9 IN ADULT, UNSPECIFIED OBESITY TYPE: ICD-10-CM

## 2023-03-01 DIAGNOSIS — M75.81 ROTATOR CUFF TENDONITIS, RIGHT: Primary | ICD-10-CM

## 2023-03-01 PROBLEM — E66.812 CLASS 2 SEVERE OBESITY WITH SERIOUS COMORBIDITY AND BODY MASS INDEX (BMI) OF 37.0 TO 37.9 IN ADULT: Status: ACTIVE | Noted: 2023-03-01

## 2023-03-01 PROCEDURE — 1125F PR PAIN SEVERITY QUANTIFIED, PAIN PRESENT: ICD-10-PCS | Mod: CPTII,S$GLB,, | Performed by: FAMILY MEDICINE

## 2023-03-01 PROCEDURE — 99999 PR PBB SHADOW E&M-EST. PATIENT-LVL IV: ICD-10-PCS | Mod: PBBFAC,,, | Performed by: FAMILY MEDICINE

## 2023-03-01 PROCEDURE — 3075F PR MOST RECENT SYSTOLIC BLOOD PRESS GE 130-139MM HG: ICD-10-PCS | Mod: CPTII,S$GLB,, | Performed by: FAMILY MEDICINE

## 2023-03-01 PROCEDURE — 3008F PR BODY MASS INDEX (BMI) DOCUMENTED: ICD-10-PCS | Mod: CPTII,S$GLB,, | Performed by: FAMILY MEDICINE

## 2023-03-01 PROCEDURE — 1101F PT FALLS ASSESS-DOCD LE1/YR: CPT | Mod: CPTII,S$GLB,, | Performed by: FAMILY MEDICINE

## 2023-03-01 PROCEDURE — 3078F DIAST BP <80 MM HG: CPT | Mod: CPTII,S$GLB,, | Performed by: FAMILY MEDICINE

## 2023-03-01 PROCEDURE — 84443 ASSAY THYROID STIM HORMONE: CPT | Performed by: FAMILY MEDICINE

## 2023-03-01 PROCEDURE — 99214 OFFICE O/P EST MOD 30 MIN: CPT | Mod: S$GLB,,, | Performed by: FAMILY MEDICINE

## 2023-03-01 PROCEDURE — 36415 COLL VENOUS BLD VENIPUNCTURE: CPT | Mod: PO | Performed by: FAMILY MEDICINE

## 2023-03-01 PROCEDURE — 80061 LIPID PANEL: CPT | Performed by: FAMILY MEDICINE

## 2023-03-01 PROCEDURE — 3075F SYST BP GE 130 - 139MM HG: CPT | Mod: CPTII,S$GLB,, | Performed by: FAMILY MEDICINE

## 2023-03-01 PROCEDURE — 3078F PR MOST RECENT DIASTOLIC BLOOD PRESSURE < 80 MM HG: ICD-10-PCS | Mod: CPTII,S$GLB,, | Performed by: FAMILY MEDICINE

## 2023-03-01 PROCEDURE — 3008F BODY MASS INDEX DOCD: CPT | Mod: CPTII,S$GLB,, | Performed by: FAMILY MEDICINE

## 2023-03-01 PROCEDURE — 4010F ACE/ARB THERAPY RXD/TAKEN: CPT | Mod: CPTII,S$GLB,, | Performed by: FAMILY MEDICINE

## 2023-03-01 PROCEDURE — 1101F PR PT FALLS ASSESS DOC 0-1 FALLS W/OUT INJ PAST YR: ICD-10-PCS | Mod: CPTII,S$GLB,, | Performed by: FAMILY MEDICINE

## 2023-03-01 PROCEDURE — 3288F PR FALLS RISK ASSESSMENT DOCUMENTED: ICD-10-PCS | Mod: CPTII,S$GLB,, | Performed by: FAMILY MEDICINE

## 2023-03-01 PROCEDURE — 4010F PR ACE/ARB THEARPY RXD/TAKEN: ICD-10-PCS | Mod: CPTII,S$GLB,, | Performed by: FAMILY MEDICINE

## 2023-03-01 PROCEDURE — 99999 PR PBB SHADOW E&M-EST. PATIENT-LVL IV: CPT | Mod: PBBFAC,,, | Performed by: FAMILY MEDICINE

## 2023-03-01 PROCEDURE — 1125F AMNT PAIN NOTED PAIN PRSNT: CPT | Mod: CPTII,S$GLB,, | Performed by: FAMILY MEDICINE

## 2023-03-01 PROCEDURE — 3288F FALL RISK ASSESSMENT DOCD: CPT | Mod: CPTII,S$GLB,, | Performed by: FAMILY MEDICINE

## 2023-03-01 PROCEDURE — 1159F MED LIST DOCD IN RCRD: CPT | Mod: CPTII,S$GLB,, | Performed by: FAMILY MEDICINE

## 2023-03-01 PROCEDURE — 80053 COMPREHEN METABOLIC PANEL: CPT | Performed by: FAMILY MEDICINE

## 2023-03-01 PROCEDURE — 99214 PR OFFICE/OUTPT VISIT, EST, LEVL IV, 30-39 MIN: ICD-10-PCS | Mod: S$GLB,,, | Performed by: FAMILY MEDICINE

## 2023-03-01 PROCEDURE — 1159F PR MEDICATION LIST DOCUMENTED IN MEDICAL RECORD: ICD-10-PCS | Mod: CPTII,S$GLB,, | Performed by: FAMILY MEDICINE

## 2023-03-01 RX ORDER — ERGOCALCIFEROL (VITAMIN D2) 10 MCG
TABLET ORAL
COMMUNITY
Start: 2023-01-27 | End: 2023-03-01

## 2023-03-01 RX ORDER — ATORVASTATIN CALCIUM 20 MG/1
20 TABLET, FILM COATED ORAL DAILY
Qty: 90 TABLET | Refills: 3 | Status: SHIPPED | OUTPATIENT
Start: 2023-03-01 | End: 2024-02-29 | Stop reason: SDUPTHER

## 2023-03-01 RX ORDER — CHLORTHALIDONE 25 MG/1
25 TABLET ORAL DAILY
Qty: 90 TABLET | Refills: 3 | Status: SHIPPED | OUTPATIENT
Start: 2023-03-01 | End: 2024-02-29 | Stop reason: SDUPTHER

## 2023-03-01 RX ORDER — LEVOTHYROXINE SODIUM 112 UG/1
TABLET ORAL
Qty: 90 TABLET | Refills: 3 | Status: SHIPPED | OUTPATIENT
Start: 2023-03-01 | End: 2024-02-29 | Stop reason: SDUPTHER

## 2023-03-01 RX ORDER — VALSARTAN 80 MG/1
80 TABLET ORAL DAILY
Qty: 90 TABLET | Refills: 3 | Status: SHIPPED | OUTPATIENT
Start: 2023-03-01 | End: 2024-02-29 | Stop reason: SDUPTHER

## 2023-03-01 RX ORDER — DICLOFENAC SODIUM 75 MG/1
75 TABLET, DELAYED RELEASE ORAL 2 TIMES DAILY
Qty: 60 TABLET | Refills: 0 | Status: SHIPPED | OUTPATIENT
Start: 2023-03-01 | End: 2023-03-27

## 2023-03-01 NOTE — PROGRESS NOTES
Subjective:       Patient ID: Kenna Schmitt is a 72 y.o. female.    Chief Complaint: Establish Care (Right arm hurts to raise and at night it goes numb )    Here today to establish care with a new PCP.   She was a patient of Dr. Varner who recently retired.  She has been working with a  since Sept and has lost almost 40 lbs.  She has been having issues with her right shoulder.    Immunizations: Prevnar 20 2022, COVID x 1, Zostervax  Last Lab work: June 2022  Colon Ca screening: Colonoscopy 2021  Breast Ca Screening: MMG 2022  Cervical Ca Screening: No longer recommended  Bone Density: 2022    HTN:  She is on Diovan and Chlorthalidone.  BP has been controlled  HLD:  She is on Lipitor 20 mg.    Hypothyroidism:  She is on Synthroid 112 mcg daily.  Last TSH was in range.    Review of Systems   Constitutional:  Negative for activity change, appetite change, fatigue and fever.   Respiratory:  Negative for cough, shortness of breath and wheezing.    Cardiovascular:  Negative for chest pain and palpitations.   Gastrointestinal:  Negative for abdominal pain, constipation, diarrhea and nausea.   Skin:  Negative for pallor and rash.   Neurological:  Negative for dizziness, syncope, light-headedness and headaches.   Psychiatric/Behavioral:  The patient is not nervous/anxious.      Objective:      Vitals:    03/01/23 1536   BP: 138/76   Pulse: 76   SpO2: 99%   Weight: 86.7 kg (191 lb 2.2 oz)   Height: 5' (1.524 m)      Physical Exam  Constitutional:       General: She is not in acute distress.  Cardiovascular:      Rate and Rhythm: Normal rate and regular rhythm.      Heart sounds: Normal heart sounds. No murmur heard.  Pulmonary:      Effort: Pulmonary effort is normal. No respiratory distress.      Breath sounds: Normal breath sounds. No wheezing, rhonchi or rales.   Musculoskeletal:      Right shoulder: No swelling, deformity or tenderness. Normal range of motion (pain with abduction and int/ext rotation).      Left  shoulder: No swelling, deformity or tenderness. Normal range of motion.   Skin:     General: Skin is warm and dry.   Neurological:      General: No focal deficit present.      Mental Status: She is alert.   Psychiatric:         Mood and Affect: Mood normal.         Behavior: Behavior normal.         Thought Content: Thought content normal.       Results for orders placed or performed in visit on 06/02/22   TSH   Result Value Ref Range    TSH 2.510 0.400 - 4.000 uIU/mL   Comprehensive Metabolic Panel   Result Value Ref Range    Sodium 139 136 - 145 mmol/L    Potassium 3.6 3.5 - 5.1 mmol/L    Chloride 100 95 - 110 mmol/L    CO2 30 (H) 23 - 29 mmol/L    Glucose 92 70 - 110 mg/dL    BUN 11 8 - 23 mg/dL    Creatinine 0.6 0.5 - 1.4 mg/dL    Calcium 9.6 8.7 - 10.5 mg/dL    Total Protein 6.7 6.0 - 8.4 g/dL    Albumin 3.7 3.5 - 5.2 g/dL    Total Bilirubin 0.6 0.1 - 1.0 mg/dL    Alkaline Phosphatase 81 55 - 135 U/L    AST 21 10 - 40 U/L    ALT 17 10 - 44 U/L    Anion Gap 9 8 - 16 mmol/L    eGFR if African American >60.0 >60 mL/min/1.73 m^2    eGFR if non African American >60.0 >60 mL/min/1.73 m^2   Lipid Panel   Result Value Ref Range    Cholesterol 169 120 - 199 mg/dL    Triglycerides 157 (H) 30 - 150 mg/dL    HDL 48 40 - 75 mg/dL    LDL Cholesterol 89.6 63.0 - 159.0 mg/dL    HDL/Cholesterol Ratio 28.4 20.0 - 50.0 %    Total Cholesterol/HDL Ratio 3.5 2.0 - 5.0    Non-HDL Cholesterol 121 mg/dL      Assessment:       1. Rotator cuff tendonitis, right    2. Essential hypertension    3. Mixed hyperlipidemia    4. Hypothyroidism due to acquired atrophy of thyroid    5. Class 2 severe obesity with serious comorbidity and body mass index (BMI) of 37.0 to 37.9 in adult, unspecified obesity type    6. Hyperlipidemia        Plan:       Rotator cuff tendonitis, right  -     diclofenac (VOLTAREN) 75 MG EC tablet; Take 1 tablet (75 mg total) by mouth 2 (two) times daily.  Dispense: 60 tablet; Refill: 0  Trial of NSAIDs and do exercises  for shoulder with  as discussed    Essential hypertension  -     Comprehensive Metabolic Panel; Future; Expected date: 03/01/2023  -     chlorthalidone (HYGROTEN) 25 MG Tab; Take 1 tablet (25 mg total) by mouth once daily.  Dispense: 90 tablet; Refill: 3  -     valsartan (DIOVAN) 80 MG tablet; Take 1 tablet (80 mg total) by mouth once daily.  Dispense: 90 tablet; Refill:3  Continue current medications and check lab work    Mixed hyperlipidemia  -     Comprehensive Metabolic Panel; Future; Expected date: 03/01/2023  -     Lipid Panel; Future; Expected date: 03/01/2023  Recheck cholesterol at this time  Hypothyroidism due to acquired atrophy of thyroid  -     TSH; Future; Expected date: 03/01/2023  -     levothyroxine (SYNTHROID) 112 MCG tablet; TAKE 1 TABLET BY MOUTH BEFORE BREAKFAST AS DIRECTED  Dispense: 90 tablet; Refill: 3  Recheck TSH at this time.  Continue Synthroid at current dose  Class 2 severe obesity with serious comorbidity and body mass index (BMI) of 37.0 to 37.9 in adult, unspecified obesity type  Continue to work on weight loss  Hyperlipidemia  -     atorvastatin (LIPITOR) 20 MG tablet; Take 1 tablet (20 mg total) by mouth once daily.  Dispense: 90 tablet; Refill: 3          Medication List with Changes/Refills   New Medications    DICLOFENAC (VOLTAREN) 75 MG EC TABLET    Take 1 tablet (75 mg total) by mouth 2 (two) times daily.   Current Medications    ASCORBIC ACID, VITAMIN C, (VITAMIN C) 1000 MG TABLET    Take 1,000 mg by mouth once daily.    B COMPLEX VITAMINS CAPSULE    Take 1 capsule by mouth once daily.    BIOTIN 10,000 MCG CAP    Take by mouth.    CHOLECALCIFEROL, VITAMIN D3, (VITAMIN D3) 50 MCG (2,000 UNIT) CAP    Take 1 capsule by mouth once daily.    HYDROCODONE-ACETAMINOPHEN (NORCO) 5-325 MG PER TABLET    Take 1 tablet by mouth every 6 (six) hours as needed.    IBUPROFEN (ADVIL,MOTRIN) 800 MG TABLET    Take 800 mg by mouth every 6 (six) hours as needed.    LACTOBACILLUS RHAMNOSUS  GG (CULTURELLE) 10 BILLION CELL CAPSULE    Take 1 capsule by mouth once daily.    OMEPRAZOLE (PRILOSEC) 20 MG CAPSULE    Take 1 capsule (20 mg total) by mouth once daily.    VALACYCLOVIR (VALTREX) 500 MG TABLET    Take 500 mg by mouth 2 (two) times daily. PRN fever blisters    ZINC GLUCONATE 50 MG TABLET    Take 50 mg by mouth once daily.   Changed and/or Refilled Medications    Modified Medication Previous Medication    ATORVASTATIN (LIPITOR) 20 MG TABLET atorvastatin (LIPITOR) 20 MG tablet       Take 1 tablet (20 mg total) by mouth once daily.    Take 1 tablet (20 mg total) by mouth once daily.    CHLORTHALIDONE (HYGROTEN) 25 MG TAB chlorthalidone (HYGROTEN) 25 MG Tab       Take 1 tablet (25 mg total) by mouth once daily.    Take 1 tablet (25 mg total) by mouth once daily.    LEVOTHYROXINE (SYNTHROID) 112 MCG TABLET levothyroxine (SYNTHROID) 112 MCG tablet       TAKE 1 TABLET BY MOUTH BEFORE BREAKFAST AS DIRECTED    TAKE 1 TABLET BY MOUTH BEFORE BREAKFAST AS DIRECTED    VALSARTAN (DIOVAN) 80 MG TABLET valsartan (DIOVAN) 80 MG tablet       Take 1 tablet (80 mg total) by mouth once daily.    Take 1 tablet (80 mg total) by mouth once daily.   Discontinued Medications    ERGOCALCIFEROL, VITAMIN D2, 10 MCG (400 UNIT) TAB

## 2023-03-02 LAB
ALBUMIN SERPL BCP-MCNC: 4 G/DL (ref 3.5–5.2)
ALP SERPL-CCNC: 76 U/L (ref 55–135)
ALT SERPL W/O P-5'-P-CCNC: 17 U/L (ref 10–44)
ANION GAP SERPL CALC-SCNC: 8 MMOL/L (ref 8–16)
AST SERPL-CCNC: 23 U/L (ref 10–40)
BILIRUB SERPL-MCNC: 0.8 MG/DL (ref 0.1–1)
BUN SERPL-MCNC: 16 MG/DL (ref 8–23)
CALCIUM SERPL-MCNC: 9.9 MG/DL (ref 8.7–10.5)
CHLORIDE SERPL-SCNC: 102 MMOL/L (ref 95–110)
CHOLEST SERPL-MCNC: 170 MG/DL (ref 120–199)
CHOLEST/HDLC SERPL: 2.8 {RATIO} (ref 2–5)
CO2 SERPL-SCNC: 30 MMOL/L (ref 23–29)
CREAT SERPL-MCNC: 0.7 MG/DL (ref 0.5–1.4)
EST. GFR  (NO RACE VARIABLE): >60 ML/MIN/1.73 M^2
GLUCOSE SERPL-MCNC: 90 MG/DL (ref 70–110)
HDLC SERPL-MCNC: 60 MG/DL (ref 40–75)
HDLC SERPL: 35.3 % (ref 20–50)
LDLC SERPL CALC-MCNC: 84.8 MG/DL (ref 63–159)
NONHDLC SERPL-MCNC: 110 MG/DL
POTASSIUM SERPL-SCNC: 3.4 MMOL/L (ref 3.5–5.1)
PROT SERPL-MCNC: 7 G/DL (ref 6–8.4)
SODIUM SERPL-SCNC: 140 MMOL/L (ref 136–145)
TRIGL SERPL-MCNC: 126 MG/DL (ref 30–150)
TSH SERPL DL<=0.005 MIU/L-ACNC: 0.58 UIU/ML (ref 0.4–4)

## 2023-04-11 DIAGNOSIS — K59.00 CONSTIPATION, UNSPECIFIED CONSTIPATION TYPE: ICD-10-CM

## 2023-04-12 RX ORDER — LACTULOSE 10 G/15ML
30 SOLUTION ORAL DAILY
Qty: 1892 ML | Refills: 1 | Status: SHIPPED | OUTPATIENT
Start: 2023-04-12

## 2023-05-15 DIAGNOSIS — M75.81 ROTATOR CUFF TENDONITIS, RIGHT: ICD-10-CM

## 2023-05-15 RX ORDER — DICLOFENAC SODIUM 75 MG/1
TABLET, DELAYED RELEASE ORAL
Qty: 60 TABLET | Refills: 0 | Status: SHIPPED | OUTPATIENT
Start: 2023-05-15 | End: 2023-08-30

## 2023-05-17 ENCOUNTER — TELEPHONE (OUTPATIENT)
Dept: NEUROLOGY | Facility: CLINIC | Age: 73
End: 2023-05-17
Payer: MEDICARE

## 2023-05-17 NOTE — TELEPHONE ENCOUNTER
Lvm with patient and rescheduled appointment with Lula Enriquez. Sent reminder letter and my chart message.

## 2023-06-08 ENCOUNTER — PATIENT MESSAGE (OUTPATIENT)
Dept: FAMILY MEDICINE | Facility: CLINIC | Age: 73
End: 2023-06-08
Payer: MEDICARE

## 2023-06-08 DIAGNOSIS — Z12.31 ENCOUNTER FOR SCREENING MAMMOGRAM FOR BREAST CANCER: Primary | ICD-10-CM

## 2023-06-21 ENCOUNTER — HOSPITAL ENCOUNTER (OUTPATIENT)
Dept: RADIOLOGY | Facility: HOSPITAL | Age: 73
Discharge: HOME | End: 2023-06-21
Attending: FAMILY MEDICINE
Payer: MEDICARE

## 2023-06-21 DIAGNOSIS — Z12.31 ENCOUNTER FOR SCREENING MAMMOGRAM FOR BREAST CANCER: ICD-10-CM

## 2023-06-21 PROCEDURE — 77063 MAMMO DIGITAL SCREENING BILAT WITH TOMO: ICD-10-PCS | Mod: 26,,, | Performed by: RADIOLOGY

## 2023-06-21 PROCEDURE — 77063 BREAST TOMOSYNTHESIS BI: CPT | Mod: 26,,, | Performed by: RADIOLOGY

## 2023-06-21 PROCEDURE — 77067 SCR MAMMO BI INCL CAD: CPT | Mod: TC,PO

## 2023-06-21 PROCEDURE — 77067 SCR MAMMO BI INCL CAD: CPT | Mod: 26,,, | Performed by: RADIOLOGY

## 2023-06-21 PROCEDURE — 77067 PR MAMMO, CAD, SCREENING, BILAT: ICD-10-PCS | Mod: 26,,, | Performed by: RADIOLOGY

## 2023-06-22 ENCOUNTER — HOSPITAL ENCOUNTER (OUTPATIENT)
Dept: RADIOLOGY | Facility: HOSPITAL | Age: 73
Discharge: HOME | End: 2023-06-22
Attending: FAMILY MEDICINE
Payer: MEDICARE

## 2023-06-22 DIAGNOSIS — R92.8 ABNORMAL MAMMOGRAM OF LEFT BREAST: ICD-10-CM

## 2023-06-23 ENCOUNTER — TELEPHONE (OUTPATIENT)
Dept: RADIOLOGY | Facility: HOSPITAL | Age: 73
End: 2023-06-23
Payer: MEDICARE

## 2023-06-23 PROBLEM — Z72.0 TOBACCO ABUSE: Chronic | Status: RESOLVED | Noted: 2019-07-06 | Resolved: 2023-06-23

## 2023-06-27 ENCOUNTER — HOSPITAL ENCOUNTER (OUTPATIENT)
Dept: RADIOLOGY | Facility: HOSPITAL | Age: 73
Discharge: HOME | End: 2023-06-27
Attending: FAMILY MEDICINE
Payer: MEDICARE

## 2023-06-27 PROCEDURE — 77061 BREAST TOMOSYNTHESIS UNI: CPT | Mod: TC,PO,LT

## 2023-06-27 PROCEDURE — 76642 ULTRASOUND BREAST LIMITED: CPT | Mod: 26,LT,, | Performed by: RADIOLOGY

## 2023-06-27 PROCEDURE — 76642 US BREAST LEFT LIMITED: ICD-10-PCS | Mod: 26,LT,, | Performed by: RADIOLOGY

## 2023-06-27 PROCEDURE — 77061 MAMMO DIGITAL DIAGNOSTIC LEFT WITH TOMO: ICD-10-PCS | Mod: 26,LT,, | Performed by: RADIOLOGY

## 2023-06-27 PROCEDURE — 76642 ULTRASOUND BREAST LIMITED: CPT | Mod: TC,PO,LT

## 2023-06-27 PROCEDURE — 77065 DX MAMMO INCL CAD UNI: CPT | Mod: 26,LT,, | Performed by: RADIOLOGY

## 2023-06-27 PROCEDURE — 77065 MAMMO DIGITAL DIAGNOSTIC LEFT WITH TOMO: ICD-10-PCS | Mod: 26,LT,, | Performed by: RADIOLOGY

## 2023-06-27 PROCEDURE — 77061 BREAST TOMOSYNTHESIS UNI: CPT | Mod: 26,LT,, | Performed by: RADIOLOGY

## 2023-08-03 ENCOUNTER — TELEPHONE (OUTPATIENT)
Dept: FAMILY MEDICINE | Facility: CLINIC | Age: 73
End: 2023-08-03
Payer: MEDICARE

## 2023-08-07 ENCOUNTER — HOSPITAL ENCOUNTER (OUTPATIENT)
Dept: CT IMAGING | Facility: HOSPITAL | Age: 73
Discharge: HOME | End: 2023-08-07
Admitting: NEUROLOGICAL SURGERY
Payer: MEDICARE

## 2023-08-07 DIAGNOSIS — I65.22 CAROTID STENOSIS, LEFT: ICD-10-CM

## 2023-08-07 LAB — CREAT BLDA-MCNC: 0.7 MG/DL (ref 0.6–1.3)

## 2023-08-07 PROCEDURE — 82565 ASSAY OF CREATININE: CPT

## 2023-08-07 PROCEDURE — 70498 CT ANGIOGRAPHY NECK: CPT

## 2023-08-07 PROCEDURE — 25510000001 IOPAMIDOL 61 % SOLUTION: Performed by: NEUROLOGICAL SURGERY

## 2023-08-07 RX ADMIN — IOPAMIDOL 95 ML: 612 INJECTION, SOLUTION INTRAVENOUS at 16:25

## 2023-08-22 NOTE — PROGRESS NOTES
Subjective   History of Present Illness: Jazmyn Castaneda is a 73 y.o. female is here today for follow-up on  bilateral carotid artery stenosis. CTA head/neck done on 23.  She is doing well today.  No new complaints.  Denies any changes in the frequency or severity of her headaches.  Denies any changes in vision.  Denies any strokelike episodes.  Denies any changes in strength or sensation.      History of Present Illness    The following portions of the patient's history were reviewed and updated as appropriate: allergies, current medications, past family history, past medical history, past social history, past surgical history, and problem list.    Past Medical History:   Diagnosis Date    Acute ischemic stroke 2021    Age-related osteoporosis without current pathological fracture 2021    Anesthesia     WOKE UP DURING CATARACT SURGERY    Chronic diastolic (congestive) heart failure 2021    Collapse of lung 1980s    history of in past post trauma    Diverticulosis     Environmental allergies     Some pollens, grass, trees, flowers    Essential hypertension 3/24/2017    Exogenous obesity 3/24/2017    H/O Pneumonia     Infectious mononucleosis     Kidney infection     Kidney stone     Mixed hyperlipidemia 2019    Neuromuscular disorder     Sepsis secondary to UTI 2019    Tobacco abuse 2019    Urinary tract infection     Vertigo     past history of several times        Past Surgical History:   Procedure Laterality Date    BREAST EXCISIONAL BIOPSY Right     30 something when it was done; says it was precancerous    CATARACT EXTRACTION, BILATERAL       SECTION      CYSTOSCOPY N/A 2019    Procedure: CYSTOSCOPY AND STENT PLACEMENT;  Surgeon: Alen Lal MD;  Location: [facility] OR;  Service: Urology    EMBOLECTOMY N/A 2021    Procedure: EMBOLECTOMY MECHANICAL;  Surgeon: Justo Cheng MD;  [facility] ;  Service: Neurosurgery;   Laterality: N/A;    MASTOID SURGERY      NASAL SEPTAL RECONSTRUCTION      SINUS SURGERY      scraped and prior deviated septum    URETEROSCOPY LASER LITHOTRIPSY WITH STENT INSERTION Right 7/18/2019    Procedure: CYSTOSCOPY, RIGHT URETEROSCOPY, LASER LITHOTRIPSY, STONE BASKET EXTRACTION, STENT PLACEMENT WITHOUT STRINGS;  Surgeon: Alfredo Gongora Jr., MD;  Location: [facility];  Service: Urology          Current Outpatient Medications:     acetaminophen (TYLENOL) 500 MG tablet, Take 1 tablet by mouth Every 4 (Four) Hours As Needed for Mild Pain or Fever., Disp: , Rfl:     aspirin 325 MG tablet, Take 1 tablet by mouth Daily., Disp: 30 tablet, Rfl: 11    atorvastatin (LIPITOR) 80 MG tablet, Take 1 tablet by mouth Every Night., Disp: 90 tablet, Rfl: 1    cetirizine (zyrTEC) 5 MG tablet, Take 1 tablet by mouth Daily., Disp: 30 tablet, Rfl: 11    Cholecalciferol (D3 2000 PO), Take  by mouth Daily., Disp: , Rfl:     clopidogrel (PLAVIX) 75 MG tablet, Take 1 tablet by mouth Daily., Disp: 90 tablet, Rfl: 1    docusate sodium (COLACE) 50 MG capsule, Take  by mouth Daily., Disp: , Rfl:     fluticasone (FLONASE) 50 MCG/ACT nasal spray, INSTILL 1 SPRAY IN EACH NOSTRIL DAILY AS DIRECTED, Disp: 16 g, Rfl: 2    lactulose (CHRONULAC) 10 GM/15ML solution, Take 30 mL by mouth Daily., Disp: 1892 mL, Rfl: 1    lisinopril (PRINIVIL,ZESTRIL) 2.5 MG tablet, Take 1 tablet by mouth Daily., Disp: 90 tablet, Rfl: 1    montelukast (SINGULAIR) 10 MG tablet, Take 1 tablet by mouth Every Night., Disp: 30 tablet, Rfl: 11    Polyethylene Glycol 3350 powder, Take 17 g by mouth Daily., Disp: 62234 g, Rfl: 5    calcium carbonate-cholecalciferol (Calcium 500+D) 500-400 MG-UNIT tablet tablet, Take 1 tablet by mouth Daily. (Patient not taking: Reported on 8/25/2023), Disp: , Rfl:      Allergies   Allergen Reactions    Cephalosporins Swelling    Penicillins Swelling    Ciprofloxacin Rash    Sulfa Antibiotics Unknown - Low Severity     Patient  "states she lost her vision temporarily when using Sulfa based eye ointment, also, broke out in rash w/oral medication.        Social History     Socioeconomic History    Marital status:     Number of children: 2    Years of education: College   Tobacco Use    Smoking status: Former     Packs/day: 1.00     Types: Cigarettes     Quit date: 6/16/2020     Years since quitting: 3.1    Smokeless tobacco: Never   Vaping Use    Vaping Use: Never used   Substance and Sexual Activity    Alcohol use: Never     Comment: \"on occasion\"    Drug use: No    Sexual activity: Defer        Family History   Problem Relation Age of Onset    Hypertension Mother     Aneurysm Mother     Heart failure Mother     Heart disease Mother     Heart disease Father     Hypertension Father     Kidney disease Father     Cancer Father 82        Bladder    Hypertension Sister     Glaucoma Maternal Grandmother     Cancer Maternal Grandmother     Cancer Maternal Aunt     Cancer Maternal Aunt     Cancer Maternal Aunt     Stroke Maternal Grandfather     Aneurysm Maternal Grandfather     Stroke Maternal Uncle     Liver cancer Other     Pancreatic cancer Other     Malig Hyperthermia Neg Hx     Breast cancer Neg Hx     Ovarian cancer Neg Hx         Review of Systems   Eyes:  Negative for visual disturbance.   Neurological:  Negative for dizziness, light-headedness, numbness and headaches.     Objective     Vitals:    08/25/23 1538   BP: 124/80   Pulse: 73   Temp: 97.8 øF (36.6 øC)   SpO2: 97%   Weight: 76.1 kg (167 lb 12.8 oz)     Body mass index is 29.73 kg/mý.      Physical Exam  Neurologic Exam  Awake, alert, oriented x3  Pupils equal round reactive to light  Extraocular muscles intact  Face symmetric  Speech is fluent and clear  No pronator drift  Motor exam  Right-sided hemiparesis with 5/5 strength throughout the left upper and lower extremity.  She has significantly improved strength in the right upper and lower extremity but decreased " coordination.  Able to give 4/5 strength throughout right biceps, triceps, handgrip, hip flexion, knee extension.  Uses a rollator for assistance with walking.    Able to detect  light touch in all 4 extremities          Assessment & Plan   Independent Review of Radiographic Studies:      I personally reviewed the images from the following studies.  CTA head and neck from 2023 and 2022  There is been no significant change of the moderate bilateral stenosis at the carotid artery bifurcation.  There is also severe stenosis of the proximal common carotid artery on the left without significant change over the past year.    Medical Decision Makin-year-old female s/p cerebral angiogram for stroke intervention on 2021  -She has made a great recovery since her procedure and her stroke.  The follow-up CTA shows stable severe stenosis of the proximal left common carotid artery and subclavian artery.  There is also moderate stenosis of the cervical carotid artery at the bifurcation.  -I will plan to see her back in 1 year with a repeat CTA head and neck to evaluate for any change and ask about any new strokelike symptoms  Diagnoses and all orders for this visit:    1. Carotid stenosis, bilateral (Primary)  -     CT Angiogram Carotids; Future  -     CT Angiogram Head With Contrast; Future      Return in about 1 year (around 2024).  I spent 40 minutes reviewing the medical record, reviewing the CTA images, discussing stroke prevention, discussing the signs and symptoms of stroke, discussing the management of carotid artery stenosis

## 2023-08-25 ENCOUNTER — OFFICE VISIT (OUTPATIENT)
Dept: NEUROSURGERY | Facility: CLINIC | Age: 73
End: 2023-08-25
Payer: MEDICARE

## 2023-08-25 VITALS
TEMPERATURE: 97.8 F | BODY MASS INDEX: 29.73 KG/M2 | WEIGHT: 167.8 LBS | DIASTOLIC BLOOD PRESSURE: 80 MMHG | OXYGEN SATURATION: 97 % | SYSTOLIC BLOOD PRESSURE: 124 MMHG | HEART RATE: 73 BPM

## 2023-08-25 DIAGNOSIS — I65.23 CAROTID STENOSIS, BILATERAL: Primary | ICD-10-CM

## 2023-08-30 ENCOUNTER — OFFICE VISIT (OUTPATIENT)
Dept: FAMILY MEDICINE | Facility: CLINIC | Age: 73
End: 2023-08-30
Payer: MEDICARE

## 2023-08-30 VITALS
TEMPERATURE: 98 F | WEIGHT: 189.81 LBS | OXYGEN SATURATION: 99 % | HEIGHT: 60 IN | BODY MASS INDEX: 37.27 KG/M2 | HEART RATE: 61 BPM | RESPIRATION RATE: 18 BRPM | DIASTOLIC BLOOD PRESSURE: 70 MMHG | SYSTOLIC BLOOD PRESSURE: 138 MMHG

## 2023-08-30 DIAGNOSIS — E66.01 CLASS 2 SEVERE OBESITY WITH SERIOUS COMORBIDITY AND BODY MASS INDEX (BMI) OF 37.0 TO 37.9 IN ADULT, UNSPECIFIED OBESITY TYPE: ICD-10-CM

## 2023-08-30 DIAGNOSIS — I10 ESSENTIAL HYPERTENSION: Primary | ICD-10-CM

## 2023-08-30 DIAGNOSIS — K21.9 GASTROESOPHAGEAL REFLUX DISEASE WITHOUT ESOPHAGITIS: ICD-10-CM

## 2023-08-30 DIAGNOSIS — E78.2 MIXED HYPERLIPIDEMIA: Chronic | ICD-10-CM

## 2023-08-30 DIAGNOSIS — E03.4 HYPOTHYROIDISM DUE TO ACQUIRED ATROPHY OF THYROID: ICD-10-CM

## 2023-08-30 PROCEDURE — 99999 PR PBB SHADOW E&M-EST. PATIENT-LVL IV: CPT | Mod: PBBFAC,,, | Performed by: FAMILY MEDICINE

## 2023-08-30 PROCEDURE — 1160F PR REVIEW ALL MEDS BY PRESCRIBER/CLIN PHARMACIST DOCUMENTED: ICD-10-PCS | Mod: CPTII,S$GLB,, | Performed by: FAMILY MEDICINE

## 2023-08-30 PROCEDURE — 1159F PR MEDICATION LIST DOCUMENTED IN MEDICAL RECORD: ICD-10-PCS | Mod: CPTII,S$GLB,, | Performed by: FAMILY MEDICINE

## 2023-08-30 PROCEDURE — 4010F ACE/ARB THERAPY RXD/TAKEN: CPT | Mod: CPTII,S$GLB,, | Performed by: FAMILY MEDICINE

## 2023-08-30 PROCEDURE — 1126F PR PAIN SEVERITY QUANTIFIED, NO PAIN PRESENT: ICD-10-PCS | Mod: CPTII,S$GLB,, | Performed by: FAMILY MEDICINE

## 2023-08-30 PROCEDURE — 99999 PR PBB SHADOW E&M-EST. PATIENT-LVL IV: ICD-10-PCS | Mod: PBBFAC,,, | Performed by: FAMILY MEDICINE

## 2023-08-30 PROCEDURE — 4010F PR ACE/ARB THEARPY RXD/TAKEN: ICD-10-PCS | Mod: CPTII,S$GLB,, | Performed by: FAMILY MEDICINE

## 2023-08-30 PROCEDURE — 3078F PR MOST RECENT DIASTOLIC BLOOD PRESSURE < 80 MM HG: ICD-10-PCS | Mod: CPTII,S$GLB,, | Performed by: FAMILY MEDICINE

## 2023-08-30 PROCEDURE — 3078F DIAST BP <80 MM HG: CPT | Mod: CPTII,S$GLB,, | Performed by: FAMILY MEDICINE

## 2023-08-30 PROCEDURE — 3288F PR FALLS RISK ASSESSMENT DOCUMENTED: ICD-10-PCS | Mod: CPTII,S$GLB,, | Performed by: FAMILY MEDICINE

## 2023-08-30 PROCEDURE — 1160F RVW MEDS BY RX/DR IN RCRD: CPT | Mod: CPTII,S$GLB,, | Performed by: FAMILY MEDICINE

## 2023-08-30 PROCEDURE — 3075F PR MOST RECENT SYSTOLIC BLOOD PRESS GE 130-139MM HG: ICD-10-PCS | Mod: CPTII,S$GLB,, | Performed by: FAMILY MEDICINE

## 2023-08-30 PROCEDURE — 99213 PR OFFICE/OUTPT VISIT, EST, LEVL III, 20-29 MIN: ICD-10-PCS | Mod: S$GLB,,, | Performed by: FAMILY MEDICINE

## 2023-08-30 PROCEDURE — 1126F AMNT PAIN NOTED NONE PRSNT: CPT | Mod: CPTII,S$GLB,, | Performed by: FAMILY MEDICINE

## 2023-08-30 PROCEDURE — 1159F MED LIST DOCD IN RCRD: CPT | Mod: CPTII,S$GLB,, | Performed by: FAMILY MEDICINE

## 2023-08-30 PROCEDURE — 1101F PR PT FALLS ASSESS DOC 0-1 FALLS W/OUT INJ PAST YR: ICD-10-PCS | Mod: CPTII,S$GLB,, | Performed by: FAMILY MEDICINE

## 2023-08-30 PROCEDURE — 1101F PT FALLS ASSESS-DOCD LE1/YR: CPT | Mod: CPTII,S$GLB,, | Performed by: FAMILY MEDICINE

## 2023-08-30 PROCEDURE — 3288F FALL RISK ASSESSMENT DOCD: CPT | Mod: CPTII,S$GLB,, | Performed by: FAMILY MEDICINE

## 2023-08-30 PROCEDURE — 99213 OFFICE O/P EST LOW 20 MIN: CPT | Mod: S$GLB,,, | Performed by: FAMILY MEDICINE

## 2023-08-30 PROCEDURE — 3075F SYST BP GE 130 - 139MM HG: CPT | Mod: CPTII,S$GLB,, | Performed by: FAMILY MEDICINE

## 2023-08-30 NOTE — PROGRESS NOTES
Subjective:       Patient ID: Kenna Schmitt is a 73 y.o. female.    Chief Complaint: Follow-up    Here today to follow up on chronic medical conditions.     HTN:  She is on Diovan and Chlorthalidone.  BP has been controlled  HLD:  She is on Lipitor 20 mg.    Hypothyroidism:  She is on Synthroid 112 mcg daily.  Last TSH was in range in March.      Review of Systems   Constitutional:  Negative for activity change, appetite change, fatigue and fever.   Respiratory:  Negative for cough, shortness of breath and wheezing.    Cardiovascular:  Negative for chest pain and palpitations.   Gastrointestinal:  Negative for abdominal pain, constipation, diarrhea and nausea.   Skin:  Negative for pallor and rash.   Neurological:  Negative for dizziness, syncope, light-headedness and headaches.       Objective:      Vitals:    08/30/23 1004   BP: 138/70   BP Location: Right arm   Patient Position: Sitting   BP Method: Medium (Manual)   Pulse: 61   Resp: 18   Temp: 97.7 °F (36.5 °C)   TempSrc: Oral   SpO2: 99%   Weight: 86.1 kg (189 lb 13.1 oz)   Height: 5' (1.524 m)      Physical Exam  Constitutional:       General: She is not in acute distress.  Cardiovascular:      Rate and Rhythm: Normal rate and regular rhythm.      Heart sounds: Normal heart sounds. No murmur heard.  Pulmonary:      Effort: Pulmonary effort is normal. No respiratory distress.      Breath sounds: Normal breath sounds. No wheezing, rhonchi or rales.   Skin:     General: Skin is warm and dry.   Neurological:      General: No focal deficit present.      Mental Status: She is alert.   Psychiatric:         Mood and Affect: Mood normal.         Behavior: Behavior normal.         Thought Content: Thought content normal.         Results for orders placed or performed in visit on 03/01/23   Comprehensive Metabolic Panel   Result Value Ref Range    Sodium 140 136 - 145 mmol/L    Potassium 3.4 (L) 3.5 - 5.1 mmol/L    Chloride 102 95 - 110 mmol/L    CO2 30 (H) 23 - 29  mmol/L    Glucose 90 70 - 110 mg/dL    BUN 16 8 - 23 mg/dL    Creatinine 0.7 0.5 - 1.4 mg/dL    Calcium 9.9 8.7 - 10.5 mg/dL    Total Protein 7.0 6.0 - 8.4 g/dL    Albumin 4.0 3.5 - 5.2 g/dL    Total Bilirubin 0.8 0.1 - 1.0 mg/dL    Alkaline Phosphatase 76 55 - 135 U/L    AST 23 10 - 40 U/L    ALT 17 10 - 44 U/L    Anion Gap 8 8 - 16 mmol/L    eGFR >60.0 >60 mL/min/1.73 m^2   Lipid Panel   Result Value Ref Range    Cholesterol 170 120 - 199 mg/dL    Triglycerides 126 30 - 150 mg/dL    HDL 60 40 - 75 mg/dL    LDL Cholesterol 84.8 63.0 - 159.0 mg/dL    HDL/Cholesterol Ratio 35.3 20.0 - 50.0 %    Total Cholesterol/HDL Ratio 2.8 2.0 - 5.0    Non-HDL Cholesterol 110 mg/dL   TSH   Result Value Ref Range    TSH 0.585 0.400 - 4.000 uIU/mL      Assessment:       1. Essential hypertension    2. Mixed hyperlipidemia    3. Hypothyroidism due to acquired atrophy of thyroid    4. Gastroesophageal reflux disease without esophagitis    5. Class 2 severe obesity with serious comorbidity and body mass index (BMI) of 37.0 to 37.9 in adult, unspecified obesity type        Plan:       Essential hypertension  -     Comprehensive Metabolic Panel; Future; Expected date: 02/29/2024    Mixed hyperlipidemia  -     Lipid Panel; Future; Expected date: 02/29/2024    Hypothyroidism due to acquired atrophy of thyroid  -     TSH; Future; Expected date: 02/29/2024    Gastroesophageal reflux disease without esophagitis    Class 2 severe obesity with serious comorbidity and body mass index (BMI) of 37.0 to 37.9 in adult, unspecified obesity type    Stable.  Continue current medication.  F/U in 6 months with lab work prior.      Medication List with Changes/Refills   Current Medications    ASCORBIC ACID, VITAMIN C, (VITAMIN C) 1000 MG TABLET    Take 1,000 mg by mouth once daily.    ATORVASTATIN (LIPITOR) 20 MG TABLET    Take 1 tablet (20 mg total) by mouth once daily.    B COMPLEX VITAMINS CAPSULE    Take 1 capsule by mouth once daily.     CHLORTHALIDONE (HYGROTEN) 25 MG TAB    Take 1 tablet (25 mg total) by mouth once daily.    CHOLECALCIFEROL, VITAMIN D3, (VITAMIN D3) 50 MCG (2,000 UNIT) CAP    Take 1 capsule by mouth once daily.    LACTOBACILLUS RHAMNOSUS GG (CULTURELLE) 10 BILLION CELL CAPSULE    Take 1 capsule by mouth once daily.    LEVOTHYROXINE (SYNTHROID) 112 MCG TABLET    TAKE 1 TABLET BY MOUTH BEFORE BREAKFAST AS DIRECTED    VALSARTAN (DIOVAN) 80 MG TABLET    Take 1 tablet (80 mg total) by mouth once daily.    ZINC GLUCONATE 50 MG TABLET    Take 50 mg by mouth once daily.   Discontinued Medications    BIOTIN 10,000 MCG CAP    Take by mouth.    DICLOFENAC (VOLTAREN) 75 MG EC TABLET    TAKE 1 TABLET(75 MG) BY MOUTH TWICE DAILY    HYDROCODONE-ACETAMINOPHEN (NORCO) 5-325 MG PER TABLET    Take 1 tablet by mouth every 6 (six) hours as needed.    IBUPROFEN (ADVIL,MOTRIN) 800 MG TABLET    Take 800 mg by mouth every 6 (six) hours as needed.    OMEPRAZOLE (PRILOSEC) 20 MG CAPSULE    Take 1 capsule (20 mg total) by mouth once daily.    VALACYCLOVIR (VALTREX) 500 MG TABLET    Take 500 mg by mouth 2 (two) times daily. PRN fever blisters

## 2023-10-09 ENCOUNTER — CLINICAL SUPPORT (OUTPATIENT)
Dept: AUDIOLOGY | Facility: CLINIC | Age: 73
End: 2023-10-09
Payer: MEDICARE

## 2023-10-09 DIAGNOSIS — Z97.4 WEARS HEARING AID IN BOTH EARS: Primary | ICD-10-CM

## 2023-10-09 PROCEDURE — 99499 NO LOS: ICD-10-PCS | Mod: S$GLB,,, | Performed by: AUDIOLOGIST-HEARING AID FITTER

## 2023-10-09 PROCEDURE — 99499 UNLISTED E&M SERVICE: CPT | Mod: S$GLB,,, | Performed by: AUDIOLOGIST-HEARING AID FITTER

## 2023-10-09 NOTE — PROGRESS NOTES
Kenna Schmitt came in on 10/09/2023 for a hearing aid follow up. Pt was alone during today's visit. Pt stated the right aid sounds staticy. She tried cleaning the battery compartment with alcohol. Advised her not to put any liquid in the battery compartment and that she should go home and use the dry aid jar with the desiccant. Tried changing the  but the static remained. The repair cost would be $350 since the aid is over 5 years. She didn't want to spend that much and said she could find a cheaper aid elsewhere. Discussed the cost of a new set of aids from here. She will call [facility] to determine any HA benefit her plan covers. Also informed her that her last hearing test was on 2/7/23 and that she would need a new hearing test if she were to order new aids since the law requires a hearing test within 6 months of ordering HAs. She took the aid home with her. All complaints were addressed during this visit to the patient's satisfaction. Plan of care was discussed in detail with the patient, who agreed with the plan as above. Pt will call the clinic PRN.

## 2023-10-10 ENCOUNTER — PATIENT MESSAGE (OUTPATIENT)
Dept: ADMINISTRATIVE | Facility: OTHER | Age: 73
End: 2023-10-10
Payer: MEDICARE

## 2023-10-17 ENCOUNTER — OFFICE VISIT (OUTPATIENT)
Dept: FAMILY MEDICINE | Facility: CLINIC | Age: 73
End: 2023-10-17
Payer: MEDICARE

## 2023-10-17 VITALS
BODY MASS INDEX: 37.4 KG/M2 | SYSTOLIC BLOOD PRESSURE: 134 MMHG | DIASTOLIC BLOOD PRESSURE: 72 MMHG | OXYGEN SATURATION: 98 % | WEIGHT: 190.5 LBS | HEART RATE: 73 BPM | HEIGHT: 60 IN

## 2023-10-17 DIAGNOSIS — I10 ESSENTIAL HYPERTENSION: ICD-10-CM

## 2023-10-17 DIAGNOSIS — Z00.00 ENCOUNTER FOR PREVENTIVE HEALTH EXAMINATION: Primary | ICD-10-CM

## 2023-10-17 DIAGNOSIS — E78.2 MIXED HYPERLIPIDEMIA: Chronic | ICD-10-CM

## 2023-10-17 DIAGNOSIS — E03.4 HYPOTHYROIDISM DUE TO ACQUIRED ATROPHY OF THYROID: ICD-10-CM

## 2023-10-17 DIAGNOSIS — E66.01 CLASS 2 SEVERE OBESITY WITH SERIOUS COMORBIDITY AND BODY MASS INDEX (BMI) OF 37.0 TO 37.9 IN ADULT, UNSPECIFIED OBESITY TYPE: ICD-10-CM

## 2023-10-17 DIAGNOSIS — I70.0 ATHEROSCLEROSIS OF AORTA: ICD-10-CM

## 2023-10-17 PROCEDURE — 1170F PR FUNCTIONAL STATUS ASSESSED: ICD-10-PCS | Mod: CPTII,S$GLB,, | Performed by: NURSE PRACTITIONER

## 2023-10-17 PROCEDURE — 4010F PR ACE/ARB THEARPY RXD/TAKEN: ICD-10-PCS | Mod: CPTII,S$GLB,, | Performed by: NURSE PRACTITIONER

## 2023-10-17 PROCEDURE — 4010F ACE/ARB THERAPY RXD/TAKEN: CPT | Mod: CPTII,S$GLB,, | Performed by: NURSE PRACTITIONER

## 2023-10-17 PROCEDURE — 1160F RVW MEDS BY RX/DR IN RCRD: CPT | Mod: CPTII,S$GLB,, | Performed by: NURSE PRACTITIONER

## 2023-10-17 PROCEDURE — 3288F FALL RISK ASSESSMENT DOCD: CPT | Mod: CPTII,S$GLB,, | Performed by: NURSE PRACTITIONER

## 2023-10-17 PROCEDURE — 3075F PR MOST RECENT SYSTOLIC BLOOD PRESS GE 130-139MM HG: ICD-10-PCS | Mod: CPTII,S$GLB,, | Performed by: NURSE PRACTITIONER

## 2023-10-17 PROCEDURE — 99999 PR PBB SHADOW E&M-EST. PATIENT-LVL IV: ICD-10-PCS | Mod: PBBFAC,,, | Performed by: NURSE PRACTITIONER

## 2023-10-17 PROCEDURE — 99999 PR PBB SHADOW E&M-EST. PATIENT-LVL IV: CPT | Mod: PBBFAC,,, | Performed by: NURSE PRACTITIONER

## 2023-10-17 PROCEDURE — 3075F SYST BP GE 130 - 139MM HG: CPT | Mod: CPTII,S$GLB,, | Performed by: NURSE PRACTITIONER

## 2023-10-17 PROCEDURE — 1159F MED LIST DOCD IN RCRD: CPT | Mod: CPTII,S$GLB,, | Performed by: NURSE PRACTITIONER

## 2023-10-17 PROCEDURE — 1101F PR PT FALLS ASSESS DOC 0-1 FALLS W/OUT INJ PAST YR: ICD-10-PCS | Mod: CPTII,S$GLB,, | Performed by: NURSE PRACTITIONER

## 2023-10-17 PROCEDURE — 1170F FXNL STATUS ASSESSED: CPT | Mod: CPTII,S$GLB,, | Performed by: NURSE PRACTITIONER

## 2023-10-17 PROCEDURE — G0439 PR MEDICARE ANNUAL WELLNESS SUBSEQUENT VISIT: ICD-10-PCS | Mod: S$GLB,,, | Performed by: NURSE PRACTITIONER

## 2023-10-17 PROCEDURE — 3078F PR MOST RECENT DIASTOLIC BLOOD PRESSURE < 80 MM HG: ICD-10-PCS | Mod: CPTII,S$GLB,, | Performed by: NURSE PRACTITIONER

## 2023-10-17 PROCEDURE — 1126F AMNT PAIN NOTED NONE PRSNT: CPT | Mod: CPTII,S$GLB,, | Performed by: NURSE PRACTITIONER

## 2023-10-17 PROCEDURE — G0439 PPPS, SUBSEQ VISIT: HCPCS | Mod: S$GLB,,, | Performed by: NURSE PRACTITIONER

## 2023-10-17 PROCEDURE — 3078F DIAST BP <80 MM HG: CPT | Mod: CPTII,S$GLB,, | Performed by: NURSE PRACTITIONER

## 2023-10-17 PROCEDURE — 1101F PT FALLS ASSESS-DOCD LE1/YR: CPT | Mod: CPTII,S$GLB,, | Performed by: NURSE PRACTITIONER

## 2023-10-17 PROCEDURE — 1126F PR PAIN SEVERITY QUANTIFIED, NO PAIN PRESENT: ICD-10-PCS | Mod: CPTII,S$GLB,, | Performed by: NURSE PRACTITIONER

## 2023-10-17 PROCEDURE — 1160F PR REVIEW ALL MEDS BY PRESCRIBER/CLIN PHARMACIST DOCUMENTED: ICD-10-PCS | Mod: CPTII,S$GLB,, | Performed by: NURSE PRACTITIONER

## 2023-10-17 PROCEDURE — 3288F PR FALLS RISK ASSESSMENT DOCUMENTED: ICD-10-PCS | Mod: CPTII,S$GLB,, | Performed by: NURSE PRACTITIONER

## 2023-10-17 PROCEDURE — 1159F PR MEDICATION LIST DOCUMENTED IN MEDICAL RECORD: ICD-10-PCS | Mod: CPTII,S$GLB,, | Performed by: NURSE PRACTITIONER

## 2023-10-17 RX ORDER — AMOXICILLIN 500 MG
2 CAPSULE ORAL DAILY
COMMUNITY

## 2023-10-17 RX ORDER — VALACYCLOVIR HYDROCHLORIDE 500 MG/1
500 TABLET, FILM COATED ORAL 2 TIMES DAILY PRN
COMMUNITY
Start: 2023-10-03

## 2023-10-17 NOTE — PATIENT INSTRUCTIONS
Counseling and Referral of Other Preventative  (Italic type indicates deductible and co-insurance are waived)    Patient Name: Kenna Schmitt  Today's Date: 10/17/2023    Health Maintenance       Date Due Completion Date    TETANUS VACCINE Never done ---    Shingles Vaccine (2 of 3) 11/14/2014 9/19/2014    Influenza Vaccine (1) 09/01/2023 11/16/2020 (Declined)    Override on 11/16/2020: Declined    COVID-19 Vaccine (2 - 2023-24 season) 09/01/2023 7/6/2021    DEXA Scan 09/09/2025 9/9/2022    Colorectal Cancer Screening 11/10/2026 11/10/2021    Lipid Panel 03/01/2028 3/1/2023        No orders of the defined types were placed in this encounter.    The following information is provided to all patients.  This information is to help you find resources for any of the problems found today that may be affecting your health:                Living healthy guide: www.[facility].[place].[facility]      Understanding Diabetes: www.diabetes.org      Eating healthy: www.cdc.gov/healthyweight      CDC home safety checklist: www.cdc.gov/steadi/patient.html      Agency on Aging: www.goea.[place].[facility]      Alcoholics anonymous (AA): www.aa.org      Physical Activity: www.aden.nih.gov/hw7ngdb      Tobacco use: www.quitwithusla.org

## 2023-10-17 NOTE — PROGRESS NOTES
Kenna Schmitt presented for a  Medicare AWV and comprehensive Health Risk Assessment today. The following components were reviewed and updated:    Medical history  Family History  Social history  Allergies and Current Medications  Health Risk Assessment  Health Maintenance  Care Team     ** See Completed Assessments for Annual Wellness Visit within the encounter summary.**     The following assessments were completed:  Living Situation  CAGE  Depression Screening  Timed Get Up and Go  Whisper Test  Cognitive Function Screening      Nutrition Screening  ADL Screening  PAQ Screening    Review for Opioid Screening: Pt does not have Rx for Opioids  Review for Substance Use Disorders: Patient does not use substance      Vitals:    10/17/23 1000   BP: 134/72   BP Location: Left arm   Patient Position: Sitting   BP Method: Medium (Manual)   Pulse: 73   SpO2: 98%   Weight: 86.4 kg (190 lb 7.6 oz)   Height: 5' (1.524 m)     Body mass index is 37.2 kg/m².  Physical Exam  Vitals reviewed.   Constitutional:       General: She is not in acute distress.  Pulmonary:      Effort: Pulmonary effort is normal. No respiratory distress.   Neurological:      Mental Status: She is alert and oriented to person, place, and time.   Psychiatric:         Mood and Affect: Mood normal.         Behavior: Behavior normal.         Thought Content: Thought content normal.         Judgment: Judgment normal.     Diagnoses and health risks identified today and associated recommendations/orders:    1. Encounter for preventive health examination  Reviewed and discussed health maintenance.    2. Class 2 severe obesity with serious comorbidity and body mass index (BMI) of 37.0 to 37.9 in adult, unspecified obesity type  Stable- continue current treatment and follow up routinely with PCP     3. Atherosclerosis of aorta  Stable- continue current treatment and follow up routinely with PCP   HTN and HLD controlled    4. Mixed hyperlipidemia  Stable- continue  current treatment and follow up routinely with PCP     5. Essential hypertension  Stable- continue current treatment and follow up routinely with PCP     6. Hypothyroidism due to acquired atrophy of thyroid  Stable- continue current treatment and follow up routinely with PCP     Provided Kenna with a 5-10 year written screening schedule and personal prevention plan. Recommendations were developed using the USPSTF age appropriate recommendations. Education, counseling, and referrals were provided as needed. After Visit Summary printed and given to patient which includes a list of additional screenings\tests needed.    I offered to discuss advanced care planning, including how to pick a person who would make decisions for you if you were unable to make them for yourself, called a health care power of , and what kind of decisions you might make such as use of life sustaining treatments such as ventilators and tube feeding when faced with a life limiting illness recorded on a living will that they will need to know. (How you want to be cared for as you near the end of your natural life)   X  Patient has advanced directives written and agrees to provide copies to the institution.  Mehnaz Strickland, NP

## 2023-10-23 ENCOUNTER — PATIENT MESSAGE (OUTPATIENT)
Dept: OTHER | Facility: OTHER | Age: 73
End: 2023-10-23
Payer: MEDICARE

## 2023-10-24 ENCOUNTER — PATIENT MESSAGE (OUTPATIENT)
Dept: ADMINISTRATIVE | Facility: OTHER | Age: 73
End: 2023-10-24
Payer: MEDICARE

## 2023-10-24 ENCOUNTER — PATIENT MESSAGE (OUTPATIENT)
Dept: OTHER | Facility: OTHER | Age: 73
End: 2023-10-24
Payer: MEDICARE

## 2023-10-25 RX ORDER — ASPIRIN 325 MG
325 TABLET ORAL DAILY
Qty: 30 TABLET | Refills: 11 | Status: SHIPPED | OUTPATIENT
Start: 2023-10-25

## 2023-11-09 DIAGNOSIS — J30.1 NON-SEASONAL ALLERGIC RHINITIS DUE TO POLLEN: ICD-10-CM

## 2023-11-09 RX ORDER — CETIRIZINE HYDROCHLORIDE 5 MG/1
5 TABLET ORAL DAILY
Qty: 30 TABLET | Refills: 11 | Status: SHIPPED | OUTPATIENT
Start: 2023-11-09

## 2023-11-09 NOTE — TELEPHONE ENCOUNTER
Caller: Laine Garza    Relationship: Emergency Contact    Best call back number: [phone]     Requested Prescriptions:   Requested Prescriptions     Pending Prescriptions Disp Refills    cetirizine (zyrTEC) 5 MG tablet 30 tablet 11     Sig: Take 1 tablet by mouth Daily.        Pharmacy where request should be sent: [id number] [address]  AT [facility] [phone] [facility] [phone] FX     Last office visit with prescribing clinician: 6/23/2023   Last telemedicine visit with prescribing clinician: Visit date not found   Next office visit with prescribing clinician: Visit date not found     Additional details provided by patient: NONE.    Does the patient have less than a 3 day supply:  [x] Yes  [] No    Would you like a call back once the refill request has been completed: [] Yes [x] No      Nunu Valiente Rep   11/09/23 13:29 EST

## 2023-12-22 DIAGNOSIS — J30.1 NON-SEASONAL ALLERGIC RHINITIS DUE TO POLLEN: ICD-10-CM

## 2023-12-22 RX ORDER — MONTELUKAST SODIUM 10 MG/1
10 TABLET ORAL NIGHTLY
Qty: 30 TABLET | Refills: 11 | Status: SHIPPED | OUTPATIENT
Start: 2023-12-22

## 2023-12-31 DIAGNOSIS — I10 ESSENTIAL HYPERTENSION: ICD-10-CM

## 2023-12-31 DIAGNOSIS — I50.32 CHRONIC DIASTOLIC (CONGESTIVE) HEART FAILURE: ICD-10-CM

## 2024-01-02 RX ORDER — LISINOPRIL 2.5 MG/1
2.5 TABLET ORAL DAILY
Qty: 90 TABLET | Refills: 1 | Status: SHIPPED | OUTPATIENT
Start: 2024-01-02

## 2024-01-15 RX ORDER — CLOPIDOGREL BISULFATE 75 MG/1
75 TABLET ORAL DAILY
Qty: 90 TABLET | Refills: 1 | Status: SHIPPED | OUTPATIENT
Start: 2024-01-15

## 2024-01-15 RX ORDER — ATORVASTATIN CALCIUM 80 MG/1
80 TABLET, FILM COATED ORAL NIGHTLY
Qty: 90 TABLET | Refills: 1 | Status: SHIPPED | OUTPATIENT
Start: 2024-01-15

## 2024-02-06 ENCOUNTER — TELEPHONE (OUTPATIENT)
Dept: AUDIOLOGY | Facility: CLINIC | Age: 74
End: 2024-02-06
Payer: MEDICARE

## 2024-02-22 ENCOUNTER — LAB VISIT (OUTPATIENT)
Dept: LAB | Facility: HOSPITAL | Age: 74
End: 2024-02-22
Attending: FAMILY MEDICINE
Payer: MEDICARE

## 2024-02-22 DIAGNOSIS — I10 ESSENTIAL HYPERTENSION: ICD-10-CM

## 2024-02-22 DIAGNOSIS — E78.2 MIXED HYPERLIPIDEMIA: Chronic | ICD-10-CM

## 2024-02-22 DIAGNOSIS — E03.4 HYPOTHYROIDISM DUE TO ACQUIRED ATROPHY OF THYROID: ICD-10-CM

## 2024-02-22 LAB
ALBUMIN SERPL BCP-MCNC: 3.7 G/DL (ref 3.5–5.2)
ALP SERPL-CCNC: 73 U/L (ref 55–135)
ALT SERPL W/O P-5'-P-CCNC: 21 U/L (ref 10–44)
ANION GAP SERPL CALC-SCNC: 9 MMOL/L (ref 8–16)
AST SERPL-CCNC: 25 U/L (ref 10–40)
BILIRUB SERPL-MCNC: 0.7 MG/DL (ref 0.1–1)
BUN SERPL-MCNC: 19 MG/DL (ref 8–23)
CALCIUM SERPL-MCNC: 9.6 MG/DL (ref 8.7–10.5)
CHLORIDE SERPL-SCNC: 104 MMOL/L (ref 95–110)
CHOLEST SERPL-MCNC: 158 MG/DL (ref 120–199)
CHOLEST/HDLC SERPL: 2.5 {RATIO} (ref 2–5)
CO2 SERPL-SCNC: 30 MMOL/L (ref 23–29)
CREAT SERPL-MCNC: 0.7 MG/DL (ref 0.5–1.4)
EST. GFR  (NO RACE VARIABLE): >60 ML/MIN/1.73 M^2
GLUCOSE SERPL-MCNC: 93 MG/DL (ref 70–110)
HDLC SERPL-MCNC: 63 MG/DL (ref 40–75)
HDLC SERPL: 39.9 % (ref 20–50)
LDLC SERPL CALC-MCNC: 82 MG/DL (ref 63–159)
NONHDLC SERPL-MCNC: 95 MG/DL
POTASSIUM SERPL-SCNC: 4 MMOL/L (ref 3.5–5.1)
PROT SERPL-MCNC: 6.5 G/DL (ref 6–8.4)
SODIUM SERPL-SCNC: 143 MMOL/L (ref 136–145)
T4 FREE SERPL-MCNC: 0.84 NG/DL (ref 0.71–1.51)
TRIGL SERPL-MCNC: 65 MG/DL (ref 30–150)
TSH SERPL DL<=0.005 MIU/L-ACNC: 4.79 UIU/ML (ref 0.4–4)

## 2024-02-22 PROCEDURE — 36415 COLL VENOUS BLD VENIPUNCTURE: CPT | Mod: PO | Performed by: FAMILY MEDICINE

## 2024-02-22 PROCEDURE — 80061 LIPID PANEL: CPT | Performed by: FAMILY MEDICINE

## 2024-02-22 PROCEDURE — 84443 ASSAY THYROID STIM HORMONE: CPT | Performed by: FAMILY MEDICINE

## 2024-02-22 PROCEDURE — 84439 ASSAY OF FREE THYROXINE: CPT | Performed by: FAMILY MEDICINE

## 2024-02-22 PROCEDURE — 80053 COMPREHEN METABOLIC PANEL: CPT | Performed by: FAMILY MEDICINE

## 2024-02-29 ENCOUNTER — OFFICE VISIT (OUTPATIENT)
Dept: FAMILY MEDICINE | Facility: CLINIC | Age: 74
End: 2024-02-29
Payer: MEDICARE

## 2024-02-29 VITALS
HEART RATE: 70 BPM | DIASTOLIC BLOOD PRESSURE: 78 MMHG | BODY MASS INDEX: 38.31 KG/M2 | TEMPERATURE: 98 F | OXYGEN SATURATION: 98 % | HEIGHT: 60 IN | SYSTOLIC BLOOD PRESSURE: 128 MMHG | WEIGHT: 195.13 LBS

## 2024-02-29 DIAGNOSIS — E66.01 CLASS 2 SEVERE OBESITY WITH SERIOUS COMORBIDITY AND BODY MASS INDEX (BMI) OF 37.0 TO 37.9 IN ADULT, UNSPECIFIED OBESITY TYPE: ICD-10-CM

## 2024-02-29 DIAGNOSIS — I70.0 ATHEROSCLEROSIS OF AORTA: ICD-10-CM

## 2024-02-29 DIAGNOSIS — E78.2 MIXED HYPERLIPIDEMIA: Chronic | ICD-10-CM

## 2024-02-29 DIAGNOSIS — E78.5 HYPERLIPIDEMIA: ICD-10-CM

## 2024-02-29 DIAGNOSIS — I10 ESSENTIAL HYPERTENSION: Primary | ICD-10-CM

## 2024-02-29 DIAGNOSIS — E03.4 HYPOTHYROIDISM DUE TO ACQUIRED ATROPHY OF THYROID: ICD-10-CM

## 2024-02-29 PROBLEM — M87.9 OSTEONECROSIS OF LEFT KNEE REGION: Status: RESOLVED | Noted: 2017-02-10 | Resolved: 2024-02-29

## 2024-02-29 PROCEDURE — 99214 OFFICE O/P EST MOD 30 MIN: CPT | Mod: S$GLB,,, | Performed by: FAMILY MEDICINE

## 2024-02-29 PROCEDURE — 3288F FALL RISK ASSESSMENT DOCD: CPT | Mod: CPTII,S$GLB,, | Performed by: FAMILY MEDICINE

## 2024-02-29 PROCEDURE — 1159F MED LIST DOCD IN RCRD: CPT | Mod: CPTII,S$GLB,, | Performed by: FAMILY MEDICINE

## 2024-02-29 PROCEDURE — 3074F SYST BP LT 130 MM HG: CPT | Mod: CPTII,S$GLB,, | Performed by: FAMILY MEDICINE

## 2024-02-29 PROCEDURE — 99999 PR PBB SHADOW E&M-EST. PATIENT-LVL III: CPT | Mod: PBBFAC,,, | Performed by: FAMILY MEDICINE

## 2024-02-29 PROCEDURE — 1126F AMNT PAIN NOTED NONE PRSNT: CPT | Mod: CPTII,S$GLB,, | Performed by: FAMILY MEDICINE

## 2024-02-29 PROCEDURE — 3078F DIAST BP <80 MM HG: CPT | Mod: CPTII,S$GLB,, | Performed by: FAMILY MEDICINE

## 2024-02-29 PROCEDURE — 1101F PT FALLS ASSESS-DOCD LE1/YR: CPT | Mod: CPTII,S$GLB,, | Performed by: FAMILY MEDICINE

## 2024-02-29 PROCEDURE — 1160F RVW MEDS BY RX/DR IN RCRD: CPT | Mod: CPTII,S$GLB,, | Performed by: FAMILY MEDICINE

## 2024-02-29 RX ORDER — LEVOTHYROXINE SODIUM 125 UG/1
125 TABLET ORAL
Qty: 90 TABLET | Refills: 3 | Status: SHIPPED | OUTPATIENT
Start: 2024-02-29 | End: 2025-02-28

## 2024-02-29 RX ORDER — VALSARTAN 80 MG/1
80 TABLET ORAL DAILY
Qty: 90 TABLET | Refills: 3 | Status: SHIPPED | OUTPATIENT
Start: 2024-02-29

## 2024-02-29 RX ORDER — ATORVASTATIN CALCIUM 20 MG/1
20 TABLET, FILM COATED ORAL DAILY
Qty: 90 TABLET | Refills: 3 | Status: SHIPPED | OUTPATIENT
Start: 2024-02-29

## 2024-02-29 RX ORDER — CHLORTHALIDONE 25 MG/1
25 TABLET ORAL DAILY
Qty: 90 TABLET | Refills: 3 | Status: SHIPPED | OUTPATIENT
Start: 2024-02-29

## 2024-02-29 NOTE — PROGRESS NOTES
Subjective:       Patient ID: Kenna Schmitt is a 73 y.o. female.    Chief Complaint: Hypertension    Patient here today for annual exam and to follow up on chronic medical conditions.    Immunizations: Due RSV, Shingrix and Tdap  Last Lab work: 2024  Colon Ca screening: Colonoscopy 2021  Breast Ca Screening: MMG June 2023  Cervical Ca Screening: no longer recommended    HTN:  She is on Diovan and Chlorthalidone.  BP has been controlled  HLD:  She is on Lipitor 20 mg.  Cholesterol to goal Feb 2024  Hypothyroidism:  She is on Synthroid 112 mcg daily.  Last TSH was mildly elevated in Feb 2024      Hypertension  Pertinent negatives include no chest pain, headaches, palpitations or shortness of breath.     Review of Systems   Constitutional:  Negative for activity change, appetite change, fatigue and fever.   Respiratory:  Negative for cough, shortness of breath and wheezing.    Cardiovascular:  Negative for chest pain and palpitations.   Gastrointestinal:  Negative for abdominal pain, constipation, diarrhea and nausea.   Skin:  Negative for pallor and rash.   Neurological:  Negative for dizziness, syncope, light-headedness and headaches.   Psychiatric/Behavioral:  The patient is not nervous/anxious.        Objective:      Vitals:    02/29/24 1007   BP: 128/78   Pulse: 70   Temp: 97.6 °F (36.4 °C)   TempSrc: Oral   SpO2: 98%   Weight: 88.5 kg (195 lb 1.7 oz)   Height: 5' (1.524 m)      Physical Exam  Constitutional:       General: She is not in acute distress.  Cardiovascular:      Rate and Rhythm: Normal rate and regular rhythm.      Heart sounds: Normal heart sounds. No murmur heard.  Pulmonary:      Effort: Pulmonary effort is normal. No respiratory distress.      Breath sounds: Normal breath sounds. No wheezing, rhonchi or rales.   Musculoskeletal:         General: No swelling.   Skin:     General: Skin is warm and dry.   Neurological:      General: No focal deficit present.      Mental Status: She is alert.    Psychiatric:         Mood and Affect: Mood normal.         Behavior: Behavior normal.         Thought Content: Thought content normal.         Results for orders placed or performed in visit on 02/22/24   Comprehensive Metabolic Panel   Result Value Ref Range    Sodium 143 136 - 145 mmol/L    Potassium 4.0 3.5 - 5.1 mmol/L    Chloride 104 95 - 110 mmol/L    CO2 30 (H) 23 - 29 mmol/L    Glucose 93 70 - 110 mg/dL    BUN 19 8 - 23 mg/dL    Creatinine 0.7 0.5 - 1.4 mg/dL    Calcium 9.6 8.7 - 10.5 mg/dL    Total Protein 6.5 6.0 - 8.4 g/dL    Albumin 3.7 3.5 - 5.2 g/dL    Total Bilirubin 0.7 0.1 - 1.0 mg/dL    Alkaline Phosphatase 73 55 - 135 U/L    AST 25 10 - 40 U/L    ALT 21 10 - 44 U/L    eGFR >60.0 >60 mL/min/1.73 m^2    Anion Gap 9 8 - 16 mmol/L   Lipid Panel   Result Value Ref Range    Cholesterol 158 120 - 199 mg/dL    Triglycerides 65 30 - 150 mg/dL    HDL 63 40 - 75 mg/dL    LDL Cholesterol 82.0 63.0 - 159.0 mg/dL    HDL/Cholesterol Ratio 39.9 20.0 - 50.0 %    Total Cholesterol/HDL Ratio 2.5 2.0 - 5.0    Non-HDL Cholesterol 95 mg/dL   TSH   Result Value Ref Range    TSH 4.785 (H) 0.400 - 4.000 uIU/mL   T4, Free   Result Value Ref Range    Free T4 0.84 0.71 - 1.51 ng/dL      Assessment:       1. Essential hypertension    2. Mixed hyperlipidemia    3. Hypothyroidism due to acquired atrophy of thyroid    4. Class 2 severe obesity with serious comorbidity and body mass index (BMI) of 37.0 to 37.9 in adult, unspecified obesity type    5. Atherosclerosis of aorta    6. Hyperlipidemia        Plan:       Essential hypertension  -     valsartan (DIOVAN) 80 MG tablet; Take 1 tablet (80 mg total) by mouth once daily.  Dispense: 90 tablet; Refill: 3  -     chlorthalidone (HYGROTEN) 25 MG Tab; Take 1 tablet (25 mg total) by mouth once daily.  Dispense: 90 tablet; Refill: 3  Continue current medication  Mixed hyperlipidemia  -     Comprehensive Metabolic Panel; Future; Expected date: 02/28/2025  -     Lipid Panel; Future;  Expected date: 02/28/2025  Continue Statin.  Hypothyroidism due to acquired atrophy of thyroid  -     levothyroxine (SYNTHROID) 125 MCG tablet; Take 1 tablet (125 mcg total) by mouth before breakfast. TAKE 1 TABLET BY MOUTH BEFORE BREAKFAST AS DIRECTED  Dispense: 90 tablet; Refill: 3  -     TSH; Future; Expected date: 02/28/2025  Increase Synthroid to 125 mcg at this time.  Class 2 severe obesity with serious comorbidity and body mass index (BMI) of 37.0 to 37.9 in adult, unspecified obesity type    Atherosclerosis of aorta  -     Lipid Panel; Future; Expected date: 02/28/2025  Continue statin  Hyperlipidemia  -     atorvastatin (LIPITOR) 20 MG tablet; Take 1 tablet (20 mg total) by mouth once daily.  Dispense: 90 tablet; Refill: 3  -     Lipid Panel; Future; Expected date: 02/28/2025          Medication List with Changes/Refills   Current Medications    ASCORBIC ACID, VITAMIN C, (VITAMIN C) 1000 MG TABLET    Take 1,000 mg by mouth once daily.    B COMPLEX VITAMINS CAPSULE    Take 1 capsule by mouth once daily.    CHOLECALCIFEROL, VITAMIN D3, (VITAMIN D3) 50 MCG (2,000 UNIT) CAP    Take 1 capsule by mouth once daily.    LACTOBACILLUS RHAMNOSUS GG (CULTURELLE) 10 BILLION CELL CAPSULE    Take 1 capsule by mouth once daily.    OMEGA-3 FATTY ACIDS/FISH OIL (FISH OIL-OMEGA-3 FATTY ACIDS) 300-1,000 MG CAPSULE    Take 2 capsules by mouth once daily.    VALACYCLOVIR (VALTREX) 500 MG TABLET    Take 500 mg by mouth 2 (two) times daily as needed.    ZINC GLUCONATE 50 MG TABLET    Take 50 mg by mouth once daily.   Changed and/or Refilled Medications    Modified Medication Previous Medication    ATORVASTATIN (LIPITOR) 20 MG TABLET atorvastatin (LIPITOR) 20 MG tablet       Take 1 tablet (20 mg total) by mouth once daily.    Take 1 tablet (20 mg total) by mouth once daily.    CHLORTHALIDONE (HYGROTEN) 25 MG TAB chlorthalidone (HYGROTEN) 25 MG Tab       Take 1 tablet (25 mg total) by mouth once daily.    Take 1 tablet (25 mg total)  by mouth once daily.    LEVOTHYROXINE (SYNTHROID) 125 MCG TABLET levothyroxine (SYNTHROID) 112 MCG tablet       Take 1 tablet (125 mcg total) by mouth before breakfast. TAKE 1 TABLET BY MOUTH BEFORE BREAKFAST AS DIRECTED    TAKE 1 TABLET BY MOUTH BEFORE BREAKFAST AS DIRECTED    VALSARTAN (DIOVAN) 80 MG TABLET valsartan (DIOVAN) 80 MG tablet       Take 1 tablet (80 mg total) by mouth once daily.    Take 1 tablet (80 mg total) by mouth once daily.

## 2024-06-09 DIAGNOSIS — I10 ESSENTIAL HYPERTENSION: ICD-10-CM

## 2024-06-09 DIAGNOSIS — K59.00 CONSTIPATION, UNSPECIFIED CONSTIPATION TYPE: ICD-10-CM

## 2024-06-09 DIAGNOSIS — I50.32 CHRONIC DIASTOLIC (CONGESTIVE) HEART FAILURE: ICD-10-CM

## 2024-06-10 RX ORDER — LACTULOSE 10 G/15ML
30 SOLUTION ORAL DAILY
Qty: 1892 ML | Refills: 0 | Status: SHIPPED | OUTPATIENT
Start: 2024-06-10

## 2024-06-10 RX ORDER — LISINOPRIL 2.5 MG/1
2.5 TABLET ORAL DAILY
Qty: 90 TABLET | Refills: 0 | Status: SHIPPED | OUTPATIENT
Start: 2024-06-10

## 2024-07-12 ENCOUNTER — HOSPITAL ENCOUNTER (OUTPATIENT)
Dept: RADIOLOGY | Facility: HOSPITAL | Age: 74
Discharge: HOME | End: 2024-07-12
Attending: FAMILY MEDICINE
Payer: MEDICARE

## 2024-07-12 DIAGNOSIS — Z12.31 ENCOUNTER FOR SCREENING MAMMOGRAM FOR BREAST CANCER: ICD-10-CM

## 2024-07-12 PROCEDURE — 77067 SCR MAMMO BI INCL CAD: CPT | Mod: TC,PO

## 2024-07-12 PROCEDURE — 77063 BREAST TOMOSYNTHESIS BI: CPT | Mod: TC,PO

## 2024-07-12 PROCEDURE — 77063 BREAST TOMOSYNTHESIS BI: CPT | Mod: 26,,, | Performed by: RADIOLOGY

## 2024-07-12 PROCEDURE — 77067 SCR MAMMO BI INCL CAD: CPT | Mod: 26,,, | Performed by: RADIOLOGY

## 2024-07-12 RX ORDER — CLOPIDOGREL BISULFATE 75 MG/1
75 TABLET ORAL DAILY
Qty: 90 TABLET | Refills: 1 | OUTPATIENT
Start: 2024-07-12

## 2024-07-12 RX ORDER — ATORVASTATIN CALCIUM 80 MG/1
80 TABLET, FILM COATED ORAL NIGHTLY
Qty: 90 TABLET | Refills: 1 | OUTPATIENT
Start: 2024-07-12

## 2024-07-12 NOTE — TELEPHONE ENCOUNTER
LOV                   6/23/2023  NOV                   Past due 2/23/23  Last RF              1/15/24      PROTOCOL       Not met    Anastasiia REDDY, NRCMA/LMR

## 2024-07-12 NOTE — TELEPHONE ENCOUNTER
OV                   6/23/2023  NOV                   Past due 2/23/23  Last RF              1/15/24      PROTOCOL       Not met     No refills until seen    DAGMAR PandeyA/NUBIAR

## 2024-07-16 RX ORDER — CLOPIDOGREL BISULFATE 75 MG/1
75 TABLET ORAL DAILY
Qty: 90 TABLET | OUTPATIENT
Start: 2024-07-16

## 2024-07-16 NOTE — TELEPHONE ENCOUNTER
Caller: Laine Garza    Relationship: Emergency Contact    Best call back number:     [phone]       Requested Prescriptions:   Requested Prescriptions     Pending Prescriptions Disp Refills    clopidogrel (PLAVIX) 75 MG tablet 90 tablet 1     Sig: Take 1 tablet by mouth Daily.        Pharmacy where request should be sent: [facility] #[id number] [place], [place] - [address]  AT [facility] [phone] [facility] [phone] FX     Last office visit with prescribing clinician: 6/23/2023   Last telemedicine visit with prescribing clinician: Visit date not found   Next office visit with prescribing clinician: Visit date not found     Additional details provided by patient: PATIENT IS OUT OF MEDICATION     Does the patient have less than a 3 day supply:  [x] Yes  [] No    Would you like a call back once the refill request has been completed: [] Yes [x] No    If the office needs to give you a call back, can they leave a voicemail: [] Yes [x] No    Nunu [place] Rep   07/16/24 14:07 EDT

## 2024-07-16 NOTE — TELEPHONE ENCOUNTER
Refused 4 days ago (7/12/2024):   Patient needs an appointment   clopidogrel (PLAVIX) 75 MG tablet     LOV                   6/23/2023  NOV                   Visit date not found  Last RF              1/15/24       PROTOCOL       Not met    DARLIN Pandey/LMR

## 2024-07-22 ENCOUNTER — TELEPHONE (OUTPATIENT)
Dept: FAMILY MEDICINE CLINIC | Facility: CLINIC | Age: 74
End: 2024-07-22
Payer: MEDICARE

## 2024-07-22 ENCOUNTER — TELEPHONE (OUTPATIENT)
Dept: NEUROSURGERY | Facility: CLINIC | Age: 74
End: 2024-07-22
Payer: MEDICARE

## 2024-07-22 RX ORDER — CLOPIDOGREL BISULFATE 75 MG/1
75 TABLET ORAL DAILY
Qty: 90 TABLET | Refills: 1 | Status: SHIPPED | OUTPATIENT
Start: 2024-07-22

## 2024-07-22 NOTE — TELEPHONE ENCOUNTER
Patient wanted to know if you can refill Clopidogrel to be sent to [facility], patient has an appointment with you on August 19, patient is out of medication wanted to know if just enough til her next appointment.

## 2024-07-22 NOTE — TELEPHONE ENCOUNTER
Patients daughter would like to know if you could refill her Plavix 75 mg. She has two days left and PCP will not refill until she is seen in there office mid to end of August.     [facility] pharmacy is correct in chart.       Will she need to continue taking both Asprin and Plavix?

## 2024-08-19 ENCOUNTER — OFFICE VISIT (OUTPATIENT)
Dept: FAMILY MEDICINE CLINIC | Facility: CLINIC | Age: 74
End: 2024-08-19
Payer: MEDICARE

## 2024-08-19 VITALS
SYSTOLIC BLOOD PRESSURE: 120 MMHG | WEIGHT: 157.6 LBS | TEMPERATURE: 97.7 F | OXYGEN SATURATION: 96 % | DIASTOLIC BLOOD PRESSURE: 70 MMHG | HEIGHT: 63 IN | BODY MASS INDEX: 27.93 KG/M2 | HEART RATE: 104 BPM

## 2024-08-19 DIAGNOSIS — D72.9 NEUTROPHILIC LEUKOCYTOSIS: ICD-10-CM

## 2024-08-19 DIAGNOSIS — Z12.11 COLON CANCER SCREENING: ICD-10-CM

## 2024-08-19 DIAGNOSIS — Z12.31 VISIT FOR SCREENING MAMMOGRAM: ICD-10-CM

## 2024-08-19 DIAGNOSIS — I50.32 CHRONIC DIASTOLIC (CONGESTIVE) HEART FAILURE: ICD-10-CM

## 2024-08-19 DIAGNOSIS — E11.9 TYPE 2 DIABETES MELLITUS WITHOUT COMPLICATION, WITHOUT LONG-TERM CURRENT USE OF INSULIN: ICD-10-CM

## 2024-08-19 DIAGNOSIS — I10 ESSENTIAL HYPERTENSION: Primary | ICD-10-CM

## 2024-08-19 DIAGNOSIS — I63.312 CEREBROVASCULAR ACCIDENT (CVA) DUE TO THROMBOSIS OF LEFT MIDDLE CEREBRAL ARTERY: ICD-10-CM

## 2024-08-19 DIAGNOSIS — E78.2 MIXED HYPERLIPIDEMIA: ICD-10-CM

## 2024-08-19 DIAGNOSIS — Z78.0 POST-MENOPAUSAL: ICD-10-CM

## 2024-08-19 PROCEDURE — 3078F DIAST BP <80 MM HG: CPT | Performed by: FAMILY MEDICINE

## 2024-08-19 PROCEDURE — G2211 COMPLEX E/M VISIT ADD ON: HCPCS | Performed by: FAMILY MEDICINE

## 2024-08-19 PROCEDURE — 3074F SYST BP LT 130 MM HG: CPT | Performed by: FAMILY MEDICINE

## 2024-08-19 PROCEDURE — 99214 OFFICE O/P EST MOD 30 MIN: CPT | Performed by: FAMILY MEDICINE

## 2024-08-19 PROCEDURE — 1126F AMNT PAIN NOTED NONE PRSNT: CPT | Performed by: FAMILY MEDICINE

## 2024-08-19 PROCEDURE — 1159F MED LIST DOCD IN RCRD: CPT | Performed by: FAMILY MEDICINE

## 2024-08-19 PROCEDURE — 1160F RVW MEDS BY RX/DR IN RCRD: CPT | Performed by: FAMILY MEDICINE

## 2024-08-19 NOTE — PROGRESS NOTES
Subjective   Jazmyn Castaneda is a 74 y.o. female.     Chief Complaint   Patient presents with    Med Refill       History of Present Illness   Hyperlipidemia- doing well on meds, no muscle aches.     Anemia/high wbc- not on iron. Has never seen hematology.     Chronic diastolic congestive heart failure- looks like last echo was borderline and is on ace. No SOA or swelling in legs.     Hypertension- Doing well, never runs high.     History of stroke-patient remains on Plavix, high-dose cholesterol medicine and her blood pressure remains controlled.  Patient has  residual from her stroke besides some expressive aphasia she is having some decreased strength on the right side. Using a walker. Also following with neurology. She quit smoking 2 years ago.     Dm2- new diagnosis, has worked on diet. Has lost 10 pounds.     The following portions of the patient's history were reviewed and updated as appropriate: allergies, current medications, past family history, past medical history, past social history, past surgical history and problem list.    Past Medical History:   Diagnosis Date    Acute ischemic stroke 4/17/2021    Age-related osteoporosis without current pathological fracture 1/19/2021    Anesthesia     WOKE UP DURING CATARACT SURGERY    Chronic diastolic (congestive) heart failure 2/5/2021    Collapse of lung 1980s    history of in past post trauma    Diverticulosis     Environmental allergies     Some pollens, grass, trees, flowers    Essential hypertension 3/24/2017    Exogenous obesity 3/24/2017    H/O Pneumonia 1980s    Infectious mononucleosis     Kidney infection     Kidney stone     Mixed hyperlipidemia 4/17/2019    Neuromuscular disorder     Sepsis secondary to UTI 07/2019    Tobacco abuse 7/6/2019    Type 2 diabetes mellitus without complication, without long-term current use of insulin 8/19/2024    Urinary tract infection     Vertigo     past history of several times       Past Surgical History:    Procedure Laterality Date    BREAST EXCISIONAL BIOPSY Right     30 something when it was done; says it was precancerous    CATARACT EXTRACTION, BILATERAL       SECTION      CYSTOSCOPY N/A 2019    Procedure: CYSTOSCOPY AND STENT PLACEMENT;  Surgeon: Alen Lal MD;  Location: [facility];  Service: Urology    EMBOLECTOMY N/A 2021    Procedure: EMBOLECTOMY MECHANICAL;  Surgeon: Justo Cheng MD;  Location: [facility] ;  Service: Neurosurgery;  Laterality: N/A;    MASTOID SURGERY      NASAL SEPTAL RECONSTRUCTION      SINUS SURGERY      scraped and prior deviated septum    URETEROSCOPY LASER LITHOTRIPSY WITH STENT INSERTION Right 2019    Procedure: CYSTOSCOPY, RIGHT URETEROSCOPY, LASER LITHOTRIPSY, STONE BASKET EXTRACTION, STENT PLACEMENT WITHOUT STRINGS;  Surgeon: Alfredo Gongora Jr., MD;  Location: [facility];  Service: Urology       Family History   Problem Relation Age of Onset    Hypertension Mother     Aneurysm Mother     Heart failure Mother     Heart disease Mother     Heart disease Father     Hypertension Father     Kidney disease Father     Cancer Father 82        Bladder    Hypertension Sister     Glaucoma Maternal Grandmother     Cancer Maternal Grandmother     Cancer Maternal Aunt     Cancer Maternal Aunt     Cancer Maternal Aunt     Stroke Maternal Grandfather     Aneurysm Maternal Grandfather     Stroke Maternal Uncle     Liver cancer Other     Pancreatic cancer Other     Malig Hyperthermia Neg Hx     Breast cancer Neg Hx     Ovarian cancer Neg Hx        Social History     Socioeconomic History    Marital status:     Number of children: 2    Years of education: College   Tobacco Use    Smoking status: Former     Current packs/day: 0.00     Types: Cigarettes     Quit date: 2020     Years since quittin.1    Smokeless tobacco: Never   Vaping Use    Vaping status: Never Used   Substance and Sexual Activity    Alcohol use: Never      "Comment: \"on occasion\"    Drug use: No    Sexual activity: Defer       Review of Systems   Constitutional:  Negative for fever.   Respiratory:  Negative for shortness of breath.    Cardiovascular:  Negative for chest pain.       Objective   Visit Vitals  /70 (BP Location: Left arm, Patient Position: Sitting, Cuff Size: Adult)   Pulse 104   Temp 97.7 °F (36.5 °C)   Ht 160 cm (62.99\")   Wt 71.5 kg (157 lb 9.6 oz)   SpO2 96%   BMI 27.92 kg/m²       Body mass index is 27.92 kg/m².  Physical Exam  Constitutional:       Appearance: Normal appearance. She is well-developed.   Cardiovascular:      Rate and Rhythm: Normal rate and regular rhythm.      Heart sounds: Normal heart sounds.   Pulmonary:      Effort: Pulmonary effort is normal.      Breath sounds: Normal breath sounds.   Musculoskeletal:         General: No swelling. Normal range of motion.      Comments: Walking with walker   Skin:     General: Skin is warm and dry.      Findings: No rash.   Neurological:      General: No focal deficit present.      Mental Status: She is alert and oriented to person, place, and time.   Psychiatric:         Mood and Affect: Mood normal.         Behavior: Behavior normal.           Assessment & Plan   Diagnoses and all orders for this visit:    1. Essential hypertension (Primary)    2. Mixed hyperlipidemia    3. Chronic diastolic (congestive) heart failure    4. Neutrophilic leukocytosis  -     CBC & Differential    5. Cerebrovascular accident (CVA) due to thrombosis of left middle cerebral artery    6. Type 2 diabetes mellitus without complication, without long-term current use of insulin  -     Comprehensive Metabolic Panel  -     Lipid Panel  -     Hemoglobin A1c  -     Microalbumin / Creatinine Urine Ratio - Urine, Clean Catch    7. Visit for screening mammogram  -     Mammo Screening Digital Tomosynthesis Bilateral With CAD; Future    8. Post-menopausal  -     DEXA Bone Density Axial; Future    9. Colon cancer " screening  -     Cologuard - Stool, Per Rectum; Future          Doing fair, cont meds, f/u in 6 months. Discussed importance of follow up for well visit. Lives with family.

## 2024-08-20 LAB
ALBUMIN SERPL-MCNC: 4.1 G/DL (ref 3.5–5.2)
ALBUMIN/GLOB SERPL: 1.6 G/DL
ALP SERPL-CCNC: 175 U/L (ref 39–117)
ALT SERPL-CCNC: 19 U/L (ref 1–33)
AST SERPL-CCNC: 18 U/L (ref 1–32)
BASOPHILS # BLD AUTO: 0.08 10*3/MM3 (ref 0–0.2)
BASOPHILS NFR BLD AUTO: 0.8 % (ref 0–1.5)
BILIRUB SERPL-MCNC: 0.3 MG/DL (ref 0–1.2)
BUN SERPL-MCNC: 15 MG/DL (ref 8–23)
BUN/CREAT SERPL: 20.8 (ref 7–25)
CALCIUM SERPL-MCNC: 10.1 MG/DL (ref 8.6–10.5)
CHLORIDE SERPL-SCNC: 105 MMOL/L (ref 98–107)
CHOLEST SERPL-MCNC: 115 MG/DL (ref 0–200)
CO2 SERPL-SCNC: 21.1 MMOL/L (ref 22–29)
CREAT SERPL-MCNC: 0.72 MG/DL (ref 0.57–1)
EGFRCR SERPLBLD CKD-EPI 2021: 86.8 ML/MIN/1.73
EOSINOPHIL # BLD AUTO: 0.61 10*3/MM3 (ref 0–0.4)
EOSINOPHIL NFR BLD AUTO: 6.3 % (ref 0.3–6.2)
ERYTHROCYTE [DISTWIDTH] IN BLOOD BY AUTOMATED COUNT: 12.4 % (ref 12.3–15.4)
GLOBULIN SER CALC-MCNC: 2.6 GM/DL
GLUCOSE SERPL-MCNC: 169 MG/DL (ref 65–99)
HBA1C MFR BLD: 6.1 % (ref 4.8–5.6)
HCT VFR BLD AUTO: 45.8 % (ref 34–46.6)
HDLC SERPL-MCNC: 31 MG/DL (ref 40–60)
HGB BLD-MCNC: 15.2 G/DL (ref 12–15.9)
IMM GRANULOCYTES # BLD AUTO: 0.02 10*3/MM3 (ref 0–0.05)
IMM GRANULOCYTES NFR BLD AUTO: 0.2 % (ref 0–0.5)
LDLC SERPL CALC-MCNC: 62 MG/DL (ref 0–100)
LYMPHOCYTES # BLD AUTO: 2.26 10*3/MM3 (ref 0.7–3.1)
LYMPHOCYTES NFR BLD AUTO: 23.2 % (ref 19.6–45.3)
MCH RBC QN AUTO: 30 PG (ref 26.6–33)
MCHC RBC AUTO-ENTMCNC: 33.2 G/DL (ref 31.5–35.7)
MCV RBC AUTO: 90.5 FL (ref 79–97)
MONOCYTES # BLD AUTO: 0.47 10*3/MM3 (ref 0.1–0.9)
MONOCYTES NFR BLD AUTO: 4.8 % (ref 5–12)
NEUTROPHILS # BLD AUTO: 6.31 10*3/MM3 (ref 1.7–7)
NEUTROPHILS NFR BLD AUTO: 64.7 % (ref 42.7–76)
NRBC BLD AUTO-RTO: 0 /100 WBC (ref 0–0.2)
PLATELET # BLD AUTO: 334 10*3/MM3 (ref 140–450)
POTASSIUM SERPL-SCNC: 4.4 MMOL/L (ref 3.5–5.2)
PROT SERPL-MCNC: 6.7 G/DL (ref 6–8.5)
RBC # BLD AUTO: 5.06 10*6/MM3 (ref 3.77–5.28)
SODIUM SERPL-SCNC: 138 MMOL/L (ref 136–145)
TRIGL SERPL-MCNC: 118 MG/DL (ref 0–150)
VLDLC SERPL CALC-MCNC: 22 MG/DL (ref 5–40)
WBC # BLD AUTO: 9.75 10*3/MM3 (ref 3.4–10.8)

## 2024-08-21 DIAGNOSIS — R74.8 ELEVATED ALKALINE PHOSPHATASE LEVEL: Primary | ICD-10-CM

## 2024-08-23 ENCOUNTER — HOSPITAL ENCOUNTER (OUTPATIENT)
Dept: CT IMAGING | Facility: HOSPITAL | Age: 74
Discharge: HOME | End: 2024-08-23
Payer: MEDICARE

## 2024-08-23 DIAGNOSIS — I65.23 CAROTID STENOSIS, BILATERAL: ICD-10-CM

## 2024-08-23 PROCEDURE — 70496 CT ANGIOGRAPHY HEAD: CPT

## 2024-08-23 PROCEDURE — 70498 CT ANGIOGRAPHY NECK: CPT

## 2024-08-23 PROCEDURE — 25510000001 IOPAMIDOL PER 1 ML: Performed by: NEUROLOGICAL SURGERY

## 2024-08-23 RX ADMIN — IOPAMIDOL 95 ML: 755 INJECTION, SOLUTION INTRAVENOUS at 13:25

## 2024-08-26 NOTE — PROGRESS NOTES
Subjective   History of Present Illness: Jazmyn Castaneda is a 74 y.o. female is here today for follow-up on bilateral carotid artery stenosis and left-sided subclavian artery stenosis. CTA head/neck was done on 24.   She is doing well today.  No new complaints.  Denies any changes in the frequency or severity of her headaches.  Denies any changes in vision.  Denies any strokelike episodes.  Denies any changes in strength or sensation.      History of Present Illness    The following portions of the patient's history were reviewed and updated as appropriate: allergies, current medications, past family history, past medical history, past social history, past surgical history, and problem list.    Past Medical History:   Diagnosis Date    Acute ischemic stroke 2021    Age-related osteoporosis without current pathological fracture 2021    Anesthesia     WOKE UP DURING CATARACT SURGERY    Chronic diastolic (congestive) heart failure 2021    Collapse of lung 1980s    history of in past post trauma    Diverticulosis     Environmental allergies     Some pollens, grass, trees, flowers    Essential hypertension 3/24/2017    Exogenous obesity 3/24/2017    H/O Pneumonia     Infectious mononucleosis     Kidney infection     Kidney stone     Mixed hyperlipidemia 2019    Neuromuscular disorder     Sepsis secondary to UTI 2019    Tobacco abuse 2019    Type 2 diabetes mellitus without complication, without long-term current use of insulin 2024    Urinary tract infection     Vertigo     past history of several times        Past Surgical History:   Procedure Laterality Date    BREAST EXCISIONAL BIOPSY Right     30 something when it was done; says it was precancerous    CATARACT EXTRACTION, BILATERAL       SECTION      CYSTOSCOPY N/A 2019    Procedure: CYSTOSCOPY AND STENT PLACEMENT;  Surgeon: Alen Lal MD;  Location: [facility];  Service: Urology    EMBOLECTOMY N/A  4/18/2021    Procedure: EMBOLECTOMY MECHANICAL;  Surgeon: Justo Cheng MD;  Location: [facility] OR 18/19;  Service: Neurosurgery;  Laterality: N/A;    MASTOID SURGERY      NASAL SEPTAL RECONSTRUCTION      SINUS SURGERY      scraped and prior deviated septum    URETEROSCOPY LASER LITHOTRIPSY WITH STENT INSERTION Right 7/18/2019    Procedure: CYSTOSCOPY, RIGHT URETEROSCOPY, LASER LITHOTRIPSY, STONE BASKET EXTRACTION, STENT PLACEMENT WITHOUT STRINGS;  Surgeon: Alfredo Gongora Jr., MD;  Location: [facility];  Service: Urology          Current Outpatient Medications:     acetaminophen (TYLENOL) 500 MG tablet, Take 1 tablet by mouth Every 4 (Four) Hours As Needed for Mild Pain or Fever., Disp: , Rfl:     aspirin 325 MG tablet, Take 1 tablet by mouth Daily., Disp: 30 tablet, Rfl: 11    atorvastatin (LIPITOR) 80 MG tablet, Take 1 tablet by mouth Every Night., Disp: 90 tablet, Rfl: 1    cetirizine (zyrTEC) 5 MG tablet, Take 1 tablet by mouth Daily., Disp: 30 tablet, Rfl: 11    Cholecalciferol (D3 2000 PO), Take  by mouth Daily., Disp: , Rfl:     clopidogrel (PLAVIX) 75 MG tablet, Take 1 tablet by mouth Daily., Disp: 90 tablet, Rfl: 1    docusate sodium (COLACE) 50 MG capsule, Take  by mouth Daily., Disp: , Rfl:     lactulose (CHRONULAC) 10 GM/15ML solution, TAKE 30 ML BY MOUTH DAILY, Disp: 1892 mL, Rfl: 0    lisinopril (PRINIVIL,ZESTRIL) 2.5 MG tablet, Take 1 tablet by mouth Daily., Disp: 90 tablet, Rfl: 1    Polyethylene Glycol 3350 powder, Take 17 g by mouth Daily., Disp: 48916 g, Rfl: 5    fluticasone (FLONASE) 50 MCG/ACT nasal spray, INSTILL 1 SPRAY IN EACH NOSTRIL DAILY AS DIRECTED (Patient not taking: Reported on 8/30/2024), Disp: 16 g, Rfl: 2    montelukast (SINGULAIR) 10 MG tablet, TAKE 1 TABLET BY MOUTH EVERY NIGHT (Patient not taking: Reported on 8/30/2024), Disp: 30 tablet, Rfl: 11     Allergies   Allergen Reactions    Cephalosporins Swelling    Penicillins Swelling    Ciprofloxacin Rash     "Sulfa Antibiotics Unknown - Low Severity     Patient states she lost her vision temporarily when using Sulfa based eye ointment, also, broke out in rash w/oral medication.        Social History     Socioeconomic History    Marital status:     Number of children: 2    Years of education: College   Tobacco Use    Smoking status: Former     Current packs/day: 0.00     Types: Cigarettes     Quit date: 2020     Years since quittin.2    Smokeless tobacco: Never   Vaping Use    Vaping status: Never Used   Substance and Sexual Activity    Alcohol use: Never     Comment: \"on occasion\"    Drug use: No    Sexual activity: Defer        Family History   Problem Relation Age of Onset    Hypertension Mother     Aneurysm Mother     Heart failure Mother     Heart disease Mother     Heart disease Father     Hypertension Father     Kidney disease Father     Cancer Father 82        Bladder    Hypertension Sister     Glaucoma Maternal Grandmother     Cancer Maternal Grandmother     Cancer Maternal Aunt     Cancer Maternal Aunt     Cancer Maternal Aunt     Stroke Maternal Grandfather     Aneurysm Maternal Grandfather     Stroke Maternal Uncle     Liver cancer Other     Pancreatic cancer Other     Malig Hyperthermia Neg Hx     Breast cancer Neg Hx     Ovarian cancer Neg Hx         Review of Systems   Neurological:  Positive for dizziness, weakness (right side) and light-headedness. Negative for headaches.   All other systems reviewed and are negative.      Objective     Vitals:    24 1252   BP: 140/70   Pulse: 108   Temp: 98 °F (36.7 °C)   SpO2: 97%   Weight: 71.2 kg (157 lb)   Height: 160 cm (62.99\")     Body mass index is 27.82 kg/m².      Physical Exam  Neurologic Exam  Awake, alert, oriented with a very mild expressive aphasia  Surgical pupils  Extraocular muscles intact  Face symmetric  Speech is fluent and clear  No pronator drift  Motor exam  Slight right sided hemiparesis with decreased coordination and " movement in the right upper extremity compared to the left.  Able to give near normal strength throughout the right hand , bicep, tricep, hip flexion, knee extension.  Appears to have full strength throughout the left upper and lower extremity.  No clonus  No Cuco's reflex  Slightly unstable gait, uses a rollator for assistance  Able to detect  light touch in all 4 extremities      Assessment & Plan   Independent Review of Radiographic Studies:      I personally reviewed the images from the following studies.  CTA head and neck from 2024 and 2022 and 2023  The follow-up CTA images were reviewed demonstrate no progression of the bilateral carotid stenosis.  No evidence of severe carotid stenosis.  No progression of the left-sided moderate to severe subclavian stenosis.    Medical Decision Makin-year-old female s/p cerebral angiogram for stroke intervention on 2021  -She has recovered very well from her left-sided stroke.  She still has mild right-sided weakness and a mild expressive aphasia.  The follow-up CTA shows no progression of the bilateral carotid stenosis or proximal left subclavian stenosis.  She denies any recent strokelike episodes.  Denies any recent changes in strength or sensation.  Denies any pain in the left upper extremity.  I will plan to see her back in 1 year with a repeat CTA to evaluate for any changes  Diagnoses and all orders for this visit:    1. Cerebrovascular disease (Primary)  -     CT Angiogram Head With Contrast; Future  -     CT angiogram carotids; Future    2. Subclavian arterial stenosis  -     CT Angiogram Head With Contrast; Future  -     CT angiogram carotids; Future    3. Bilateral carotid artery stenosis  -     CT Angiogram Head With Contrast; Future  -     CT angiogram carotids; Future      Return in about 1 year (around 2025).  I spent 40 minutes reviewing the medical record, reviewing the CTA images, discussing  the management of stroke, discussion of prevention of stroke, discussing the signs and symptoms of stroke

## 2024-08-28 DIAGNOSIS — I10 ESSENTIAL HYPERTENSION: ICD-10-CM

## 2024-08-28 DIAGNOSIS — I50.32 CHRONIC DIASTOLIC (CONGESTIVE) HEART FAILURE: ICD-10-CM

## 2024-08-28 RX ORDER — LISINOPRIL 2.5 MG/1
2.5 TABLET ORAL DAILY
Qty: 90 TABLET | Refills: 1 | Status: SHIPPED | OUTPATIENT
Start: 2024-08-28

## 2024-08-28 RX ORDER — ATORVASTATIN CALCIUM 80 MG/1
80 TABLET, FILM COATED ORAL NIGHTLY
Qty: 90 TABLET | Refills: 1 | Status: SHIPPED | OUTPATIENT
Start: 2024-08-28

## 2024-08-28 NOTE — TELEPHONE ENCOUNTER
LOV                   8/19/2024  NOV                   2/26/2025  Last RF             1/15/24  atorvastatin                             6/10/24  lisinopril  PROTOCOL       Met    DAGMAR PandeyA/LMR

## 2024-08-30 ENCOUNTER — OFFICE VISIT (OUTPATIENT)
Dept: NEUROSURGERY | Facility: CLINIC | Age: 74
End: 2024-08-30
Payer: MEDICARE

## 2024-08-30 VITALS
DIASTOLIC BLOOD PRESSURE: 70 MMHG | OXYGEN SATURATION: 97 % | SYSTOLIC BLOOD PRESSURE: 140 MMHG | HEIGHT: 63 IN | TEMPERATURE: 98 F | WEIGHT: 157 LBS | BODY MASS INDEX: 27.82 KG/M2 | HEART RATE: 108 BPM

## 2024-08-30 DIAGNOSIS — I67.9 CEREBROVASCULAR DISEASE: Primary | ICD-10-CM

## 2024-08-30 DIAGNOSIS — I77.1 SUBCLAVIAN ARTERIAL STENOSIS: ICD-10-CM

## 2024-08-30 DIAGNOSIS — I65.23 BILATERAL CAROTID ARTERY STENOSIS: ICD-10-CM

## 2024-08-30 PROCEDURE — 3077F SYST BP >= 140 MM HG: CPT | Performed by: NEUROLOGICAL SURGERY

## 2024-08-30 PROCEDURE — 1159F MED LIST DOCD IN RCRD: CPT | Performed by: NEUROLOGICAL SURGERY

## 2024-08-30 PROCEDURE — 3078F DIAST BP <80 MM HG: CPT | Performed by: NEUROLOGICAL SURGERY

## 2024-08-30 PROCEDURE — 99215 OFFICE O/P EST HI 40 MIN: CPT | Performed by: NEUROLOGICAL SURGERY

## 2024-08-30 PROCEDURE — 1160F RVW MEDS BY RX/DR IN RCRD: CPT | Performed by: NEUROLOGICAL SURGERY

## 2024-09-09 DIAGNOSIS — I10 ESSENTIAL HYPERTENSION: ICD-10-CM

## 2024-09-09 DIAGNOSIS — R19.5 POSITIVE COLORECTAL CANCER SCREENING USING COLOGUARD TEST: Primary | ICD-10-CM

## 2024-09-09 DIAGNOSIS — I50.32 CHRONIC DIASTOLIC (CONGESTIVE) HEART FAILURE: ICD-10-CM

## 2024-09-09 RX ORDER — LISINOPRIL 2.5 MG/1
2.5 TABLET ORAL DAILY
Qty: 90 TABLET | Refills: 1 | OUTPATIENT
Start: 2024-09-09

## 2024-09-24 ENCOUNTER — OFFICE VISIT (OUTPATIENT)
Dept: SURGERY | Facility: CLINIC | Age: 74
End: 2024-09-24
Payer: MEDICARE

## 2024-09-24 VITALS
DIASTOLIC BLOOD PRESSURE: 74 MMHG | WEIGHT: 158 LBS | BODY MASS INDEX: 28 KG/M2 | HEIGHT: 63 IN | SYSTOLIC BLOOD PRESSURE: 128 MMHG

## 2024-09-24 DIAGNOSIS — R19.5 POSITIVE COLORECTAL CANCER SCREENING USING DNA-BASED STOOL TEST: Primary | ICD-10-CM

## 2024-09-24 PROCEDURE — 3078F DIAST BP <80 MM HG: CPT | Performed by: PHYSICIAN ASSISTANT

## 2024-09-24 PROCEDURE — 1160F RVW MEDS BY RX/DR IN RCRD: CPT | Performed by: PHYSICIAN ASSISTANT

## 2024-09-24 PROCEDURE — 99203 OFFICE O/P NEW LOW 30 MIN: CPT | Performed by: PHYSICIAN ASSISTANT

## 2024-09-24 PROCEDURE — 1159F MED LIST DOCD IN RCRD: CPT | Performed by: PHYSICIAN ASSISTANT

## 2024-09-24 PROCEDURE — 3074F SYST BP LT 130 MM HG: CPT | Performed by: PHYSICIAN ASSISTANT

## 2024-09-30 DIAGNOSIS — I50.32 CHRONIC DIASTOLIC (CONGESTIVE) HEART FAILURE: ICD-10-CM

## 2024-09-30 DIAGNOSIS — I10 ESSENTIAL HYPERTENSION: ICD-10-CM

## 2024-10-01 DIAGNOSIS — R19.5 POSITIVE COLORECTAL CANCER SCREENING USING DNA-BASED STOOL TEST: Primary | ICD-10-CM

## 2024-10-01 DIAGNOSIS — I10 ESSENTIAL HYPERTENSION: ICD-10-CM

## 2024-10-01 DIAGNOSIS — I50.32 CHRONIC DIASTOLIC (CONGESTIVE) HEART FAILURE: ICD-10-CM

## 2024-10-01 RX ORDER — LISINOPRIL 2.5 MG/1
2.5 TABLET ORAL DAILY
Qty: 90 TABLET | Refills: 1 | OUTPATIENT
Start: 2024-10-01

## 2024-10-01 RX ORDER — LISINOPRIL 2.5 MG/1
2.5 TABLET ORAL DAILY
Qty: 90 TABLET | Refills: 1 | Status: SHIPPED | OUTPATIENT
Start: 2024-10-01

## 2024-10-01 NOTE — TELEPHONE ENCOUNTER
Caller: BECKA PRATER    Relationship: Child    Best call back number: [phone]     Requested Prescriptions:   Requested Prescriptions     Pending Prescriptions Disp Refills    lisinopril (PRINIVIL,ZESTRIL) 2.5 MG tablet 90 tablet 1     Sig: Take 1 tablet by mouth Daily.        Pharmacy where request should be sent: [id number] [address] [place] [address]  AT [facility] [phone] [facility] [phone]      Last office visit with prescribing clinician: 8/19/2024   Last telemedicine visit with prescribing clinician: Visit date not found   Next office visit with prescribing clinician: 2/26/2025     Additional details provided by patient: 1 PILL ON HAND    Does the patient have less than a 3 day supply:  [x] Yes  [] No    Would you like a call back once the refill request has been completed: [x] Yes [] No    If the office needs to give you a call back, can they leave a voicemail: [] Yes [] No    Nunu Meza Rep   10/01/24 09:46 EDT

## 2024-10-01 NOTE — TELEPHONE ENCOUNTER
LOV                   8/19/2024  NOV                   2/26/2025  Last refill             8/28/24  Protocol              met

## 2024-10-08 ENCOUNTER — TELEPHONE (OUTPATIENT)
Dept: NEUROSURGERY | Facility: CLINIC | Age: 74
End: 2024-10-08

## 2024-10-08 NOTE — TELEPHONE ENCOUNTER
[facility] received a fax that requires your attention. The document has been indexed to the patient’s chart for your review.      Reason for sending: FOR DR BELTRÁN'S RECORDS    Documents Description: COLONOSCOPY CLEARANCE LETTER 9/27/24    Name of Sender: [facility]    Date Indexed: 10/4/24    Notes (if needed): PROCEDURE SCHEDULED FOR 11/7/24

## 2024-10-11 ENCOUNTER — PATIENT MESSAGE (OUTPATIENT)
Dept: ADMINISTRATIVE | Facility: OTHER | Age: 74
End: 2024-10-11
Payer: MEDICARE

## 2024-10-14 RX ORDER — ATORVASTATIN CALCIUM 80 MG/1
80 TABLET, FILM COATED ORAL NIGHTLY
Qty: 90 TABLET | Refills: 3 | Status: SHIPPED | OUTPATIENT
Start: 2024-10-14

## 2024-10-18 ENCOUNTER — PATIENT MESSAGE (OUTPATIENT)
Dept: ADMINISTRATIVE | Facility: OTHER | Age: 74
End: 2024-10-18
Payer: MEDICARE

## 2024-10-29 DIAGNOSIS — Z00.00 ENCOUNTER FOR MEDICARE ANNUAL WELLNESS EXAM: ICD-10-CM

## 2024-11-01 DIAGNOSIS — J30.1 NON-SEASONAL ALLERGIC RHINITIS DUE TO POLLEN: ICD-10-CM

## 2024-11-01 RX ORDER — CETIRIZINE HYDROCHLORIDE 5 MG/1
5 TABLET ORAL DAILY
Qty: 90 TABLET | Refills: 3 | Status: SHIPPED | OUTPATIENT
Start: 2024-11-01

## 2024-11-07 ENCOUNTER — HOSPITAL ENCOUNTER (OUTPATIENT)
Facility: HOSPITAL | Age: 74
Setting detail: HOSPITAL OUTPATIENT SURGERY
Discharge: HOME | End: 2024-11-07
Attending: SURGERY | Admitting: SURGERY
Payer: MEDICARE

## 2024-11-07 ENCOUNTER — ANESTHESIA EVENT (OUTPATIENT)
Dept: GASTROENTEROLOGY | Facility: HOSPITAL | Age: 74
End: 2024-11-07
Payer: MEDICARE

## 2024-11-07 ENCOUNTER — ANESTHESIA (OUTPATIENT)
Dept: GASTROENTEROLOGY | Facility: HOSPITAL | Age: 74
End: 2024-11-07
Payer: MEDICARE

## 2024-11-07 VITALS
HEART RATE: 86 BPM | DIASTOLIC BLOOD PRESSURE: 82 MMHG | WEIGHT: 157 LBS | HEIGHT: 61 IN | OXYGEN SATURATION: 95 % | RESPIRATION RATE: 23 BRPM | BODY MASS INDEX: 29.64 KG/M2 | SYSTOLIC BLOOD PRESSURE: 172 MMHG

## 2024-11-07 DIAGNOSIS — R19.5 POSITIVE COLORECTAL CANCER SCREENING USING DNA-BASED STOOL TEST: ICD-10-CM

## 2024-11-07 PROCEDURE — 45385 COLONOSCOPY W/LESION REMOVAL: CPT | Performed by: SURGERY

## 2024-11-07 PROCEDURE — 25010000002 PROPOFOL 10 MG/ML EMULSION: Performed by: ANESTHESIOLOGY

## 2024-11-07 PROCEDURE — 25010000002 PROPOFOL 1000 MG/100ML EMULSION: Performed by: ANESTHESIOLOGY

## 2024-11-07 PROCEDURE — 45380 COLONOSCOPY AND BIOPSY: CPT | Performed by: SURGERY

## 2024-11-07 PROCEDURE — 88305 TISSUE EXAM BY PATHOLOGIST: CPT | Performed by: SURGERY

## 2024-11-07 PROCEDURE — S0260 H&P FOR SURGERY: HCPCS | Performed by: SURGERY

## 2024-11-07 PROCEDURE — 25810000003 LACTATED RINGERS PER 1000 ML: Performed by: SURGERY

## 2024-11-07 PROCEDURE — 25010000002 LIDOCAINE 2% SOLUTION: Performed by: ANESTHESIOLOGY

## 2024-11-07 RX ORDER — PROPOFOL 10 MG/ML
VIAL (ML) INTRAVENOUS AS NEEDED
Status: DISCONTINUED | OUTPATIENT
Start: 2024-11-07 | End: 2024-11-07 | Stop reason: SURG

## 2024-11-07 RX ORDER — PROPOFOL 10 MG/ML
INJECTION, EMULSION INTRAVENOUS CONTINUOUS PRN
Status: DISCONTINUED | OUTPATIENT
Start: 2024-11-07 | End: 2024-11-07 | Stop reason: SURG

## 2024-11-07 RX ORDER — LIDOCAINE HYDROCHLORIDE 20 MG/ML
INJECTION, SOLUTION INFILTRATION; PERINEURAL AS NEEDED
Status: DISCONTINUED | OUTPATIENT
Start: 2024-11-07 | End: 2024-11-07 | Stop reason: SURG

## 2024-11-07 RX ORDER — SODIUM CHLORIDE, SODIUM LACTATE, POTASSIUM CHLORIDE, CALCIUM CHLORIDE 600; 310; 30; 20 MG/100ML; MG/100ML; MG/100ML; MG/100ML
30 INJECTION, SOLUTION INTRAVENOUS CONTINUOUS
Status: DISCONTINUED | OUTPATIENT
Start: 2024-11-07 | End: 2024-11-07 | Stop reason: HOSPADM

## 2024-11-07 RX ADMIN — SODIUM CHLORIDE, POTASSIUM CHLORIDE, SODIUM LACTATE AND CALCIUM CHLORIDE: 600; 310; 30; 20 INJECTION, SOLUTION INTRAVENOUS at 15:23

## 2024-11-07 RX ADMIN — LIDOCAINE HYDROCHLORIDE 50 MG: 20 INJECTION, SOLUTION INFILTRATION; PERINEURAL at 15:30

## 2024-11-07 RX ADMIN — PROPOFOL 20 MG: 10 INJECTION, EMULSION INTRAVENOUS at 15:53

## 2024-11-07 RX ADMIN — PROPOFOL INJECTABLE EMULSION 100 MCG/KG/MIN: 10 INJECTION, EMULSION INTRAVENOUS at 15:30

## 2024-11-07 NOTE — ANESTHESIA PREPROCEDURE EVALUATION
" Anesthesia Evaluation     Patient summary reviewed and Nursing notes reviewed   history of anesthetic complications:  PONV               Airway   Mallampati: II  Neck ROM: limited  Dental      Pulmonary    (+) a smoker Former, COPD,  Cardiovascular     ECG reviewed  Rhythm: regular  Rate: normal    (+) hypertension, valvular problems/murmurs (mild) MR, CHF , hyperlipidemia      Neuro/Psych  (+) CVA (right sided weakness and expressive dysphasia), dizziness/light headedness, numbness  GI/Hepatic/Renal/Endo    (+) obesity, hiatal hernia, renal disease- stones, diabetes mellitus type 2    Musculoskeletal (-) negative ROS    Abdominal    Substance History - negative use     OB/GYN negative ob/gyn ROS         Other                      Anesthesia Plan    ASA 4     MAC     (BMI  Stroke effects: right sided weakness and expressive aphasia  Goes by \" Camille\")  intravenous induction     Anesthetic plan, risks, benefits, and alternatives have been provided, discussed and informed consent has been obtained with: patient.      CODE STATUS:         "

## 2024-11-07 NOTE — H&P
SURGERY  Jazmyn Castaneda  22    HPI: 74 y.o. female here for colonoscopy after having a positive Cologuard test.  She last had a colonoscopy greater than 10 years ago.  She has held Plavix for 5 days and transition to 81 mg of aspirin.  She has history of embolic stroke in  and had an embolectomy.    Past Medical History:   Diagnosis Date    Acute ischemic stroke 2021    Age-related osteoporosis without current pathological fracture 2021    Anesthesia     WOKE UP DURING CATARACT SURGERY    Collapse of lung     history of in past post trauma    Diverticulitis     Diverticulosis     Environmental allergies     Some pollens, grass, trees, flowers    Essential hypertension 2017    Exogenous obesity 2017    H/O Pneumonia     Infectious mononucleosis     Kidney infection     Kidney stone     Mixed hyperlipidemia 2019    Neuromuscular disorder     PONV (postoperative nausea and vomiting)     Positive colorectal cancer screening using Cologuard test     Right sided weakness     HX STROKE     Sepsis secondary to UTI 2019    Tobacco abuse 2019    Urinary tract infection     Vertigo     past history of several times       Past Surgical History:   Procedure Laterality Date    BREAST EXCISIONAL BIOPSY Right     30 something when it was done; says it was precancerous    CATARACT EXTRACTION, BILATERAL       SECTION      COLONOSCOPY      CYSTOSCOPY N/A 2019    Procedure: CYSTOSCOPY AND STENT PLACEMENT;  Surgeon: Alen Lal MD;  Location: [facility] OR;  Service: Urology    EMBOLECTOMY N/A 2021    Procedure: EMBOLECTOMY MECHANICAL;  Surgeon: Justo Cheng MD;  Location: [facility] OR ;  Service: Neurosurgery;  Laterality: N/A;    MASTOID SURGERY      NASAL SEPTAL RECONSTRUCTION      SINUS SURGERY      scraped and prior deviated septum    URETEROSCOPY LASER LITHOTRIPSY WITH STENT INSERTION Right 2019     Procedure: CYSTOSCOPY, RIGHT URETEROSCOPY, LASER LITHOTRIPSY, STONE BASKET EXTRACTION, STENT PLACEMENT WITHOUT STRINGS;  Surgeon: Alfredo Gongora Jr., MD;  Location: [facility];  Service: Urology       is allergic to cephalosporins, penicillins, ciprofloxacin, and sulfa antibiotics.       Medication List        ASK your doctor about these medications      acetaminophen 500 MG tablet  Commonly known as: TYLENOL     aspirin 325 MG tablet  Take 1 tablet by mouth Daily.     atorvastatin 80 MG tablet  Commonly known as: LIPITOR  Take 1 tablet by mouth Every Night.     cetirizine 5 MG tablet  Commonly known as: zyrTEC  Take 1 tablet by mouth Daily.     clopidogrel 75 MG tablet  Commonly known as: PLAVIX  Take 1 tablet by mouth Daily.     D3 2000 PO     docusate sodium 50 MG capsule  Commonly known as: COLACE     fluticasone 50 MCG/ACT nasal spray  Commonly known as: FLONASE  INSTILL 1 SPRAY IN EACH NOSTRIL DAILY AS DIRECTED     lactulose 10 GM/15ML solution  Commonly known as: CHRONULAC  TAKE 30 ML BY MOUTH DAILY     lisinopril 2.5 MG tablet  Commonly known as: PRINIVIL,ZESTRIL  Take 1 tablet by mouth Daily.     montelukast 10 MG tablet  Commonly known as: SINGULAIR  TAKE 1 TABLET BY MOUTH EVERY NIGHT     Polyethylene Glycol 3350 powder  Take 17 g by mouth Daily.              Family History   Problem Relation Age of Onset    Hypertension Mother     Aneurysm Mother     Heart failure Mother     Heart disease Mother     Heart disease Father     Hypertension Father     Kidney disease Father     Cancer Father 82        Bladder    Hypertension Sister     Glaucoma Maternal Grandmother     Cancer Maternal Grandmother     Cancer Maternal Aunt     Cancer Maternal Aunt     Cancer Maternal Aunt     Stroke Maternal Grandfather     Aneurysm Maternal Grandfather     Stroke Maternal Uncle     Liver cancer Other     Pancreatic cancer Other     Malig Hyperthermia Neg Hx     Breast cancer Neg Hx     Ovarian cancer Neg Hx   "      Social History     Socioeconomic History    Marital status:     Number of children: 2    Years of education: College   Tobacco Use    Smoking status: Former     Current packs/day: 0.00     Types: Cigarettes     Quit date: 2020     Years since quittin.3    Smokeless tobacco: Never   Vaping Use    Vaping status: Never Used   Substance and Sexual Activity    Alcohol use: Never     Comment: \"on occasion\"    Drug use: No    Sexual activity: Defer       Vitals:    24 1442   BP: 124/100   Pulse: 85   Resp: 11   SpO2: 98%       Body mass index is 29.66 kg/m².    Physical Exam    General: No acute distress  Lungs: No labored breathing, Pulse oximetry on room air is 98%.  Heart: RRR  Abdo: Soft  Mental:  Awake, alert, and oriented      Assessment/Plan  Positive colorectal cancer screening using Cologuard  Proceed with colonoscopy.  Risk, benefits discussed including risk of perforation, bleeding.    Shaun Hernandez MD  15:31 EST              "

## 2024-11-07 NOTE — OP NOTE
Colonoscopy Procedure Note  Jazmyn Castaneda  1950  Date of Procedure: 11/07/24    Pre-operative Diagnosis:    Positive colorectal cancer screening using Cologuard    Post-operative Diagnosis:  Pandiverticulosis, transverse colon polyp, sigmoid colon polyp x 2    Procedure: Colonoscopy to cecum with hot snare biopsy of transverse colon polyp, cold forcep biopsy of sigmoid colon polyp x 2    Findings/Treatments:   Diverticulosis prominent throughout the entire examined colon  5 mm transverse colon polyp-resection and retrieval complete  2 small sessile sigmoid colon polyp-resection and retrieval complete       Recommendations:   Follow-up results of pathology.  Recommend high-fiber diet for diverticulosis.  Okay to resume aspirin and Plavix tomorrow.  The office will call within the next 10 days with final pathology.    Surgeon: Shaun Hernandez MD    Anesthetic: MAC per Hank Henao MD      Procedure Details:    MAC anesthesia was induced.  A digital rectal exam was performed along with visual inspection of the anus. The 180 Colonoscope was inserted into the rectum and advanced to the cecum, with relative ease.  Cecum was identified by the appendiceal orifice and the ileocecal valve and photographed for documentation.       A careful inspection was made as the scope was withdrawn, including a retroflexed view of the rectum.  There was diverticulosis present throughout the colon.  In the transverse colon a 5 mm sessile polyp was removed in its entirety with the hot snare.  Settings on the cautery were 0 and 11.  Tissue was retrieved via suction through the scope.  In the sigmoid colon there were 2 small sessile polyps that were removed in their entirety with the cold forcep and the hot snare. Retroflexion in the rectum revealed no abnormalities. Prep quality was excellent.      Shaun Hernandez M.D.  General, Endoscopic, and Robotic Surgery  [facility]    [address]  [place], [place], [address]  P: [phone]  F: [phone]

## 2024-11-07 NOTE — DISCHARGE INSTRUCTIONS
For the next 24 hours patient needs to be with a responsible adult.    For THE REST OF TODAY DO NOT drive, operate machinery, appliances, drink alcohol, make important decisions or sign legal documents.    Start with a light or bland diet if you are feeling sick to your stomach otherwise advance to regular diet as tolerated.    Follow recommendations on procedure report if provided by your doctor.    Call Dr Hernandez for problems [phone]    Problems may include but not limited to: large amounts of bleeding, trouble breathing, repeated vomiting, severe unrelieved pain, fever or chills.      If biopsies or polyps were taken, MD will call you with the results in about 7 days. If you don't hear from the MD in 2 weeks, call the number above.

## 2024-11-11 LAB
CYTO UR: NORMAL
LAB AP CASE REPORT: NORMAL
PATH REPORT.FINAL DX SPEC: NORMAL
PATH REPORT.GROSS SPEC: NORMAL

## 2024-11-11 RX ORDER — ASPIRIN 325 MG
325 TABLET ORAL DAILY
Qty: 30 TABLET | Refills: 11 | Status: SHIPPED | OUTPATIENT
Start: 2024-11-11

## 2024-11-11 NOTE — ANESTHESIA POSTPROCEDURE EVALUATION
Patient: Jazmyn Castaneda    Procedure Summary       Date: 11/07/24 Room / Location:  [facility]    Anesthesia Start: 1523 Anesthesia Stop: 1612    Procedure: COLONOSCOPY to cecum with hot polypectomies Diagnosis:       Positive colorectal cancer screening using DNA-based stool test      (Positive colorectal cancer screening using DNA-based stool test [R19.5])    Surgeons: Shaun Hernandez MD Provider: Hank Henao MD    Anesthesia Type: MAC ASA Status: 4            Anesthesia Type: MAC    Vitals  Vitals Value Taken Time   /82 11/07/24 1635   Temp     Pulse 86 11/07/24 1635   Resp 23 11/07/24 1635   SpO2 95 % 11/07/24 1635           Anesthesia Post Evaluation

## 2024-11-11 NOTE — ANESTHESIA POSTPROCEDURE EVALUATION
Patient: Jazmyn Castaneda    Procedure Summary       Date: 11/07/24 Room / Location: [facility] 1 /  [facility]    Anesthesia Start: 1523 Anesthesia Stop: 1612    Procedure: COLONOSCOPY to cecum with hot polypectomies Diagnosis:       Positive colorectal cancer screening using DNA-based stool test      (Positive colorectal cancer screening using DNA-based stool test [R19.5])    Surgeons: Shaun Hernandez MD Provider: Hank Henao MD    Anesthesia Type: MAC ASA Status: 4            Anesthesia Type: MAC    Vitals  Vitals Value Taken Time   /82 11/07/24 1635   Temp     Pulse 86 11/07/24 1635   Resp 23 11/07/24 1635   SpO2 95 % 11/07/24 1635           Post Anesthesia Care and Evaluation    Patient location during evaluation: PACU  Patient participation: complete - patient participated  Level of consciousness: awake and alert  Pain management: adequate    Airway patency: patent  Anesthetic complications: No anesthetic complications    Cardiovascular status: acceptable  Respiratory status: acceptable  Hydration status: acceptable    Comments: --------------------            11/07/24               1635     --------------------   BP:       172/82     Pulse:      86       Resp:       23       SpO2:      95%      --------------------

## 2024-11-26 RX ORDER — ASPIRIN 325 MG
325 TABLET ORAL DAILY
Qty: 30 TABLET | Refills: 11 | OUTPATIENT
Start: 2024-11-26

## 2024-12-03 ENCOUNTER — OFFICE VISIT (OUTPATIENT)
Dept: FAMILY MEDICINE | Facility: CLINIC | Age: 74
End: 2024-12-03
Payer: MEDICARE

## 2024-12-03 VITALS
DIASTOLIC BLOOD PRESSURE: 72 MMHG | HEIGHT: 60 IN | WEIGHT: 200.38 LBS | SYSTOLIC BLOOD PRESSURE: 124 MMHG | OXYGEN SATURATION: 98 % | BODY MASS INDEX: 39.34 KG/M2 | HEART RATE: 73 BPM

## 2024-12-03 DIAGNOSIS — E78.2 MIXED HYPERLIPIDEMIA: Chronic | ICD-10-CM

## 2024-12-03 DIAGNOSIS — E03.4 HYPOTHYROIDISM DUE TO ACQUIRED ATROPHY OF THYROID: ICD-10-CM

## 2024-12-03 DIAGNOSIS — K21.9 GASTROESOPHAGEAL REFLUX DISEASE WITHOUT ESOPHAGITIS: ICD-10-CM

## 2024-12-03 DIAGNOSIS — I10 ESSENTIAL HYPERTENSION: ICD-10-CM

## 2024-12-03 DIAGNOSIS — E66.812 CLASS 2 SEVERE OBESITY WITH SERIOUS COMORBIDITY AND BODY MASS INDEX (BMI) OF 37.0 TO 37.9 IN ADULT, UNSPECIFIED OBESITY TYPE: ICD-10-CM

## 2024-12-03 DIAGNOSIS — J30.1 NON-SEASONAL ALLERGIC RHINITIS DUE TO POLLEN: ICD-10-CM

## 2024-12-03 DIAGNOSIS — M75.81 ROTATOR CUFF TENDONITIS, RIGHT: ICD-10-CM

## 2024-12-03 DIAGNOSIS — Z00.00 ENCOUNTER FOR PREVENTIVE HEALTH EXAMINATION: Primary | ICD-10-CM

## 2024-12-03 DIAGNOSIS — I70.0 ATHEROSCLEROSIS OF AORTA: ICD-10-CM

## 2024-12-03 DIAGNOSIS — E66.01 CLASS 2 SEVERE OBESITY WITH SERIOUS COMORBIDITY AND BODY MASS INDEX (BMI) OF 37.0 TO 37.9 IN ADULT, UNSPECIFIED OBESITY TYPE: ICD-10-CM

## 2024-12-03 PROCEDURE — 3078F DIAST BP <80 MM HG: CPT | Mod: CPTII,S$GLB,, | Performed by: NURSE PRACTITIONER

## 2024-12-03 PROCEDURE — 1159F MED LIST DOCD IN RCRD: CPT | Mod: CPTII,S$GLB,, | Performed by: NURSE PRACTITIONER

## 2024-12-03 PROCEDURE — 99999 PR PBB SHADOW E&M-EST. PATIENT-LVL IV: CPT | Mod: PBBFAC,,, | Performed by: NURSE PRACTITIONER

## 2024-12-03 PROCEDURE — 1158F ADVNC CARE PLAN TLK DOCD: CPT | Mod: CPTII,S$GLB,, | Performed by: NURSE PRACTITIONER

## 2024-12-03 PROCEDURE — 3074F SYST BP LT 130 MM HG: CPT | Mod: CPTII,S$GLB,, | Performed by: NURSE PRACTITIONER

## 2024-12-03 PROCEDURE — 1170F FXNL STATUS ASSESSED: CPT | Mod: CPTII,S$GLB,, | Performed by: NURSE PRACTITIONER

## 2024-12-03 PROCEDURE — G0439 PPPS, SUBSEQ VISIT: HCPCS | Mod: S$GLB,,, | Performed by: NURSE PRACTITIONER

## 2024-12-03 PROCEDURE — 1126F AMNT PAIN NOTED NONE PRSNT: CPT | Mod: CPTII,S$GLB,, | Performed by: NURSE PRACTITIONER

## 2024-12-03 PROCEDURE — 1101F PT FALLS ASSESS-DOCD LE1/YR: CPT | Mod: CPTII,S$GLB,, | Performed by: NURSE PRACTITIONER

## 2024-12-03 PROCEDURE — 1160F RVW MEDS BY RX/DR IN RCRD: CPT | Mod: CPTII,S$GLB,, | Performed by: NURSE PRACTITIONER

## 2024-12-03 PROCEDURE — 3288F FALL RISK ASSESSMENT DOCD: CPT | Mod: CPTII,S$GLB,, | Performed by: NURSE PRACTITIONER

## 2024-12-03 RX ORDER — MONTELUKAST SODIUM 10 MG/1
10 TABLET ORAL NIGHTLY
Qty: 30 TABLET | Refills: 11 | Status: SHIPPED | OUTPATIENT
Start: 2024-12-03

## 2024-12-03 NOTE — TELEPHONE ENCOUNTER
LOV                   8/19/2024  NOV                   2/26/2025  Last refill             12/22/23  Protocol              met

## 2024-12-03 NOTE — PROGRESS NOTES
Kenna Schmitt presented for an initial Medicare AWV today. The following components were reviewed and updated:    Medical history  Family History  Social history  Allergies and Current Medications  Health Risk Assessment  Health Maintenance  Care Team    **See Completed Assessments for Annual Wellness visit with in the encounter summary    The following assessments were completed:  Depression Screening  Cognitive function Screening    Timed Get Up Test  Whisper Test      Opioid documentation:      Patient does not have a current opioid prescription.          Vitals:    12/03/24 1058   BP: 124/72   BP Location: Left arm   Patient Position: Sitting   Pulse: 73   SpO2: 98%   Weight: 90.9 kg (200 lb 6.4 oz)   Height: 5' (1.524 m)     Body mass index is 39.14 kg/m².       Physical Exam  Vitals reviewed.   Constitutional:       General: She is not in acute distress.  HENT:      Head: Normocephalic.   Eyes:      Conjunctiva/sclera: Conjunctivae normal.   Cardiovascular:      Rate and Rhythm: Normal rate.   Pulmonary:      Effort: Pulmonary effort is normal. No respiratory distress.   Skin:     General: Skin is warm.   Neurological:      General: No focal deficit present.      Mental Status: She is alert.   Psychiatric:         Mood and Affect: Mood normal.         Thought Content: Thought content normal.           Diagnoses and health risks identified today and associated recommendations/orders:  1. Encounter for preventive health examination  Reviewed health maintenance and provided recommendations   Recommend flu, tdap and rsv vaccines    2. Atherosclerosis of aorta  Continue to monitor  Followed by Andrey Quintero MD   CXR 7/31/19.      3. Class 2 severe obesity with serious comorbidity and body mass index (BMI) of 37.0 to 37.9 in adult, unspecified obesity type  Continue to monitor  Followed by Andrey Quintero MD   Encourage healthy food choices.      4. Essential hypertension  Continue to monitor  Followed by  Andrey Quintero MD .      5. Mixed hyperlipidemia  Continue to monitor  Followed by Andrey Quintero MD   Atorvastatin 20 mg.      6. Hypothyroidism due to acquired atrophy of thyroid  Continue to monitor  Followed by Andrey Quintero MD   Levothyroxine 125 mcg.      7. Rotator cuff tendonitis, right  Continue to monitor  Followed by Andrey Quintero MD .      8. Gastroesophageal reflux disease without esophagitis  Continue to monitor  Followed by Andrey Quintero MD   Continue tums as recommended.        Provided Kenna with a 5-10 year written screening schedule and personal prevention plan. Recommendations were developed using the USPSTF age appropriate recommendations. Education, counseling, and referrals were provided as needed.  After Visit Summary printed and given to patient which includes a list of additional screenings\tests needed.    No follow-ups on file.      Macey French NP

## 2024-12-03 NOTE — PATIENT INSTRUCTIONS
Counseling and Referral of Other Preventative  (Italic type indicates deductible and co-insurance are waived)    Patient Name: Kenna Schmitt  Today's Date: 12/3/2024    Health Maintenance       Date Due Completion Date    TETANUS VACCINE Never done ---    RSV Vaccine (Age 60+ and Pregnant patients) (1 - 1-dose 75+ series) Never done ---    DEXA Scan 09/09/2025 9/9/2022    Lipid Panel 02/22/2029 2/22/2024        No orders of the defined types were placed in this encounter.    The following information is provided to all patients.  This information is to help you find resources for any of the problems found today that may be affecting your health:                  Living healthy guide: www.[facility].[place].gov      Understanding Diabetes: www.diabetes.org      Eating healthy: www.cdc.gov/healthyweight      CDC home safety checklist: www.cdc.gov/steadi/patient.html      Agency on Aging: www.goea.[place].[facility]      Alcoholics anonymous (AA): www.aa.org      Physical Activity: www.aden.nih.gov/aq4edlx      Tobacco use: www.quitwithusla.org

## 2024-12-06 DIAGNOSIS — E03.4 HYPOTHYROIDISM DUE TO ACQUIRED ATROPHY OF THYROID: ICD-10-CM

## 2024-12-06 NOTE — TELEPHONE ENCOUNTER
Care Due:                  Date            Visit Type   Department     Provider  --------------------------------------------------------------------------------                                EP -                              PRIMARY      [facility] FAM[facility]  Last Visit: 02-      CARE ([facility])   HANS Quintero                               -                              [facility]  Next Visit: 03-      CARE ([facility])   HANS Quintero                                                            Last  Test          Frequency    Reason                     Performed    Due Date  --------------------------------------------------------------------------------    CMP.........  12 months..  atorvastatin,              02- 02-                             chlorthalidone, valsartan    Lipid Panel.  12 months..  atorvastatin.............  02- 02-    TSH.........  12 months..  levothyroxine............  02- 02-    [facility] Embedded Care Due Messages. Reference number: [id number].   12/06/2024 6:08:13 AM CST

## 2024-12-08 RX ORDER — LEVOTHYROXINE SODIUM 125 UG/1
TABLET ORAL
Qty: 90 TABLET | Refills: 0 | Status: SHIPPED | OUTPATIENT
Start: 2024-12-08

## 2024-12-09 NOTE — TELEPHONE ENCOUNTER
Provider Staff:  Action required for this patient    Requires labs      Please see care gap opportunities below in Care Due Message.    Thanks!  [facility]     Appointments      Date Provider   Last Visit   2/29/2024 Andrey Quintero MD   Next Visit   3/3/2025 Andrey Quintero MD     Refill Decision Note   Kenna Schmitt  is requesting a refill authorization.  Brief Assessment and Rationale for Refill:  Approve     Medication Therapy Plan:  T4 WNL (2/22/24)      Comments:     Note composed:6:47 PM 12/08/2024

## 2024-12-14 DIAGNOSIS — J30.1 NON-SEASONAL ALLERGIC RHINITIS DUE TO POLLEN: ICD-10-CM

## 2024-12-16 RX ORDER — MONTELUKAST SODIUM 10 MG/1
10 TABLET ORAL NIGHTLY
Qty: 30 TABLET | Refills: 11 | OUTPATIENT
Start: 2024-12-16

## 2024-12-19 DIAGNOSIS — J30.1 NON-SEASONAL ALLERGIC RHINITIS DUE TO POLLEN: ICD-10-CM

## 2024-12-19 RX ORDER — MONTELUKAST SODIUM 10 MG/1
10 TABLET ORAL NIGHTLY
Qty: 30 TABLET | Refills: 11 | Status: SHIPPED | OUTPATIENT
Start: 2024-12-19

## 2024-12-19 NOTE — TELEPHONE ENCOUNTER
PATIENT'S GRANDSON IS CALLING TO CHECK ON THE STATUS OF THIS. PATIENT'S GRANDSON STATES THAT THIS WAS SUPPOSED TO BE READY ON 12/14 BUT THEY STILL DON'T HAVE THIS. PATIENT IS OUT OF THIS.

## 2024-12-30 RX ORDER — ASPIRIN 325 MG
325 TABLET ORAL DAILY
Qty: 30 TABLET | Refills: 11 | Status: SHIPPED | OUTPATIENT
Start: 2024-12-30

## 2025-01-06 DIAGNOSIS — K59.00 CONSTIPATION, UNSPECIFIED CONSTIPATION TYPE: ICD-10-CM

## 2025-01-07 RX ORDER — ATORVASTATIN CALCIUM 80 MG/1
80 TABLET, FILM COATED ORAL NIGHTLY
Qty: 90 TABLET | Refills: 3 | OUTPATIENT
Start: 2025-01-07

## 2025-01-07 RX ORDER — LACTULOSE 10 G/15ML
30 SOLUTION ORAL DAILY
Qty: 1892 ML | Refills: 0 | OUTPATIENT
Start: 2025-01-07

## 2025-01-08 DIAGNOSIS — K59.00 CONSTIPATION, UNSPECIFIED CONSTIPATION TYPE: ICD-10-CM

## 2025-01-09 ENCOUNTER — TELEPHONE (OUTPATIENT)
Dept: FAMILY MEDICINE CLINIC | Facility: CLINIC | Age: 75
End: 2025-01-09
Payer: MEDICARE

## 2025-01-09 RX ORDER — LACTULOSE 10 G/15ML
30 SOLUTION ORAL DAILY
Qty: 1892 ML | Refills: 0 | Status: SHIPPED | OUTPATIENT
Start: 2025-01-09

## 2025-01-09 NOTE — TELEPHONE ENCOUNTER
Provider: DR HALLERON    Caller: Laine Garza    Relationship to Patient: Emergency Contact    Pharmacy: [id number] [address] AT [facility] - [phone]  - [phone]  [phone]     Phone Number:     [phone]       Reason for Call: PATIENT'S DAUGHTER IS CALLING TO CHECK THE STATUS OF A REQUEST FOR A NEW PRESCRIPTION FOR THE FOLLOWING MEDICATION.  SHE STATES PATIENT IS COMPLETELY OUT.  lactulose (CHRONULAC) 10 GM/15ML solution     PLEASE ADVISE.

## 2025-01-09 NOTE — TELEPHONE ENCOUNTER
LOV 8/19/24  NOV 2/26/25  LF 6/10/24    Protocol  Please advise    [facility]

## 2025-01-15 DIAGNOSIS — J30.1 NON-SEASONAL ALLERGIC RHINITIS DUE TO POLLEN: ICD-10-CM

## 2025-01-16 RX ORDER — MONTELUKAST SODIUM 10 MG/1
10 TABLET ORAL NIGHTLY
Qty: 90 TABLET | Refills: 1 | Status: SHIPPED | OUTPATIENT
Start: 2025-01-16

## 2025-01-16 RX ORDER — CLOPIDOGREL BISULFATE 75 MG/1
75 TABLET ORAL DAILY
Qty: 90 TABLET | Refills: 1 | Status: SHIPPED | OUTPATIENT
Start: 2025-01-16

## 2025-02-24 DIAGNOSIS — Z00.00 ENCOUNTER FOR MEDICARE ANNUAL WELLNESS EXAM: ICD-10-CM

## 2025-02-26 ENCOUNTER — LAB VISIT (OUTPATIENT)
Dept: LAB | Facility: HOSPITAL | Age: 75
End: 2025-02-26
Attending: FAMILY MEDICINE
Payer: MEDICARE

## 2025-02-26 ENCOUNTER — OFFICE VISIT (OUTPATIENT)
Dept: FAMILY MEDICINE CLINIC | Facility: CLINIC | Age: 75
End: 2025-02-26
Payer: MEDICARE

## 2025-02-26 VITALS
BODY MASS INDEX: 29.68 KG/M2 | SYSTOLIC BLOOD PRESSURE: 132 MMHG | HEIGHT: 61 IN | HEART RATE: 88 BPM | DIASTOLIC BLOOD PRESSURE: 80 MMHG | TEMPERATURE: 97.8 F | OXYGEN SATURATION: 97 % | WEIGHT: 157.2 LBS

## 2025-02-26 DIAGNOSIS — I63.312 CEREBROVASCULAR ACCIDENT (CVA) DUE TO THROMBOSIS OF LEFT MIDDLE CEREBRAL ARTERY: ICD-10-CM

## 2025-02-26 DIAGNOSIS — E78.2 MIXED HYPERLIPIDEMIA: Chronic | ICD-10-CM

## 2025-02-26 DIAGNOSIS — E11.9 TYPE 2 DIABETES MELLITUS WITHOUT COMPLICATION, WITHOUT LONG-TERM CURRENT USE OF INSULIN: ICD-10-CM

## 2025-02-26 DIAGNOSIS — D75.1 ERYTHROCYTOSIS: ICD-10-CM

## 2025-02-26 DIAGNOSIS — Z23 NEED FOR VACCINATION: ICD-10-CM

## 2025-02-26 DIAGNOSIS — Z23 IMMUNIZATION DUE: ICD-10-CM

## 2025-02-26 DIAGNOSIS — Z12.31 VISIT FOR SCREENING MAMMOGRAM: ICD-10-CM

## 2025-02-26 DIAGNOSIS — D72.828 NEUTROPHILIC LEUKOCYTOSIS: ICD-10-CM

## 2025-02-26 DIAGNOSIS — Z78.0 POST-MENOPAUSAL: ICD-10-CM

## 2025-02-26 DIAGNOSIS — E78.2 MIXED HYPERLIPIDEMIA: ICD-10-CM

## 2025-02-26 DIAGNOSIS — Z00.00 MEDICARE ANNUAL WELLNESS VISIT, SUBSEQUENT: ICD-10-CM

## 2025-02-26 DIAGNOSIS — I50.32 CHRONIC DIASTOLIC (CONGESTIVE) HEART FAILURE: ICD-10-CM

## 2025-02-26 DIAGNOSIS — R79.9 ABNORMAL FINDING OF BLOOD CHEMISTRY, UNSPECIFIED: ICD-10-CM

## 2025-02-26 DIAGNOSIS — E03.4 HYPOTHYROIDISM DUE TO ACQUIRED ATROPHY OF THYROID: ICD-10-CM

## 2025-02-26 DIAGNOSIS — E78.5 HYPERLIPIDEMIA: ICD-10-CM

## 2025-02-26 DIAGNOSIS — I10 ESSENTIAL HYPERTENSION: Primary | ICD-10-CM

## 2025-02-26 DIAGNOSIS — I70.0 ATHEROSCLEROSIS OF AORTA: ICD-10-CM

## 2025-02-26 LAB
ALBUMIN SERPL BCP-MCNC: 4 G/DL (ref 3.5–5.2)
ALP SERPL-CCNC: 70 U/L (ref 40–150)
ALT SERPL W/O P-5'-P-CCNC: 16 U/L (ref 10–44)
ANION GAP SERPL CALC-SCNC: 9 MMOL/L (ref 8–16)
AST SERPL-CCNC: 26 U/L (ref 10–40)
BILIRUB SERPL-MCNC: 0.8 MG/DL (ref 0.1–1)
BUN SERPL-MCNC: 20 MG/DL (ref 8–23)
CALCIUM SERPL-MCNC: 9.5 MG/DL (ref 8.7–10.5)
CHLORIDE SERPL-SCNC: 104 MMOL/L (ref 95–110)
CHOLEST SERPL-MCNC: 180 MG/DL (ref 120–199)
CHOLEST/HDLC SERPL: 2.7 {RATIO} (ref 2–5)
CO2 SERPL-SCNC: 28 MMOL/L (ref 23–29)
CREAT SERPL-MCNC: 0.7 MG/DL (ref 0.5–1.4)
EST. GFR  (NO RACE VARIABLE): >60 ML/MIN/1.73 M^2
GLUCOSE SERPL-MCNC: 100 MG/DL (ref 70–110)
HDLC SERPL-MCNC: 67 MG/DL (ref 40–75)
HDLC SERPL: 37.2 % (ref 20–50)
LDLC SERPL CALC-MCNC: 94.8 MG/DL (ref 63–159)
NONHDLC SERPL-MCNC: 113 MG/DL
POTASSIUM SERPL-SCNC: 3.6 MMOL/L (ref 3.5–5.1)
PROT SERPL-MCNC: 7 G/DL (ref 6–8.4)
SODIUM SERPL-SCNC: 141 MMOL/L (ref 136–145)
TRIGL SERPL-MCNC: 91 MG/DL (ref 30–150)
TSH SERPL DL<=0.005 MIU/L-ACNC: 2.19 UIU/ML (ref 0.4–4)

## 2025-02-26 PROCEDURE — 80061 LIPID PANEL: CPT | Performed by: FAMILY MEDICINE

## 2025-02-26 PROCEDURE — 36415 COLL VENOUS BLD VENIPUNCTURE: CPT | Mod: PO | Performed by: FAMILY MEDICINE

## 2025-02-26 PROCEDURE — 84443 ASSAY THYROID STIM HORMONE: CPT | Performed by: FAMILY MEDICINE

## 2025-02-26 PROCEDURE — 80053 COMPREHEN METABOLIC PANEL: CPT | Performed by: FAMILY MEDICINE

## 2025-02-26 RX ORDER — LISINOPRIL 2.5 MG/1
2.5 TABLET ORAL DAILY
Qty: 90 TABLET | Refills: 1 | Status: SHIPPED | OUTPATIENT
Start: 2025-02-26
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
## 2025-02-26 NOTE — ASSESSMENT & PLAN NOTE
Orders:    Comprehensive Metabolic Panel    Lipid Panel    Hemoglobin A1c    Microalbumin / Creatinine Urine Ratio - Urine, Clean Catch

## 2025-02-27 ENCOUNTER — RESULTS FOLLOW-UP (OUTPATIENT)
Dept: FAMILY MEDICINE | Facility: CLINIC | Age: 75
End: 2025-02-27

## 2025-02-27 LAB
ALBUMIN SERPL-MCNC: 3.9 G/DL (ref 3.5–5.2)
ALBUMIN/GLOB SERPL: 1.4 G/DL
ALP SERPL-CCNC: 152 U/L (ref 39–117)
ALT SERPL-CCNC: 16 U/L (ref 1–33)
AST SERPL-CCNC: 17 U/L (ref 1–32)
BASOPHILS # BLD AUTO: 0.09 10*3/MM3 (ref 0–0.2)
BASOPHILS NFR BLD AUTO: 0.8 % (ref 0–1.5)
BILIRUB SERPL-MCNC: 0.3 MG/DL (ref 0–1.2)
BUN SERPL-MCNC: 15 MG/DL (ref 8–23)
BUN/CREAT SERPL: 20.3 (ref 7–25)
CALCIUM SERPL-MCNC: 10.2 MG/DL (ref 8.6–10.5)
CHLORIDE SERPL-SCNC: 105 MMOL/L (ref 98–107)
CHOLEST SERPL-MCNC: 105 MG/DL (ref 0–200)
CO2 SERPL-SCNC: 21.4 MMOL/L (ref 22–29)
CREAT SERPL-MCNC: 0.74 MG/DL (ref 0.57–1)
EGFRCR SERPLBLD CKD-EPI 2021: 84 ML/MIN/1.73
EOSINOPHIL # BLD AUTO: 0.44 10*3/MM3 (ref 0–0.4)
EOSINOPHIL NFR BLD AUTO: 4.1 % (ref 0.3–6.2)
ERYTHROCYTE [DISTWIDTH] IN BLOOD BY AUTOMATED COUNT: 12.6 % (ref 12.3–15.4)
GLOBULIN SER CALC-MCNC: 2.7 GM/DL
GLUCOSE SERPL-MCNC: 137 MG/DL (ref 65–99)
HBA1C MFR BLD: 6.1 % (ref 4.8–5.6)
HCT VFR BLD AUTO: 43.8 % (ref 34–46.6)
HDLC SERPL-MCNC: 32 MG/DL (ref 40–60)
HGB BLD-MCNC: 14.6 G/DL (ref 12–15.9)
IMM GRANULOCYTES # BLD AUTO: 0.02 10*3/MM3 (ref 0–0.05)
IMM GRANULOCYTES NFR BLD AUTO: 0.2 % (ref 0–0.5)
IRON SATN MFR SERPL: 15 % (ref 20–50)
IRON SERPL-MCNC: 60 MCG/DL (ref 37–145)
LDLC SERPL CALC-MCNC: 57 MG/DL (ref 0–100)
LYMPHOCYTES # BLD AUTO: 2.67 10*3/MM3 (ref 0.7–3.1)
LYMPHOCYTES NFR BLD AUTO: 24.9 % (ref 19.6–45.3)
MCH RBC QN AUTO: 30.8 PG (ref 26.6–33)
MCHC RBC AUTO-ENTMCNC: 33.3 G/DL (ref 31.5–35.7)
MCV RBC AUTO: 92.4 FL (ref 79–97)
MONOCYTES # BLD AUTO: 0.76 10*3/MM3 (ref 0.1–0.9)
MONOCYTES NFR BLD AUTO: 7.1 % (ref 5–12)
NEUTROPHILS # BLD AUTO: 6.74 10*3/MM3 (ref 1.7–7)
NEUTROPHILS NFR BLD AUTO: 62.9 % (ref 42.7–76)
NRBC BLD AUTO-RTO: 0 /100 WBC (ref 0–0.2)
PLATELET # BLD AUTO: 313 10*3/MM3 (ref 140–450)
POTASSIUM SERPL-SCNC: 4.7 MMOL/L (ref 3.5–5.2)
PROT SERPL-MCNC: 6.6 G/DL (ref 6–8.5)
RBC # BLD AUTO: 4.74 10*6/MM3 (ref 3.77–5.28)
SODIUM SERPL-SCNC: 138 MMOL/L (ref 136–145)
TIBC SERPL-MCNC: 396 MCG/DL
TRIGL SERPL-MCNC: 78 MG/DL (ref 0–150)
UIBC SERPL-MCNC: 336 MCG/DL (ref 112–346)
UNABLE TO VOID: NORMAL
VLDLC SERPL CALC-MCNC: 16 MG/DL (ref 5–40)
WBC # BLD AUTO: 10.72 10*3/MM3 (ref 3.4–10.8)

## 2025-02-28 DIAGNOSIS — E11.9 TYPE 2 DIABETES MELLITUS WITHOUT COMPLICATION, WITHOUT LONG-TERM CURRENT USE OF INSULIN: Primary | ICD-10-CM

## 2025-03-01 LAB
ALBUMIN/CREAT UR: 6 MG/G CREAT (ref 0–29)
CREAT UR-MCNC: 64.7 MG/DL
MICROALBUMIN UR-MCNC: 3.6 UG/ML

## 2025-03-03 ENCOUNTER — OFFICE VISIT (OUTPATIENT)
Dept: FAMILY MEDICINE | Facility: CLINIC | Age: 75
End: 2025-03-03
Payer: MEDICARE

## 2025-03-03 VITALS
SYSTOLIC BLOOD PRESSURE: 118 MMHG | OXYGEN SATURATION: 97 % | BODY MASS INDEX: 39.87 KG/M2 | TEMPERATURE: 98 F | DIASTOLIC BLOOD PRESSURE: 60 MMHG | HEART RATE: 74 BPM | HEIGHT: 60 IN | WEIGHT: 203.06 LBS

## 2025-03-03 DIAGNOSIS — I10 ESSENTIAL HYPERTENSION: ICD-10-CM

## 2025-03-03 DIAGNOSIS — I70.0 ATHEROSCLEROSIS OF AORTA: ICD-10-CM

## 2025-03-03 DIAGNOSIS — E66.01 CLASS 2 SEVERE OBESITY WITH SERIOUS COMORBIDITY AND BODY MASS INDEX (BMI) OF 37.0 TO 37.9 IN ADULT, UNSPECIFIED OBESITY TYPE: ICD-10-CM

## 2025-03-03 DIAGNOSIS — E03.4 HYPOTHYROIDISM DUE TO ACQUIRED ATROPHY OF THYROID: ICD-10-CM

## 2025-03-03 DIAGNOSIS — Z00.00 ANNUAL PHYSICAL EXAM: Primary | ICD-10-CM

## 2025-03-03 DIAGNOSIS — E78.00 HYPERCHOLESTEREMIA: ICD-10-CM

## 2025-03-03 DIAGNOSIS — E66.812 CLASS 2 SEVERE OBESITY WITH SERIOUS COMORBIDITY AND BODY MASS INDEX (BMI) OF 37.0 TO 37.9 IN ADULT, UNSPECIFIED OBESITY TYPE: ICD-10-CM

## 2025-03-03 PROCEDURE — 99999 PR PBB SHADOW E&M-EST. PATIENT-LVL IV: CPT | Mod: PBBFAC,,, | Performed by: FAMILY MEDICINE

## 2025-03-03 RX ORDER — ATORVASTATIN CALCIUM 20 MG/1
20 TABLET, FILM COATED ORAL DAILY
Qty: 90 TABLET | Refills: 3 | Status: SHIPPED | OUTPATIENT
Start: 2025-03-03

## 2025-03-03 RX ORDER — CHLORTHALIDONE 25 MG/1
25 TABLET ORAL DAILY
Qty: 90 TABLET | Refills: 3 | Status: SHIPPED | OUTPATIENT
Start: 2025-03-03

## 2025-03-03 RX ORDER — LEVOTHYROXINE SODIUM 125 UG/1
125 TABLET ORAL
Qty: 90 TABLET | Refills: 3 | Status: SHIPPED | OUTPATIENT
Start: 2025-03-03

## 2025-03-03 RX ORDER — VALSARTAN 80 MG/1
80 TABLET ORAL DAILY
Qty: 90 TABLET | Refills: 3 | Status: SHIPPED | OUTPATIENT
Start: 2025-03-03

## 2025-03-03 NOTE — PROGRESS NOTES
"Subjective:       Patient ID: Kenna Schmitt is a 74 y.o. female.    Chief Complaint: Annual Exam    Patient here today for annual exam     Immunizations: Due RSV and Tdap  Last Lab work: 2025  Colon Ca screening: Colonoscopy 2021  Breast Ca Screening: MMG July 2024  Cervical Ca Screening: no longer recommended     HTN:  She is on Diovan and Chlorthalidone.  BP has been controlled  HLD:  She is on Lipitor 20 mg.  Cholesterol to goal Feb 2025.  CXR in 2019 noted "There is atherosclerotic calcification present within the aortic arch"  Hypothyroidism:  She is on Synthroid 125 mcg daily.  Last TSH was normal  in Feb 2025        Review of Systems   Constitutional:  Negative for activity change, appetite change, fatigue and fever.   Respiratory:  Negative for cough, shortness of breath and wheezing.    Cardiovascular:  Negative for chest pain and palpitations.   Gastrointestinal:  Negative for abdominal pain, constipation, diarrhea and nausea.   Skin:  Negative for pallor and rash.   Neurological:  Negative for dizziness, syncope, light-headedness and headaches.   Psychiatric/Behavioral:  The patient is not nervous/anxious.        Objective:      Vitals:    03/03/25 0958   BP: 118/60   Pulse: 74   Temp: 97.9 °F (36.6 °C)   SpO2: 97%   Weight: 92.1 kg (203 lb 0.7 oz)   Height: 5' (1.524 m)      Physical Exam  Constitutional:       General: She is not in acute distress.  Cardiovascular:      Rate and Rhythm: Normal rate and regular rhythm.      Heart sounds: Normal heart sounds. No murmur heard.  Pulmonary:      Effort: Pulmonary effort is normal. No respiratory distress.      Breath sounds: Normal breath sounds. No wheezing, rhonchi or rales.   Musculoskeletal:         General: No swelling.   Skin:     General: Skin is warm and dry.   Neurological:      General: No focal deficit present.      Mental Status: She is alert.   Psychiatric:         Mood and Affect: Mood normal.         Behavior: Behavior normal.         Thought " Content: Thought content normal.         Results for orders placed or performed in visit on 02/26/25   Comprehensive Metabolic Panel    Collection Time: 02/26/25  7:24 AM   Result Value Ref Range    Sodium 141 136 - 145 mmol/L    Potassium 3.6 3.5 - 5.1 mmol/L    Chloride 104 95 - 110 mmol/L    CO2 28 23 - 29 mmol/L    Glucose 100 70 - 110 mg/dL    BUN 20 8 - 23 mg/dL    Creatinine 0.7 0.5 - 1.4 mg/dL    Calcium 9.5 8.7 - 10.5 mg/dL    Total Protein 7.0 6.0 - 8.4 g/dL    Albumin 4.0 3.5 - 5.2 g/dL    Total Bilirubin 0.8 0.1 - 1.0 mg/dL    Alkaline Phosphatase 70 40 - 150 U/L    AST 26 10 - 40 U/L    ALT 16 10 - 44 U/L    eGFR >60.0 >60 mL/min/1.73 m^2    Anion Gap 9 8 - 16 mmol/L   Lipid Panel    Collection Time: 02/26/25  7:24 AM   Result Value Ref Range    Cholesterol 180 120 - 199 mg/dL    Triglycerides 91 30 - 150 mg/dL    HDL 67 40 - 75 mg/dL    LDL Cholesterol 94.8 63.0 - 159.0 mg/dL    HDL/Cholesterol Ratio 37.2 20.0 - 50.0 %    Total Cholesterol/HDL Ratio 2.7 2.0 - 5.0    Non-HDL Cholesterol 113 mg/dL   TSH    Collection Time: 02/26/25  7:24 AM   Result Value Ref Range    TSH 2.190 0.400 - 4.000 uIU/mL      Assessment:       1. Annual physical exam    2. Essential hypertension    3. Hypothyroidism due to acquired atrophy of thyroid    4. Hypercholesteremia    5. Atherosclerosis of aorta    6. Class 2 severe obesity with serious comorbidity and body mass index (BMI) of 37.0 to 37.9 in adult, unspecified obesity type        Plan:       Annual physical exam  Continue to work on dietary improvements (decrease overall calorie intake, decrease sugar and carb intake, decrease animal protein intake)  Continue to exercise at least 30-40 minutes, 3 times per week  Immunizations were discussed and RSV/Tdap at pharmacy  Preventative exams were discussed and up to date   Essential hypertension  -     valsartan (DIOVAN) 80 MG tablet; Take 1 tablet (80 mg total) by mouth once daily.  Dispense: 90 tablet; Refill: 3  -      chlorthalidone (HYGROTEN) 25 MG Tab; Take 1 tablet (25 mg total) by mouth once daily.  Dispense: 90 tablet; Refill: 3  -     Comprehensive Metabolic Panel; Future; Expected date: 03/03/2026  -     TSH; Future; Expected date: 03/03/2026  -     CBC Auto Differential; Future; Expected date: 03/03/2026  Continue current medications  Hypothyroidism due to acquired atrophy of thyroid  -     levothyroxine (SYNTHROID) 125 MCG tablet; Take 1 tablet (125 mcg total) by mouth before breakfast.  Dispense: 90 tablet; Refill: 3  -     TSH; Future; Expected date: 03/03/2026  Continue current medications  Hypercholesteremia  -     atorvastatin (LIPITOR) 20 MG tablet; Take 1 tablet (20 mg total) by mouth once daily.  Dispense: 90 tablet; Refill: 3  -     Lipid Panel; Future; Expected date: 03/03/2026  Continue current medications  Atherosclerosis of aorta  -     atorvastatin (LIPITOR) 20 MG tablet; Take 1 tablet (20 mg total) by mouth once daily.  Dispense: 90 tablet; Refill: 3  -     Lipid Panel; Future; Expected date: 03/03/2026  Continue current medications  Class 2 severe obesity with serious comorbidity and body mass index (BMI) of 37.0 to 37.9 in adult, unspecified obesity type  Work on weight loss    F/U in 1 year with lab work prior    Visit today included increased complexity associated with the care of the episodic problem HTN addressed and managing the longitudinal care of the patient due to the serious and/or complex managed problem(s) as above.       Medication List with Changes/Refills   Current Medications    ASCORBIC ACID, VITAMIN C, (VITAMIN C) 1000 MG TABLET    Take 1,000 mg by mouth once daily.    B COMPLEX VITAMINS CAPSULE    Take 1 capsule by mouth once daily.    CHOLECALCIFEROL, VITAMIN D3, (VITAMIN D3) 50 MCG (2,000 UNIT) CAP    Take 1 capsule by mouth once daily.    LACTOBACILLUS RHAMNOSUS GG (CULTURELLE) 10 BILLION CELL CAPSULE    Take 1 capsule by mouth once daily.    OMEGA-3 FATTY ACIDS/FISH OIL (FISH  OIL-OMEGA-3 FATTY ACIDS) 300-1,000 MG CAPSULE    Take 2 capsules by mouth once daily.    VALACYCLOVIR (VALTREX) 500 MG TABLET    Take 500 mg by mouth 2 (two) times daily as needed.    ZINC GLUCONATE 50 MG TABLET    Take 50 mg by mouth once daily.   Changed and/or Refilled Medications    Modified Medication Previous Medication    ATORVASTATIN (LIPITOR) 20 MG TABLET atorvastatin (LIPITOR) 20 MG tablet       Take 1 tablet (20 mg total) by mouth once daily.    Take 1 tablet (20 mg total) by mouth once daily.    CHLORTHALIDONE (HYGROTEN) 25 MG TAB chlorthalidone (HYGROTEN) 25 MG Tab       Take 1 tablet (25 mg total) by mouth once daily.    Take 1 tablet (25 mg total) by mouth once daily.    LEVOTHYROXINE (SYNTHROID) 125 MCG TABLET levothyroxine (SYNTHROID) 125 MCG tablet       Take 1 tablet (125 mcg total) by mouth before breakfast.    TAKE 1 TABLET(125 MCG) BY MOUTH BEFORE BREAKFAST AS DIRECTED    VALSARTAN (DIOVAN) 80 MG TABLET valsartan (DIOVAN) 80 MG tablet       Take 1 tablet (80 mg total) by mouth once daily.    Take 1 tablet (80 mg total) by mouth once daily.

## 2025-03-04 DIAGNOSIS — E03.4 HYPOTHYROIDISM DUE TO ACQUIRED ATROPHY OF THYROID: ICD-10-CM

## 2025-03-04 NOTE — TELEPHONE ENCOUNTER
No care due was identified.  Health [facility] Embedded Care Due Messages. Reference number: [id number].   3/04/2025 6:07:19 AM CST

## 2025-03-05 RX ORDER — LEVOTHYROXINE SODIUM 125 UG/1
TABLET ORAL
Qty: 90 TABLET | Refills: 3 | OUTPATIENT
Start: 2025-03-05

## 2025-03-05 NOTE — TELEPHONE ENCOUNTER
Refill Decision Note   Kenna Schmitt  is requesting a refill authorization.  Brief Assessment and Rationale for Refill:  Quick Discontinue     Medication Therapy Plan: Receipt confirmed by pharmacy (3/3/2025 10:29 AM CST)      Comments:     Note composed:11:24 AM 03/05/2025

## 2025-03-06 DIAGNOSIS — E78.00 HYPERCHOLESTEREMIA: ICD-10-CM

## 2025-03-06 DIAGNOSIS — I10 ESSENTIAL HYPERTENSION: ICD-10-CM

## 2025-03-06 DIAGNOSIS — I70.0 ATHEROSCLEROSIS OF AORTA: ICD-10-CM

## 2025-03-06 RX ORDER — VALSARTAN 80 MG/1
TABLET ORAL
Qty: 90 TABLET | Refills: 3 | OUTPATIENT
Start: 2025-03-06

## 2025-03-06 RX ORDER — ATORVASTATIN CALCIUM 20 MG/1
TABLET, FILM COATED ORAL
Qty: 90 TABLET | Refills: 3 | OUTPATIENT
Start: 2025-03-06

## 2025-03-06 RX ORDER — CHLORTHALIDONE 25 MG/1
TABLET ORAL
Qty: 90 TABLET | Refills: 3 | OUTPATIENT
Start: 2025-03-06

## 2025-03-06 NOTE — TELEPHONE ENCOUNTER
Refill Decision Note   Kenna Schmitt  is requesting a refill authorization.  Brief Assessment and Rationale for Refill:  Quick Discontinue     Medication Therapy Plan:  Receipt confirmed by pharmacy (3/3/2025 10:29 AM CST)      Comments:     Note composed:11:51 AM 03/06/2025

## 2025-03-06 NOTE — TELEPHONE ENCOUNTER
No care due was identified.  Health [facility] Embedded Care Due Messages. Reference number: [id number].   3/06/2025 6:02:07 AM CST

## 2025-03-24 ENCOUNTER — OFFICE VISIT (OUTPATIENT)
Dept: FAMILY MEDICINE CLINIC | Facility: CLINIC | Age: 75
End: 2025-03-24
Payer: MEDICARE

## 2025-03-24 VITALS
SYSTOLIC BLOOD PRESSURE: 102 MMHG | WEIGHT: 157.4 LBS | OXYGEN SATURATION: 96 % | DIASTOLIC BLOOD PRESSURE: 80 MMHG | HEART RATE: 65 BPM | BODY MASS INDEX: 29.72 KG/M2 | TEMPERATURE: 97.8 F | HEIGHT: 61 IN

## 2025-03-24 DIAGNOSIS — N30.00 ACUTE CYSTITIS WITHOUT HEMATURIA: Primary | ICD-10-CM

## 2025-03-24 DIAGNOSIS — R30.0 DYSURIA: ICD-10-CM

## 2025-03-24 LAB
BILIRUB BLD-MCNC: NEGATIVE MG/DL
CLARITY, POC: CLEAR
COLOR UR: YELLOW
EXPIRATION DATE: NORMAL
GLUCOSE UR STRIP-MCNC: NEGATIVE MG/DL
KETONES UR QL: NEGATIVE
LEUKOCYTE EST, POC: NEGATIVE
Lab: NORMAL
NITRITE UR-MCNC: NEGATIVE MG/ML
PH UR: 6 [PH] (ref 5–8)
PROT UR STRIP-MCNC: NEGATIVE MG/DL
RBC # UR STRIP: NEGATIVE /UL
SP GR UR: 1.02 (ref 1–1.03)
UROBILINOGEN UR QL: NORMAL

## 2025-03-24 PROCEDURE — 3074F SYST BP LT 130 MM HG: CPT

## 2025-03-24 PROCEDURE — 1126F AMNT PAIN NOTED NONE PRSNT: CPT

## 2025-03-24 PROCEDURE — 81003 URINALYSIS AUTO W/O SCOPE: CPT

## 2025-03-24 PROCEDURE — 3079F DIAST BP 80-89 MM HG: CPT

## 2025-03-24 PROCEDURE — 99213 OFFICE O/P EST LOW 20 MIN: CPT

## 2025-03-24 RX ORDER — NITROFURANTOIN 25; 75 MG/1; MG/1
100 CAPSULE ORAL 2 TIMES DAILY
Qty: 14 CAPSULE | Refills: 0 | Status: SHIPPED | OUTPATIENT
Start: 2025-03-24 | End: 2025-03-31

## 2025-03-24 RX ORDER — PHENAZOPYRIDINE HYDROCHLORIDE 200 MG/1
200 TABLET, FILM COATED ORAL 3 TIMES DAILY PRN
Qty: 6 TABLET | Refills: 0 | Status: SHIPPED | OUTPATIENT
Start: 2025-03-24

## 2025-03-24 NOTE — PROGRESS NOTES
"Chief Complaint  burning while uriniating  (With stomach pain for a few days )    Subjective          History of Present Illness  Patient's son in law Greg is present with her today and helped provide history.      Dysuria  Reports Pain and burning while urinating.  lower abd pain.   Ongoing since Friday  Treatment cranberry juice, tylenol with some relief.  history of UTIs, but it has been couple years.  Denies fever, chills, NVD.  History kidney stones, diverticulitis.    Brief Urine Lab Results  (Last result in the past 365 days)        Color   Clarity   Blood   Leuk Est   Nitrite   Protein   CREAT   Urine HCG        03/24/25 1351 Yellow   Clear   Negative   Negative   Negative   Negative                   Objective   Vital Signs:  /80   Pulse 65   Temp 97.8 °F (36.6 °C) (Temporal)   Ht 154.9 cm (60.98\")   Wt 71.4 kg (157 lb 6.4 oz)   SpO2 96%   BMI 29.76 kg/m²   Estimated body mass index is 29.76 kg/m² as calculated from the following:    Height as of this encounter: 154.9 cm (60.98\").    Weight as of this encounter: 71.4 kg (157 lb 6.4 oz).            Physical Exam  Vitals reviewed.   Constitutional:       General: She is not in acute distress.     Appearance: Normal appearance.      Comments: Frail, using walker   HENT:      Head: Normocephalic and atraumatic.      Mouth/Throat:      Mouth: Mucous membranes are moist.      Pharynx: No oropharyngeal exudate or posterior oropharyngeal erythema.   Eyes:      Conjunctiva/sclera: Conjunctivae normal.      Pupils: Pupils are equal, round, and reactive to light.   Cardiovascular:      Rate and Rhythm: Normal rate and regular rhythm.      Pulses: Normal pulses.      Heart sounds: No murmur heard.     No gallop.   Pulmonary:      Effort: Pulmonary effort is normal. No respiratory distress.      Breath sounds: Normal breath sounds. No wheezing.   Abdominal:      General: Bowel sounds are normal. There is no distension.      Palpations: Abdomen is soft.      " Tenderness: There is no abdominal tenderness. There is no right CVA tenderness or left CVA tenderness.   Musculoskeletal:         General: Normal range of motion.      Cervical back: Normal range of motion and neck supple. No tenderness.   Skin:     General: Skin is warm and dry.   Neurological:      Mental Status: She is alert. Mental status is at baseline.   Psychiatric:         Mood and Affect: Mood normal.        Result Review :                Assessment and Plan   Diagnoses and all orders for this visit:    1. Acute cystitis without hematuria (Primary)    2. Dysuria  -     POCT urinalysis dipstick, automated  -     Urine Culture - Urine, Urine, Clean Catch  -     nitrofurantoin, macrocrystal-monohydrate, (Macrobid) 100 MG capsule; Take 1 capsule by mouth 2 (Two) Times a Day for 7 days.  Dispense: 14 capsule; Refill: 0  -     phenazopyridine (Pyridium) 200 MG tablet; Take 1 tablet by mouth 3 (Three) Times a Day As Needed for Bladder Spasms or Dysuria.  Dispense: 6 tablet; Refill: 0    UA negative in office today.  Due to patient's current symptoms, antibiotics initiated today, sent off for culture, will follow-up with results.  Discussed new medication and side effects.  Follow-up if symptoms do not improve or worsen.  F/u with pcp on 08/27/25 for chronic conditions.         Follow Up   Return if symptoms worsen or fail to improve.  Patient was given instructions and counseling regarding her condition or for health maintenance advice. Please see specific information pulled into the AVS if appropriate.

## 2025-03-26 LAB
BACTERIA UR CULT: NORMAL
BACTERIA UR CULT: NORMAL

## 2025-04-01 ENCOUNTER — TELEPHONE (OUTPATIENT)
Dept: FAMILY MEDICINE CLINIC | Facility: CLINIC | Age: 75
End: 2025-04-01
Payer: MEDICARE

## 2025-04-01 NOTE — TELEPHONE ENCOUNTER
PATIENTS DAUGHTER CALLED STATING PATIENT FINISHED ANTIBIOTICS AND STILL HAVING ISSUES. WANTS TO KNOW WHAT STEP THEY CAN TAKE NEXT. [phone]

## 2025-04-01 NOTE — TELEPHONE ENCOUNTER
I called patient and she said that she is having a burning sensation when she pees even though she finished the antibiotic. She said she might go to the ER or an Urgent Care. What should her next step be?    Please advise  AG

## 2025-04-03 NOTE — TELEPHONE ENCOUNTER
I called and spoke with the patients daughter and informed they should be seen by another provider and have a Urine CX . Patients daughter expressed understanding

## 2025-04-16 ENCOUNTER — PATIENT MESSAGE (OUTPATIENT)
Dept: ADMINISTRATIVE | Facility: OTHER | Age: 75
End: 2025-04-16
Payer: MEDICARE

## 2025-07-10 DIAGNOSIS — J30.1 NON-SEASONAL ALLERGIC RHINITIS DUE TO POLLEN: ICD-10-CM

## 2025-07-11 RX ORDER — CLOPIDOGREL BISULFATE 75 MG/1
75 TABLET ORAL DAILY
Qty: 90 TABLET | Refills: 0 | Status: SHIPPED | OUTPATIENT
Start: 2025-07-11

## 2025-07-11 RX ORDER — MONTELUKAST SODIUM 10 MG/1
10 TABLET ORAL NIGHTLY
Qty: 90 TABLET | Refills: 0 | Status: SHIPPED | OUTPATIENT
Start: 2025-07-11

## 2025-07-14 DIAGNOSIS — K59.00 CONSTIPATION, UNSPECIFIED CONSTIPATION TYPE: ICD-10-CM

## 2025-07-14 RX ORDER — LACTULOSE 10 G/15ML
30 SOLUTION ORAL DAILY
Qty: 1892 ML | Refills: 1 | Status: SHIPPED | OUTPATIENT
Start: 2025-07-14

## 2025-07-28 ENCOUNTER — PATIENT MESSAGE (OUTPATIENT)
Dept: FAMILY MEDICINE | Facility: CLINIC | Age: 75
End: 2025-07-28
Payer: MEDICARE

## 2025-08-26 ENCOUNTER — HOSPITAL ENCOUNTER (OUTPATIENT)
Dept: CT IMAGING | Facility: HOSPITAL | Age: 75
Discharge: HOME | End: 2025-08-26
Admitting: NEUROLOGICAL SURGERY
Payer: MEDICARE

## 2025-08-26 DIAGNOSIS — I65.23 BILATERAL CAROTID ARTERY STENOSIS: ICD-10-CM

## 2025-08-26 DIAGNOSIS — I67.9 CEREBROVASCULAR DISEASE: ICD-10-CM

## 2025-08-26 DIAGNOSIS — I77.1 SUBCLAVIAN ARTERIAL STENOSIS: ICD-10-CM

## 2025-08-26 PROCEDURE — 70498 CT ANGIOGRAPHY NECK: CPT

## 2025-08-26 PROCEDURE — 25510000001 IOPAMIDOL PER 1 ML: Performed by: NEUROLOGICAL SURGERY

## 2025-08-26 PROCEDURE — 70496 CT ANGIOGRAPHY HEAD: CPT

## 2025-08-26 RX ORDER — IOPAMIDOL 755 MG/ML
100 INJECTION, SOLUTION INTRAVASCULAR
Status: COMPLETED | OUTPATIENT
Start: 2025-08-26 | End: 2025-08-26

## 2025-08-26 RX ADMIN — IOPAMIDOL 95 ML: 755 INJECTION, SOLUTION INTRAVENOUS at 15:08

## 2025-08-27 ENCOUNTER — OFFICE VISIT (OUTPATIENT)
Dept: FAMILY MEDICINE CLINIC | Facility: CLINIC | Age: 75
End: 2025-08-27
Payer: MEDICARE

## 2025-08-27 VITALS
OXYGEN SATURATION: 97 % | HEART RATE: 93 BPM | BODY MASS INDEX: 30.21 KG/M2 | HEIGHT: 61 IN | WEIGHT: 160 LBS | DIASTOLIC BLOOD PRESSURE: 78 MMHG | SYSTOLIC BLOOD PRESSURE: 118 MMHG

## 2025-08-27 DIAGNOSIS — I10 ESSENTIAL HYPERTENSION: ICD-10-CM

## 2025-08-27 DIAGNOSIS — R42 VERTIGO: ICD-10-CM

## 2025-08-27 DIAGNOSIS — E11.9 TYPE 2 DIABETES MELLITUS WITHOUT COMPLICATION, WITHOUT LONG-TERM CURRENT USE OF INSULIN: ICD-10-CM

## 2025-08-27 DIAGNOSIS — Z12.31 ENCOUNTER FOR SCREENING MAMMOGRAM FOR MALIGNANT NEOPLASM OF BREAST: ICD-10-CM

## 2025-08-27 DIAGNOSIS — E78.2 MIXED HYPERLIPIDEMIA: Primary | ICD-10-CM

## 2025-08-27 DIAGNOSIS — D64.9 ANEMIA, UNSPECIFIED TYPE: ICD-10-CM

## 2025-08-27 DIAGNOSIS — M81.0 AGE-RELATED OSTEOPOROSIS WITHOUT CURRENT PATHOLOGICAL FRACTURE: ICD-10-CM

## 2025-08-27 DIAGNOSIS — K57.30 DIVERTICULOSIS OF LARGE INTESTINE WITHOUT HEMORRHAGE: ICD-10-CM

## 2025-08-27 DIAGNOSIS — D75.1 ERYTHROCYTOSIS: ICD-10-CM

## 2025-08-27 DIAGNOSIS — R23.8 DRY SCALP: ICD-10-CM

## 2025-08-27 DIAGNOSIS — D72.828 NEUTROPHILIC LEUKOCYTOSIS: ICD-10-CM

## 2025-08-27 DIAGNOSIS — E66.09 EXOGENOUS OBESITY: ICD-10-CM

## 2025-08-27 DIAGNOSIS — I50.32 CHRONIC DIASTOLIC (CONGESTIVE) HEART FAILURE: ICD-10-CM

## 2025-08-27 PROBLEM — I67.9 CEREBROVASCULAR DISEASE: Status: RESOLVED | Noted: 2022-06-15 | Resolved: 2025-08-27

## 2025-08-27 PROBLEM — Z12.11 ENCOUNTER FOR SCREENING COLONOSCOPY: Status: RESOLVED | Noted: 2017-03-24 | Resolved: 2025-08-27

## 2025-08-27 PROBLEM — I63.9 ACUTE ISCHEMIC STROKE: Status: RESOLVED | Noted: 2021-04-17 | Resolved: 2025-08-27

## 2025-08-27 PROBLEM — N30.00 ACUTE CYSTITIS WITHOUT HEMATURIA: Status: RESOLVED | Noted: 2025-03-24 | Resolved: 2025-08-27

## 2025-08-27 PROBLEM — N20.1 URETERAL STONE: Status: RESOLVED | Noted: 2019-07-18 | Resolved: 2025-08-27

## 2025-08-27 PROBLEM — R19.5 POSITIVE COLORECTAL CANCER SCREENING USING DNA-BASED STOOL TEST: Status: RESOLVED | Noted: 2024-10-01 | Resolved: 2025-08-27

## 2025-08-27 PROBLEM — R30.0 DYSURIA: Status: RESOLVED | Noted: 2025-03-24 | Resolved: 2025-08-27

## 2025-08-27 PROBLEM — Z12.11 COLON CANCER SCREENING: Status: RESOLVED | Noted: 2020-06-29 | Resolved: 2025-08-27

## 2025-08-29 ENCOUNTER — TELEPHONE (OUTPATIENT)
Dept: FAMILY MEDICINE CLINIC | Facility: CLINIC | Age: 75
End: 2025-08-29
Payer: MEDICARE

## 2025-08-29 DIAGNOSIS — I10 ESSENTIAL HYPERTENSION: ICD-10-CM

## 2025-08-29 DIAGNOSIS — I50.32 CHRONIC DIASTOLIC (CONGESTIVE) HEART FAILURE: ICD-10-CM

## 2025-08-29 RX ORDER — KETOCONAZOLE 20 MG/ML
SHAMPOO, SUSPENSION TOPICAL 2 TIMES WEEKLY
Qty: 120 ML | Refills: 1 | Status: SHIPPED | OUTPATIENT
Start: 2025-09-01

## 2025-08-29 RX ORDER — ALENDRONATE SODIUM 10 MG/1
10 TABLET ORAL
Qty: 90 TABLET | Refills: 0 | Status: SHIPPED | OUTPATIENT
Start: 2025-08-29

## 2025-08-29 RX ORDER — LISINOPRIL 2.5 MG/1
2.5 TABLET ORAL DAILY
Qty: 90 TABLET | Refills: 1 | Status: SHIPPED | OUTPATIENT
Start: 2025-08-29

## 2025-09-01 PROBLEM — E78.00 HYPERCHOLESTEREMIA: Status: RESOLVED | Noted: 2025-03-03 | Resolved: 2025-09-01

## 2025-09-02 ENCOUNTER — TELEPHONE (OUTPATIENT)
Dept: FAMILY MEDICINE | Facility: CLINIC | Age: 75
End: 2025-09-02

## 2025-09-02 ENCOUNTER — OFFICE VISIT (OUTPATIENT)
Dept: FAMILY MEDICINE | Facility: CLINIC | Age: 75
End: 2025-09-02
Payer: MEDICARE

## 2025-09-02 ENCOUNTER — TELEPHONE (OUTPATIENT)
Dept: GENETICS | Facility: CLINIC | Age: 75
End: 2025-09-02
Payer: MEDICARE

## 2025-09-02 ENCOUNTER — OFFICE VISIT (OUTPATIENT)
Dept: OTOLARYNGOLOGY | Facility: CLINIC | Age: 75
End: 2025-09-02
Payer: MEDICARE

## 2025-09-02 VITALS
WEIGHT: 206.44 LBS | DIASTOLIC BLOOD PRESSURE: 66 MMHG | HEART RATE: 64 BPM | TEMPERATURE: 98 F | BODY MASS INDEX: 40.53 KG/M2 | OXYGEN SATURATION: 99 % | HEIGHT: 60 IN | SYSTOLIC BLOOD PRESSURE: 126 MMHG

## 2025-09-02 VITALS — HEIGHT: 60 IN | BODY MASS INDEX: 40.52 KG/M2 | WEIGHT: 206.38 LBS

## 2025-09-02 DIAGNOSIS — I70.0 ATHEROSCLEROSIS OF AORTA: ICD-10-CM

## 2025-09-02 DIAGNOSIS — I10 ESSENTIAL HYPERTENSION: ICD-10-CM

## 2025-09-02 DIAGNOSIS — H61.21 IMPACTED CERUMEN OF RIGHT EAR: ICD-10-CM

## 2025-09-02 DIAGNOSIS — Z23 NEED FOR TETANUS BOOSTER: ICD-10-CM

## 2025-09-02 DIAGNOSIS — E03.4 HYPOTHYROIDISM DUE TO ACQUIRED ATROPHY OF THYROID: ICD-10-CM

## 2025-09-02 DIAGNOSIS — H61.22 IMPACTED CERUMEN OF LEFT EAR: Primary | ICD-10-CM

## 2025-09-02 DIAGNOSIS — Z00.00 ENCOUNTER FOR MEDICARE ANNUAL WELLNESS EXAM: Primary | ICD-10-CM

## 2025-09-02 DIAGNOSIS — E66.813 CLASS 3 SEVERE OBESITY DUE TO EXCESS CALORIES WITH SERIOUS COMORBIDITY AND BODY MASS INDEX (BMI) OF 40.0 TO 44.9 IN ADULT: ICD-10-CM

## 2025-09-02 DIAGNOSIS — E78.2 MIXED HYPERLIPIDEMIA: Chronic | ICD-10-CM

## 2025-09-02 DIAGNOSIS — H90.3 SENSORINEURAL HEARING LOSS (SNHL) OF BOTH EARS: ICD-10-CM

## 2025-09-02 DIAGNOSIS — L98.9 SKIN LESION: ICD-10-CM

## 2025-09-02 DIAGNOSIS — L30.4 INTERTRIGO: ICD-10-CM

## 2025-09-02 DIAGNOSIS — Z83.49 FAMILY HISTORY OF HEMOCHROMATOSIS: ICD-10-CM

## 2025-09-02 DIAGNOSIS — Z83.49 FAMILY HISTORY OF HEMOCHROMATOSIS: Primary | ICD-10-CM

## 2025-09-02 DIAGNOSIS — Z86.0100 HISTORY OF COLONIC POLYPS: Chronic | ICD-10-CM

## 2025-09-02 DIAGNOSIS — H61.22 CERUMEN DEBRIS ON TYMPANIC MEMBRANE, LEFT: ICD-10-CM

## 2025-09-02 DIAGNOSIS — Z96.652 HISTORY OF LEFT KNEE REPLACEMENT: ICD-10-CM

## 2025-09-02 DIAGNOSIS — K21.9 GASTROESOPHAGEAL REFLUX DISEASE, UNSPECIFIED WHETHER ESOPHAGITIS PRESENT: ICD-10-CM

## 2025-09-02 PROBLEM — M22.42 CHONDROMALACIA PATELLAE OF LEFT KNEE: Status: RESOLVED | Noted: 2017-02-10 | Resolved: 2025-09-02

## 2025-09-02 PROBLEM — M75.81 ROTATOR CUFF TENDONITIS, RIGHT: Status: RESOLVED | Noted: 2023-03-01 | Resolved: 2025-09-02

## 2025-09-02 PROBLEM — M17.12 LEFT KNEE DJD: Status: RESOLVED | Noted: 2019-07-08 | Resolved: 2025-09-02

## 2025-09-02 PROCEDURE — 99999 PR PBB SHADOW E&M-EST. PATIENT-LVL III: CPT | Mod: PBBFAC,,, | Performed by: NURSE PRACTITIONER

## 2025-09-02 PROCEDURE — 1101F PT FALLS ASSESS-DOCD LE1/YR: CPT | Mod: CPTII,S$GLB,, | Performed by: NURSE PRACTITIONER

## 2025-09-02 PROCEDURE — 99999 PR PBB SHADOW E&M-EST. PATIENT-LVL V: CPT | Mod: PBBFAC,,, | Performed by: PHYSICIAN ASSISTANT

## 2025-09-02 PROCEDURE — 1126F AMNT PAIN NOTED NONE PRSNT: CPT | Mod: CPTII,S$GLB,, | Performed by: NURSE PRACTITIONER

## 2025-09-02 PROCEDURE — 1159F MED LIST DOCD IN RCRD: CPT | Mod: CPTII,S$GLB,, | Performed by: NURSE PRACTITIONER

## 2025-09-02 PROCEDURE — 99203 OFFICE O/P NEW LOW 30 MIN: CPT | Mod: 25,S$GLB,, | Performed by: NURSE PRACTITIONER

## 2025-09-02 PROCEDURE — 3288F FALL RISK ASSESSMENT DOCD: CPT | Mod: CPTII,S$GLB,, | Performed by: NURSE PRACTITIONER

## 2025-09-02 RX ORDER — LEVOCETIRIZINE DIHYDROCHLORIDE 5 MG/1
5 TABLET, FILM COATED ORAL NIGHTLY
COMMUNITY
Start: 2025-06-28 | End: 2025-09-02

## (undated) DEVICE — FIBR LASR HOLMIUM ACCUMAX 200 1PT USE

## (undated) DEVICE — CATH MIC PROW/PLS 2TP 45D .021 150

## (undated) DEVICE — TIDISHIELD UROLOGY DRAIN BAGS FROSTY VINYL FITS SIEMENS UROSKOP ACCESS 20 PER CASE: Brand: TIDISHIELD

## (undated) DEVICE — GLV SURG BIOGEL LTX PF 7

## (undated) DEVICE — PRT BIOP SEALS

## (undated) DEVICE — KT ORCA ORCAPOD DISP STRL

## (undated) DEVICE — APPLICATOR CHLORAPREP ORN 26ML

## (undated) DEVICE — SENSR O2 OXIMAX FNGR A/ 18IN NONSTR

## (undated) DEVICE — ADAPT CLN BIOGUARD AIR/H2O DISP

## (undated) DEVICE — DRAPE STERI U-SHAPED 47X51IN

## (undated) DEVICE — PAD CAST SPECIALIST STRL 6

## (undated) DEVICE — BASN GW RINGMASTER

## (undated) DEVICE — STPCK 3WY HP ROT

## (undated) DEVICE — STROKE ACCESS: Brand: SOFIA 5F-125CM STR

## (undated) DEVICE — LN SMPL CO2 SHTRM SD STREAM W/M LUER

## (undated) DEVICE — SINGLE-USE BIOPSY FORCEPS: Brand: RADIAL JAW 4

## (undated) DEVICE — ADAPT Y ROT GATEWAY PLS

## (undated) DEVICE — THE SINGLE USE ETRAP – POLYP TRAP IS USED FOR SUCTION RETRIEVAL OF ENDOSCOPICALLY REMOVED POLYPS.: Brand: ETRAP

## (undated) DEVICE — AVIATOR PLUS .014 4.0X20 142CM: Brand: AVIATOR

## (undated) DEVICE — Device: Brand: D-STAT® DRY SILVER CLEAR TOPICAL HEMOSTAT

## (undated) DEVICE — PAD ABD 8X10 STERILE

## (undated) DEVICE — BANDAGE ACE ELASTIC 6"

## (undated) DEVICE — SEE MEDLINE ITEM 146292

## (undated) DEVICE — SLEEVE SCD EXPRESS CALF MEDIUM

## (undated) DEVICE — DRAPE PLASTIC U 60X72

## (undated) DEVICE — DRSNG SURESITE WNDW 4X4.5

## (undated) DEVICE — THE DISPOSABLE RAPTOR GRASPING DEVICE IS USED TO GRASP TISSUE AND/OR RETRIEVE FOREIGN BODIES, EXCISED TISSUE AND STENTS DURING ENDOSCOPIC PROCEDURES.: Brand: RAPTOR

## (undated) DEVICE — PK URETSCP 40

## (undated) DEVICE — TREK CORONARY DILATATION CATHETER 3.0 MM X 20 MM / RAPID-EXCHANGE: Brand: TREK

## (undated) DEVICE — CVR PROB 96IN LF STRL

## (undated) DEVICE — ST ACC MICROPUNCTURE STFF .018 ECHO/PLDM/TP 4F/10CM 21G/7CM

## (undated) DEVICE — DRAPE EXTREMITY W/ABC NON-SLIP

## (undated) DEVICE — GOWN,NON-REINFORCED,SIRUS,SET IN SLV,XL: Brand: MEDLINE

## (undated) DEVICE — GLV SURG BIOGEL LTX PF 7 1/2

## (undated) DEVICE — WIPE INST MEROCEL

## (undated) DEVICE — ELECTRODE REM PLYHSV RETURN 9

## (undated) DEVICE — SEE MEDLINE ITEM 146313

## (undated) DEVICE — MAT QUICK 40X30 FLOOR FLUID LF

## (undated) DEVICE — MYNXGRIP 6F/7F: Brand: MYNXGRIP

## (undated) DEVICE — INFLATION DEVICE: Brand: ENCORE™ 26

## (undated) DEVICE — COPILOT BLEEDBACK CONTROL VALVE: Brand: COPILOT

## (undated) DEVICE — Device: Brand: FUBUKI

## (undated) DEVICE — RADIFOCUS GLIDEWIRE: Brand: GLIDEWIRE

## (undated) DEVICE — LINER GLOVE POWDERFREE SZ 8.5

## (undated) DEVICE — BLADE SHAVER GREAT WHITE 3.5MM

## (undated) DEVICE — SEE MEDLINE ITEM 157128

## (undated) DEVICE — EMBOLIC PROTECTION SYSTEM: Brand: FILTERWIRE EZ™

## (undated) DEVICE — TBG CONN ASP PENUMBRA SYSTEM MAX .88IN

## (undated) DEVICE — 0.2 MICRON INTRAVENOUS FILTER FOR 96 HOUR USE WITH MICRO-BORE EXTENSION TUBING AN LUER-LOCK ADAPTER: Brand: PALL POSIDYNE® ELD FILTER

## (undated) DEVICE — BG WAST DISPOSABLE DEPOT W/TBG48IN S/COCK SPK1400

## (undated) DEVICE — DEL MICROCATH VELOCITY 2.95F 160CM

## (undated) DEVICE — GAUZE SPONGE 4X4 12PLY

## (undated) DEVICE — PUMP COLD THERAPY

## (undated) DEVICE — CATH ASPIR REACT .071IN 132CM

## (undated) DEVICE — KT CATH GUIDE BENCHMARK 071 STR 95CM W/CATH SELECT 5F

## (undated) DEVICE — STNT RETRV SOLITAIREX REVASCULARIZATION 6X40MM 130CM

## (undated) DEVICE — PINNACLE R/O II INTRODUCER SHEATH WITH RADIOPAQUE MARKER: Brand: PINNACLE

## (undated) DEVICE — PAD COLD THERAPY KNEE WRAP ON

## (undated) DEVICE — SOL IRR NACL .9% 3000ML

## (undated) DEVICE — NDL SPINAL 18GX3.5 SPINOCAN

## (undated) DEVICE — TUBING, SUCTION, 1/4" X 10', STRAIGHT: Brand: MEDLINE

## (undated) DEVICE — CLOSURE SKIN STERI STRIP 1/2X4

## (undated) DEVICE — Device

## (undated) DEVICE — NEPTUNE 4 PORT MANIFOLD

## (undated) DEVICE — CATH URETRL FLXITP POLLACK STD 5F 70CM

## (undated) DEVICE — STROKE ACCESS: Brand: SOFIA 5F-115CM STR

## (undated) DEVICE — STANDARD GUIDEWIRE WITH HYDROPHILIC COATING: Brand: SYNCHRO 2

## (undated) DEVICE — CATH ANGIO BCN .038 5F 125CM VTK

## (undated) DEVICE — GLOVE BIOGEL SZ 8 1/2

## (undated) DEVICE — SEE MEDLINE ITEM 152530

## (undated) DEVICE — SEE MEDLINE ITEM 157131

## (undated) DEVICE — BANDAGE ESMARK 6X12

## (undated) DEVICE — SNAR POLYP SENSATION STDOVL 27 240 BX40

## (undated) DEVICE — TBG ART PRESS 24 IN

## (undated) DEVICE — PK ANGIO CERBRL RAD 40

## (undated) DEVICE — STROKE ACCESS: Brand: SOFIA 6F-131CM STR

## (undated) DEVICE — DRESSING N ADH OIL EMUL 3X3

## (undated) DEVICE — BLADE SURG CARBON STEEL SZ11

## (undated) DEVICE — GOWN SURG 2XL DISP TIE BACK

## (undated) DEVICE — SPNG GZ WOVN 4X4IN 12PLY 10/BX STRL

## (undated) DEVICE — TREVO XP PROVUE RETRIEVER: Brand: TREVO XP

## (undated) DEVICE — PATIENT RETURN ELECTRODE, SINGLE-USE, CONTACT QUALITY MONITORING, ADULT, WITH 9FT CORD, FOR PATIENTS WEIGING OVER 33LBS. (15KG): Brand: MEGADYNE

## (undated) DEVICE — APPL CHLORAPREP HI/LITE 26ML ORNG

## (undated) DEVICE — GLV SURG BIOGEL LTX PF 8

## (undated) DEVICE — RADIFOCUS TORQUE DEVICE MULTI-TORQUE VISE: Brand: RADIFOCUS TORQUE DEVICE

## (undated) DEVICE — CANN O2 ETCO2 FITS ALL CONN CO2 SMPL A/ 7IN DISP LF

## (undated) DEVICE — NITINOL STONE RETRIEVAL BASKET: Brand: ZERO TIP

## (undated) DEVICE — SEE MEDLINE ITEM 157216